# Patient Record
Sex: FEMALE | Race: BLACK OR AFRICAN AMERICAN | NOT HISPANIC OR LATINO | Employment: FULL TIME | URBAN - METROPOLITAN AREA
[De-identification: names, ages, dates, MRNs, and addresses within clinical notes are randomized per-mention and may not be internally consistent; named-entity substitution may affect disease eponyms.]

---

## 2018-06-29 ENCOUNTER — OFFICE VISIT (OUTPATIENT)
Dept: URGENT CARE | Facility: CLINIC | Age: 68
End: 2018-06-29
Payer: COMMERCIAL

## 2018-06-29 VITALS
WEIGHT: 180 LBS | HEIGHT: 65 IN | SYSTOLIC BLOOD PRESSURE: 160 MMHG | HEART RATE: 68 BPM | BODY MASS INDEX: 29.99 KG/M2 | DIASTOLIC BLOOD PRESSURE: 67 MMHG | OXYGEN SATURATION: 98 % | TEMPERATURE: 98 F | RESPIRATION RATE: 17 BRPM

## 2018-06-29 DIAGNOSIS — L23.9 ALLERGIC DERMATITIS: Primary | ICD-10-CM

## 2018-06-29 PROCEDURE — 99203 OFFICE O/P NEW LOW 30 MIN: CPT | Mod: S$GLB,,, | Performed by: NURSE PRACTITIONER

## 2018-06-29 RX ORDER — LEVOTHYROXINE SODIUM 50 UG/1
50 TABLET ORAL DAILY
COMMUNITY

## 2018-06-29 RX ORDER — MELOXICAM 15 MG/1
15 TABLET ORAL DAILY
COMMUNITY

## 2018-06-29 RX ORDER — SIMVASTATIN 20 MG/1
20 TABLET, FILM COATED ORAL NIGHTLY
COMMUNITY

## 2018-06-29 RX ORDER — TRIAMCINOLONE ACETONIDE 1 MG/G
OINTMENT TOPICAL 2 TIMES DAILY
Qty: 1 TUBE | Refills: 0 | Status: SHIPPED | OUTPATIENT
Start: 2018-06-29 | End: 2022-01-01

## 2018-06-29 RX ORDER — LISINOPRIL AND HYDROCHLOROTHIAZIDE 12.5; 2 MG/1; MG/1
1 TABLET ORAL DAILY
COMMUNITY
End: 2022-01-01

## 2018-06-29 NOTE — PROGRESS NOTES
"Subjective:       Patient ID: Dominique Mcgee is a 68 y.o. female.    Vitals:  height is 5' 5" (1.651 m) and weight is 81.6 kg (180 lb). Her oral temperature is 98.1 °F (36.7 °C). Her blood pressure is 160/67 (abnormal) and her pulse is 68. Her respiration is 17 and oxygen saturation is 98%.     Chief Complaint: Rash    Generalized itchy rash on arms for 2 weeks. No known contact with chemical, soap change detergent change, yard work or insect bite.  Diffuse reddened papular lesions over arms. Patient is wearing sleeveless shirt today and states this is what she usually wears daily.  No lesions elsewhere on body. No fever throughout.      Rash   This is a new problem. The current episode started 1 to 4 weeks ago. The problem has been gradually worsening since onset. The rash is diffuse. The rash is characterized by swelling, itchiness, pain and burning. It is unknown if there was an exposure to a precipitant. Pertinent negatives include no congestion, cough, fever, joint pain, shortness of breath or sore throat. Past treatments include anti-itch cream. The treatment provided no relief.     Review of Systems   Constitution: Negative for chills, fever and malaise/fatigue.   HENT: Negative for congestion, ear pain, hoarse voice and sore throat.    Eyes: Negative for discharge and redness.   Cardiovascular: Negative for chest pain, dyspnea on exertion and leg swelling.   Respiratory: Negative for cough, shortness of breath, sputum production and wheezing.    Skin: Positive for itching and rash.   Musculoskeletal: Negative for joint pain and myalgias.   Gastrointestinal: Negative for abdominal pain and nausea.   Neurological: Negative for headaches.       Objective:      Physical Exam   Constitutional: She is oriented to person, place, and time. She appears well-developed and well-nourished.   HENT:   Head: Normocephalic and atraumatic. Head is without abrasion, without contusion and without laceration.   Right Ear: " External ear normal.   Left Ear: External ear normal.   Nose: Nose normal.   Mouth/Throat: Oropharynx is clear and moist.   Eyes: Conjunctivae, EOM and lids are normal. Pupils are equal, round, and reactive to light.   Neck: Trachea normal, full passive range of motion without pain and phonation normal. Neck supple.   Cardiovascular: Normal rate, regular rhythm and normal heart sounds.    Pulmonary/Chest: Effort normal and breath sounds normal. No stridor. No respiratory distress. She has no decreased breath sounds. She has no wheezes.   Musculoskeletal: Normal range of motion.   Neurological: She is alert and oriented to person, place, and time.   Skin: Skin is warm, dry and intact. Capillary refill takes less than 2 seconds. Rash noted. No abrasion, no bruising, no burn, no ecchymosis, no laceration and no lesion noted. Rash is maculopapular. No erythema.        Erythematous, maculopapular rash in diffuse patches to bilateral arms   Psychiatric: She has a normal mood and affect. Her speech is normal and behavior is normal. Judgment and thought content normal. Cognition and memory are normal.   Nursing note and vitals reviewed.      Assessment:       1. Allergic dermatitis        Plan:         Allergic dermatitis  -     triamcinolone acetonide 0.1% (KENALOG) 0.1 % ointment; Apply topically 2 (two) times daily. for 14 days  Dispense: 1 Tube; Refill: 0      Patient Instructions     You may continue taking an antihistamine daily.    Please drink plenty of fluids.  Please get plenty of rest.  Please return here or go to the Emergency Department for any concerns or worsening of condition.  If you have a localized reaction it is ok to apply topical Benadryl and/or Cortaid as directed to the affected area.  Please take over the counter Benadryl as directed for the next 24-72 hours as needed for itching unless it makes you sleepy, then you can take over the counter Allegra or Claritin or Zyrtec as directed during the  daytime hours as these generally do not cause drowsiness.  Please follow up with your primary care doctor or specialist as needed.    If you  smoke, please stop smoking.    General Allergic Reactions  An allergic reaction is a set of symptoms caused by an allergen. An allergen is something that causes a persons immune system to react. When a person comes in contact with an allergen, it causes the body to release chemicals. These include the chemical histamine. Histamine causes swelling and itching. It may affect the entire body. This is called a general allergic reaction. Often symptoms affect only 1 part of the body. This is called a local allergic reaction.  You are having an allergic reaction. Almost anything can cause one. Different people are allergic to different things. It is usually something that you ate or swallowed, came into contact with by getting or putting it on your skin or clothes, or something you breathed in the air. This can be very annoying and sometimes scary.  Most of us think of allergic reactions when we have a rash or itchy skin. Symptoms can include:  · Itching of the eyes, nose, and roof of the mouth  · Runny or stuffy nose  · Watery eyes   · Sneezing or coughing   · A blocked feeling in the ear  · Red, itchy rash called hives  · Red and purple spots  · Rash, redness, welts, blisters  · Itching, burning, stinging, pain  · Dry, flaky, cracking, scaly skin  Severe symptoms include:  · Swelling of the face, lips, or other parts of the body  · Hoarse voice  · Trouble swallowing, feeling like your throat is closing  · Trouble breathing, wheezing  · Nausea, vomiting, diarrhea, stomach cramps  · Feeling faint or lightheaded, rapid heart rate  Sometimes the cause may be obvious. But there are so many things that can cause a reaction that you may not be able to figure out. The most important things to help find your allergen are:  · Remembering when it started  · What you were doing at the time  or just before that  · Any activities you were involved in  · Any new products or contacts  Below are some common causes. But remember that almost anything can cause a reaction. You may not even be aware that you came into contact with one of these things:  · Dust, mold, pollen  · Plants (common ones are poison ivy and poison oak, but there are many others)   · Animals  · Foods such as shrimp, shellfish, peanuts, milk products, gluten, and eggs. Also food colorings, flavorings, and additives.  · Insect bites or stings such as bees, mosquitos, fleas, ticks  · Medicines such as penicillin, sulfa medicines, amoxicillin, aspirin, and ibuprofen. But any medicine can cause a reaction.  · Jewelry such as nickel or gold. This can be new, or something youve worn for a while, including zippers and buttons.  · Latex such as in gloves, clothes, toys, balloons, or some tapes. Some people allergic to latex may also have problems with foods like bananas, avocados, kiwi, papaya, or chestnuts.  · Lotions, perfumes, cosmetics, soaps, shampoos, skincare products, nail products  · Chemicals or dyes in clothing, linen, , hair dyes, soaps, iodine  Many viruses and common colds can cause a rash that is not an allergic reaction. Sometimes it is hard to tell the difference between allergies, sensitivity, or an intolerance to something. This is especially true with food. Many things can cause diarrhea, vomiting, stomach cramps, and skin irritation.  Home care    The goal of treatment is to help relieve the symptoms and get you feeling better. The rash will usually fade over several days. But it can sometimes last a couple of weeks. Over the next couple of days, there may be times when it is gets a little worse, and then better again. Here are some things to do:  · If you know what you are allergic to, stay away from it. Future reactions could be worse than this one.  · Avoid tight clothing and anything that heats up your skin (hot  showers or baths, direct sunlight). Heat will make itching worse.  · An ice pack will relieve local areas of intense itching and redness. To make an ice pack, put ice cubes in a plastic bag that seals at the top. Wrap it in a thin, clean towel. Dont put the ice directly on the skin because it can damage the skin.  · Oral diphenhydramine is an over-the-counter antihistamine sold at pharmacy and grocery stores. Unless a prescription antihistamine was given, diphenhydramine may be used to reduce itching if large areas of the skin are involved. It may make you sleepy. So be careful using it in the daytime or when going to school, working, or driving. Note: Dont use diphenhydramine if you have glaucoma or if you are a man with trouble urinating due to an enlarged prostate. There are other antihistamines that wont make you so sleepy. These are good choices for daytime use. Ask your pharmacist for suggestions.  · Dont use diphenhydramine cream on your skin. It can cause a further reaction in some people.  · To help prevent an infection, don't scratch the affected area. Scratching may worsen the reaction and damage your skin. It can also lead to an infection. Always check the affected for signs of an infection.  · Call your healthcare provider and ask what you can use to help decrease the itching.  · To decrease allergic reactions, try the following:    · Use heat-steam to clean your home  · Use high-efficiency particulate (HEPA) vacuums and filters  · Stay away from food and pet triggers  · Kill any cockroaches  · Clean your house often  Follow-up care  Follow up with your healthcare provider, or as advised. If you had a severe reaction today, or if you have had several mild to medium allergic reactions in the past, ask your provider about allergy testing. This can help you find out what you are allergic to. If your reaction included dizziness, fainting, or trouble breathing or swallowing, ask your provider about  carrying auto-injectable epinephrine.  Call 911  Call 911 if any of these occur:  · Trouble breathing or swallowing, wheezing  · Cool, moist, pale skin  · Shortness of breath  · Hoarse voice or trouble speaking  · Confused   · Very drowsy or trouble awakening  · Fainting or loss of consciousness  · Rapid heart rate  · Feeling of dizziness or weakness or a sudden drop in blood pressure  · Feeling of doom  · Feeling lightheaded  · Severe nausea or vomiting, or diarrhea  · Seizure  · Swelling in the face, eyelids, lips, mouth, throat or tongue  · Drooling  When to seek medical advice  Call your healthcare provider right away if any of these occur:  · Spreading areas of itching, redness or swelling  · Nausea or stomach cramps or abdominal pain  · Continuing or recurring symptoms  · Spreading areas of redness, swelling, or itching  · Signs of infection at the affected site:  ¨ Spreading redness  ¨ Increased pain or swelling  ¨ Fluid or colored drainage from the site  ¨ Fever of 100.4°F (38°C) or above lasting for 24 to 48 hours, or as directed by your provider  Date Last Reviewed: 3/1/2017  © 6044-0841 [employer]. [address] [place] [address]. All rights reserved. This information is not intended as a substitute for professional medical care. Always follow your healthcare professional's instructions.

## 2018-06-29 NOTE — PATIENT INSTRUCTIONS
You may continue taking an antihistamine daily.    Please drink plenty of fluids.  Please get plenty of rest.  Please return here or go to the Emergency Department for any concerns or worsening of condition.  If you have a localized reaction it is ok to apply topical Benadryl and/or Cortaid as directed to the affected area.  Please take over the counter Benadryl as directed for the next 24-72 hours as needed for itching unless it makes you sleepy, then you can take over the counter Allegra or Claritin or Zyrtec as directed during the daytime hours as these generally do not cause drowsiness.  Please follow up with your primary care doctor or specialist as needed.    If you  smoke, please stop smoking.    General Allergic Reactions  An allergic reaction is a set of symptoms caused by an allergen. An allergen is something that causes a persons immune system to react. When a person comes in contact with an allergen, it causes the body to release chemicals. These include the chemical histamine. Histamine causes swelling and itching. It may affect the entire body. This is called a general allergic reaction. Often symptoms affect only 1 part of the body. This is called a local allergic reaction.  You are having an allergic reaction. Almost anything can cause one. Different people are allergic to different things. It is usually something that you ate or swallowed, came into contact with by getting or putting it on your skin or clothes, or something you breathed in the air. This can be very annoying and sometimes scary.  Most of us think of allergic reactions when we have a rash or itchy skin. Symptoms can include:  · Itching of the eyes, nose, and roof of the mouth  · Runny or stuffy nose  · Watery eyes   · Sneezing or coughing   · A blocked feeling in the ear  · Red, itchy rash called hives  · Red and purple spots  · Rash, redness, welts, blisters  · Itching, burning, stinging, pain  · Dry, flaky, cracking, scaly  skin  Severe symptoms include:  · Swelling of the face, lips, or other parts of the body  · Hoarse voice  · Trouble swallowing, feeling like your throat is closing  · Trouble breathing, wheezing  · Nausea, vomiting, diarrhea, stomach cramps  · Feeling faint or lightheaded, rapid heart rate  Sometimes the cause may be obvious. But there are so many things that can cause a reaction that you may not be able to figure out. The most important things to help find your allergen are:  · Remembering when it started  · What you were doing at the time or just before that  · Any activities you were involved in  · Any new products or contacts  Below are some common causes. But remember that almost anything can cause a reaction. You may not even be aware that you came into contact with one of these things:  · Dust, mold, pollen  · Plants (common ones are poison ivy and poison oak, but there are many others)   · Animals  · Foods such as shrimp, shellfish, peanuts, milk products, gluten, and eggs. Also food colorings, flavorings, and additives.  · Insect bites or stings such as bees, mosquitos, fleas, ticks  · Medicines such as penicillin, sulfa medicines, amoxicillin, aspirin, and ibuprofen. But any medicine can cause a reaction.  · Jewelry such as nickel or gold. This can be new, or something youve worn for a while, including zippers and buttons.  · Latex such as in gloves, clothes, toys, balloons, or some tapes. Some people allergic to latex may also have problems with foods like bananas, avocados, kiwi, papaya, or chestnuts.  · Lotions, perfumes, cosmetics, soaps, shampoos, skincare products, nail products  · Chemicals or dyes in clothing, linen, , hair dyes, soaps, iodine  Many viruses and common colds can cause a rash that is not an allergic reaction. Sometimes it is hard to tell the difference between allergies, sensitivity, or an intolerance to something. This is especially true with food. Many things can cause  diarrhea, vomiting, stomach cramps, and skin irritation.  Home care    The goal of treatment is to help relieve the symptoms and get you feeling better. The rash will usually fade over several days. But it can sometimes last a couple of weeks. Over the next couple of days, there may be times when it is gets a little worse, and then better again. Here are some things to do:  · If you know what you are allergic to, stay away from it. Future reactions could be worse than this one.  · Avoid tight clothing and anything that heats up your skin (hot showers or baths, direct sunlight). Heat will make itching worse.  · An ice pack will relieve local areas of intense itching and redness. To make an ice pack, put ice cubes in a plastic bag that seals at the top. Wrap it in a thin, clean towel. Dont put the ice directly on the skin because it can damage the skin.  · Oral diphenhydramine is an over-the-counter antihistamine sold at pharmacy and grocery stores. Unless a prescription antihistamine was given, diphenhydramine may be used to reduce itching if large areas of the skin are involved. It may make you sleepy. So be careful using it in the daytime or when going to school, working, or driving. Note: Dont use diphenhydramine if you have glaucoma or if you are a man with trouble urinating due to an enlarged prostate. There are other antihistamines that wont make you so sleepy. These are good choices for daytime use. Ask your pharmacist for suggestions.  · Dont use diphenhydramine cream on your skin. It can cause a further reaction in some people.  · To help prevent an infection, don't scratch the affected area. Scratching may worsen the reaction and damage your skin. It can also lead to an infection. Always check the affected for signs of an infection.  · Call your healthcare provider and ask what you can use to help decrease the itching.  · To decrease allergic reactions, try the following:    · Use heat-steam to clean  your home  · Use high-efficiency particulate (HEPA) vacuums and filters  · Stay away from food and pet triggers  · Kill any cockroaches  · Clean your house often  Follow-up care  Follow up with your healthcare provider, or as advised. If you had a severe reaction today, or if you have had several mild to medium allergic reactions in the past, ask your provider about allergy testing. This can help you find out what you are allergic to. If your reaction included dizziness, fainting, or trouble breathing or swallowing, ask your provider about carrying auto-injectable epinephrine.  Call 911  Call 911 if any of these occur:  · Trouble breathing or swallowing, wheezing  · Cool, moist, pale skin  · Shortness of breath  · Hoarse voice or trouble speaking  · Confused   · Very drowsy or trouble awakening  · Fainting or loss of consciousness  · Rapid heart rate  · Feeling of dizziness or weakness or a sudden drop in blood pressure  · Feeling of doom  · Feeling lightheaded  · Severe nausea or vomiting, or diarrhea  · Seizure  · Swelling in the face, eyelids, lips, mouth, throat or tongue  · Drooling  When to seek medical advice  Call your healthcare provider right away if any of these occur:  · Spreading areas of itching, redness or swelling  · Nausea or stomach cramps or abdominal pain  · Continuing or recurring symptoms  · Spreading areas of redness, swelling, or itching  · Signs of infection at the affected site:  ¨ Spreading redness  ¨ Increased pain or swelling  ¨ Fluid or colored drainage from the site  ¨ Fever of 100.4°F (38°C) or above lasting for 24 to 48 hours, or as directed by your provider  Date Last Reviewed: 3/1/2017  © 1065-0682 [employer]. [address] [place] [address], [place] [address]. All rights reserved. This information is not intended as a substitute for professional medical care. Always follow your healthcare professional's instructions.

## 2021-06-14 ENCOUNTER — HOSPITAL ENCOUNTER (OUTPATIENT)
Dept: RADIOLOGY | Facility: OTHER | Age: 71
Discharge: HOME | End: 2021-06-14
Attending: INTERNAL MEDICINE
Payer: MEDICARE

## 2021-06-14 DIAGNOSIS — Z12.31 VISIT FOR SCREENING MAMMOGRAM: ICD-10-CM

## 2021-06-14 PROCEDURE — 77067 MAMMO DIGITAL SCREENING BILAT WITH TOMO: ICD-10-PCS | Mod: 26,,, | Performed by: RADIOLOGY

## 2021-06-14 PROCEDURE — 77063 BREAST TOMOSYNTHESIS BI: CPT | Mod: 26,,, | Performed by: RADIOLOGY

## 2021-06-14 PROCEDURE — 77067 SCR MAMMO BI INCL CAD: CPT | Mod: 26,,, | Performed by: RADIOLOGY

## 2021-06-14 PROCEDURE — 77067 SCR MAMMO BI INCL CAD: CPT | Mod: TC

## 2021-06-14 PROCEDURE — 77063 MAMMO DIGITAL SCREENING BILAT WITH TOMO: ICD-10-PCS | Mod: 26,,, | Performed by: RADIOLOGY

## 2022-01-01 ENCOUNTER — HOSPITAL ENCOUNTER (EMERGENCY)
Facility: OTHER | Age: 72
Discharge: HOME | End: 2022-11-30
Attending: EMERGENCY MEDICINE
Payer: MEDICARE

## 2022-01-01 ENCOUNTER — HOSPITAL ENCOUNTER (INPATIENT)
Facility: HOSPITAL | Age: 72
LOS: 26 days | DRG: 643 | End: 2023-01-18
Attending: EMERGENCY MEDICINE | Admitting: HOSPITALIST
Payer: MEDICARE

## 2022-01-01 ENCOUNTER — HOSPITAL ENCOUNTER (OUTPATIENT)
Dept: RADIOLOGY | Facility: OTHER | Age: 72
Discharge: HOME | End: 2022-12-10
Attending: INTERNAL MEDICINE
Payer: MEDICARE

## 2022-01-01 ENCOUNTER — HOSPITAL ENCOUNTER (EMERGENCY)
Facility: OTHER | Age: 72
Discharge: HOME | End: 2022-12-12
Attending: EMERGENCY MEDICINE
Payer: MEDICARE

## 2022-01-01 VITALS
DIASTOLIC BLOOD PRESSURE: 84 MMHG | SYSTOLIC BLOOD PRESSURE: 196 MMHG | OXYGEN SATURATION: 100 % | TEMPERATURE: 98 F | BODY MASS INDEX: 25.34 KG/M2 | WEIGHT: 143 LBS | RESPIRATION RATE: 22 BRPM | HEIGHT: 63 IN | HEART RATE: 67 BPM

## 2022-01-01 VITALS
RESPIRATION RATE: 18 BRPM | DIASTOLIC BLOOD PRESSURE: 86 MMHG | HEIGHT: 63 IN | SYSTOLIC BLOOD PRESSURE: 179 MMHG | HEART RATE: 99 BPM | WEIGHT: 132 LBS | BODY MASS INDEX: 23.39 KG/M2 | TEMPERATURE: 99 F | OXYGEN SATURATION: 98 %

## 2022-01-01 DIAGNOSIS — R65.21 SEPTIC SHOCK: ICD-10-CM

## 2022-01-01 DIAGNOSIS — R03.0 ELEVATED BLOOD PRESSURE READING: ICD-10-CM

## 2022-01-01 DIAGNOSIS — R53.1 WEAKNESS: ICD-10-CM

## 2022-01-01 DIAGNOSIS — G95.9 CERVICAL MYELOPATHY: ICD-10-CM

## 2022-01-01 DIAGNOSIS — R06.02 SHORTNESS OF BREATH: ICD-10-CM

## 2022-01-01 DIAGNOSIS — I16.0 HYPERTENSIVE URGENCY: ICD-10-CM

## 2022-01-01 DIAGNOSIS — R07.9 CHEST PAIN: ICD-10-CM

## 2022-01-01 DIAGNOSIS — E87.6 HYPOKALEMIA: ICD-10-CM

## 2022-01-01 DIAGNOSIS — E04.9 ENLARGED THYROID: ICD-10-CM

## 2022-01-01 DIAGNOSIS — R06.02 SOB (SHORTNESS OF BREATH): ICD-10-CM

## 2022-01-01 DIAGNOSIS — E87.1 HYPONATREMIA: Primary | ICD-10-CM

## 2022-01-01 DIAGNOSIS — J96.01 ACUTE HYPOXEMIC RESPIRATORY FAILURE: ICD-10-CM

## 2022-01-01 DIAGNOSIS — R13.10 DYSPHAGIA, UNSPECIFIED TYPE: ICD-10-CM

## 2022-01-01 DIAGNOSIS — A41.9 SEPTIC SHOCK: ICD-10-CM

## 2022-01-01 DIAGNOSIS — R05.9 COUGH, UNSPECIFIED TYPE: Primary | ICD-10-CM

## 2022-01-01 DIAGNOSIS — Z91.89 AT RISK FOR LONG QT SYNDROME: ICD-10-CM

## 2022-01-01 DIAGNOSIS — E87.1 HYPONATREMIA: ICD-10-CM

## 2022-01-01 DIAGNOSIS — R06.09 DYSPNEA ON EXERTION: ICD-10-CM

## 2022-01-01 DIAGNOSIS — J06.9 VIRAL URI WITH COUGH: Primary | ICD-10-CM

## 2022-01-01 DIAGNOSIS — R00.0 TACHYCARDIA: ICD-10-CM

## 2022-01-01 DIAGNOSIS — R06.03 RESPIRATORY DISTRESS: ICD-10-CM

## 2022-01-01 DIAGNOSIS — R60.0 FLUID COLLECTION (EDEMA) IN THE ARMS, LEGS, HANDS AND FEET: ICD-10-CM

## 2022-01-01 DIAGNOSIS — M54.9 BACK PAIN, UNSPECIFIED BACK LOCATION, UNSPECIFIED BACK PAIN LATERALITY, UNSPECIFIED CHRONICITY: ICD-10-CM

## 2022-01-01 DIAGNOSIS — I10 HYPERTENSION, UNSPECIFIED TYPE: ICD-10-CM

## 2022-01-01 DIAGNOSIS — E87.8 HYPOCHLOREMIA: ICD-10-CM

## 2022-01-01 DIAGNOSIS — I10 HYPERTENSION: ICD-10-CM

## 2022-01-01 LAB
ABDOMINAL AORTA MID PSV: 49 CM/S
ALBUMIN SERPL BCP-MCNC: 3.1 G/DL (ref 3.5–5.2)
ALBUMIN SERPL BCP-MCNC: 3.2 G/DL (ref 3.5–5.2)
ALBUMIN SERPL BCP-MCNC: 3.4 G/DL (ref 3.5–5.2)
ALBUMIN SERPL BCP-MCNC: 3.4 G/DL (ref 3.5–5.2)
ALBUMIN SERPL BCP-MCNC: 4.1 G/DL (ref 3.5–5.2)
ALBUMIN SERPL BCP-MCNC: 4.2 G/DL (ref 3.5–5.2)
ALBUMIN SERPL BCP-MCNC: 4.2 G/DL (ref 3.5–5.2)
ALDOST SERPL-MCNC: 10.8 NG/DL
ALDOST SERPL-MCNC: 7 NG/DL
ALDOST SERPL-MCNC: 7.4 NG/DL
ALDOST/RENIN PLAS-RTO: 2.2 RATIO
ALDOST/RENIN PLAS-RTO: 73.7 RATIO
ALLENS TEST: ABNORMAL
ALP SERPL-CCNC: 60 U/L (ref 55–135)
ALP SERPL-CCNC: 64 U/L (ref 55–135)
ALP SERPL-CCNC: 64 U/L (ref 55–135)
ALP SERPL-CCNC: 66 U/L (ref 55–135)
ALP SERPL-CCNC: 68 U/L (ref 55–135)
ALP SERPL-CCNC: 70 U/L (ref 55–135)
ALP SERPL-CCNC: 81 U/L (ref 55–135)
ALT SERPL W/O P-5'-P-CCNC: 10 U/L (ref 10–44)
ALT SERPL W/O P-5'-P-CCNC: 11 U/L (ref 10–44)
ALT SERPL W/O P-5'-P-CCNC: 12 U/L (ref 10–44)
ALT SERPL W/O P-5'-P-CCNC: 14 U/L (ref 10–44)
ALT SERPL W/O P-5'-P-CCNC: 14 U/L (ref 10–44)
AMORPH CRY UR QL COMP ASSIST: ABNORMAL
ANION GAP SERPL CALC-SCNC: 10 MMOL/L (ref 8–16)
ANION GAP SERPL CALC-SCNC: 11 MMOL/L (ref 8–16)
ANION GAP SERPL CALC-SCNC: 11 MMOL/L (ref 8–16)
ANION GAP SERPL CALC-SCNC: 12 MMOL/L (ref 8–16)
ANION GAP SERPL CALC-SCNC: 12 MMOL/L (ref 8–16)
ANION GAP SERPL CALC-SCNC: 5 MMOL/L (ref 8–16)
ANION GAP SERPL CALC-SCNC: 6 MMOL/L (ref 8–16)
ANION GAP SERPL CALC-SCNC: 7 MMOL/L (ref 8–16)
ANION GAP SERPL CALC-SCNC: 7 MMOL/L (ref 8–16)
ANION GAP SERPL CALC-SCNC: 8 MMOL/L (ref 8–16)
ANION GAP SERPL CALC-SCNC: 9 MMOL/L (ref 8–16)
APPEARANCE FLD: CLEAR
ASCENDING AORTA: 2.24 CM
AST SERPL-CCNC: 18 U/L (ref 10–40)
AST SERPL-CCNC: 18 U/L (ref 10–40)
AST SERPL-CCNC: 20 U/L (ref 10–40)
AST SERPL-CCNC: 22 U/L (ref 10–40)
AST SERPL-CCNC: 23 U/L (ref 10–40)
AST SERPL-CCNC: 23 U/L (ref 10–40)
AST SERPL-CCNC: 24 U/L (ref 10–40)
AV INDEX (PROSTH): 0.73
AV MEAN GRADIENT: 3 MMHG
AV PEAK GRADIENT: 5 MMHG
AV VALVE AREA: 2.08 CM2
AV VELOCITY RATIO: 0.75
BACTERIA #/AREA URNS AUTO: ABNORMAL /HPF
BACTERIA #/AREA URNS AUTO: ABNORMAL /HPF
BASOPHILS # BLD AUTO: 0.01 K/UL (ref 0–0.2)
BASOPHILS # BLD AUTO: 0.02 K/UL (ref 0–0.2)
BASOPHILS # BLD AUTO: 0.04 K/UL (ref 0–0.2)
BASOPHILS # BLD AUTO: 0.08 K/UL (ref 0–0.2)
BASOPHILS # BLD AUTO: 0.09 K/UL (ref 0–0.2)
BASOPHILS # BLD AUTO: 0.09 K/UL (ref 0–0.2)
BASOPHILS # BLD AUTO: 0.1 K/UL (ref 0–0.2)
BASOPHILS # BLD AUTO: 0.1 K/UL (ref 0–0.2)
BASOPHILS # BLD AUTO: 0.13 K/UL (ref 0–0.2)
BASOPHILS NFR BLD: 0.1 % (ref 0–1.9)
BASOPHILS NFR BLD: 0.2 % (ref 0–1.9)
BASOPHILS NFR BLD: 0.4 % (ref 0–1.9)
BASOPHILS NFR BLD: 0.8 % (ref 0–1.9)
BASOPHILS NFR BLD: 0.9 % (ref 0–1.9)
BASOPHILS NFR BLD: 1.1 % (ref 0–1.9)
BASOPHILS NFR BLD: 1.3 % (ref 0–1.9)
BASOPHILS NFR BLD: 1.4 % (ref 0–1.9)
BASOPHILS NFR BLD: 1.4 % (ref 0–1.9)
BILIRUB SERPL-MCNC: 0.6 MG/DL (ref 0.1–1)
BILIRUB SERPL-MCNC: 0.6 MG/DL (ref 0.1–1)
BILIRUB SERPL-MCNC: 0.7 MG/DL (ref 0.1–1)
BILIRUB SERPL-MCNC: 0.8 MG/DL (ref 0.1–1)
BILIRUB SERPL-MCNC: 0.9 MG/DL (ref 0.1–1)
BILIRUB SERPL-MCNC: 1 MG/DL (ref 0.1–1)
BILIRUB SERPL-MCNC: 1.1 MG/DL (ref 0.1–1)
BILIRUB UR QL STRIP: NEGATIVE
BILIRUB UR QL STRIP: NEGATIVE
BNP SERPL-MCNC: 16 PG/ML (ref 0–99)
BNP SERPL-MCNC: 31 PG/ML (ref 0–99)
BNP SERPL-MCNC: <10 PG/ML (ref 0–99)
BODY FLD TYPE: NORMAL
BSA FOR ECHO PROCEDURE: 1.64 M2
BUN SERPL-MCNC: 10 MG/DL (ref 8–23)
BUN SERPL-MCNC: 11 MG/DL (ref 8–23)
BUN SERPL-MCNC: 12 MG/DL (ref 8–23)
BUN SERPL-MCNC: 13 MG/DL (ref 8–23)
BUN SERPL-MCNC: 14 MG/DL (ref 8–23)
BUN SERPL-MCNC: 15 MG/DL (ref 8–23)
BUN SERPL-MCNC: 16 MG/DL (ref 8–23)
BUN SERPL-MCNC: 16 MG/DL (ref 8–23)
BUN SERPL-MCNC: 17 MG/DL (ref 8–23)
BUN SERPL-MCNC: 19 MG/DL (ref 8–23)
BUN SERPL-MCNC: 22 MG/DL (ref 8–23)
BUN SERPL-MCNC: 22 MG/DL (ref 8–23)
BUN SERPL-MCNC: 23 MG/DL (ref 8–23)
BUN SERPL-MCNC: 7 MG/DL (ref 8–23)
BUN SERPL-MCNC: 8 MG/DL (ref 8–23)
BUN SERPL-MCNC: 8 MG/DL (ref 8–23)
BUN SERPL-MCNC: 9 MG/DL (ref 8–23)
BUN SERPL-MCNC: 9 MG/DL (ref 8–23)
CALCIUM SERPL-MCNC: 7.6 MG/DL (ref 8.7–10.5)
CALCIUM SERPL-MCNC: 7.6 MG/DL (ref 8.7–10.5)
CALCIUM SERPL-MCNC: 8 MG/DL (ref 8.7–10.5)
CALCIUM SERPL-MCNC: 8.1 MG/DL (ref 8.7–10.5)
CALCIUM SERPL-MCNC: 8.2 MG/DL (ref 8.7–10.5)
CALCIUM SERPL-MCNC: 8.2 MG/DL (ref 8.7–10.5)
CALCIUM SERPL-MCNC: 8.3 MG/DL (ref 8.7–10.5)
CALCIUM SERPL-MCNC: 8.4 MG/DL (ref 8.7–10.5)
CALCIUM SERPL-MCNC: 8.5 MG/DL (ref 8.7–10.5)
CALCIUM SERPL-MCNC: 8.6 MG/DL (ref 8.7–10.5)
CALCIUM SERPL-MCNC: 8.7 MG/DL (ref 8.7–10.5)
CALCIUM SERPL-MCNC: 8.8 MG/DL (ref 8.7–10.5)
CALCIUM SERPL-MCNC: 8.9 MG/DL (ref 8.7–10.5)
CALCIUM SERPL-MCNC: 8.9 MG/DL (ref 8.7–10.5)
CALCIUM SERPL-MCNC: 9 MG/DL (ref 8.7–10.5)
CALCIUM SERPL-MCNC: 9.1 MG/DL (ref 8.7–10.5)
CALCIUM SERPL-MCNC: 9.3 MG/DL (ref 8.7–10.5)
CALCIUM SERPL-MCNC: 9.7 MG/DL (ref 8.7–10.5)
CALCIUM SERPL-MCNC: 9.8 MG/DL (ref 8.7–10.5)
CALCIUM SERPL-MCNC: 9.9 MG/DL (ref 8.7–10.5)
CAOX CRY UR QL COMP ASSIST: ABNORMAL
CHLORIDE SERPL-SCNC: 101 MMOL/L (ref 95–110)
CHLORIDE SERPL-SCNC: 75 MMOL/L (ref 95–110)
CHLORIDE SERPL-SCNC: 76 MMOL/L (ref 95–110)
CHLORIDE SERPL-SCNC: 77 MMOL/L (ref 95–110)
CHLORIDE SERPL-SCNC: 78 MMOL/L (ref 95–110)
CHLORIDE SERPL-SCNC: 79 MMOL/L (ref 95–110)
CHLORIDE SERPL-SCNC: 80 MMOL/L (ref 95–110)
CHLORIDE SERPL-SCNC: 83 MMOL/L (ref 95–110)
CHLORIDE SERPL-SCNC: 83 MMOL/L (ref 95–110)
CHLORIDE SERPL-SCNC: 84 MMOL/L (ref 95–110)
CHLORIDE SERPL-SCNC: 84 MMOL/L (ref 95–110)
CHLORIDE SERPL-SCNC: 87 MMOL/L (ref 95–110)
CHLORIDE SERPL-SCNC: 87 MMOL/L (ref 95–110)
CHLORIDE SERPL-SCNC: 89 MMOL/L (ref 95–110)
CHLORIDE SERPL-SCNC: 89 MMOL/L (ref 95–110)
CHLORIDE SERPL-SCNC: 90 MMOL/L (ref 95–110)
CHLORIDE SERPL-SCNC: 91 MMOL/L (ref 95–110)
CHLORIDE SERPL-SCNC: 93 MMOL/L (ref 95–110)
CHLORIDE SERPL-SCNC: 94 MMOL/L (ref 95–110)
CHLORIDE SERPL-SCNC: 95 MMOL/L (ref 95–110)
CHLORIDE SERPL-SCNC: 97 MMOL/L (ref 95–110)
CHLORIDE SERPL-SCNC: 99 MMOL/L (ref 95–110)
CHOLEST SERPL-MCNC: 151 MG/DL (ref 120–199)
CHOLEST/HDLC SERPL: 3.7 {RATIO} (ref 2–5)
CLARITY UR REFRACT.AUTO: ABNORMAL
CLARITY UR REFRACT.AUTO: CLEAR
CO2 SERPL-SCNC: 23 MMOL/L (ref 23–29)
CO2 SERPL-SCNC: 23 MMOL/L (ref 23–29)
CO2 SERPL-SCNC: 25 MMOL/L (ref 23–29)
CO2 SERPL-SCNC: 26 MMOL/L (ref 23–29)
CO2 SERPL-SCNC: 28 MMOL/L (ref 23–29)
CO2 SERPL-SCNC: 29 MMOL/L (ref 23–29)
CO2 SERPL-SCNC: 30 MMOL/L (ref 23–29)
CO2 SERPL-SCNC: 31 MMOL/L (ref 23–29)
CO2 SERPL-SCNC: 32 MMOL/L (ref 23–29)
CO2 SERPL-SCNC: 35 MMOL/L (ref 23–29)
CO2 SERPL-SCNC: 37 MMOL/L (ref 23–29)
COLOR FLD: COLORLESS
COLOR UR AUTO: COLORLESS
COLOR UR AUTO: YELLOW
CORTIS SERPL-MCNC: 37.8 UG/DL (ref 4.3–22.4)
CREAT SERPL-MCNC: 0.4 MG/DL (ref 0.5–1.4)
CREAT SERPL-MCNC: 0.4 MG/DL (ref 0.5–1.4)
CREAT SERPL-MCNC: 0.5 MG/DL (ref 0.5–1.4)
CREAT SERPL-MCNC: 0.6 MG/DL (ref 0.5–1.4)
CREAT SERPL-MCNC: 0.7 MG/DL (ref 0.5–1.4)
CREAT SERPL-MCNC: 0.8 MG/DL (ref 0.5–1.4)
CREAT UR-MCNC: 110 MG/DL (ref 15–325)
CTP QC/QA: YES
CV ECHO LV RWT: 0.4 CM
D DIMER PPP IA.FEU-MCNC: 0.27 MG/L FEU
D DIMER PPP IA.FEU-MCNC: 0.32 MG/L FEU
D DIMER PPP IA.FEU-MCNC: 0.48 MG/L FEU
DELSYS: ABNORMAL
DIFFERENTIAL METHOD: ABNORMAL
DOP CALC AO PEAK VEL: 1.17 M/S
DOP CALC AO VTI: 25.24 CM
DOP CALC LVOT AREA: 2.8 CM2
DOP CALC LVOT DIAMETER: 1.9 CM
DOP CALC LVOT PEAK VEL: 0.88 M/S
DOP CALC LVOT STROKE VOLUME: 52.45 CM3
DOP CALC MV VTI: 8.26 CM
DOP CALCLVOT PEAK VEL VTI: 18.51 CM
E WAVE DECELERATION TIME: 154.9 MSEC
E/A RATIO: 0.51
E/E' RATIO: 7.57 M/S
ECHO LV POSTERIOR WALL: 0.7 CM (ref 0.6–1.1)
EJECTION FRACTION: 65 %
EOSINOPHIL # BLD AUTO: 0 K/UL (ref 0–0.5)
EOSINOPHIL # BLD AUTO: 0.1 K/UL (ref 0–0.5)
EOSINOPHIL # BLD AUTO: 0.1 K/UL (ref 0–0.5)
EOSINOPHIL # BLD AUTO: 0.2 K/UL (ref 0–0.5)
EOSINOPHIL # BLD AUTO: 0.2 K/UL (ref 0–0.5)
EOSINOPHIL NFR BLD: 0 % (ref 0–8)
EOSINOPHIL NFR BLD: 0 % (ref 0–8)
EOSINOPHIL NFR BLD: 0.1 % (ref 0–8)
EOSINOPHIL NFR BLD: 0.2 % (ref 0–8)
EOSINOPHIL NFR BLD: 0.2 % (ref 0–8)
EOSINOPHIL NFR BLD: 0.9 % (ref 0–8)
EOSINOPHIL NFR BLD: 1.4 % (ref 0–8)
EOSINOPHIL NFR BLD: 1.9 % (ref 0–8)
EOSINOPHIL NFR BLD: 2.5 % (ref 0–8)
EP: 5
ERYTHROCYTE [DISTWIDTH] IN BLOOD BY AUTOMATED COUNT: 12.3 % (ref 11.5–14.5)
ERYTHROCYTE [DISTWIDTH] IN BLOOD BY AUTOMATED COUNT: 12.4 % (ref 11.5–14.5)
ERYTHROCYTE [DISTWIDTH] IN BLOOD BY AUTOMATED COUNT: 12.5 % (ref 11.5–14.5)
ERYTHROCYTE [DISTWIDTH] IN BLOOD BY AUTOMATED COUNT: 12.6 % (ref 11.5–14.5)
ERYTHROCYTE [DISTWIDTH] IN BLOOD BY AUTOMATED COUNT: 12.7 % (ref 11.5–14.5)
ERYTHROCYTE [DISTWIDTH] IN BLOOD BY AUTOMATED COUNT: 12.8 % (ref 11.5–14.5)
ERYTHROCYTE [DISTWIDTH] IN BLOOD BY AUTOMATED COUNT: 12.9 % (ref 11.5–14.5)
ERYTHROCYTE [DISTWIDTH] IN BLOOD BY AUTOMATED COUNT: 13 % (ref 11.5–14.5)
ERYTHROCYTE [DISTWIDTH] IN BLOOD BY AUTOMATED COUNT: 13.1 % (ref 11.5–14.5)
ERYTHROCYTE [DISTWIDTH] IN BLOOD BY AUTOMATED COUNT: 13.3 % (ref 11.5–14.5)
ERYTHROCYTE [DISTWIDTH] IN BLOOD BY AUTOMATED COUNT: 13.3 % (ref 11.5–14.5)
ERYTHROCYTE [SEDIMENTATION RATE] IN BLOOD BY WESTERGREN METHOD: 12 MM/H
ERYTHROCYTE [SEDIMENTATION RATE] IN BLOOD BY WESTERGREN METHOD: 18 MM/H
ERYTHROCYTE [SEDIMENTATION RATE] IN BLOOD BY WESTERGREN METHOD: 8 MM/H
EST. GFR  (NO RACE VARIABLE): >60 ML/MIN/1.73 M^2
ESTIMATED AVG GLUCOSE: 108 MG/DL (ref 68–131)
FIO2: 30
FIO2: 30
FIO2: 40
FRACTIONAL SHORTENING: 32 % (ref 28–44)
GLUCOSE SERPL-MCNC: 100 MG/DL (ref 70–110)
GLUCOSE SERPL-MCNC: 109 MG/DL (ref 70–110)
GLUCOSE SERPL-MCNC: 109 MG/DL (ref 70–110)
GLUCOSE SERPL-MCNC: 113 MG/DL (ref 70–110)
GLUCOSE SERPL-MCNC: 114 MG/DL (ref 70–110)
GLUCOSE SERPL-MCNC: 118 MG/DL (ref 70–110)
GLUCOSE SERPL-MCNC: 118 MG/DL (ref 70–110)
GLUCOSE SERPL-MCNC: 119 MG/DL (ref 70–110)
GLUCOSE SERPL-MCNC: 122 MG/DL (ref 70–110)
GLUCOSE SERPL-MCNC: 123 MG/DL (ref 70–110)
GLUCOSE SERPL-MCNC: 127 MG/DL (ref 70–110)
GLUCOSE SERPL-MCNC: 129 MG/DL (ref 70–110)
GLUCOSE SERPL-MCNC: 129 MG/DL (ref 70–110)
GLUCOSE SERPL-MCNC: 130 MG/DL (ref 70–110)
GLUCOSE SERPL-MCNC: 132 MG/DL (ref 70–110)
GLUCOSE SERPL-MCNC: 133 MG/DL (ref 70–110)
GLUCOSE SERPL-MCNC: 136 MG/DL (ref 70–110)
GLUCOSE SERPL-MCNC: 139 MG/DL (ref 70–110)
GLUCOSE SERPL-MCNC: 150 MG/DL (ref 70–110)
GLUCOSE SERPL-MCNC: 154 MG/DL (ref 70–110)
GLUCOSE SERPL-MCNC: 155 MG/DL (ref 70–110)
GLUCOSE SERPL-MCNC: 171 MG/DL (ref 70–110)
GLUCOSE SERPL-MCNC: 171 MG/DL (ref 70–110)
GLUCOSE SERPL-MCNC: 176 MG/DL (ref 70–110)
GLUCOSE SERPL-MCNC: 256 MG/DL (ref 70–110)
GLUCOSE SERPL-MCNC: 84 MG/DL (ref 70–110)
GLUCOSE SERPL-MCNC: 90 MG/DL (ref 70–110)
GLUCOSE SERPL-MCNC: 97 MG/DL (ref 70–110)
GLUCOSE SERPL-MCNC: 98 MG/DL (ref 70–110)
GLUCOSE SERPL-MCNC: 99 MG/DL (ref 70–110)
GLUCOSE UR QL STRIP: ABNORMAL
GLUCOSE UR QL STRIP: NEGATIVE
GRAM STN SPEC: NORMAL
HBA1C MFR BLD: 5.4 % (ref 4–5.6)
HCO3 UR-SCNC: 28.1 MMOL/L (ref 24–28)
HCO3 UR-SCNC: 28.7 MMOL/L (ref 24–28)
HCO3 UR-SCNC: 30.3 MMOL/L (ref 24–28)
HCO3 UR-SCNC: 32.8 MMOL/L (ref 24–28)
HCT VFR BLD AUTO: 26.3 % (ref 37–48.5)
HCT VFR BLD AUTO: 28.9 % (ref 37–48.5)
HCT VFR BLD AUTO: 30.3 % (ref 37–48.5)
HCT VFR BLD AUTO: 32.7 % (ref 37–48.5)
HCT VFR BLD AUTO: 33.1 % (ref 37–48.5)
HCT VFR BLD AUTO: 35.3 % (ref 37–48.5)
HCT VFR BLD AUTO: 35.7 % (ref 37–48.5)
HCT VFR BLD AUTO: 37.9 % (ref 37–48.5)
HCT VFR BLD AUTO: 39.9 % (ref 37–48.5)
HCT VFR BLD AUTO: 40 % (ref 37–48.5)
HCT VFR BLD AUTO: 40.5 % (ref 37–48.5)
HCV AB SERPL QL IA: NEGATIVE
HDLC SERPL-MCNC: 41 MG/DL (ref 40–75)
HDLC SERPL: 27.2 % (ref 20–50)
HGB BLD-MCNC: 10.1 G/DL (ref 12–16)
HGB BLD-MCNC: 10.8 G/DL (ref 12–16)
HGB BLD-MCNC: 11 G/DL (ref 12–16)
HGB BLD-MCNC: 11.6 G/DL (ref 12–16)
HGB BLD-MCNC: 12.4 G/DL (ref 12–16)
HGB BLD-MCNC: 12.9 G/DL (ref 12–16)
HGB BLD-MCNC: 13 G/DL (ref 12–16)
HGB BLD-MCNC: 13.5 G/DL (ref 12–16)
HGB BLD-MCNC: 13.7 G/DL (ref 12–16)
HGB BLD-MCNC: 8.9 G/DL (ref 12–16)
HGB BLD-MCNC: 9.7 G/DL (ref 12–16)
HGB UR QL STRIP: ABNORMAL
HGB UR QL STRIP: NEGATIVE
HIV 1+2 AB+HIV1 P24 AG SERPL QL IA: NEGATIVE
HYALINE CASTS UR QL AUTO: 0 /LPF
IMM GRANULOCYTES # BLD AUTO: 0.01 K/UL (ref 0–0.04)
IMM GRANULOCYTES # BLD AUTO: 0.02 K/UL (ref 0–0.04)
IMM GRANULOCYTES # BLD AUTO: 0.03 K/UL (ref 0–0.04)
IMM GRANULOCYTES # BLD AUTO: 0.03 K/UL (ref 0–0.04)
IMM GRANULOCYTES # BLD AUTO: 0.04 K/UL (ref 0–0.04)
IMM GRANULOCYTES # BLD AUTO: 0.04 K/UL (ref 0–0.04)
IMM GRANULOCYTES # BLD AUTO: 0.05 K/UL (ref 0–0.04)
IMM GRANULOCYTES # BLD AUTO: 0.05 K/UL (ref 0–0.04)
IMM GRANULOCYTES # BLD AUTO: 0.06 K/UL (ref 0–0.04)
IMM GRANULOCYTES # BLD AUTO: 0.11 K/UL (ref 0–0.04)
IMM GRANULOCYTES # BLD AUTO: 0.11 K/UL (ref 0–0.04)
IMM GRANULOCYTES NFR BLD AUTO: 0.1 % (ref 0–0.5)
IMM GRANULOCYTES NFR BLD AUTO: 0.3 % (ref 0–0.5)
IMM GRANULOCYTES NFR BLD AUTO: 0.4 % (ref 0–0.5)
IMM GRANULOCYTES NFR BLD AUTO: 0.5 % (ref 0–0.5)
IMM GRANULOCYTES NFR BLD AUTO: 0.5 % (ref 0–0.5)
IMM GRANULOCYTES NFR BLD AUTO: 0.7 % (ref 0–0.5)
IMM GRANULOCYTES NFR BLD AUTO: 0.8 % (ref 0–0.5)
INFLUENZA A, MOLECULAR: NOT DETECTED
INFLUENZA A, MOLECULAR: NOT DETECTED
INFLUENZA B, MOLECULAR: NOT DETECTED
INFLUENZA B, MOLECULAR: NOT DETECTED
INR PPP: 1 (ref 0.8–1.2)
INTERVENTRICULAR SEPTUM: 0.85 CM (ref 0.6–1.1)
IP: 14
IP: 15
IT: 0.9
IVRT: 88.49 MSEC
KAPPA LC SER QL IA: 1.81 MG/DL (ref 0.33–1.94)
KAPPA LC/LAMBDA SER IA: 2.7 (ref 0.26–1.65)
KETONES UR QL STRIP: ABNORMAL
KETONES UR QL STRIP: NEGATIVE
LA MAJOR: 4.43 CM
LA MINOR: 4.37 CM
LA WIDTH: 3.38 CM
LACTATE SERPL-SCNC: 0.6 MMOL/L (ref 0.5–2.2)
LACTATE SERPL-SCNC: 1 MMOL/L (ref 0.5–2.2)
LAMBDA LC SER QL IA: 0.67 MG/DL (ref 0.57–2.63)
LDLC SERPL CALC-MCNC: 99 MG/DL (ref 63–159)
LEFT ATRIUM SIZE: 2.88 CM
LEFT ATRIUM VOLUME INDEX MOD: 16.7 ML/M2
LEFT ATRIUM VOLUME INDEX: 22.3 ML/M2
LEFT ATRIUM VOLUME MOD: 27.21 CM3
LEFT ATRIUM VOLUME: 36.4 CM3
LEFT INTERNAL DIMENSION IN SYSTOLE: 2.35 CM (ref 2.1–4)
LEFT RENAL DIST DIAS: 8 CM/S
LEFT RENAL DIST SYS: 38 CM/S
LEFT RENAL ORIGIN DIAS: 31 CM/S
LEFT RENAL ORIGIN SYS: 189 CM/S
LEFT RENAL ULTRASOUND ACCELERATION TIME MEASUREMENT 1: 46 MS
LEFT RENAL ULTRASOUND ACCELERATION TIME MEASUREMENT AVERAGE: 46 MS
LEFT RENAL ULTRASOUND KIDNEY SIZE MEASUREMENT 1: 10 CM
LEFT RENAL ULTRASOUND KIDNEY SIZE MEASUREMENT AVERAGE: 10 CM
LEFT RENAL ULTRASOUND RESISTIVE INDEX MEASUREMENT 1: 0.79
LEFT RENAL ULTRASOUND RESISTIVE INDEX MEASUREMENT AVERAGE: 0.79
LEFT VENTRICLE DIASTOLIC VOLUME INDEX: 30.76 ML/M2
LEFT VENTRICLE DIASTOLIC VOLUME: 50.14 ML
LEFT VENTRICLE MASS INDEX: 44 G/M2
LEFT VENTRICLE SYSTOLIC VOLUME INDEX: 11.8 ML/M2
LEFT VENTRICLE SYSTOLIC VOLUME: 19.16 ML
LEFT VENTRICULAR INTERNAL DIMENSION IN DIASTOLE: 3.48 CM (ref 3.5–6)
LEFT VENTRICULAR MASS: 71.42 G
LEUKOCYTE ESTERASE UR QL STRIP: NEGATIVE
LEUKOCYTE ESTERASE UR QL STRIP: NEGATIVE
LIPASE SERPL-CCNC: 20 U/L (ref 4–60)
LV LATERAL E/E' RATIO: 7.57 M/S
LV SEPTAL E/E' RATIO: 7.57 M/S
LYMPHOCYTES # BLD AUTO: 0.2 K/UL (ref 1–4.8)
LYMPHOCYTES # BLD AUTO: 0.6 K/UL (ref 1–4.8)
LYMPHOCYTES # BLD AUTO: 0.7 K/UL (ref 1–4.8)
LYMPHOCYTES # BLD AUTO: 0.8 K/UL (ref 1–4.8)
LYMPHOCYTES # BLD AUTO: 1 K/UL (ref 1–4.8)
LYMPHOCYTES # BLD AUTO: 1.2 K/UL (ref 1–4.8)
LYMPHOCYTES # BLD AUTO: 1.5 K/UL (ref 1–4.8)
LYMPHOCYTES # BLD AUTO: 1.6 K/UL (ref 1–4.8)
LYMPHOCYTES # BLD AUTO: 1.7 K/UL (ref 1–4.8)
LYMPHOCYTES # BLD AUTO: 1.7 K/UL (ref 1–4.8)
LYMPHOCYTES # BLD AUTO: 2 K/UL (ref 1–4.8)
LYMPHOCYTES NFR BLD: 1.5 % (ref 18–48)
LYMPHOCYTES NFR BLD: 11.3 % (ref 18–48)
LYMPHOCYTES NFR BLD: 15.1 % (ref 18–48)
LYMPHOCYTES NFR BLD: 18.5 % (ref 18–48)
LYMPHOCYTES NFR BLD: 18.8 % (ref 18–48)
LYMPHOCYTES NFR BLD: 22.8 % (ref 18–48)
LYMPHOCYTES NFR BLD: 23.2 % (ref 18–48)
LYMPHOCYTES NFR BLD: 3.6 % (ref 18–48)
LYMPHOCYTES NFR BLD: 5.9 % (ref 18–48)
LYMPHOCYTES NFR BLD: 7.3 % (ref 18–48)
LYMPHOCYTES NFR BLD: 7.8 % (ref 18–48)
MAGNESIUM SERPL-MCNC: 1.7 MG/DL (ref 1.6–2.6)
MAGNESIUM SERPL-MCNC: 1.8 MG/DL (ref 1.6–2.6)
MAGNESIUM SERPL-MCNC: 1.9 MG/DL (ref 1.6–2.6)
MAGNESIUM SERPL-MCNC: 2 MG/DL (ref 1.6–2.6)
MAGNESIUM SERPL-MCNC: 2.2 MG/DL (ref 1.6–2.6)
MCH RBC QN AUTO: 25.8 PG (ref 27–31)
MCH RBC QN AUTO: 25.8 PG (ref 27–31)
MCH RBC QN AUTO: 25.9 PG (ref 27–31)
MCH RBC QN AUTO: 26.2 PG (ref 27–31)
MCH RBC QN AUTO: 26.2 PG (ref 27–31)
MCH RBC QN AUTO: 26.3 PG (ref 27–31)
MCH RBC QN AUTO: 26.6 PG (ref 27–31)
MCH RBC QN AUTO: 26.7 PG (ref 27–31)
MCH RBC QN AUTO: 26.7 PG (ref 27–31)
MCH RBC QN AUTO: 26.8 PG (ref 27–31)
MCH RBC QN AUTO: 27.1 PG (ref 27–31)
MCHC RBC AUTO-ENTMCNC: 32.1 G/DL (ref 32–36)
MCHC RBC AUTO-ENTMCNC: 32.5 G/DL (ref 32–36)
MCHC RBC AUTO-ENTMCNC: 32.6 G/DL (ref 32–36)
MCHC RBC AUTO-ENTMCNC: 33.3 G/DL (ref 32–36)
MCHC RBC AUTO-ENTMCNC: 33.6 G/DL (ref 32–36)
MCHC RBC AUTO-ENTMCNC: 33.6 G/DL (ref 32–36)
MCHC RBC AUTO-ENTMCNC: 33.8 G/DL (ref 32–36)
MCHC RBC AUTO-ENTMCNC: 33.8 G/DL (ref 32–36)
MCHC RBC AUTO-ENTMCNC: 34 G/DL (ref 32–36)
MCHC RBC AUTO-ENTMCNC: 34.3 G/DL (ref 32–36)
MCHC RBC AUTO-ENTMCNC: 35.1 G/DL (ref 32–36)
MCV RBC AUTO: 76 FL (ref 82–98)
MCV RBC AUTO: 77 FL (ref 82–98)
MCV RBC AUTO: 78 FL (ref 82–98)
MCV RBC AUTO: 79 FL (ref 82–98)
MCV RBC AUTO: 79 FL (ref 82–98)
MCV RBC AUTO: 80 FL (ref 82–98)
MCV RBC AUTO: 80 FL (ref 82–98)
MCV RBC AUTO: 81 FL (ref 82–98)
MCV RBC AUTO: 82 FL (ref 82–98)
MICROSCOPIC COMMENT: ABNORMAL
MICROSCOPIC COMMENT: ABNORMAL
MIN VOL: 9.26
MIN VOL: 9.7
MODE: ABNORMAL
MONOCYTES # BLD AUTO: 0.5 K/UL (ref 0.3–1)
MONOCYTES # BLD AUTO: 0.5 K/UL (ref 0.3–1)
MONOCYTES # BLD AUTO: 0.6 K/UL (ref 0.3–1)
MONOCYTES # BLD AUTO: 0.6 K/UL (ref 0.3–1)
MONOCYTES # BLD AUTO: 0.7 K/UL (ref 0.3–1)
MONOCYTES # BLD AUTO: 0.8 K/UL (ref 0.3–1)
MONOCYTES # BLD AUTO: 1 K/UL (ref 0.3–1)
MONOCYTES # BLD AUTO: 1.1 K/UL (ref 0.3–1)
MONOCYTES # BLD AUTO: 1.2 K/UL (ref 0.3–1)
MONOCYTES # BLD AUTO: 1.4 K/UL (ref 0.3–1)
MONOCYTES # BLD AUTO: 1.5 K/UL (ref 0.3–1)
MONOCYTES NFR BLD: 10.3 % (ref 4–15)
MONOCYTES NFR BLD: 11 % (ref 4–15)
MONOCYTES NFR BLD: 11.1 % (ref 4–15)
MONOCYTES NFR BLD: 4.8 % (ref 4–15)
MONOCYTES NFR BLD: 5.6 % (ref 4–15)
MONOCYTES NFR BLD: 5.9 % (ref 4–15)
MONOCYTES NFR BLD: 7.3 % (ref 4–15)
MONOCYTES NFR BLD: 7.4 % (ref 4–15)
MONOCYTES NFR BLD: 7.8 % (ref 4–15)
MONOCYTES NFR BLD: 8.4 % (ref 4–15)
MONOCYTES NFR BLD: 9.9 % (ref 4–15)
MONOS+MACROS NFR FLD MANUAL: 100 %
MV A" WAVE DURATION": 6.28 MSEC
MV MEAN GRADIENT: 1 MMHG
MV PEAK A VEL: 1.04 M/S
MV PEAK E VEL: 0.53 M/S
MV PEAK GRADIENT: 2 MMHG
MV STENOSIS PRESSURE HALF TIME: 35.27 MS
MV VALVE AREA BY CONTINUITY EQUATION: 6.35 CM2
MV VALVE AREA P 1/2 METHOD: 6.24 CM2
NEUTROPHILS # BLD AUTO: 10.2 K/UL (ref 1.8–7.7)
NEUTROPHILS # BLD AUTO: 12.8 K/UL (ref 1.8–7.7)
NEUTROPHILS # BLD AUTO: 13.1 K/UL (ref 1.8–7.7)
NEUTROPHILS # BLD AUTO: 13.9 K/UL (ref 1.8–7.7)
NEUTROPHILS # BLD AUTO: 4.3 K/UL (ref 1.8–7.7)
NEUTROPHILS # BLD AUTO: 4.7 K/UL (ref 1.8–7.7)
NEUTROPHILS # BLD AUTO: 5.7 K/UL (ref 1.8–7.7)
NEUTROPHILS # BLD AUTO: 6.1 K/UL (ref 1.8–7.7)
NEUTROPHILS # BLD AUTO: 7.1 K/UL (ref 1.8–7.7)
NEUTROPHILS # BLD AUTO: 9.7 K/UL (ref 1.8–7.7)
NEUTROPHILS # BLD AUTO: 9.7 K/UL (ref 1.8–7.7)
NEUTROPHILS NFR BLD: 63.3 % (ref 38–73)
NEUTROPHILS NFR BLD: 64.3 % (ref 38–73)
NEUTROPHILS NFR BLD: 67.5 % (ref 38–73)
NEUTROPHILS NFR BLD: 70.7 % (ref 38–73)
NEUTROPHILS NFR BLD: 73.2 % (ref 38–73)
NEUTROPHILS NFR BLD: 81.4 % (ref 38–73)
NEUTROPHILS NFR BLD: 81.8 % (ref 38–73)
NEUTROPHILS NFR BLD: 86.9 % (ref 38–73)
NEUTROPHILS NFR BLD: 87.8 % (ref 38–73)
NEUTROPHILS NFR BLD: 87.9 % (ref 38–73)
NEUTROPHILS NFR BLD: 90.2 % (ref 38–73)
NITRITE UR QL STRIP: NEGATIVE
NITRITE UR QL STRIP: NEGATIVE
NONHDLC SERPL-MCNC: 110 MG/DL
NRBC BLD-RTO: 0 /100 WBC
OHS CV LEFT RENAL RAR: 3.86
OHS CV RIGHT RENAL RAR: 4.49
OHS CV US LEFT RENAL HIGHEST EDV: 31
OHS CV US LEFT RENAL HIGHEST PSV: 189
OHS CV US RIGHT RENAL HIGHEST EDV: 21
OHS CV US RIGHT RENAL HIGHEST PSV: 220
OSMOLALITY SERPL: 255 MOSM/KG (ref 275–295)
OSMOLALITY UR: 270 MOSM/KG (ref 50–1200)
OSMOLALITY UR: 364 MOSM/KG (ref 50–1200)
OSMOLALITY UR: 572 MOSM/KG (ref 50–1200)
OSMOLALITY UR: 802 MOSM/KG (ref 50–1200)
PCO2 BLDA: 35.5 MMHG (ref 35–45)
PCO2 BLDA: 37 MMHG (ref 35–45)
PCO2 BLDA: 41.2 MMHG (ref 35–45)
PCO2 BLDA: 43.1 MMHG (ref 35–45)
PEEP: 5
PEEP: 6
PEEP: 6
PH SMN: 7.47 [PH] (ref 7.35–7.45)
PH SMN: 7.49 [PH] (ref 7.35–7.45)
PH SMN: 7.5 [PH] (ref 7.35–7.45)
PH SMN: 7.51 [PH] (ref 7.35–7.45)
PH UR STRIP: 6 [PH] (ref 5–8)
PH UR STRIP: 7 [PH] (ref 5–8)
PHOSPHATE SERPL-MCNC: 1.5 MG/DL (ref 2.7–4.5)
PHOSPHATE SERPL-MCNC: 2.5 MG/DL (ref 2.7–4.5)
PHOSPHATE SERPL-MCNC: 2.6 MG/DL (ref 2.7–4.5)
PHOSPHATE SERPL-MCNC: 2.8 MG/DL (ref 2.7–4.5)
PHOSPHATE SERPL-MCNC: 2.9 MG/DL (ref 2.7–4.5)
PHOSPHATE SERPL-MCNC: 3.6 MG/DL (ref 2.7–4.5)
PHOSPHATE SERPL-MCNC: 4.2 MG/DL (ref 2.7–4.5)
PHOSPHATE SERPL-MCNC: 4.7 MG/DL (ref 2.7–4.5)
PHOSPHATE SERPL-MCNC: <1 MG/DL (ref 2.7–4.5)
PHOSPHATE UR-MCNC: 134 MG/DL
PIP: 22
PISA TR MAX VEL: 2.95 M/S
PLATELET # BLD AUTO: 304 K/UL (ref 150–450)
PLATELET # BLD AUTO: 316 K/UL (ref 150–450)
PLATELET # BLD AUTO: 353 K/UL (ref 150–450)
PLATELET # BLD AUTO: 360 K/UL (ref 150–450)
PLATELET # BLD AUTO: 384 K/UL (ref 150–450)
PLATELET # BLD AUTO: 414 K/UL (ref 150–450)
PLATELET # BLD AUTO: 428 K/UL (ref 150–450)
PLATELET # BLD AUTO: 443 K/UL (ref 150–450)
PLATELET # BLD AUTO: 455 K/UL (ref 150–450)
PLATELET # BLD AUTO: 483 K/UL (ref 150–450)
PLATELET # BLD AUTO: 494 K/UL (ref 150–450)
PLATELET BLD QL SMEAR: ABNORMAL
PMV BLD AUTO: 10.4 FL (ref 9.2–12.9)
PMV BLD AUTO: 10.4 FL (ref 9.2–12.9)
PMV BLD AUTO: 11 FL (ref 9.2–12.9)
PMV BLD AUTO: 11.1 FL (ref 9.2–12.9)
PMV BLD AUTO: 11.2 FL (ref 9.2–12.9)
PMV BLD AUTO: 9.4 FL (ref 9.2–12.9)
PMV BLD AUTO: 9.6 FL (ref 9.2–12.9)
PMV BLD AUTO: 9.7 FL (ref 9.2–12.9)
PMV BLD AUTO: 9.8 FL (ref 9.2–12.9)
PMV BLD AUTO: 9.9 FL (ref 9.2–12.9)
PMV BLD AUTO: 9.9 FL (ref 9.2–12.9)
PO2 BLDA: 63 MMHG (ref 80–100)
PO2 BLDA: 66 MMHG (ref 80–100)
PO2 BLDA: 66 MMHG (ref 80–100)
PO2 BLDA: 99 MMHG (ref 80–100)
POC BE: 5 MMOL/L
POC BE: 5 MMOL/L
POC BE: 7 MMOL/L
POC BE: 9 MMOL/L
POC MOLECULAR INFLUENZA A AGN: NEGATIVE
POC MOLECULAR INFLUENZA A AGN: NEGATIVE
POC MOLECULAR INFLUENZA B AGN: NEGATIVE
POC MOLECULAR INFLUENZA B AGN: NEGATIVE
POC SATURATED O2: 94 % (ref 95–100)
POC SATURATED O2: 94 % (ref 95–100)
POC SATURATED O2: 95 % (ref 95–100)
POC SATURATED O2: 98 % (ref 95–100)
POC TCO2: 29 MMOL/L (ref 23–27)
POC TCO2: 30 MMOL/L (ref 23–27)
POC TCO2: 32 MMOL/L (ref 23–27)
POC TCO2: 34 MMOL/L (ref 23–27)
POCT GLUCOSE: 105 MG/DL (ref 70–110)
POCT GLUCOSE: 109 MG/DL (ref 70–110)
POCT GLUCOSE: 113 MG/DL (ref 70–110)
POCT GLUCOSE: 121 MG/DL (ref 70–110)
POCT GLUCOSE: 135 MG/DL (ref 70–110)
POCT GLUCOSE: 142 MG/DL (ref 70–110)
POCT GLUCOSE: 145 MG/DL (ref 70–110)
POCT GLUCOSE: 99 MG/DL (ref 70–110)
POTASSIUM SERPL-SCNC: 2.9 MMOL/L (ref 3.5–5.1)
POTASSIUM SERPL-SCNC: 3 MMOL/L (ref 3.5–5.1)
POTASSIUM SERPL-SCNC: 3.2 MMOL/L (ref 3.5–5.1)
POTASSIUM SERPL-SCNC: 3.4 MMOL/L (ref 3.5–5.1)
POTASSIUM SERPL-SCNC: 3.5 MMOL/L (ref 3.5–5.1)
POTASSIUM SERPL-SCNC: 3.6 MMOL/L (ref 3.5–5.1)
POTASSIUM SERPL-SCNC: 3.6 MMOL/L (ref 3.5–5.1)
POTASSIUM SERPL-SCNC: 3.7 MMOL/L (ref 3.5–5.1)
POTASSIUM SERPL-SCNC: 3.8 MMOL/L (ref 3.5–5.1)
POTASSIUM SERPL-SCNC: 3.9 MMOL/L (ref 3.5–5.1)
POTASSIUM SERPL-SCNC: 4 MMOL/L (ref 3.5–5.1)
POTASSIUM SERPL-SCNC: 4.1 MMOL/L (ref 3.5–5.1)
POTASSIUM SERPL-SCNC: 4.2 MMOL/L (ref 3.5–5.1)
POTASSIUM SERPL-SCNC: 4.2 MMOL/L (ref 3.5–5.1)
POTASSIUM SERPL-SCNC: 4.3 MMOL/L (ref 3.5–5.1)
POTASSIUM SERPL-SCNC: 5 MMOL/L (ref 3.5–5.1)
PROT SERPL-MCNC: 5.7 G/DL (ref 6–8.4)
PROT SERPL-MCNC: 6.1 G/DL (ref 6–8.4)
PROT SERPL-MCNC: 6.4 G/DL (ref 6–8.4)
PROT SERPL-MCNC: 6.6 G/DL (ref 6–8.4)
PROT SERPL-MCNC: 7.7 G/DL (ref 6–8.4)
PROT SERPL-MCNC: 7.9 G/DL (ref 6–8.4)
PROT SERPL-MCNC: 7.9 G/DL (ref 6–8.4)
PROT UR QL STRIP: ABNORMAL
PROT UR QL STRIP: NEGATIVE
PROTHROMBIN TIME: 10.9 SEC (ref 9–12.5)
PS: 14
PULM VEIN S/D RATIO: 2.47
PV PEAK D VEL: 0.3 M/S
PV PEAK S VEL: 0.74 M/S
RA MAJOR: 3.87 CM
RA PRESSURE: 3 MMHG
RA WIDTH: 3.2 CM
RBC # BLD AUTO: 3.28 M/UL (ref 4–5.4)
RBC # BLD AUTO: 3.62 M/UL (ref 4–5.4)
RBC # BLD AUTO: 3.91 M/UL (ref 4–5.4)
RBC # BLD AUTO: 4.19 M/UL (ref 4–5.4)
RBC # BLD AUTO: 4.24 M/UL (ref 4–5.4)
RBC # BLD AUTO: 4.43 M/UL (ref 4–5.4)
RBC # BLD AUTO: 4.67 M/UL (ref 4–5.4)
RBC # BLD AUTO: 4.83 M/UL (ref 4–5.4)
RBC # BLD AUTO: 4.96 M/UL (ref 4–5.4)
RBC # BLD AUTO: 5.14 M/UL (ref 4–5.4)
RBC # BLD AUTO: 5.14 M/UL (ref 4–5.4)
RBC #/AREA URNS AUTO: 4 /HPF (ref 0–4)
RBC #/AREA URNS AUTO: 70 /HPF (ref 0–4)
RENIN PLAS-CCNC: 0.1 NG/ML/HR
RENIN PLAS-CCNC: 1.2 NG/ML/H
RENIN PLAS-CCNC: 4.9 NG/ML/HR
RIGHT RENAL DIST DIAS: 18 CM/S
RIGHT RENAL DIST SYS: 114 CM/S
RIGHT RENAL MID DIAS: 21 CM/S
RIGHT RENAL MID RAR: 2.45
RIGHT RENAL MID SYS: 120 CM/S
RIGHT RENAL ORIGIN DIAS: 20 CM/S
RIGHT RENAL ORIGIN SYS: 220 CM/S
RIGHT RENAL ULTRASOUND ACCELERATION TIME MEASUREMENT 1: 29 MS
RIGHT RENAL ULTRASOUND ACCELERATION TIME MEASUREMENT 2: 34 MS
RIGHT RENAL ULTRASOUND ACCELERATION TIME MEASUREMENT 3: 40 MS
RIGHT RENAL ULTRASOUND ACCELERATION TIME MEASUREMENT AVERAGE: 40 MS
RIGHT RENAL ULTRASOUND KIDNEY SIZE MEASUREMENT 1: 9.6 CM
RIGHT RENAL ULTRASOUND KIDNEY SIZE MEASUREMENT AVERAGE: 9.6 CM
RIGHT RENAL ULTRASOUND RESISTIVE INDEX MEASUREMENT 1: 0.69
RIGHT RENAL ULTRASOUND RESISTIVE INDEX MEASUREMENT 2: 0.79
RIGHT RENAL ULTRASOUND RESISTIVE INDEX MEASUREMENT 3: 0.77
RIGHT RENAL ULTRASOUND RESISTIVE INDEX MEASUREMENT AVERAGE: 0.79
RIGHT VENTRICULAR END-DIASTOLIC DIMENSION: 2.74 CM
RSV AG BY MOLECULAR METHOD: NOT DETECTED
RSV AG BY MOLECULAR METHOD: NOT DETECTED
RV TISSUE DOPPLER FREE WALL SYSTOLIC VELOCITY 1 (APICAL 4 CHAMBER VIEW): 12.17 CM/S
SAMPLE: ABNORMAL
SARS-COV-2 RDRP RESP QL NAA+PROBE: NEGATIVE
SARS-COV-2 RDRP RESP QL NAA+PROBE: NEGATIVE
SARS-COV-2 RNA RESP QL NAA+PROBE: NOT DETECTED
SARS-COV-2 RNA RESP QL NAA+PROBE: NOT DETECTED
SINUS: 2.71 CM
SITE: ABNORMAL
SODIUM SERPL-SCNC: 115 MMOL/L (ref 136–145)
SODIUM SERPL-SCNC: 115 MMOL/L (ref 136–145)
SODIUM SERPL-SCNC: 116 MMOL/L (ref 136–145)
SODIUM SERPL-SCNC: 116 MMOL/L (ref 136–145)
SODIUM SERPL-SCNC: 117 MMOL/L (ref 136–145)
SODIUM SERPL-SCNC: 118 MMOL/L (ref 136–145)
SODIUM SERPL-SCNC: 118 MMOL/L (ref 136–145)
SODIUM SERPL-SCNC: 119 MMOL/L (ref 136–145)
SODIUM SERPL-SCNC: 120 MMOL/L (ref 136–145)
SODIUM SERPL-SCNC: 121 MMOL/L (ref 136–145)
SODIUM SERPL-SCNC: 121 MMOL/L (ref 136–145)
SODIUM SERPL-SCNC: 123 MMOL/L (ref 136–145)
SODIUM SERPL-SCNC: 124 MMOL/L (ref 136–145)
SODIUM SERPL-SCNC: 124 MMOL/L (ref 136–145)
SODIUM SERPL-SCNC: 126 MMOL/L (ref 136–145)
SODIUM SERPL-SCNC: 126 MMOL/L (ref 136–145)
SODIUM SERPL-SCNC: 127 MMOL/L (ref 136–145)
SODIUM SERPL-SCNC: 127 MMOL/L (ref 136–145)
SODIUM SERPL-SCNC: 128 MMOL/L (ref 136–145)
SODIUM SERPL-SCNC: 129 MMOL/L (ref 136–145)
SODIUM SERPL-SCNC: 133 MMOL/L (ref 136–145)
SODIUM SERPL-SCNC: 135 MMOL/L (ref 136–145)
SODIUM SERPL-SCNC: 135 MMOL/L (ref 136–145)
SODIUM SERPL-SCNC: 136 MMOL/L (ref 136–145)
SODIUM UR-SCNC: 109 MMOL/L (ref 20–250)
SODIUM UR-SCNC: 18 MMOL/L (ref 20–250)
SODIUM UR-SCNC: 47 MMOL/L (ref 20–250)
SODIUM UR-SCNC: 91 MMOL/L (ref 20–250)
SODIUM UR-SCNC: <10 MMOL/L (ref 20–250)
SP GR UR STRIP: 1 (ref 1–1.03)
SP GR UR STRIP: 1.01 (ref 1–1.03)
SP02: 94
SPONT RATE: 19
SPONT RATE: 30
SQUAMOUS #/AREA URNS AUTO: 1 /HPF
SQUAMOUS #/AREA URNS AUTO: 8 /HPF
STJ: 2.2 CM
T4 FREE SERPL-MCNC: 1.19 NG/DL (ref 0.71–1.51)
TDI LATERAL: 0.07 M/S
TDI SEPTAL: 0.07 M/S
TDI: 0.07 M/S
TR MAX PG: 35 MMHG
TRICUSPID ANNULAR PLANE SYSTOLIC EXCURSION: 1.49 CM
TRIGL SERPL-MCNC: 55 MG/DL (ref 30–150)
TRIGL SERPL-MCNC: 56 MG/DL (ref 30–150)
TROPONIN I SERPL DL<=0.01 NG/ML-MCNC: 0.01 NG/ML (ref 0–0.03)
TROPONIN I SERPL DL<=0.01 NG/ML-MCNC: 0.01 NG/ML (ref 0–0.03)
TROPONIN I SERPL DL<=0.01 NG/ML-MCNC: 0.02 NG/ML (ref 0–0.03)
TROPONIN I SERPL DL<=0.01 NG/ML-MCNC: 0.02 NG/ML (ref 0–0.03)
TSH SERPL DL<=0.005 MIU/L-ACNC: 0.49 UIU/ML (ref 0.4–4)
TSH SERPL DL<=0.005 MIU/L-ACNC: 2.33 UIU/ML (ref 0.4–4)
TV REST PULMONARY ARTERY PRESSURE: 38 MMHG
URN SPEC COLLECT METH UR: ABNORMAL
URN SPEC COLLECT METH UR: ABNORMAL
VT: 350
VT: 356
WBC # BLD AUTO: 11.14 K/UL (ref 3.9–12.7)
WBC # BLD AUTO: 11.58 K/UL (ref 3.9–12.7)
WBC # BLD AUTO: 13.25 K/UL (ref 3.9–12.7)
WBC # BLD AUTO: 14.55 K/UL (ref 3.9–12.7)
WBC # BLD AUTO: 15.61 K/UL (ref 3.9–12.7)
WBC # BLD AUTO: 15.79 K/UL (ref 3.9–12.7)
WBC # BLD AUTO: 6.68 K/UL (ref 3.9–12.7)
WBC # BLD AUTO: 7.37 K/UL (ref 3.9–12.7)
WBC # BLD AUTO: 8.05 K/UL (ref 3.9–12.7)
WBC # BLD AUTO: 8.74 K/UL (ref 3.9–12.7)
WBC # BLD AUTO: 9.06 K/UL (ref 3.9–12.7)
WBC # FLD: 0 /CU MM
WBC #/AREA URNS AUTO: 8 /HPF (ref 0–5)
YEAST UR QL AUTO: ABNORMAL
YEAST UR QL AUTO: ABNORMAL

## 2022-01-01 PROCEDURE — 0241U SARS-COV2 (COVID) WITH FLU/RSV BY PCR: CPT | Performed by: PHYSICIAN ASSISTANT

## 2022-01-01 PROCEDURE — 99291 CRITICAL CARE FIRST HOUR: CPT | Mod: ,,, | Performed by: INTERNAL MEDICINE

## 2022-01-01 PROCEDURE — 76536 US EXAM OF HEAD AND NECK: CPT | Mod: 26,,, | Performed by: RADIOLOGY

## 2022-01-01 PROCEDURE — 25000003 PHARM REV CODE 250: Performed by: STUDENT IN AN ORGANIZED HEALTH CARE EDUCATION/TRAINING PROGRAM

## 2022-01-01 PROCEDURE — 97530 THERAPEUTIC ACTIVITIES: CPT

## 2022-01-01 PROCEDURE — 63600175 PHARM REV CODE 636 W HCPCS

## 2022-01-01 PROCEDURE — 99233 SBSQ HOSP IP/OBS HIGH 50: CPT | Mod: ,,, | Performed by: INTERNAL MEDICINE

## 2022-01-01 PROCEDURE — 25000003 PHARM REV CODE 250: Performed by: HOSPITALIST

## 2022-01-01 PROCEDURE — 93005 ELECTROCARDIOGRAM TRACING: CPT

## 2022-01-01 PROCEDURE — 27000221 HC OXYGEN, UP TO 24 HOURS

## 2022-01-01 PROCEDURE — 85025 COMPLETE CBC W/AUTO DIFF WBC: CPT | Performed by: STUDENT IN AN ORGANIZED HEALTH CARE EDUCATION/TRAINING PROGRAM

## 2022-01-01 PROCEDURE — 36556 INSERT NON-TUNNEL CV CATH: CPT | Mod: 59,GC,, | Performed by: INTERNAL MEDICINE

## 2022-01-01 PROCEDURE — 84165 PATHOLOGIST INTERPRETATION SPE: ICD-10-PCS | Mod: 26,,, | Performed by: PATHOLOGY

## 2022-01-01 PROCEDURE — 99900035 HC TECH TIME PER 15 MIN (STAT)

## 2022-01-01 PROCEDURE — 86334 IMMUNOFIX E-PHORESIS SERUM: CPT | Mod: 26,,, | Performed by: PATHOLOGY

## 2022-01-01 PROCEDURE — 25000003 PHARM REV CODE 250

## 2022-01-01 PROCEDURE — 84439 ASSAY OF FREE THYROXINE: CPT | Performed by: HOSPITALIST

## 2022-01-01 PROCEDURE — 25000242 PHARM REV CODE 250 ALT 637 W/ HCPCS

## 2022-01-01 PROCEDURE — 80053 COMPREHEN METABOLIC PANEL: CPT | Performed by: STUDENT IN AN ORGANIZED HEALTH CARE EDUCATION/TRAINING PROGRAM

## 2022-01-01 PROCEDURE — 37799 UNLISTED PX VASCULAR SURGERY: CPT

## 2022-01-01 PROCEDURE — 87040 BLOOD CULTURE FOR BACTERIA: CPT | Mod: 59 | Performed by: STUDENT IN AN ORGANIZED HEALTH CARE EDUCATION/TRAINING PROGRAM

## 2022-01-01 PROCEDURE — 36415 COLL VENOUS BLD VENIPUNCTURE: CPT | Performed by: HOSPITALIST

## 2022-01-01 PROCEDURE — 94640 AIRWAY INHALATION TREATMENT: CPT

## 2022-01-01 PROCEDURE — 93010 EKG 12-LEAD: ICD-10-PCS | Mod: 76,,, | Performed by: INTERNAL MEDICINE

## 2022-01-01 PROCEDURE — 20000000 HC ICU ROOM

## 2022-01-01 PROCEDURE — 99285 EMERGENCY DEPT VISIT HI MDM: CPT | Mod: 25

## 2022-01-01 PROCEDURE — 97110 THERAPEUTIC EXERCISES: CPT | Mod: CQ

## 2022-01-01 PROCEDURE — 99223 PR INITIAL HOSPITAL CARE,LEVL III: ICD-10-PCS | Mod: ,,, | Performed by: NEUROLOGICAL SURGERY

## 2022-01-01 PROCEDURE — 36415 COLL VENOUS BLD VENIPUNCTURE: CPT

## 2022-01-01 PROCEDURE — 99233 SBSQ HOSP IP/OBS HIGH 50: CPT | Mod: GC,,, | Performed by: HOSPITALIST

## 2022-01-01 PROCEDURE — 84244 ASSAY OF RENIN: CPT | Performed by: NURSE PRACTITIONER

## 2022-01-01 PROCEDURE — 99291 PR CRITICAL CARE, E/M 30-74 MINUTES: ICD-10-PCS | Mod: ,,, | Performed by: INTERNAL MEDICINE

## 2022-01-01 PROCEDURE — 83935 ASSAY OF URINE OSMOLALITY: CPT | Performed by: NURSE PRACTITIONER

## 2022-01-01 PROCEDURE — 84300 ASSAY OF URINE SODIUM: CPT | Performed by: NURSE PRACTITIONER

## 2022-01-01 PROCEDURE — 85379 FIBRIN DEGRADATION QUANT: CPT | Performed by: PHYSICIAN ASSISTANT

## 2022-01-01 PROCEDURE — 85025 COMPLETE CBC W/AUTO DIFF WBC: CPT

## 2022-01-01 PROCEDURE — 63600175 PHARM REV CODE 636 W HCPCS: Performed by: HOSPITALIST

## 2022-01-01 PROCEDURE — 83521 IG LIGHT CHAINS FREE EACH: CPT | Mod: 59 | Performed by: STUDENT IN AN ORGANIZED HEALTH CARE EDUCATION/TRAINING PROGRAM

## 2022-01-01 PROCEDURE — 94799 UNLISTED PULMONARY SVC/PX: CPT

## 2022-01-01 PROCEDURE — 86334 IMMUNOFIX E-PHORESIS SERUM: CPT | Performed by: NURSE PRACTITIONER

## 2022-01-01 PROCEDURE — 63600175 PHARM REV CODE 636 W HCPCS: Performed by: INTERNAL MEDICINE

## 2022-01-01 PROCEDURE — 84300 ASSAY OF URINE SODIUM: CPT | Performed by: STUDENT IN AN ORGANIZED HEALTH CARE EDUCATION/TRAINING PROGRAM

## 2022-01-01 PROCEDURE — 36415 COLL VENOUS BLD VENIPUNCTURE: CPT | Performed by: STUDENT IN AN ORGANIZED HEALTH CARE EDUCATION/TRAINING PROGRAM

## 2022-01-01 PROCEDURE — 80048 BASIC METABOLIC PNL TOTAL CA: CPT | Mod: 91

## 2022-01-01 PROCEDURE — 63600175 PHARM REV CODE 636 W HCPCS: Performed by: STUDENT IN AN ORGANIZED HEALTH CARE EDUCATION/TRAINING PROGRAM

## 2022-01-01 PROCEDURE — 83935 ASSAY OF URINE OSMOLALITY: CPT | Performed by: STUDENT IN AN ORGANIZED HEALTH CARE EDUCATION/TRAINING PROGRAM

## 2022-01-01 PROCEDURE — 80053 COMPREHEN METABOLIC PANEL: CPT | Performed by: EMERGENCY MEDICINE

## 2022-01-01 PROCEDURE — 25000003 PHARM REV CODE 250: Performed by: INTERNAL MEDICINE

## 2022-01-01 PROCEDURE — 99223 1ST HOSP IP/OBS HIGH 75: CPT | Mod: ,,, | Performed by: NURSE PRACTITIONER

## 2022-01-01 PROCEDURE — C9113 INJ PANTOPRAZOLE SODIUM, VIA: HCPCS | Performed by: STUDENT IN AN ORGANIZED HEALTH CARE EDUCATION/TRAINING PROGRAM

## 2022-01-01 PROCEDURE — 99233 PR SUBSEQUENT HOSPITAL CARE,LEVL III: ICD-10-PCS | Mod: GC,,, | Performed by: HOSPITALIST

## 2022-01-01 PROCEDURE — 80048 BASIC METABOLIC PNL TOTAL CA: CPT

## 2022-01-01 PROCEDURE — 36000 PLACE NEEDLE IN VEIN: CPT

## 2022-01-01 PROCEDURE — 36415 COLL VENOUS BLD VENIPUNCTURE: CPT | Performed by: NURSE PRACTITIONER

## 2022-01-01 PROCEDURE — 97165 OT EVAL LOW COMPLEX 30 MIN: CPT

## 2022-01-01 PROCEDURE — 93010 ELECTROCARDIOGRAM REPORT: CPT | Mod: ,,, | Performed by: INTERNAL MEDICINE

## 2022-01-01 PROCEDURE — 89051 BODY FLUID CELL COUNT: CPT

## 2022-01-01 PROCEDURE — 36556 PR INSERT NON-TUNNEL CV CATH 5+ YRS OLD: ICD-10-PCS | Mod: 59,GC,, | Performed by: INTERNAL MEDICINE

## 2022-01-01 PROCEDURE — 99233 PR SUBSEQUENT HOSPITAL CARE,LEVL III: ICD-10-PCS | Mod: ,,, | Performed by: NURSE PRACTITIONER

## 2022-01-01 PROCEDURE — 36620 ARTERIAL LINE: ICD-10-PCS | Mod: 59,GC,, | Performed by: INTERNAL MEDICINE

## 2022-01-01 PROCEDURE — 85025 COMPLETE CBC W/AUTO DIFF WBC: CPT | Performed by: PHYSICIAN ASSISTANT

## 2022-01-01 PROCEDURE — 99223 PR INITIAL HOSPITAL CARE,LEVL III: ICD-10-PCS | Mod: ,,, | Performed by: PHYSICIAN ASSISTANT

## 2022-01-01 PROCEDURE — 97116 GAIT TRAINING THERAPY: CPT | Mod: CQ

## 2022-01-01 PROCEDURE — 87077 CULTURE AEROBIC IDENTIFY: CPT

## 2022-01-01 PROCEDURE — 83880 ASSAY OF NATRIURETIC PEPTIDE: CPT | Performed by: PHYSICIAN ASSISTANT

## 2022-01-01 PROCEDURE — 25000003 PHARM REV CODE 250: Performed by: NURSE PRACTITIONER

## 2022-01-01 PROCEDURE — 80053 COMPREHEN METABOLIC PANEL: CPT | Performed by: PHYSICIAN ASSISTANT

## 2022-01-01 PROCEDURE — 76536 US THYROID: ICD-10-PCS | Mod: 26,,, | Performed by: RADIOLOGY

## 2022-01-01 PROCEDURE — 94761 N-INVAS EAR/PLS OXIMETRY MLT: CPT

## 2022-01-01 PROCEDURE — 36620 INSERTION CATHETER ARTERY: CPT | Mod: 59,GC,, | Performed by: INTERNAL MEDICINE

## 2022-01-01 PROCEDURE — 84100 ASSAY OF PHOSPHORUS: CPT | Mod: 91 | Performed by: STUDENT IN AN ORGANIZED HEALTH CARE EDUCATION/TRAINING PROGRAM

## 2022-01-01 PROCEDURE — 25000003 PHARM REV CODE 250: Performed by: PHYSICIAN ASSISTANT

## 2022-01-01 PROCEDURE — 97161 PT EVAL LOW COMPLEX 20 MIN: CPT

## 2022-01-01 PROCEDURE — 20600001 HC STEP DOWN PRIVATE ROOM

## 2022-01-01 PROCEDURE — 80048 BASIC METABOLIC PNL TOTAL CA: CPT | Mod: 91 | Performed by: STUDENT IN AN ORGANIZED HEALTH CARE EDUCATION/TRAINING PROGRAM

## 2022-01-01 PROCEDURE — 84100 ASSAY OF PHOSPHORUS: CPT | Mod: 91

## 2022-01-01 PROCEDURE — 27200966 HC CLOSED SUCTION SYSTEM

## 2022-01-01 PROCEDURE — 84100 ASSAY OF PHOSPHORUS: CPT | Performed by: STUDENT IN AN ORGANIZED HEALTH CARE EDUCATION/TRAINING PROGRAM

## 2022-01-01 PROCEDURE — 84478 ASSAY OF TRIGLYCERIDES: CPT | Performed by: STUDENT IN AN ORGANIZED HEALTH CARE EDUCATION/TRAINING PROGRAM

## 2022-01-01 PROCEDURE — 76536 US EXAM OF HEAD AND NECK: CPT | Mod: TC

## 2022-01-01 PROCEDURE — 0241U SARS-COV2 (COVID) WITH FLU/RSV BY PCR: CPT | Performed by: INTERNAL MEDICINE

## 2022-01-01 PROCEDURE — 84100 ASSAY OF PHOSPHORUS: CPT

## 2022-01-01 PROCEDURE — 83605 ASSAY OF LACTIC ACID: CPT | Performed by: STUDENT IN AN ORGANIZED HEALTH CARE EDUCATION/TRAINING PROGRAM

## 2022-01-01 PROCEDURE — 84484 ASSAY OF TROPONIN QUANT: CPT

## 2022-01-01 PROCEDURE — 83735 ASSAY OF MAGNESIUM: CPT | Performed by: STUDENT IN AN ORGANIZED HEALTH CARE EDUCATION/TRAINING PROGRAM

## 2022-01-01 PROCEDURE — 87205 SMEAR GRAM STAIN: CPT

## 2022-01-01 PROCEDURE — 99900026 HC AIRWAY MAINTENANCE (STAT)

## 2022-01-01 PROCEDURE — 84105 ASSAY OF URINE PHOSPHORUS: CPT | Performed by: NURSE PRACTITIONER

## 2022-01-01 PROCEDURE — 99233 PR SUBSEQUENT HOSPITAL CARE,LEVL III: ICD-10-PCS | Mod: ,,, | Performed by: INTERNAL MEDICINE

## 2022-01-01 PROCEDURE — 84132 ASSAY OF SERUM POTASSIUM: CPT | Performed by: INTERNAL MEDICINE

## 2022-01-01 PROCEDURE — 85379 FIBRIN DEGRADATION QUANT: CPT

## 2022-01-01 PROCEDURE — 99223 1ST HOSP IP/OBS HIGH 75: CPT | Mod: ,,, | Performed by: NEUROLOGICAL SURGERY

## 2022-01-01 PROCEDURE — 84300 ASSAY OF URINE SODIUM: CPT

## 2022-01-01 PROCEDURE — 86803 HEPATITIS C AB TEST: CPT | Performed by: EMERGENCY MEDICINE

## 2022-01-01 PROCEDURE — 99233 SBSQ HOSP IP/OBS HIGH 50: CPT | Mod: ,,, | Performed by: NURSE PRACTITIONER

## 2022-01-01 PROCEDURE — 87040 BLOOD CULTURE FOR BACTERIA: CPT | Performed by: STUDENT IN AN ORGANIZED HEALTH CARE EDUCATION/TRAINING PROGRAM

## 2022-01-01 PROCEDURE — 87449 NOS EACH ORGANISM AG IA: CPT | Performed by: STUDENT IN AN ORGANIZED HEALTH CARE EDUCATION/TRAINING PROGRAM

## 2022-01-01 PROCEDURE — 27000190 HC CPAP FULL FACE MASK W/VALVE

## 2022-01-01 PROCEDURE — 80048 BASIC METABOLIC PNL TOTAL CA: CPT | Mod: XB | Performed by: HOSPITALIST

## 2022-01-01 PROCEDURE — 81001 URINALYSIS AUTO W/SCOPE: CPT | Performed by: PHYSICIAN ASSISTANT

## 2022-01-01 PROCEDURE — 83690 ASSAY OF LIPASE: CPT | Performed by: PHYSICIAN ASSISTANT

## 2022-01-01 PROCEDURE — 83880 ASSAY OF NATRIURETIC PEPTIDE: CPT | Performed by: EMERGENCY MEDICINE

## 2022-01-01 PROCEDURE — 84443 ASSAY THYROID STIM HORMONE: CPT | Performed by: HOSPITALIST

## 2022-01-01 PROCEDURE — 87389 HIV-1 AG W/HIV-1&-2 AB AG IA: CPT | Performed by: EMERGENCY MEDICINE

## 2022-01-01 PROCEDURE — 87635 SARS-COV-2 COVID-19 AMP PRB: CPT | Performed by: EMERGENCY MEDICINE

## 2022-01-01 PROCEDURE — 84484 ASSAY OF TROPONIN QUANT: CPT | Performed by: PHYSICIAN ASSISTANT

## 2022-01-01 PROCEDURE — 84443 ASSAY THYROID STIM HORMONE: CPT | Performed by: PHYSICIAN ASSISTANT

## 2022-01-01 PROCEDURE — 99285 PR EMERGENCY DEPT VISIT,LEVEL V: ICD-10-PCS | Mod: CS,,, | Performed by: PHYSICIAN ASSISTANT

## 2022-01-01 PROCEDURE — 82570 ASSAY OF URINE CREATININE: CPT

## 2022-01-01 PROCEDURE — 93010 EKG 12-LEAD: ICD-10-PCS | Mod: ,,, | Performed by: INTERNAL MEDICINE

## 2022-01-01 PROCEDURE — 83835 ASSAY OF METANEPHRINES: CPT

## 2022-01-01 PROCEDURE — 99223 PR INITIAL HOSPITAL CARE,LEVL III: ICD-10-PCS | Mod: ,,, | Performed by: NURSE PRACTITIONER

## 2022-01-01 PROCEDURE — 94002 VENT MGMT INPAT INIT DAY: CPT

## 2022-01-01 PROCEDURE — 31500 INSERT EMERGENCY AIRWAY: CPT | Mod: ,,, | Performed by: INTERNAL MEDICINE

## 2022-01-01 PROCEDURE — 83735 ASSAY OF MAGNESIUM: CPT | Performed by: PHYSICIAN ASSISTANT

## 2022-01-01 PROCEDURE — 84165 PROTEIN E-PHORESIS SERUM: CPT | Mod: 26,,, | Performed by: PATHOLOGY

## 2022-01-01 PROCEDURE — 85025 COMPLETE CBC W/AUTO DIFF WBC: CPT | Performed by: EMERGENCY MEDICINE

## 2022-01-01 PROCEDURE — 84300 ASSAY OF URINE SODIUM: CPT | Mod: 91 | Performed by: STUDENT IN AN ORGANIZED HEALTH CARE EDUCATION/TRAINING PROGRAM

## 2022-01-01 PROCEDURE — 99232 SBSQ HOSP IP/OBS MODERATE 35: CPT | Mod: GC,,, | Performed by: HOSPITALIST

## 2022-01-01 PROCEDURE — 83735 ASSAY OF MAGNESIUM: CPT

## 2022-01-01 PROCEDURE — 84244 ASSAY OF RENIN: CPT | Performed by: STUDENT IN AN ORGANIZED HEALTH CARE EDUCATION/TRAINING PROGRAM

## 2022-01-01 PROCEDURE — 93010 ELECTROCARDIOGRAM REPORT: CPT | Mod: 76,,, | Performed by: INTERNAL MEDICINE

## 2022-01-01 PROCEDURE — 84244 ASSAY OF RENIN: CPT | Mod: 91 | Performed by: STUDENT IN AN ORGANIZED HEALTH CARE EDUCATION/TRAINING PROGRAM

## 2022-01-01 PROCEDURE — 87070 CULTURE OTHR SPECIMN AEROBIC: CPT

## 2022-01-01 PROCEDURE — 87186 SC STD MICRODIL/AGAR DIL: CPT

## 2022-01-01 PROCEDURE — 80048 BASIC METABOLIC PNL TOTAL CA: CPT | Mod: 91 | Performed by: HOSPITALIST

## 2022-01-01 PROCEDURE — 94660 CPAP INITIATION&MGMT: CPT

## 2022-01-01 PROCEDURE — 63600175 PHARM REV CODE 636 W HCPCS: Performed by: NURSE PRACTITIONER

## 2022-01-01 PROCEDURE — 83930 ASSAY OF BLOOD OSMOLALITY: CPT | Performed by: STUDENT IN AN ORGANIZED HEALTH CARE EDUCATION/TRAINING PROGRAM

## 2022-01-01 PROCEDURE — 80048 BASIC METABOLIC PNL TOTAL CA: CPT | Mod: XB | Performed by: STUDENT IN AN ORGANIZED HEALTH CARE EDUCATION/TRAINING PROGRAM

## 2022-01-01 PROCEDURE — 80061 LIPID PANEL: CPT | Performed by: STUDENT IN AN ORGANIZED HEALTH CARE EDUCATION/TRAINING PROGRAM

## 2022-01-01 PROCEDURE — 99232 PR SUBSEQUENT HOSPITAL CARE,LEVL II: ICD-10-PCS | Mod: GC,,, | Performed by: HOSPITALIST

## 2022-01-01 PROCEDURE — 99223 PR INITIAL HOSPITAL CARE,LEVL III: ICD-10-PCS | Mod: AI,GC,, | Performed by: HOSPITALIST

## 2022-01-01 PROCEDURE — 99285 EMERGENCY DEPT VISIT HI MDM: CPT | Mod: CS,,, | Performed by: PHYSICIAN ASSISTANT

## 2022-01-01 PROCEDURE — 81001 URINALYSIS AUTO W/SCOPE: CPT | Performed by: STUDENT IN AN ORGANIZED HEALTH CARE EDUCATION/TRAINING PROGRAM

## 2022-01-01 PROCEDURE — 99223 1ST HOSP IP/OBS HIGH 75: CPT | Mod: ,,, | Performed by: PHYSICIAN ASSISTANT

## 2022-01-01 PROCEDURE — 87635 SARS-COV-2 COVID-19 AMP PRB: CPT | Performed by: PHYSICIAN ASSISTANT

## 2022-01-01 PROCEDURE — 97116 GAIT TRAINING THERAPY: CPT

## 2022-01-01 PROCEDURE — 99223 1ST HOSP IP/OBS HIGH 75: CPT | Mod: AI,GC,, | Performed by: HOSPITALIST

## 2022-01-01 PROCEDURE — 80048 BASIC METABOLIC PNL TOTAL CA: CPT | Mod: 91,XB

## 2022-01-01 PROCEDURE — 83036 HEMOGLOBIN GLYCOSYLATED A1C: CPT | Performed by: STUDENT IN AN ORGANIZED HEALTH CARE EDUCATION/TRAINING PROGRAM

## 2022-01-01 PROCEDURE — 86334 PATHOLOGIST INTERPRETATION IFE: ICD-10-PCS | Mod: 26,,, | Performed by: PATHOLOGY

## 2022-01-01 PROCEDURE — 85610 PROTHROMBIN TIME: CPT | Performed by: INTERNAL MEDICINE

## 2022-01-01 PROCEDURE — 82803 BLOOD GASES ANY COMBINATION: CPT

## 2022-01-01 PROCEDURE — 82533 TOTAL CORTISOL: CPT | Performed by: HOSPITALIST

## 2022-01-01 PROCEDURE — 31500 INTUBATION: ICD-10-PCS | Mod: ,,, | Performed by: INTERNAL MEDICINE

## 2022-01-01 PROCEDURE — 84165 PROTEIN E-PHORESIS SERUM: CPT | Performed by: NURSE PRACTITIONER

## 2022-01-01 PROCEDURE — 84484 ASSAY OF TROPONIN QUANT: CPT | Performed by: EMERGENCY MEDICINE

## 2022-01-01 PROCEDURE — 82088 ASSAY OF ALDOSTERONE: CPT | Performed by: STUDENT IN AN ORGANIZED HEALTH CARE EDUCATION/TRAINING PROGRAM

## 2022-01-01 RX ORDER — LOSARTAN POTASSIUM 50 MG/1
50 TABLET ORAL DAILY
Status: DISCONTINUED | OUTPATIENT
Start: 2022-01-01 | End: 2022-01-01

## 2022-01-01 RX ORDER — CARVEDILOL 25 MG/1
25 TABLET ORAL 2 TIMES DAILY
Status: DISCONTINUED | OUTPATIENT
Start: 2022-01-01 | End: 2022-01-01

## 2022-01-01 RX ORDER — LOSARTAN POTASSIUM 50 MG/1
50 TABLET ORAL ONCE
Status: COMPLETED | OUTPATIENT
Start: 2022-01-01 | End: 2022-01-01

## 2022-01-01 RX ORDER — POTASSIUM CHLORIDE 20 MEQ/1
40 TABLET, EXTENDED RELEASE ORAL ONCE
Status: COMPLETED | OUTPATIENT
Start: 2022-01-01 | End: 2022-01-01

## 2022-01-01 RX ORDER — IPRATROPIUM BROMIDE AND ALBUTEROL SULFATE 2.5; .5 MG/3ML; MG/3ML
3 SOLUTION RESPIRATORY (INHALATION)
Status: DISCONTINUED | OUTPATIENT
Start: 2022-01-01 | End: 2023-01-01

## 2022-01-01 RX ORDER — POTASSIUM CHLORIDE 29.8 MG/ML
40 INJECTION INTRAVENOUS ONCE
Status: COMPLETED | OUTPATIENT
Start: 2022-01-01 | End: 2022-01-01

## 2022-01-01 RX ORDER — GLYCERIN 1 G/1
1 SUPPOSITORY RECTAL ONCE AS NEEDED
Status: DISCONTINUED | OUTPATIENT
Start: 2022-01-01 | End: 2023-01-01 | Stop reason: HOSPADM

## 2022-01-01 RX ORDER — ETOMIDATE 2 MG/ML
INJECTION INTRAVENOUS
Status: DISPENSED
Start: 2022-01-01 | End: 2022-01-01

## 2022-01-01 RX ORDER — ACETAMINOPHEN 325 MG/1
650 TABLET ORAL EVERY 6 HOURS PRN
Status: DISCONTINUED | OUTPATIENT
Start: 2022-01-01 | End: 2023-01-01

## 2022-01-01 RX ORDER — NOREPINEPHRINE BITARTRATE/D5W 4MG/250ML
0-.2 PLASTIC BAG, INJECTION (ML) INTRAVENOUS CONTINUOUS
Status: DISCONTINUED | OUTPATIENT
Start: 2022-01-01 | End: 2022-01-01

## 2022-01-01 RX ORDER — IBUPROFEN 200 MG
16 TABLET ORAL
Status: DISCONTINUED | OUTPATIENT
Start: 2022-01-01 | End: 2023-01-01

## 2022-01-01 RX ORDER — METOPROLOL SUCCINATE 50 MG/1
50 TABLET, EXTENDED RELEASE ORAL DAILY
COMMUNITY

## 2022-01-01 RX ORDER — VASOPRESSIN 20 [USP'U]/ML
INJECTION, SOLUTION INTRAMUSCULAR; SUBCUTANEOUS
Status: DISPENSED
Start: 2022-01-01 | End: 2022-01-01

## 2022-01-01 RX ORDER — SODIUM CHLORIDE 9 MG/ML
INJECTION, SOLUTION INTRAVENOUS CONTINUOUS
Status: DISCONTINUED | OUTPATIENT
Start: 2022-01-01 | End: 2022-01-01

## 2022-01-01 RX ORDER — DEXMEDETOMIDINE HYDROCHLORIDE 4 UG/ML
0-1.4 INJECTION, SOLUTION INTRAVENOUS CONTINUOUS
Status: DISCONTINUED | OUTPATIENT
Start: 2022-01-01 | End: 2022-01-01

## 2022-01-01 RX ORDER — MAGNESIUM SULFATE HEPTAHYDRATE 40 MG/ML
2 INJECTION, SOLUTION INTRAVENOUS ONCE
Status: COMPLETED | OUTPATIENT
Start: 2022-01-01 | End: 2022-01-01

## 2022-01-01 RX ORDER — INSULIN ASPART 100 [IU]/ML
0-5 INJECTION, SOLUTION INTRAVENOUS; SUBCUTANEOUS EVERY 6 HOURS PRN
Status: DISCONTINUED | OUTPATIENT
Start: 2022-01-01 | End: 2023-01-01

## 2022-01-01 RX ORDER — AMOXICILLIN 250 MG
1 CAPSULE ORAL DAILY
Status: DISCONTINUED | OUTPATIENT
Start: 2022-01-01 | End: 2023-01-01

## 2022-01-01 RX ORDER — POLYETHYLENE GLYCOL 3350 17 G/17G
17 POWDER, FOR SOLUTION ORAL 2 TIMES DAILY
Status: DISCONTINUED | OUTPATIENT
Start: 2022-01-01 | End: 2023-01-01

## 2022-01-01 RX ORDER — DESMOPRESSIN ACETATE 4 UG/ML
1 INJECTION, SOLUTION INTRAVENOUS; SUBCUTANEOUS ONCE
Status: DISCONTINUED | OUTPATIENT
Start: 2022-01-01 | End: 2022-01-01

## 2022-01-01 RX ORDER — NALOXONE HCL 0.4 MG/ML
0.02 VIAL (ML) INJECTION
Status: DISCONTINUED | OUTPATIENT
Start: 2022-01-01 | End: 2023-01-01

## 2022-01-01 RX ORDER — HYDROCHLOROTHIAZIDE 25 MG/1
25 TABLET ORAL DAILY
COMMUNITY

## 2022-01-01 RX ORDER — NOREPINEPHRINE BITARTRATE/D5W 4MG/250ML
0-3 PLASTIC BAG, INJECTION (ML) INTRAVENOUS CONTINUOUS
Status: DISCONTINUED | OUTPATIENT
Start: 2022-01-01 | End: 2022-01-01

## 2022-01-01 RX ORDER — METOPROLOL SUCCINATE 50 MG/1
50 TABLET, EXTENDED RELEASE ORAL DAILY
Status: DISCONTINUED | OUTPATIENT
Start: 2022-01-01 | End: 2022-01-01

## 2022-01-01 RX ORDER — FUROSEMIDE 10 MG/ML
40 INJECTION INTRAMUSCULAR; INTRAVENOUS ONCE
Status: COMPLETED | OUTPATIENT
Start: 2022-01-01 | End: 2022-01-01

## 2022-01-01 RX ORDER — POTASSIUM CHLORIDE 20 MEQ/1
40 TABLET, EXTENDED RELEASE ORAL ONCE
Status: DISCONTINUED | OUTPATIENT
Start: 2022-01-01 | End: 2022-01-01

## 2022-01-01 RX ORDER — HYDRALAZINE HYDROCHLORIDE 20 MG/ML
5 INJECTION INTRAMUSCULAR; INTRAVENOUS EVERY 6 HOURS PRN
Status: DISCONTINUED | OUTPATIENT
Start: 2022-01-01 | End: 2022-01-01

## 2022-01-01 RX ORDER — LIDOCAINE HYDROCHLORIDE 10 MG/ML
INJECTION INFILTRATION; PERINEURAL
Status: DISPENSED
Start: 2022-01-01 | End: 2022-01-01

## 2022-01-01 RX ORDER — ROCURONIUM BROMIDE 10 MG/ML
INJECTION, SOLUTION INTRAVENOUS
Status: DISPENSED
Start: 2022-01-01 | End: 2022-01-01

## 2022-01-01 RX ORDER — DEXMEDETOMIDINE HYDROCHLORIDE 4 UG/ML
0-1.4 INJECTION, SOLUTION INTRAVENOUS CONTINUOUS
Status: DISCONTINUED | OUTPATIENT
Start: 2022-01-01 | End: 2023-01-01

## 2022-01-01 RX ORDER — ATORVASTATIN CALCIUM 10 MG/1
10 TABLET, FILM COATED ORAL DAILY
Status: DISCONTINUED | OUTPATIENT
Start: 2022-01-01 | End: 2023-01-01

## 2022-01-01 RX ORDER — CARVEDILOL 12.5 MG/1
12.5 TABLET ORAL 2 TIMES DAILY
Status: DISCONTINUED | OUTPATIENT
Start: 2022-01-01 | End: 2022-01-01

## 2022-01-01 RX ORDER — ETOMIDATE 2 MG/ML
INJECTION INTRAVENOUS CODE/TRAUMA/SEDATION MEDICATION
Status: COMPLETED | OUTPATIENT
Start: 2022-01-01 | End: 2022-01-01

## 2022-01-01 RX ORDER — GLYCERIN 1 G/1
1 SUPPOSITORY RECTAL ONCE
Status: COMPLETED | OUTPATIENT
Start: 2022-01-01 | End: 2022-01-01

## 2022-01-01 RX ORDER — LISINOPRIL 20 MG/1
20 TABLET ORAL DAILY
Status: DISCONTINUED | OUTPATIENT
Start: 2022-01-01 | End: 2022-01-01

## 2022-01-01 RX ORDER — CAPSAICIN 0.03 G/100G
CREAM TOPICAL 2 TIMES DAILY
Status: DISCONTINUED | OUTPATIENT
Start: 2022-01-01 | End: 2023-01-01 | Stop reason: HOSPADM

## 2022-01-01 RX ORDER — SODIUM CHLORIDE 9 MG/ML
INJECTION, SOLUTION INTRAVENOUS CONTINUOUS
Status: ACTIVE | OUTPATIENT
Start: 2022-01-01 | End: 2022-01-01

## 2022-01-01 RX ORDER — LABETALOL HCL 20 MG/4 ML
10 SYRINGE (ML) INTRAVENOUS EVERY 6 HOURS PRN
Status: DISCONTINUED | OUTPATIENT
Start: 2022-01-01 | End: 2022-01-01

## 2022-01-01 RX ORDER — ENOXAPARIN SODIUM 100 MG/ML
40 INJECTION SUBCUTANEOUS EVERY 24 HOURS
Status: DISCONTINUED | OUTPATIENT
Start: 2022-01-01 | End: 2023-01-01

## 2022-01-01 RX ORDER — AMLODIPINE BESYLATE 5 MG/1
5 TABLET ORAL DAILY
Status: DISCONTINUED | OUTPATIENT
Start: 2022-01-01 | End: 2022-01-01

## 2022-01-01 RX ORDER — HYDRALAZINE HYDROCHLORIDE 20 MG/ML
10 INJECTION INTRAMUSCULAR; INTRAVENOUS EVERY 6 HOURS PRN
Status: DISCONTINUED | OUTPATIENT
Start: 2022-01-01 | End: 2022-01-01

## 2022-01-01 RX ORDER — GLUCAGON 1 MG
1 KIT INJECTION
Status: DISCONTINUED | OUTPATIENT
Start: 2022-01-01 | End: 2022-01-01

## 2022-01-01 RX ORDER — AMLODIPINE BESYLATE 10 MG/1
10 TABLET ORAL DAILY
Status: DISCONTINUED | OUTPATIENT
Start: 2022-01-01 | End: 2022-01-01

## 2022-01-01 RX ORDER — LEVOTHYROXINE SODIUM 50 UG/1
50 TABLET ORAL
Status: DISCONTINUED | OUTPATIENT
Start: 2022-01-01 | End: 2023-01-01

## 2022-01-01 RX ORDER — SODIUM CHLORIDE 0.9 % (FLUSH) 0.9 %
10 SYRINGE (ML) INJECTION EVERY 12 HOURS PRN
Status: DISCONTINUED | OUTPATIENT
Start: 2022-01-01 | End: 2023-01-01 | Stop reason: HOSPADM

## 2022-01-01 RX ORDER — GLUCAGON 1 MG
1 KIT INJECTION
Status: DISCONTINUED | OUTPATIENT
Start: 2022-01-01 | End: 2023-01-01

## 2022-01-01 RX ORDER — MUPIROCIN 20 MG/G
OINTMENT TOPICAL 2 TIMES DAILY
Status: COMPLETED | OUTPATIENT
Start: 2022-01-01 | End: 2023-01-01

## 2022-01-01 RX ORDER — PANTOPRAZOLE SODIUM 40 MG/10ML
40 INJECTION, POWDER, LYOPHILIZED, FOR SOLUTION INTRAVENOUS DAILY
Status: DISCONTINUED | OUTPATIENT
Start: 2022-01-01 | End: 2023-01-01

## 2022-01-01 RX ORDER — SODIUM CHLORIDE 9 MG/ML
1000 INJECTION, SOLUTION INTRAVENOUS
Status: COMPLETED | OUTPATIENT
Start: 2022-01-01 | End: 2022-01-01

## 2022-01-01 RX ORDER — NICARDIPINE HYDROCHLORIDE 0.2 MG/ML
0-15 INJECTION INTRAVENOUS CONTINUOUS
Status: DISCONTINUED | OUTPATIENT
Start: 2022-01-01 | End: 2023-01-01

## 2022-01-01 RX ORDER — 3% SODIUM CHLORIDE 3 G/100ML
40 INJECTION, SOLUTION INTRAVENOUS CONTINUOUS
Status: DISCONTINUED | OUTPATIENT
Start: 2022-01-01 | End: 2022-01-01

## 2022-01-01 RX ORDER — CARVEDILOL 6.25 MG/1
6.25 TABLET ORAL 2 TIMES DAILY
Status: DISCONTINUED | OUTPATIENT
Start: 2022-01-01 | End: 2022-01-01

## 2022-01-01 RX ORDER — METHOCARBAMOL 500 MG/1
500 TABLET, FILM COATED ORAL 3 TIMES DAILY PRN
Status: DISCONTINUED | OUTPATIENT
Start: 2022-01-01 | End: 2023-01-01

## 2022-01-01 RX ORDER — LOSARTAN POTASSIUM 50 MG/1
100 TABLET ORAL DAILY
Status: DISCONTINUED | OUTPATIENT
Start: 2022-01-01 | End: 2022-01-01

## 2022-01-01 RX ORDER — FLUDROCORTISONE ACETATE 0.1 MG/1
100 TABLET ORAL DAILY
Status: DISCONTINUED | OUTPATIENT
Start: 2022-01-01 | End: 2022-01-01

## 2022-01-01 RX ORDER — SODIUM,POTASSIUM PHOSPHATES 280-250MG
2 POWDER IN PACKET (EA) ORAL ONCE
Status: COMPLETED | OUTPATIENT
Start: 2022-01-01 | End: 2022-01-01

## 2022-01-01 RX ORDER — IBUPROFEN 200 MG
24 TABLET ORAL
Status: DISCONTINUED | OUTPATIENT
Start: 2022-01-01 | End: 2023-01-01

## 2022-01-01 RX ORDER — HYDRALAZINE HYDROCHLORIDE 25 MG/1
25 TABLET, FILM COATED ORAL EVERY 8 HOURS PRN
Status: DISCONTINUED | OUTPATIENT
Start: 2022-01-01 | End: 2022-01-01

## 2022-01-01 RX ORDER — LOSARTAN POTASSIUM 50 MG/1
50 TABLET ORAL DAILY
COMMUNITY

## 2022-01-01 RX ORDER — PROPOFOL 10 MG/ML
VIAL (ML) INTRAVENOUS
Status: DISCONTINUED
Start: 2022-01-01 | End: 2022-01-01 | Stop reason: WASHOUT

## 2022-01-01 RX ORDER — ROCURONIUM BROMIDE 10 MG/ML
INJECTION, SOLUTION INTRAVENOUS CODE/TRAUMA/SEDATION MEDICATION
Status: COMPLETED | OUTPATIENT
Start: 2022-01-01 | End: 2022-01-01

## 2022-01-01 RX ORDER — SUCCINYLCHOLINE CHLORIDE 20 MG/ML
INJECTION INTRAMUSCULAR; INTRAVENOUS
Status: DISCONTINUED
Start: 2022-01-01 | End: 2022-01-01 | Stop reason: WASHOUT

## 2022-01-01 RX ADMIN — POTASSIUM CHLORIDE 40 MEQ: 29.8 INJECTION, SOLUTION INTRAVENOUS at 05:12

## 2022-01-01 RX ADMIN — CAPSAICIN: 0.25 CREAM TOPICAL at 08:12

## 2022-01-01 RX ADMIN — THIAMINE HYDROCHLORIDE 500 MG: 100 INJECTION, SOLUTION INTRAMUSCULAR; INTRAVENOUS at 09:12

## 2022-01-01 RX ADMIN — NOREPINEPHRINE BITARTRATE 0.03 MCG/KG/MIN: 4 INJECTION, SOLUTION INTRAVENOUS at 04:12

## 2022-01-01 RX ADMIN — LEVOTHYROXINE SODIUM 50 MCG: 50 TABLET ORAL at 05:12

## 2022-01-01 RX ADMIN — POLYETHYLENE GLYCOL 3350 17 G: 17 POWDER, FOR SOLUTION ORAL at 03:12

## 2022-01-01 RX ADMIN — IPRATROPIUM BROMIDE AND ALBUTEROL SULFATE 3 ML: 2.5; .5 SOLUTION RESPIRATORY (INHALATION) at 07:12

## 2022-01-01 RX ADMIN — LOSARTAN POTASSIUM 50 MG: 50 TABLET, FILM COATED ORAL at 09:12

## 2022-01-01 RX ADMIN — ATORVASTATIN CALCIUM 10 MG: 10 TABLET, FILM COATED ORAL at 07:12

## 2022-01-01 RX ADMIN — NICARDIPINE HYDROCHLORIDE 3 MG/HR: 0.2 INJECTION, SOLUTION INTRAVENOUS at 01:12

## 2022-01-01 RX ADMIN — SODIUM CHLORIDE 1000 ML: 9 INJECTION, SOLUTION INTRAVENOUS at 04:12

## 2022-01-01 RX ADMIN — SENNOSIDES AND DOCUSATE SODIUM 1 TABLET: 50; 8.6 TABLET ORAL at 03:12

## 2022-01-01 RX ADMIN — THIAMINE HYDROCHLORIDE 500 MG: 100 INJECTION, SOLUTION INTRAMUSCULAR; INTRAVENOUS at 08:12

## 2022-01-01 RX ADMIN — LOSARTAN POTASSIUM 50 MG: 50 TABLET, FILM COATED ORAL at 08:12

## 2022-01-01 RX ADMIN — POLYETHYLENE GLYCOL 3350 17 G: 17 POWDER, FOR SOLUTION ORAL at 09:12

## 2022-01-01 RX ADMIN — AZITHROMYCIN MONOHYDRATE 500 MG: 500 INJECTION, POWDER, LYOPHILIZED, FOR SOLUTION INTRAVENOUS at 10:12

## 2022-01-01 RX ADMIN — CARVEDILOL 25 MG: 25 TABLET, FILM COATED ORAL at 08:12

## 2022-01-01 RX ADMIN — IPRATROPIUM BROMIDE AND ALBUTEROL SULFATE 3 ML: 2.5; .5 SOLUTION RESPIRATORY (INHALATION) at 12:12

## 2022-01-01 RX ADMIN — PIPERACILLIN SODIUM AND TAZOBACTAM SODIUM 4.5 G: 4; .5 INJECTION, POWDER, LYOPHILIZED, FOR SOLUTION INTRAVENOUS at 11:12

## 2022-01-01 RX ADMIN — IPRATROPIUM BROMIDE AND ALBUTEROL SULFATE 3 ML: 2.5; .5 SOLUTION RESPIRATORY (INHALATION) at 08:12

## 2022-01-01 RX ADMIN — LOSARTAN POTASSIUM 50 MG: 50 TABLET, FILM COATED ORAL at 03:12

## 2022-01-01 RX ADMIN — POTASSIUM BICARBONATE 20 MEQ: 391 TABLET, EFFERVESCENT ORAL at 09:12

## 2022-01-01 RX ADMIN — PIPERACILLIN SODIUM AND TAZOBACTAM SODIUM 4.5 G: 4; .5 INJECTION, POWDER, LYOPHILIZED, FOR SOLUTION INTRAVENOUS at 04:12

## 2022-01-01 RX ADMIN — HYDROCORTISONE SODIUM SUCCINATE 100 MG: 100 INJECTION, POWDER, FOR SOLUTION INTRAMUSCULAR; INTRAVENOUS at 09:12

## 2022-01-01 RX ADMIN — CAPSAICIN: 0.25 CREAM TOPICAL at 09:12

## 2022-01-01 RX ADMIN — POTASSIUM CHLORIDE 40 MEQ: 400 INJECTION, SOLUTION INTRAVENOUS at 05:12

## 2022-01-01 RX ADMIN — DEXMEDETOMIDINE HYDROCHLORIDE 0.2 MCG/KG/HR: 4 INJECTION, SOLUTION INTRAVENOUS at 04:12

## 2022-01-01 RX ADMIN — AZITHROMYCIN MONOHYDRATE 500 MG: 500 INJECTION, POWDER, LYOPHILIZED, FOR SOLUTION INTRAVENOUS at 09:12

## 2022-01-01 RX ADMIN — CARVEDILOL 12.5 MG: 12.5 TABLET, FILM COATED ORAL at 08:12

## 2022-01-01 RX ADMIN — HYDROCORTISONE SODIUM SUCCINATE 100 MG: 100 INJECTION, POWDER, FOR SOLUTION INTRAMUSCULAR; INTRAVENOUS at 12:12

## 2022-01-01 RX ADMIN — POTASSIUM CHLORIDE 40 MEQ: 1500 TABLET, EXTENDED RELEASE ORAL at 01:12

## 2022-01-01 RX ADMIN — ATORVASTATIN CALCIUM 10 MG: 10 TABLET, FILM COATED ORAL at 08:12

## 2022-01-01 RX ADMIN — SODIUM CHLORIDE: 9 INJECTION, SOLUTION INTRAVENOUS at 02:12

## 2022-01-01 RX ADMIN — AMLODIPINE BESYLATE 5 MG: 5 TABLET ORAL at 08:12

## 2022-01-01 RX ADMIN — MUPIROCIN: 20 OINTMENT TOPICAL at 09:12

## 2022-01-01 RX ADMIN — NOREPINEPHRINE BITARTRATE 0.03 MCG/KG/MIN: 4 INJECTION, SOLUTION INTRAVENOUS at 08:12

## 2022-01-01 RX ADMIN — PANTOPRAZOLE SODIUM 40 MG: 40 INJECTION, POWDER, FOR SOLUTION INTRAVENOUS at 11:12

## 2022-01-01 RX ADMIN — CAPSAICIN: 0.25 CREAM TOPICAL at 10:12

## 2022-01-01 RX ADMIN — IPRATROPIUM BROMIDE AND ALBUTEROL SULFATE 3 ML: 2.5; .5 SOLUTION RESPIRATORY (INHALATION) at 03:12

## 2022-01-01 RX ADMIN — MUPIROCIN: 20 OINTMENT TOPICAL at 12:12

## 2022-01-01 RX ADMIN — HYDRALAZINE HYDROCHLORIDE 5 MG: 20 INJECTION, SOLUTION INTRAMUSCULAR; INTRAVENOUS at 08:12

## 2022-01-01 RX ADMIN — PIPERACILLIN SODIUM AND TAZOBACTAM SODIUM 4.5 G: 4; .5 INJECTION, POWDER, LYOPHILIZED, FOR SOLUTION INTRAVENOUS at 08:12

## 2022-01-01 RX ADMIN — SODIUM CHLORIDE: 0.9 INJECTION, SOLUTION INTRAVENOUS at 10:12

## 2022-01-01 RX ADMIN — ENOXAPARIN SODIUM 40 MG: 40 INJECTION SUBCUTANEOUS at 05:12

## 2022-01-01 RX ADMIN — CARVEDILOL 6.25 MG: 6.25 TABLET, FILM COATED ORAL at 08:12

## 2022-01-01 RX ADMIN — DEXMEDETOMIDINE HYDROCHLORIDE 0.3 MCG/KG/HR: 4 INJECTION, SOLUTION INTRAVENOUS at 05:12

## 2022-01-01 RX ADMIN — LEVOTHYROXINE SODIUM 50 MCG: 50 TABLET ORAL at 06:12

## 2022-01-01 RX ADMIN — POTASSIUM BICARBONATE 50 MEQ: 978 TABLET, EFFERVESCENT ORAL at 05:12

## 2022-01-01 RX ADMIN — IPRATROPIUM BROMIDE AND ALBUTEROL SULFATE 3 ML: 2.5; .5 SOLUTION RESPIRATORY (INHALATION) at 01:12

## 2022-01-01 RX ADMIN — SODIUM PHOSPHATE, MONOBASIC, MONOHYDRATE AND SODIUM PHOSPHATE, DIBASIC, ANHYDROUS 15 MMOL: 276; 142 INJECTION, SOLUTION INTRAVENOUS at 08:12

## 2022-01-01 RX ADMIN — PIPERACILLIN SODIUM AND TAZOBACTAM SODIUM 4.5 G: 4; .5 INJECTION, POWDER, LYOPHILIZED, FOR SOLUTION INTRAVENOUS at 01:12

## 2022-01-01 RX ADMIN — THERA TABS 1 TABLET: TAB at 09:12

## 2022-01-01 RX ADMIN — AMLODIPINE BESYLATE 5 MG: 5 TABLET ORAL at 09:12

## 2022-01-01 RX ADMIN — MUPIROCIN: 20 OINTMENT TOPICAL at 08:12

## 2022-01-01 RX ADMIN — THIAMINE HYDROCHLORIDE 500 MG: 100 INJECTION, SOLUTION INTRAMUSCULAR; INTRAVENOUS at 02:12

## 2022-01-01 RX ADMIN — THERA TABS 1 TABLET: TAB at 08:12

## 2022-01-01 RX ADMIN — HYDROCORTISONE SODIUM SUCCINATE 100 MG: 100 INJECTION, POWDER, FOR SOLUTION INTRAMUSCULAR; INTRAVENOUS at 06:12

## 2022-01-01 RX ADMIN — DEXMEDETOMIDINE HYDROCHLORIDE 0.2 MCG/KG/HR: 4 INJECTION, SOLUTION INTRAVENOUS at 02:12

## 2022-01-01 RX ADMIN — DEXTROSE 500 ML: 5 SOLUTION INTRAVENOUS at 12:12

## 2022-01-01 RX ADMIN — PIPERACILLIN SODIUM AND TAZOBACTAM SODIUM 4.5 G: 4; .5 INJECTION, POWDER, LYOPHILIZED, FOR SOLUTION INTRAVENOUS at 07:12

## 2022-01-01 RX ADMIN — THIAMINE HYDROCHLORIDE 500 MG: 100 INJECTION, SOLUTION INTRAMUSCULAR; INTRAVENOUS at 03:12

## 2022-01-01 RX ADMIN — AMLODIPINE BESYLATE 5 MG: 5 TABLET ORAL at 07:12

## 2022-01-01 RX ADMIN — ENOXAPARIN SODIUM 40 MG: 40 INJECTION SUBCUTANEOUS at 04:12

## 2022-01-01 RX ADMIN — MAGNESIUM SULFATE 2 G: 2 INJECTION INTRAVENOUS at 06:12

## 2022-01-01 RX ADMIN — VANCOMYCIN HYDROCHLORIDE 1250 MG: 1.25 INJECTION, POWDER, LYOPHILIZED, FOR SOLUTION INTRAVENOUS at 12:12

## 2022-01-01 RX ADMIN — PANTOPRAZOLE SODIUM 40 MG: 40 INJECTION, POWDER, FOR SOLUTION INTRAVENOUS at 08:12

## 2022-01-01 RX ADMIN — CAPSAICIN: 0.25 CREAM TOPICAL at 12:12

## 2022-01-01 RX ADMIN — ENOXAPARIN SODIUM 40 MG: 40 INJECTION SUBCUTANEOUS at 06:12

## 2022-01-01 RX ADMIN — ACETAMINOPHEN 650 MG: 325 TABLET ORAL at 08:12

## 2022-01-01 RX ADMIN — HYDROCORTISONE SODIUM SUCCINATE 100 MG: 100 INJECTION, POWDER, FOR SOLUTION INTRAMUSCULAR; INTRAVENOUS at 01:12

## 2022-01-01 RX ADMIN — POTASSIUM CHLORIDE 40 MEQ: 1500 TABLET, EXTENDED RELEASE ORAL at 05:12

## 2022-01-01 RX ADMIN — MAGNESIUM SULFATE 2 G: 2 INJECTION INTRAVENOUS at 09:12

## 2022-01-01 RX ADMIN — PIPERACILLIN SODIUM AND TAZOBACTAM SODIUM 4.5 G: 4; .5 INJECTION, POWDER, LYOPHILIZED, FOR SOLUTION INTRAVENOUS at 03:12

## 2022-01-01 RX ADMIN — GLYCERIN 1 SUPPOSITORY: 2 SUPPOSITORY RECTAL at 02:12

## 2022-01-01 RX ADMIN — CARVEDILOL 6.25 MG: 6.25 TABLET, FILM COATED ORAL at 09:12

## 2022-01-01 RX ADMIN — SENNOSIDES AND DOCUSATE SODIUM 1 TABLET: 50; 8.6 TABLET ORAL at 08:12

## 2022-01-01 RX ADMIN — DEXMEDETOMIDINE HYDROCHLORIDE 0.2 MCG/KG/HR: 4 INJECTION, SOLUTION INTRAVENOUS at 01:12

## 2022-01-01 RX ADMIN — MUPIROCIN: 20 OINTMENT TOPICAL at 10:12

## 2022-01-01 RX ADMIN — LABETALOL HYDROCHLORIDE 10 MG: 5 INJECTION, SOLUTION INTRAVENOUS at 07:12

## 2022-01-01 RX ADMIN — POLYETHYLENE GLYCOL 3350 17 G: 17 POWDER, FOR SOLUTION ORAL at 08:12

## 2022-01-01 RX ADMIN — ETOMIDATE 8 MG: 2 INJECTION INTRAVENOUS at 01:12

## 2022-01-01 RX ADMIN — VANCOMYCIN HYDROCHLORIDE 1250 MG: 1.25 INJECTION, POWDER, LYOPHILIZED, FOR SOLUTION INTRAVENOUS at 01:12

## 2022-01-01 RX ADMIN — LABETALOL HYDROCHLORIDE 10 MG: 5 INJECTION, SOLUTION INTRAVENOUS at 06:12

## 2022-01-01 RX ADMIN — VASOPRESSIN 0.04 UNITS/MIN: 20 INJECTION INTRAVENOUS at 12:12

## 2022-01-01 RX ADMIN — FUROSEMIDE 40 MG: 10 INJECTION, SOLUTION INTRAMUSCULAR; INTRAVENOUS at 11:12

## 2022-01-01 RX ADMIN — CARVEDILOL 12.5 MG: 12.5 TABLET, FILM COATED ORAL at 07:12

## 2022-01-01 RX ADMIN — LOSARTAN POTASSIUM 50 MG: 50 TABLET, FILM COATED ORAL at 11:12

## 2022-01-01 RX ADMIN — DEXTROSE 500 ML: 5 SOLUTION INTRAVENOUS at 05:12

## 2022-01-01 RX ADMIN — METHOCARBAMOL 500 MG: 500 TABLET ORAL at 12:12

## 2022-01-01 RX ADMIN — SODIUM CHLORIDE 1700 ML: 9 INJECTION, SOLUTION INTRAVENOUS at 12:12

## 2022-01-01 RX ADMIN — SODIUM CHLORIDE 500 ML: 0.9 INJECTION, SOLUTION INTRAVENOUS at 03:12

## 2022-01-01 RX ADMIN — ATORVASTATIN CALCIUM 10 MG: 10 TABLET, FILM COATED ORAL at 09:12

## 2022-01-01 RX ADMIN — SODIUM PHOSPHATE, MONOBASIC, MONOHYDRATE 39.99 MMOL: 276; 142 INJECTION, SOLUTION INTRAVENOUS at 05:12

## 2022-01-01 RX ADMIN — ROCURONIUM BROMIDE 60 MG: 10 INJECTION, SOLUTION INTRAVENOUS at 01:12

## 2022-01-01 RX ADMIN — IPRATROPIUM BROMIDE AND ALBUTEROL SULFATE 3 ML: 2.5; .5 SOLUTION RESPIRATORY (INHALATION) at 02:12

## 2022-01-01 RX ADMIN — HYDRALAZINE HYDROCHLORIDE 25 MG: 25 TABLET, FILM COATED ORAL at 08:12

## 2022-01-01 RX ADMIN — SODIUM CHLORIDE 40 ML/HR: 3 INJECTION, SOLUTION INTRAVENOUS at 03:12

## 2022-01-01 RX ADMIN — POTASSIUM & SODIUM PHOSPHATES POWDER PACK 280-160-250 MG 2 PACKET: 280-160-250 PACK at 08:12

## 2022-01-01 RX ADMIN — AMLODIPINE BESYLATE 5 MG: 5 TABLET ORAL at 06:12

## 2022-01-01 RX ADMIN — HYDRALAZINE HYDROCHLORIDE 5 MG: 20 INJECTION, SOLUTION INTRAMUSCULAR; INTRAVENOUS at 01:12

## 2022-01-01 RX ADMIN — LABETALOL HYDROCHLORIDE 10 MG: 5 INJECTION, SOLUTION INTRAVENOUS at 01:12

## 2022-01-01 RX ADMIN — MAGNESIUM SULFATE 2 G: 2 INJECTION INTRAVENOUS at 04:12

## 2022-01-01 RX ADMIN — Medication 1 ENEMA: at 06:12

## 2022-01-01 RX ADMIN — LABETALOL HYDROCHLORIDE 10 MG: 5 INJECTION, SOLUTION INTRAVENOUS at 12:12

## 2022-01-01 RX ADMIN — HYDROCORTISONE SODIUM SUCCINATE 100 MG: 100 INJECTION, POWDER, FOR SOLUTION INTRAMUSCULAR; INTRAVENOUS at 05:12

## 2022-01-01 RX ADMIN — NICARDIPINE HYDROCHLORIDE 2 MG/HR: 0.2 INJECTION, SOLUTION INTRAVENOUS at 04:12

## 2022-11-30 NOTE — ED TRIAGE NOTES
Pt with SOB for 3 days, cough. SOB worsened when ambulating. Pt is alert and oriented, ambulatory, respirations are even unlabored.

## 2022-11-30 NOTE — FIRST PROVIDER EVALUATION
Emergency Department TeleTriage Encounter Note      CHIEF COMPLAINT    Chief Complaint   Patient presents with    Cough     Patient to ED with c/o productive cough / congestion x 3 days with dyspnea on exertion . Oxygen saturation 98% on room air. BP and hr elevated . Patient states she took BP meds at 0900        VITAL SIGNS   Initial Vitals [11/30/22 1138]   BP Pulse Resp Temp SpO2   (!) 217/89 (!) 115 18 98.1 °F (36.7 °C) 98 %      MAP       --            ALLERGIES    Review of patient's allergies indicates:   Allergen Reactions    Shellfish containing products Swelling       PROVIDER TRIAGE NOTE  Patient presents with complaint of shortness of breath for 3 days.  She reports associated cough and congestion.  She denies chest pain.  Reports compliance with her blood pressure medications.      Phy:   Constitutional: well nourished, well developed, appearing stated age, NAD   HEENT: NCAT, symmetrical lids, No obvious facial deformity.  Normal phonation. Normal Conjunctiva   Neck: NAROM   Respiratory: Normal effort.  No obvious use of accessory muscles   Musculoskeletal: Moved upper extremities well   Neuro: Alert, answers questions appropriately    Psych: appropriate mood and affect      Initial orders will be placed and care will be transferred to an alternate provider when patient is roomed for a full evaluation. Any additional orders and the final disposition will be determined by that provider.        ORDERS  Labs Reviewed   CBC W/ AUTO DIFFERENTIAL   COMPREHENSIVE METABOLIC PANEL   SARS-COV-2 RDRP GENE       ED Orders (720h ago, onward)      Start Ordered     Status Ordering Provider    11/30/22 1210 11/30/22 1209  CBC Auto Differential  STAT         Ordered ELVER RAI    11/30/22 1210 11/30/22 1209  Comprehensive Metabolic Panel  STAT         Ordered ELVER RAI    11/30/22 1210 11/30/22 1209  Pulse Oximetry Continuous  Continuous         Ordered ELVER RAI     11/30/22 1210 11/30/22 1209  Cardiac Monitoring - Adult  Continuous         Ordered ELVER RAI    11/30/22 1210 11/30/22 1209  EKG 12-lead  Once         Ordered ELVER RAI    11/30/22 1210 11/30/22 1209  POCT COVID-19 Rapid Screening  Once         Ordered EVELYN RAIINE    11/30/22 1210 11/30/22 1209  X-Ray Chest PA And Lateral  1 time imaging         Ordered ELVER RAI              Virtual Visit Note: The provider triage portion of this emergency department evaluation and documentation was performed via [email or web address], a HIPAA-compliant telemedicine application, in concert with a tele-presenter in the room. A face to face patient evaluation with one of my colleagues will occur once the patient is placed in an emergency department room.      DISCLAIMER: This note was prepared with [employer] voice recognition transcription software. Garbled syntax, mangled pronouns, and other bizarre constructions may be attributed to that software system.

## 2022-11-30 NOTE — ED PROVIDER NOTES
Source of History:  Patient    Chief complaint:  Cough (Patient to ED with c/o productive cough / congestion x 3 days with dyspnea on exertion . Oxygen saturation 98% on room air. BP and hr elevated . Patient states she took BP meds at 0900 )      HPI:  Dominique Mcgee is a 72 y.o. female with hypertension, hypothyroidism and hyperlipidemia who is presenting to the emergency department with shortness of breath.  Patient states that she is felt short of breath at rest and this worsens with exertion for the past 3 days.  Denies orthopnea.  She also reports a mostly dry cough and feels like mucus gets stuck in her throat.  She denies fever or chills.  She is been taking over-the-counter Coricidin HBP.  She denies chest pain.  She also reports gas pain in her stomach.  She denies leg pain or leg swelling.  Patient states that she was possibly diagnosed with a blood clot in her left leg approximately 6 months ago but was not placed on anticoagulation.  This is the extent to the patients complaints today here in the emergency department.    ROS: As per HPI and below:  General: No fever.  No chills.  No fatigue.  Eyes: No visual changes.  ENT: No sore throat. No congestion.  Head: No headache.    Respiratory: + shortness of breath and cough  Cardiovascular: No chest pain.  Abdomen: + increased gas.  No nausea or vomiting.  Genito-Urinary: No abnormal urination.  Neurologic: No focal weakness.  No numbness.  MSK: no back pain.  Integument: No rashes or lesions.  Allergy/immunology:  Not immunocompromised.     Review of patient's allergies indicates:   Allergen Reactions    Shellfish containing products Swelling       PMH:  As per HPI and below:  Past Medical History:   Diagnosis Date    Hyperlipidemia     Hypertension     Hypothyroidism      Past Surgical History:   Procedure Laterality Date    HYSTERECTOMY         Social History     Tobacco Use    Smoking status: Never       Physical Exam:    BP (!) 194/87 (BP  "Location: Left arm)   Pulse 102   Temp 98.7 °F (37.1 °C) (Oral)   Resp 18   Ht 5' 3" (1.6 m)   Wt 59.9 kg (132 lb)   SpO2 99%   BMI 23.38 kg/m²   Nursing note and vital signs reviewed.  Appearance: No acute distress.  Nontoxic appearing.  Eyes: No conjunctival injection.  ENT: Oropharynx clear.    Chest/ Respiratory: Clear to auscultation bilaterally.  Good air movement.  No wheezes.  No rhonchi. No rales. No accessory muscle use.  Cardiovascular: Regular rate and rhythm.  No murmurs. No gallops. No rubs.  Abdomen: Soft.  Not distended.  Mild epigastric tenderness.  No guarding.  No rebound. Non-peritoneal.  Musculoskeletal: Good range of motion all joints.  No deformities.  Neck supple.  No meningismus.  No lower extremity edema.  Skin: No rashes seen.  Good turgor.  No abrasions.  No ecchymoses.  Neurologic: Motor intact.  Sensation intact.    Mental Status:  Alert and oriented x 3.  Appropriate, conversant.   Labs that have been ordered have been independently reviewed and interpreted by myself.    EKG obtained from 12:56 a.m. sinus tachycardia rate of 107 beats per minute.  No STEMI.  No ischemic changes.  No previous EKG to compare to.    I decided to obtain the patient's medical records.    MDM/ Differential Dx:    72 y.o. female who is presenting to the emergency department with shortness of breath, dyspnea on exertion and cough x3 days.  Patient is afebrile, nontoxic hemodynamically stable.  Initial vital signs, patient was hypertensive and tachycardic.  Repeat vital signs upon arrival to back ED, tachycardia improved but pulse 102 and blood pressure improving.  Basic lab work obtained from tele triage which was unremarkable.  COVID and flu test were negative and chest x-ray revealed no acute cardiopulmonary process.  Given patient's age and risk factors, concern for ACS versus PE.  Will obtain additional blood work, BNP, troponin and D-dimer.        Troponin, D-dimer, lipase and BNP all within normal " limits.  Patient's tachycardia resolved.  Patient advised on supportive care for mucolytic-OTC meds.  Given strict precautions to the ED.     Diagnostic Impression:    1. Cough, unspecified type    2. Shortness of breath                   Ho Dorsey PA-C  11/30/22 7858

## 2022-12-12 NOTE — ED TRIAGE NOTES
Patient reports SOB, chest tightness, productive cough w/ clear white sputum w/ intermittent dizziness x1week. Patient denies fever.

## 2022-12-12 NOTE — ED PROVIDER NOTES
Encounter Date: 12/12/2022    SCRIBE #1 NOTE: I, Terry Morris, am scribing for, and in the presence of,  Yvonne Cary MD.     History     Chief Complaint   Patient presents with    Cough    Shortness of Breath     Patient presented to ER with complaint of cough, SOB, and chest tightness x 1 week.      Dominique Mcgee is a 72 y.o. female with PMHx of hypertension, hypothyroidism, and hyperlipidemia who presents to the ED with cough, SOB, and chest tightness for the past week. Associated symptoms include fever. Pt also reports coughing up white phlegm.   She denies any weakness, nausea vomiting or diarrhea.      The history is provided by the patient.   Review of patient's allergies indicates:   Allergen Reactions    Shellfish containing products Swelling     Past Medical History:   Diagnosis Date    Hyperlipidemia     Hypertension     Hypothyroidism      Past Surgical History:   Procedure Laterality Date    HYSTERECTOMY       Family History   Problem Relation Age of Onset    Breast cancer Sister 55    Breast cancer Sister 65     Social History     Tobacco Use    Smoking status: Never   Substance Use Topics    Drug use: Never     Review of Systems   Constitutional:  Positive for fever.   HENT:  Negative for sore throat.    Respiratory:  Positive for cough, chest tightness and shortness of breath.    Cardiovascular:  Negative for chest pain.   Gastrointestinal:  Negative for nausea.   Genitourinary:  Negative for dysuria.   Musculoskeletal:  Negative for back pain.   Skin:  Negative for rash.   Neurological:  Negative for weakness.   Hematological:  Does not bruise/bleed easily.     Physical Exam     Initial Vitals [12/12/22 1447]   BP Pulse Resp Temp SpO2   (!) 205/85 76 18 98.3 °F (36.8 °C) 99 %      MAP       --         Physical Exam    Nursing note and vitals reviewed.  Constitutional: She appears well-developed and well-nourished.   HENT:   Head: Normocephalic and atraumatic.   Mouth/Throat: Oropharynx is  clear and moist.   Eyes: Conjunctivae and EOM are normal. Pupils are equal, round, and reactive to light.   Neck: Neck supple.   Normal range of motion.  Cardiovascular:  Normal rate, regular rhythm, normal heart sounds and intact distal pulses.     Exam reveals no gallop and no friction rub.       No murmur heard.  Pulmonary/Chest: Breath sounds normal.   Abdominal: Abdomen is soft. Bowel sounds are normal. She exhibits no distension. There is no abdominal tenderness. There is no rebound and no guarding.   Musculoskeletal:         General: No tenderness or edema. Normal range of motion.      Cervical back: Normal range of motion and neck supple.     Lymphadenopathy:     She has no cervical adenopathy.   Neurological: She is alert and oriented to person, place, and time. She has normal strength and normal reflexes.   Skin: Skin is warm and dry.   Psychiatric: She has a normal mood and affect. Her behavior is normal. Judgment and thought content normal.       ED Course   Procedures  Labs Reviewed   CBC W/ AUTO DIFFERENTIAL - Abnormal; Notable for the following components:       Result Value    MCV 79 (*)     MCH 26.7 (*)     Platelets 483 (*)     All other components within normal limits   COMPREHENSIVE METABOLIC PANEL - Abnormal; Notable for the following components:    Sodium 127 (*)     Chloride 87 (*)     CO2 30 (*)     All other components within normal limits   HIV 1 / 2 ANTIBODY    Narrative:     Release to patient->Immediate   HEPATITIS C ANTIBODY    Narrative:     Release to patient->Immediate   TROPONIN I   B-TYPE NATRIURETIC PEPTIDE   POCT INFLUENZA A/B MOLECULAR   SARS-COV-2 RDRP GENE    Narrative:     This test utilizes isothermal nucleic acid amplification technology to detect the SARS-CoV-2 RdRp nucleic acid segment. The analytical sensitivity (limit of detection) is 500 copies/swab.     A POSITIVE result is indicative of the presence of SARS-CoV-2 RNA; clinical correlation with patient history and  other diagnostic information is necessary to determine patient infection status.    A NEGATIVE result means that SARS-CoV-2 nucleic acids are not present above the limit of detection. A NEGATIVE result should be treated as presumptive. It does not rule out the possibility of COVID-19 and should not be the sole basis for treatment decisions. If COVID-19 is strongly suspected based on clinical and exposure history, re-testing using an alternate molecular assay should be considered.     This test is only for use under the Food and Drug Administration s Emergency Use Authorization (EUA).     Commercial kits are provided by [employer]. Performance characteristics of the EUA have been independently verified by [facility] Department of Pathology and Laboratory Medicine.   _________________________________________________________________   The authorized Fact Sheet for Healthcare Providers and the authorized Fact Sheet for Patients of the ID NOW COVID-19 are available on the FDA website:    https://www.fda.gov/media/036383/download      https://www.fda.gov/media/494248/download         EKG Readings: (Independently Interpreted)   Initial: 1706. Rhythm: Normal Sinus Rhythm. Ectopy: No Ectopy. Conduction: Normal. ST Segments: Normal ST Segments. T Waves: Normal. Clinical Impression: Normal Sinus Rhythm     Imaging Results              X-Ray Chest AP Portable (Final result)  Result time 12/12/22 16:58:25      Final result by Khushboo Quispe MD (12/12/22 16:58:25)                   Impression:      No acute cardiopulmonary process seen.      Electronically signed by: Khushboo Quispe  Date:    12/12/2022  Time:    16:58               Narrative:    EXAMINATION:  XR CHEST AP PORTABLE    CLINICAL HISTORY:  CHF;    TECHNIQUE:  Single frontal view of the chest was performed.    COMPARISON:  11/30/2022    FINDINGS:  Lungs are well expanded.  No acute consolidation, pleural effusion, or pneumothorax.    Cardiac  silhouette is normal in size.                                       Medications - No data to display  Medical Decision Making:   History:   Old Medical Records: I decided to obtain old medical records.  Independently Interpreted Test(s):   I have ordered and independently interpreted X-rays - see prior notes.  I have ordered and independently interpreted EKG Reading(s) - see prior notes  Clinical Tests:   Lab Tests: Ordered and Reviewed  Radiological Study: Ordered and Reviewed  Medical Tests: Ordered and Reviewed  ED Management:    The patient is a 72-year-old female who came to the emergency department with viral symptoms and a white productive cough.  She states she has been sick for the past week.  She has a past history of hypertension, high cholesterol and hypothyroidism.  Her cardiac workup is within normal limits.  BNP  was 16.  Her sodium was 127 and chloride was 86.  She has had normal electrolytes in the past.  The patient does not have any symptoms of weakness muscle spasms and she has had no nausea and vomiting.  I recommend that she follows up with her primary care doctor this week to have repeat labs drawn.NP was        Scribe Attestation:   Scribe #1: I performed the above scribed service and the documentation accurately describes the services I performed. I attest to the accuracy of the note.            I, Yvonne Cary, personally performed the services described in this documentation. All medical record entries made by the scribe were at my direction and in my presence.  I have reviewed the chart and agree that the record reflects my personal performance and is accurate and complete. Yvonne Cary M.D. 7:56 PM12/12/2022        Clinical Impression:   Final diagnoses:  [R07.9] Chest pain  [R06.02] Shortness of breath  [J06.9] Viral URI with cough (Primary)        ED Disposition Condition    Discharge Stable          ED Prescriptions    None       Follow-up Information       Follow up With Specialties  Details Why Contact Info    Briana Leblanc MD Internal Medicine  As needed [address] [facility] [address]  [phone]      [facility] - Emergency Dept Emergency Medicine  As needed, If symptoms worsen [address] [facility] [id number] [phone]             Yvonne Cary MD  12/12/22 2002

## 2022-12-23 PROBLEM — E87.6 HYPOKALEMIA: Status: ACTIVE | Noted: 2022-01-01

## 2022-12-23 PROBLEM — I16.0 HYPERTENSIVE URGENCY: Status: ACTIVE | Noted: 2022-01-01

## 2022-12-23 PROBLEM — I10 ESSENTIAL HYPERTENSION: Status: ACTIVE | Noted: 2022-01-01

## 2022-12-23 PROBLEM — R06.00 DYSPNEA: Status: ACTIVE | Noted: 2022-01-01

## 2022-12-23 PROBLEM — E87.1 HYPONATREMIA: Status: ACTIVE | Noted: 2022-01-01

## 2022-12-23 PROBLEM — M54.9 BACK PAIN: Status: ACTIVE | Noted: 2022-01-01

## 2022-12-23 PROBLEM — R06.09 DYSPNEA ON EXERTION: Status: ACTIVE | Noted: 2022-01-01

## 2022-12-23 PROBLEM — E87.3 METABOLIC ALKALOSIS: Status: ACTIVE | Noted: 2022-01-01

## 2022-12-23 PROBLEM — E78.5 HYPERLIPIDEMIA: Status: ACTIVE | Noted: 2022-01-01

## 2022-12-23 PROBLEM — R53.1 GENERALIZED WEAKNESS: Status: ACTIVE | Noted: 2022-01-01

## 2022-12-23 NOTE — Clinical Note
Diagnosis: Hyponatremia [198519]   Admitting Provider:: JORJE FLOYD [[id number]]   Future Attending Provider: JORJE FLOYD [[id number]]   Reason for IP Medical Treatment  (Clinical interventions that can only be accomplished in the IP setting? ) :: hyponatremia, elevated bp   Estimated Length of Stay:: 2 midnights   I certify that Inpatient services for greater than or equal to 2 midnights are medically necessary:: Yes   Plans for Post-Acute care--if anticipated (pick the single best option):: A. No post acute care anticipated at this time   Special Needs:: No Special Needs [1]

## 2022-12-23 NOTE — ED PROVIDER NOTES
Encounter Date: 12/23/2022       History     Chief Complaint   Patient presents with    Shortness of Breath     X 2 weeks. Reports cough. Denies cardiac hx.     1:29 PM  Patient is a 72-year-old female with a history of HTN, HLD, hypothyroidism who presents to [facility] ED with SOB and LIRA.    Patient has had shortness of breath for 1-2 weeks.  On chart review she has presented to the ED twice since November.  She states that she feels like she is breathing fast.  Can barely talk sometimes.  Her shortness of breath is worse with exertion.  States that she has to sit or lay down to relieve her symptoms.  She denies any chest pain.  Was coughing last week that resolved.  She has not had fever, chills, leg pain or swelling.  She has had some nausea without vomiting.  Does feel weak and fatigued.    On 12/16, her lisinopril-hctz was switched to losartan-hctc. She is still on metoprolol. She had both this morning.     Review of patient's allergies indicates:   Allergen Reactions    Shellfish containing products Swelling     Past Medical History:   Diagnosis Date    Hyperlipidemia     Hypertension     Hypothyroidism      Past Surgical History:   Procedure Laterality Date    HYSTERECTOMY       Family History   Problem Relation Age of Onset    Breast cancer Sister 55    Breast cancer Sister 65     Social History     Tobacco Use    Smoking status: Never   Substance Use Topics    Drug use: Never     Review of Systems   Constitutional:  Negative for chills, diaphoresis and fever.   HENT:  Negative for sore throat.    Respiratory:  Positive for shortness of breath. Negative for cough (resolved).    Cardiovascular:  Negative for chest pain and leg swelling.   Gastrointestinal:  Negative for abdominal pain, diarrhea, nausea and vomiting.   Genitourinary:  Negative for dysuria, frequency, hematuria and urgency.   Musculoskeletal:  Negative for back pain.   Skin:  Negative for rash.   Neurological:  Negative for weakness, numbness and  headaches.   Hematological:  Does not bruise/bleed easily.     Physical Exam     Initial Vitals   BP Pulse Resp Temp SpO2   12/23/22 1248 12/23/22 1246 12/23/22 1246 12/23/22 1248 12/23/22 1246   (!) 242/107 85 16 97.9 °F (36.6 °C) 98 %      MAP       --                Physical Exam    Vitals reviewed.  Constitutional: She appears well-developed and well-nourished. She is not diaphoretic. She is cooperative.  Non-toxic appearance. She does not have a sickly appearance. She does not appear ill. No distress. Face mask in place.   HENT:   Head: Normocephalic and atraumatic.   Nose: Nose normal.   Mouth/Throat: No trismus in the jaw.   Eyes: Conjunctivae and EOM are normal.   Neck:   Normal range of motion.  Cardiovascular:  Normal rate and regular rhythm.           Pulmonary/Chest: Breath sounds normal. No accessory muscle usage. No tachypnea. No respiratory distress. She has no wheezes. She has no rhonchi. She has no rales.   Abdominal: She exhibits no distension.   Musculoskeletal:         General: Normal range of motion.      Cervical back: Normal range of motion.     Neurological: She is alert. She has normal strength.   Skin: Skin is warm and dry. No erythema. No pallor.       ED Course   Procedures  Labs Reviewed   CBC W/ AUTO DIFFERENTIAL - Abnormal; Notable for the following components:       Result Value    MCV 78 (*)     MCH 26.7 (*)     Platelets 494 (*)     Gran % 81.4 (*)     Lymph % 11.3 (*)     All other components within normal limits   COMPREHENSIVE METABOLIC PANEL - Abnormal; Notable for the following components:    Sodium 120 (*)     Potassium 2.9 (*)     Chloride 76 (*)     CO2 37 (*)     Glucose 118 (*)     Total Bilirubin 1.1 (*)     Anion Gap 7 (*)     All other components within normal limits   TROPONIN I   B-TYPE NATRIURETIC PEPTIDE   D DIMER, QUANTITATIVE   SARS-COV2 (COVID) WITH FLU/RSV BY PCR   MAGNESIUM   MAGNESIUM    Narrative:     add on mg per dr.catherine ramos /order#[id number] @  12/23/2022  15:37   ADD ON TSH PER JU MITCHELL PA-C ORDER# [id number] @  12/23/2022    15:53    TSH   TSH    Narrative:     add on mg per dr.catherine mitchell /order#[id number] @ 12/23/2022  15:37   ADD ON TSH PER JU MITCHELL PA-C ORDER# [id number] @  12/23/2022    15:53    URINALYSIS, REFLEX TO URINE CULTURE   BASIC METABOLIC PANEL   ALDOSTERONE   RENIN   ALDOSTERONE/RENIN ACTIVITY RATIO   HEMOGLOBIN A1C   LIPID PANEL        ECG Results              EKG 12-lead (Final result)  Result time 12/23/22 14:41:44      Final result by Interface, Lab In [facility] (12/23/22 14:41:44)                   Narrative:    Test Reason : R06.02,    Vent. Rate : 082 BPM     Atrial Rate : 082 BPM     P-R Int : 174 ms          QRS Dur : 084 ms      QT Int : 448 ms       P-R-T Axes : 083 074 098 degrees     QTc Int : 523 ms    Normal sinus rhythm  Nonspecific ST and T wave abnormality  Prolonged QT  Abnormal ECG  When compared with ECG of 12-DEC-2022 17:06,  Nonspecific T wave abnormality now evident in Inferior leads  QT has lengthened  Confirmed by LISSETH RUEDA MD ([id number]) on 12/23/2022 2:41:35 PM    Referred By: ED   SELF           Confirmed By:LISSETH RUEDA MD                                  Imaging Results              X-Ray Chest AP Portable (Final result)  Result time 12/23/22 14:47:01      Final result by Theodore Estrada III, MD (12/23/22 14:47:01)                   Impression:      No acute process seen.      Electronically signed by: Theodore Estrada MD  Date:    12/23/2022  Time:    14:47               Narrative:    EXAMINATION:  XR CHEST AP PORTABLE    CLINICAL HISTORY:  CHF;    FINDINGS:  Chest one view: Heart size is normal.  There is aortic plaque.  Lungs are clear.                                       Medications   sodium chloride 0.9% flush 10 mL (has no administration in time range)   naloxone 0.4 mg/mL injection 0.02 mg (has no administration in time range)   glucose chewable tablet 16 g (has no administration  in time range)   glucose chewable tablet 24 g (has no administration in time range)   glucagon (human recombinant) injection 1 mg (has no administration in time range)   enoxaparin injection 40 mg (40 mg Subcutaneous Given 12/23/22 1731)   atorvastatin tablet 10 mg (has no administration in time range)   levothyroxine tablet 50 mcg (has no administration in time range)   magnesium sulfate 2g in water 50mL IVPB (premix) (2 g Intravenous New Bag 12/23/22 1819)   dextrose 10% bolus 125 mL 125 mL (has no administration in time range)   dextrose 10% bolus 250 mL 250 mL (has no administration in time range)   labetalol 20 mg/4 mL (5 mg/mL) IV syring (10 mg Intravenous Given 12/23/22 1834)   hydrALAZINE injection 5 mg (has no administration in time range)   metoprolol succinate (TOPROL-XL) 24 hr tablet 50 mg (has no administration in time range)   amLODIPine tablet 5 mg (5 mg Oral Given 12/23/22 1835)   0.9%  NaCl infusion (1,000 mLs Intravenous New Bag 12/23/22 1633)   potassium bicarbonate disintegrating tablet 50 mEq (50 mEq Oral Given 12/23/22 1731)   potassium chloride SA CR tablet 40 mEq (40 mEq Oral Given 12/23/22 1731)                Attending Attestation:     Physician Attestation Statement for NP/PA:   I discussed this assessment and plan of this patient with the NP/PA, but I did not personally examine the patient. The face to face encounter was performed by the NP/PA.            ED Course as of 12/23/22 1922   Fri Dec 23, 2022   1353 BP(!): 156/101  Lowered without intervention.  [CL]   1353 Temp: 97.9 °F (36.6 °C) [CL]   1353 Pulse: 78 [CL]   1353 Resp: 20 [CL]   1353 SpO2: 98 % [CL]   1519 WBC: 8.74 [CL]   1519 HEMOGLOBIN: 13.7 [CL]   1519 Platelets(!): 494 [CL]   1520 X-Ray Chest AP Portable  No acute process seen. [CL]   1522 D-Dimer: 0.27  Doubt VTE. [CL]   1535 Troponin I: 0.024 [CL]   1535 BNP: 31 [CL]   1549 Sodium(!): 120  Will add on urine studies. Patient on HCTZ. It has been trending down for the  past few weeks.  [CL]   1549 Potassium(!): 2.9  Will add on Mag. [CL]   1549 Chloride(!): 76  Patient getting NaCl infusion.  [CL]   1550 Creatinine: 0.6  No GERMANIA.  [CL]   1552 BP(!): 193/89 [CL]   1634 BP(!): 185/86 [CL]   1640 RSV Ag by Molecular Method: Not Detected [CL]   1640 Influenza B, Molecular: Not Detected [CL]   1640 Influenza A, Molecular: Not Detected [CL]   1640 SARS-CoV2 (COVID-19) Qualitative PCR: Not Detected [CL]   1640 Case was discussed with Hospital Medicine who will admit to their service for further evaluation and management of hyponatremia, hypokalemia, and hypochloridemia.  She is on hydrochlorothiazide.  I have added on urine studies for further investigation. [CL]   1642 Troponin I: 0.024 [CL]   1642 TSH: 2.327  Likely not the cause of hyponatremia.  [CL]      ED Course User Index  [CL] Aubree Navarro PA-C               I have reviewed patient's chart and discussed this case with my supervising MD.     Aubree Navarro PA-C  Emergent Department  [facility]  Spectralink #[id number] or #[id number]    Clinical Impression:   Final diagnoses:  [R06.02] SOB (shortness of breath)  [E87.1] Hyponatremia (Primary)  [R03.0] Elevated blood pressure reading  [E87.6] Hypokalemia  [E87.8] Hypochloremia        ED Disposition Condition    Admit Stable                  Aubree Navarro PA-C  12/23/22 1642       Andreas Albright MD  12/23/22 1922

## 2022-12-23 NOTE — ED NOTES
Patient identifiers for Dominique Mcgee 72 y.o. female checked and correct.  Chief Complaint   Patient presents with    Shortness of Breath     X 2 weeks. Reports cough. Denies cardiac hx.     Past Medical History:   Diagnosis Date    Hyperlipidemia     Hypertension     Hypothyroidism      Allergies reported:   Review of patient's allergies indicates:   Allergen Reactions    Shellfish containing products Swelling         LOC: Patient is awake, alert, and aware of environment with an appropriate affect. Patient is oriented x 4 and speaking appropriately.  APPEARANCE: Patient resting comfortably and in no acute distress. Patient is clean and well groomed, patient's clothing is properly fastened.  HEENT: Pt presents with surgical mask on.   SKIN: The skin is warm and dry. Patient has sluggish skin turgor and dry mucus membranes.   MUSKULOSKELETAL: Patient is moving all extremities well, no obvious deformities noted. Pulses intact. Pt reports the use of a cane to ambulate, reports generalized weakness.   RESPIRATORY: Airway is open and patent. Respirations are spontaneous and non-labored with normal effort and rate. Pt reports dyspnea on exertion.   CARDIAC: Patient has a normal rate and rhythm. 78 on cardiac monitor. No peripheral edema noted. Pt is hypertensive.  ABDOMEN: No distention noted. Soft and non-tender upon palpation.  NEUROLOGICAL: Facial expression is symmetrical. Hand grasps are equal bilaterally. Normal sensation in all extremities when touched with finger.

## 2022-12-23 NOTE — HPI
Ms. Mcgee is a 71 y/o F patient with a PMHx of essential hypertension, hyperlipidemia, hypothyroidism, balance difficulites who presents to the ED for 3 weeks of weakness, dyspnea on exertion, decreased appetite. Prior to 3 weeks ago patient was completely functional with no deficits, completely independent with ADLs/iADLs. She became weaker over the past three weeks and more short of breath to the point where she gets winded while walking to the bathroom and back to her bed. Her  also states that she has been eating less and less, however has been drinking plenty of water. Patient denies any chest pain, N/V/D, dysuria, edema, abdominal distention, but does also endorse occasionally lower quadrant abdominal pain and constipation. She was started on Losartan by her PCP 4 days ago due to concerns that lisinopril was causing her to have dry mouth and cough. Currently taking a statin, losartan,  metoprolol, hydrochlorothiazide, and levothyroxine.     History obtained from patient and her .     ED Course:  BP on arrival up to 244/109, HR 84, 97% on room air. She was given 1L NS by ED provider. Labs significant for Na 120, K 2.9, Cl 76, CO2 37, T.Bili 1.1. Admitted to hospital medicine for further work-up.

## 2022-12-23 NOTE — ED TRIAGE NOTES
The pt is a 72-year-old female who presents to the ED from home via personal transportation. The pt reports shortness of breath that has been present for the last few weeks that is becoming progressively worse. Reports unable to tolerate any physical activity.

## 2022-12-23 NOTE — SUBJECTIVE & OBJECTIVE
Past Medical History:   Diagnosis Date    Hyperlipidemia     Hypertension     Hypothyroidism        Past Surgical History:   Procedure Laterality Date    HYSTERECTOMY         Review of patient's allergies indicates:   Allergen Reactions    Shellfish containing products Swelling       No current facility-administered medications on file prior to encounter.     Current Outpatient Medications on File Prior to Encounter   Medication Sig    alendronate-vitamin D3 (FOSAMAX PLUS D) 70 mg- 2,800 unit per tablet Take 1 tablet by mouth every 7 days.    levothyroxine (SYNTHROID) 50 MCG tablet Take 50 mcg by mouth once daily.    lisinopril-hydrochlorothiazide (PRINZIDE,ZESTORETIC) 20-12.5 mg per tablet Take 1 tablet by mouth once daily.    meloxicam (MOBIC) 15 MG tablet Take 15 mg by mouth once daily.    metoprolol succinate (TOPROL-XL) 50 MG 24 hr tablet Take 50 mg by mouth once daily.    simvastatin (ZOCOR) 20 MG tablet Take 20 mg by mouth every evening.    triamcinolone acetonide 0.1% (KENALOG) 0.1 % ointment Apply topically 2 (two) times daily. for 14 days     Family History       Problem Relation (Age of Onset)    Breast cancer Sister (55), Sister (65)          Tobacco Use    Smoking status: Never    Smokeless tobacco: Not on file   Substance and Sexual Activity    Alcohol use: Not on file    Drug use: Never    Sexual activity: Not on file     Review of Systems   Constitutional:  Positive for fatigue. Negative for chills and fever.   HENT:  Negative for congestion and sore throat.    Respiratory:  Positive for shortness of breath. Negative for cough.    Cardiovascular:  Negative for chest pain and leg swelling.   Gastrointestinal:  Positive for abdominal pain and constipation. Negative for diarrhea, nausea and vomiting.   Endocrine: Positive for polydipsia. Negative for polyuria.   Genitourinary:  Negative for difficulty urinating and dysuria.   Musculoskeletal:  Negative for arthralgias and back pain.   Skin:  Negative  for color change.   Neurological:  Positive for weakness.        Chronic balance problems   Psychiatric/Behavioral:  Negative for agitation, behavioral problems and confusion.    Objective:     Vital Signs (Most Recent):  Temp: 97.9 °F (36.6 °C) (12/23/22 1631)  Pulse: 84 (12/23/22 1718)  Resp: 20 (12/23/22 1631)  BP: (!) 179/90 (12/23/22 1732)  SpO2: 97 % (12/23/22 1718)   Vital Signs (24h Range):  Temp:  [97.9 °F (36.6 °C)] 97.9 °F (36.6 °C)  Pulse:  [74-86] 84  Resp:  [16-20] 20  SpO2:  [95 %-99 %] 97 %  BP: (148-244)/() 179/90     Weight: 58.5 kg (129 lb)  Body mass index is 22.85 kg/m².    Physical Exam  Constitutional:       Appearance: Normal appearance.   HENT:      Head: Normocephalic and atraumatic.      Nose: Nose normal.      Mouth/Throat:      Mouth: Mucous membranes are moist.   Eyes:      Extraocular Movements: Extraocular movements intact.      Pupils: Pupils are equal, round, and reactive to light.   Cardiovascular:      Rate and Rhythm: Normal rate and regular rhythm.      Pulses: Normal pulses.      Heart sounds: Normal heart sounds.   Pulmonary:      Effort: Pulmonary effort is normal. No respiratory distress.      Breath sounds: Normal breath sounds.   Abdominal:      General: Abdomen is flat. There is no distension.      Tenderness: There is no abdominal tenderness.   Musculoskeletal:         General: Normal range of motion.      Cervical back: Normal range of motion.      Comments: Flexion UE 5/5 strength, extension UE 4/5 strength, 4/5 strenght LE bilaterally   Skin:     General: Skin is warm and dry.   Neurological:      General: No focal deficit present.      Mental Status: She is alert and oriented to person, place, and time.   Psychiatric:         Mood and Affect: Mood normal.         Behavior: Behavior normal.         CRANIAL NERVES     CN III, IV, VI   Pupils are equal, round, and reactive to light.     Significant Labs: All pertinent labs within the past 24 hours have been  reviewed.    Significant Imaging: I have reviewed all pertinent imaging results/findings within the past 24 hours.

## 2022-12-24 PROBLEM — D72.829 LEUKOCYTOSIS: Status: ACTIVE | Noted: 2022-01-01

## 2022-12-24 NOTE — ASSESSMENT & PLAN NOTE
Likely due to profound electrolyte abnormalities, will replete. Work-up as above.   - PT/OT ordered

## 2022-12-24 NOTE — ASSESSMENT & PLAN NOTE
Hypochloremic metabolic alkalosis. Likely due to volume contraction in addition to hypokalemia. Repeat BMP with improvement after 1L NS in the ED as well as potassium repletion. Initial CO2 of 37.  - Asymptomatic with normal respiratory drive and saturation  - Improving after IV NS. Now normalized.

## 2022-12-24 NOTE — HOSPITAL COURSE
Ms. Mcgee is a 72 year old female with a history of HTN, HLD, Hypothyroidism, balance disorders who presented to the ED for 3 weeks of leg weakness, dyspnea on exertion, decreased appetite. Recently switched from Lisinopril to Losartan and started on HCTZ. Has history of very high blood pressures with systolic's in the 180s+. On arrival, hypertensive with Na 120, K2.9, CO2 37. Received IV fluids and admitted to hospital medicine for further work-up. Initial concern for hyperaldo state given metabolic alkalosis, hypokalemia, uncontrolled hypertension. Also concern for thiazide induced electrolyte abnormlaities. Her thiazide and losartan were held and labs were collected for hyperaldo state. BP came down from 180s to 120s with 5mg hydralazine IV and 10mg labetalol IV. She was started on amlodipine 5mg and her home metoprolol was switched to Coreg 6.25 for better blood pressure control. Losartan was restarted. Her sodium continues to remain in the low 120s, urine/osm studies pending. Potassium repleted orally. Urine studies indicate possible SIADH, will attempt fluid restriction and assess Na response. Coreg increased to 12.5mg BID for tighter BP control. BP highly sensitive to IV medications with large fluctuations from HTN to hypotension; however, patient remains largely asymptomatic until morning of 12/28 when she appeared more somnolent and confused. Nephrology and ICU consulted and pt to be admitted to ICU for hypertonic saline.

## 2022-12-24 NOTE — PT/OT/SLP EVAL
Occupational Therapy   Evaluation    Name: Dominique Mcgee  MRN: [id number]  Admitting Diagnosis: Hypertensive urgency  Recent Surgery: * No surgery found *      Recommendations:     Discharge Recommendations: home health OT  Discharge Equipment Recommendations:  bedside commode  Barriers to discharge:  Decreased caregiver support    Assessment:     Dominique Mcgee is a 72 y.o. female with a medical diagnosis of Hypertensive urgency.  She presents with decreased independence with ADL's. Performance deficits affecting function: weakness, impaired endurance, impaired self care skills, impaired balance, impaired functional mobility, impaired cardiopulmonary response to activity, decreased upper extremity function, decreased lower extremity function.      Rehab Prognosis: Fair; patient would benefit from acute skilled OT services to address these deficits and reach maximum level of function.       Plan:     Patient to be seen 3 x/week to address the above listed problems via self-care/home management, neuromuscular re-education, therapeutic activities, therapeutic exercises  Plan of Care Expires: 01/23/23  Plan of Care Reviewed with: patient    Subjective     Chief Complaint: neck pain  Patient/Family Comments/goals: to feel better    Occupational Profile:  Living Environment: Pt resides alone in 1st floor apartment with 4 steps no rails to enter. Pt was independent with ADL's & IADL's and used SPC at times for ambulation.  Pt does not drive & is a retired . Pt enjoys going to the [employer].  Pt has a bathtub for bathing.  Equipment Used at Home: rollator, shower chair, cane, straight (raised toilet seat)  Assistance upon Discharge: unknown    Pain/Comfort:  Pain Rating 1:  (did not rate)  Location 1: neck  Pain Addressed 1: Reposition, Distraction, Nurse notified, Cessation of Activity  Pain Rating Post-Intervention 1:  (did not rate)    Patients cultural, spiritual, Adventist conflicts given the current  situation: no    Objective:     Communicated with: RN prior to session.  Patient found supine with telemetry, oxygen, bed alarm (no family present) upon OT entry to room.    General Precautions: Standard, fall  Orthopedic Precautions: N/A  Braces: N/A    Occupational Performance:    Bed Mobility:    Patient completed Supine to Sit with contact guard assistance  Patient completed Sit to Supine with stand by assistance  Min (A) with scooting up HOB while supine    Functional Mobility/Transfers:  Patient completed Sit <> Stand Transfer with stand by assistance  with  no assistive device   Functional Mobility: CGA with functional mobility in room  with (A) due to decreased balance & dizziness    Activities of Daily Living:  Upper Body Dressing: stand by assistance donning gown around back while seated EOB  Lower Body Dressing: stand by assistance donning socks seated EOB    Cognitive/Visual Perceptual:  Cognitive/Psychosocial Skills:     -       Oriented to: Person, Place, Time, and Situation   -       Follows Commands/attention:Follows multistep  commands  -       Safety awareness/insight to disability: intact     Physical Exam:  Sensation:    -       Intact  Dominant hand:    -       right  Upper Extremity Range of Motion:  WFL except 20-30* (B) shoulder flexion  Upper Extremity Strength: 3+/5 grossly except 3-/5 (B) shoulder flexion   Strength: fair BUE    AMPAC 6 Click ADL:  AMPAC Total Score: 18    Treatment & Education:  SBA with postural control while seated EOB.  Pt with noted dizziness with standing activities.  Provided education on safety & need for (A) with OOB activities.  Provided education regarding role of OT, POC, & discharge recommendations with pt verbalizing understanding.  Pt had no further questions & when asked whether there were any concerns pt reported none.      Patient left supine with all lines intact, call button in reach, bed alarm on, and RN notified    GOALS:   Multidisciplinary  Problems       Occupational Therapy Goals          Problem: Occupational Therapy    Goal Priority Disciplines Outcome Interventions   Occupational Therapy Goal     OT, PT/OT Ongoing, Progressing    Description: Goals to be met by: 1/23/23     Patient will increase functional independence with ADLs by performing:    UE Dressing with Modified [place].  LE Dressing with Modified [place].  Grooming while standing with Modified independent.  Toileting from toilet with Modified [place] for hygiene and clothing management.   Supine to sit with Modified [place].  Toilet transfer to toilet with Modified [place].                         History:     Past Medical History:   Diagnosis Date    Hyperlipidemia     Hypertension     Hypothyroidism          Past Surgical History:   Procedure Laterality Date    HYSTERECTOMY         Time Tracking:     OT Date of Treatment: 12/24/22  OT Start Time: 1055  OT Stop Time: 1117  OT Total Time (min): 22 min    Billable Minutes:Evaluation 12  Therapeutic Activity 10    12/24/2022

## 2022-12-24 NOTE — ASSESSMENT & PLAN NOTE
On home losartan, metoprolol, HCTZ. Seems to be uncontrolled as of 6 months ago. Recently lisinopril switched to losartan given cough, resolved.   - Treatment as above

## 2022-12-24 NOTE — ASSESSMENT & PLAN NOTE
Sodium of 120 on admission, received 1L NS. Possible in the setting of thiazide use, hyperaldo state, decreased intake  - Repeat BMP after fluids  - Hold off on urine studies as she just received fluids  - Hyperaldo work-up as above  - Slowly increase <10 per day as unknown chronicity

## 2022-12-24 NOTE — ASSESSMENT & PLAN NOTE
Ms Mcgee is a 73 y/o female with previous history of essential hypertension, hypothyroidism, HLD who presents to the ED for 3 weeks of generalized weakness and progressing dyspnea on exertion. Found to have BP as high as 244 systolic in the ED with hyponatremia and hypokalemia. Differentials given her presentation include primary/secondary hyperaldosteronism, renal artery stenosis, decreased with uncontrolled essential hypertension. On chart review it seems her blood pressure has consistently been above 190 for the past 6 months with no known work-up in the [facility] system. She does seem to follow with [facility] and outside PCP who started her on Losartan-Hydrochlorothiazide, Metoprolol, Levothyroxin, and a statin. She was in relatively good health prior to her deterioation three weeks ago. She did follow with [facility] outpatient PT/OT for balance issues and shoulder pain in the past couple of months, last fall was in April this year, denies any syncope or other symptoms (mechanical falls).     - Holding thiazide and acei/arb to evaluate accurate serum susanne/serum renin. Will continue with abdominal imaging if concerns for adrenal hyperplasia, or renal artery stenosis given initial laboratory findings  - replete electrolytes as below  - Metoprolol 50 xl mg bid, per home dose  - starting amlodipine 5mg   - IV Labetalol 10mg q6h prn,  - IV hydralazine 5mg q6h prn,

## 2022-12-24 NOTE — H&P
[facility] Dept  Hospital Medicine  History & Physical    Patient Name: Dominique Mcgee  MRN: [id number]  Patient Class: IP- Inpatient  Admission Date: 12/23/2022  Attending Physician: Lexis Purdy*   Primary Care Provider: Briana Leblanc MD         Patient information was obtained from patient, spouse/SO and ER records.     Subjective:     Principal Problem:Hypertensive urgency    Chief Complaint:   Chief Complaint   Patient presents with    Shortness of Breath     X 2 weeks. Reports cough. Denies cardiac hx.        HPI: Ms. Mcgee is a 73 y/o F patient with a PMHx of essential hypertension, hyperlipidemia, hypothyroidism, balance difficulites who presents to the ED for 3 weeks of weakness, dyspnea on exertion, decreased appetite. Prior to 3 weeks ago patient was completely functional with no deficits, completely independent with ADLs/iADLs. She became weaker over the past three weeks and more short of breath to the point where she gets winded while walking to the bathroom and back to her bed. Her  also states that she has been eating less and less, however has been drinking plenty of water. Patient denies any chest pain, N/V/D, dysuria, edema, abdominal distention, but does also endorse occasionally lower quadrant abdominal pain and constipation. She was started on Losartan by her PCP 4 days ago due to concerns that lisinopril was causing her to have dry mouth and cough. Currently taking a statin, losartan,  metoprolol, hydrochlorothiazide, and levothyroxine.     History obtained from patient and her .     ED Course:  BP on arrival up to 244/109, HR 84, 97% on room air. She was given 1L NS by ED provider. Labs significant for Na 120, K 2.9, Cl 76, CO2 37, T.Bili 1.1. Admitted to hospital medicine for further work-up.      Past Medical History:   Diagnosis Date    Hyperlipidemia     Hypertension     Hypothyroidism        Past Surgical History:   Procedure Laterality Date     HYSTERECTOMY         Review of patient's allergies indicates:   Allergen Reactions    Shellfish containing products Swelling       No current facility-administered medications on file prior to encounter.     Current Outpatient Medications on File Prior to Encounter   Medication Sig    alendronate-vitamin D3 (FOSAMAX PLUS D) 70 mg- 2,800 unit per tablet Take 1 tablet by mouth every 7 days.    levothyroxine (SYNTHROID) 50 MCG tablet Take 50 mcg by mouth once daily.    lisinopril-hydrochlorothiazide (PRINZIDE,ZESTORETIC) 20-12.5 mg per tablet Take 1 tablet by mouth once daily.    meloxicam (MOBIC) 15 MG tablet Take 15 mg by mouth once daily.    metoprolol succinate (TOPROL-XL) 50 MG 24 hr tablet Take 50 mg by mouth once daily.    simvastatin (ZOCOR) 20 MG tablet Take 20 mg by mouth every evening.    triamcinolone acetonide 0.1% (KENALOG) 0.1 % ointment Apply topically 2 (two) times daily. for 14 days     Family History       Problem Relation (Age of Onset)    Breast cancer Sister (55), Sister (65)          Tobacco Use    Smoking status: Never    Smokeless tobacco: Not on file   Substance and Sexual Activity    Alcohol use: Not on file    Drug use: Never    Sexual activity: Not on file     Review of Systems   Constitutional:  Positive for fatigue. Negative for chills and fever.   HENT:  Negative for congestion and sore throat.    Respiratory:  Positive for shortness of breath. Negative for cough.    Cardiovascular:  Negative for chest pain and leg swelling.   Gastrointestinal:  Positive for abdominal pain and constipation. Negative for diarrhea, nausea and vomiting.   Endocrine: Positive for polydipsia. Negative for polyuria.   Genitourinary:  Negative for difficulty urinating and dysuria.   Musculoskeletal:  Negative for arthralgias and back pain.   Skin:  Negative for color change.   Neurological:  Positive for weakness.        Chronic balance problems   Psychiatric/Behavioral:  Negative for agitation, behavioral  problems and confusion.    Objective:     Vital Signs (Most Recent):  Temp: 97.9 °F (36.6 °C) (12/23/22 1631)  Pulse: 84 (12/23/22 1718)  Resp: 20 (12/23/22 1631)  BP: (!) 179/90 (12/23/22 1732)  SpO2: 97 % (12/23/22 1718)   Vital Signs (24h Range):  Temp:  [97.9 °F (36.6 °C)] 97.9 °F (36.6 °C)  Pulse:  [74-86] 84  Resp:  [16-20] 20  SpO2:  [95 %-99 %] 97 %  BP: (148-244)/() 179/90     Weight: 58.5 kg (129 lb)  Body mass index is 22.85 kg/m².    Physical Exam  Constitutional:       Appearance: Normal appearance.   HENT:      Head: Normocephalic and atraumatic.      Nose: Nose normal.      Mouth/Throat:      Mouth: Mucous membranes are moist.   Eyes:      Extraocular Movements: Extraocular movements intact.      Pupils: Pupils are equal, round, and reactive to light.   Cardiovascular:      Rate and Rhythm: Normal rate and regular rhythm.      Pulses: Normal pulses.      Heart sounds: Normal heart sounds.   Pulmonary:      Effort: Pulmonary effort is normal. No respiratory distress.      Breath sounds: Normal breath sounds.   Abdominal:      General: Abdomen is flat. There is no distension.      Tenderness: There is no abdominal tenderness.   Musculoskeletal:         General: Normal range of motion.      Cervical back: Normal range of motion.      Comments: Flexion UE 5/5 strength, extension UE 4/5 strength, 4/5 strenght LE bilaterally   Skin:     General: Skin is warm and dry.   Neurological:      General: No focal deficit present.      Mental Status: She is alert and oriented to person, place, and time.   Psychiatric:         Mood and Affect: Mood normal.         Behavior: Behavior normal.         CRANIAL NERVES     CN III, IV, VI   Pupils are equal, round, and reactive to light.     Significant Labs: All pertinent labs within the past 24 hours have been reviewed.    Significant Imaging: I have reviewed all pertinent imaging results/findings within the past 24 hours.    Assessment/Plan:     * Hypertensive  urgency  Ms Mcgee is a 71 y/o female with previous history of essential hypertension, hypothyroidism, HLD who presents to the ED for 3 weeks of generalized weakness and progressing dyspnea on exertion. Found to have BP as high as 244 systolic in the ED with hyponatremia, metabolic alkalosis, and hypokalemia. Differentials given her presentation include primary/secondary hyperaldosteronism, renal artery stenosis, decreased intake/diuretic induced electrolyte abnormalities with uncontrolled essential hypertension. On chart review it seems her blood pressure has consistently been above 190 for the past 6 months with no known work-up in the [facility] system. She does seem to follow with [facility] and outside PCP who started her on Losartan-Hydrochlorothiazide, Metoprolol, Levothyroxin, and a statin. She was in relatively good health prior to her deterioation three weeks ago. She did follow with [facility] outpatient PT/OT for balance issues and shoulder pain in the past couple of months, last fall was in April this year, denies any syncope or other symptoms (mechanical falls).     - Holding thiazide (sodium loss) and acei/arb to evaluate accurate serum susanne/serum renin. Will continue with abdominal imaging if concerns for adrenal hyperplasia, or renal artery stenosis given initial laboratory findings  - replete electrolytes as below  - Metoprolol 50 xl mg bid, per home dose  - starting amlodipine 5mg   - IV Labetalol 10mg q6h prn,  - IV hydralazine 5mg q6h prn,     Hypokalemia  Hypokalemic to 2.8, may explain her generalized weakness and fatigue along with her sodium. Possibly due to decreased intake, thiazide use, hyperaldo state. Will work-up as above  - Repleted with 90mEq, BMP with improvement   - Replete as necessary       Hyponatremia  Sodium of 120 on admission, received 1L NS. Possible in the setting of thiazide use, decreased intake  - Repeat BMP after fluids  - Hold off on urine studies as she is receiving fluids  -  Slowly increase <10 per day as unknown chronicity  - 100ml/hr for 5 hours, repeat BMP in the morning    Metabolic alkalosis  Hypochloremic metabolic alkalosis. Likely due to volume contraction in addition to hypokalemia. Repeat BMP with improvement after 1L NS in the ED as well as potassium repletion. Initial CO2 of 37.  - Asymptomatic with normal respiratory drive and saturation    Back pain  Progressing back pain over the past couple of months, now with LE weakness. No incontinence or saddle paresthesia.   - XRAY lumbar spine      Generalized weakness  Likely due to profound electrolyte abnormalities, will replete. Work-up as above.   - PT/OT ordered    Hyperlipidemia  Lipid panel pending  - Continue home simvastatin equivalent (atorvastatin 10)    Essential hypertension  On home losartan, metoprolol, HCTZ. Seems to be uncontrolled as of 6 months ago. Recently lisinopril switched to losartan given cough, resolved.   - Treatment as above    Dyspnea on exertion  Unclear etiology, no PFTs done in the past. May be explained due to profound weakness and primary susanne, workup pending. CXR without acute changes. Currently satting 99% on room air. EKG with peaked T waves in V3. Elevated QTC. Will replete electrolytes and reassess.         VTE Risk Mitigation (From admission, onward)           Ordered     enoxaparin injection 40 mg  Daily         12/23/22 1649     IP VTE HIGH RISK PATIENT  Once         12/23/22 1649     Place sequential compression device  Until discontinued         12/23/22 1649                       Hope Jean DO  Department of Hospital Medicine   [facility]

## 2022-12-24 NOTE — PLAN OF CARE
Problem: Occupational Therapy  Goal: Occupational Therapy Goal  Description: Goals to be met by: 1/23/23     Patient will increase functional independence with ADLs by performing:    UE Dressing with Modified [place].  LE Dressing with Modified [place].  Grooming while standing with Modified independent.  Toileting from toilet with Modified [place] for hygiene and clothing management.   Supine to sit with Modified [place].  Toilet transfer to toilet with Modified [place].    Outcome: Ongoing, Progressing     OT eval completed.

## 2022-12-24 NOTE — SUBJECTIVE & OBJECTIVE
Interval History: HDS, AF overnight. Patient states her neck is hurting more today, and still short of breath when getting up. Otherwise feeling better. Potassium repleted, still hyponatremic. Metabolic alkalosis improving. Will obtain urine studies, continue to work up hyponatremia. Hold thiazide.       Objective:     Vital Signs (Most Recent):  Temp: 98.3 °F (36.8 °C) (12/24/22 1126)  Pulse: 83 (12/24/22 1126)  Resp: 18 (12/24/22 1126)  BP: 130/63 (12/24/22 1126)  SpO2: 100 % (12/24/22 1126) Vital Signs (24h Range):  Temp:  [97.9 °F (36.6 °C)-98.5 °F (36.9 °C)] 98.3 °F (36.8 °C)  Pulse:  [74-98] 83  Resp:  [16-20] 18  SpO2:  [95 %-100 %] 100 %  BP: (118-244)/() 130/63     Weight: 59.5 kg (131 lb 3.2 oz)  Body mass index is 22.52 kg/m².    Intake/Output Summary (Last 24 hours) at 12/24/2022 1245  Last data filed at 12/23/2022 2034  Gross per 24 hour   Intake 402.08 ml   Output --   Net 402.08 ml      Physical Exam  Constitutional:       Appearance: Normal appearance.   HENT:      Head: Normocephalic and atraumatic.      Nose: Nose normal.      Mouth/Throat:      Mouth: Mucous membranes are moist.   Eyes:      Extraocular Movements: Extraocular movements intact.      Pupils: Pupils are equal, round, and reactive to light.   Cardiovascular:      Rate and Rhythm: Normal rate and regular rhythm.      Pulses: Normal pulses.      Heart sounds: Normal heart sounds.   Pulmonary:      Effort: Pulmonary effort is normal. No respiratory distress.      Breath sounds: Normal breath sounds.   Abdominal:      General: Abdomen is flat. There is no distension.      Tenderness: There is no abdominal tenderness.   Musculoskeletal:         General: Normal range of motion.      Cervical back: Normal range of motion.   Skin:     General: Skin is warm and dry.   Neurological:      General: No focal deficit present.      Mental Status: She is alert and oriented to person, place, and time.   Psychiatric:         Mood and Affect: Mood  normal.         Behavior: Behavior normal.       Significant Labs: All pertinent labs within the past 24 hours have been reviewed.    Significant Imaging: I have reviewed all pertinent imaging results/findings within the past 24 hours.

## 2022-12-24 NOTE — ASSESSMENT & PLAN NOTE
Unclear etiology, no PFTs done in the past. May be explained due to profound weakness and primary susanne, workup pending. CXR without acute changes. Currently satting 99% on room air. EKG with peaked T waves in V3. Elevated QTC. Will replete electrolytes and reassess.

## 2022-12-24 NOTE — ASSESSMENT & PLAN NOTE
WBC of 13 on day two of admission. Unclear source of this leukocytosis as she is not on any steroids, no clear source of infection, UA negative w/ no reflux, CXR clear. Patient HDS, AF.   - Repeat CBC in the morning

## 2022-12-24 NOTE — ASSESSMENT & PLAN NOTE
Hypokalemic to 2.8, may explain her generalized weakness and fatigue along with her sodium. Possibly due to decreased intake, thiazide use, hyperaldo state. Will work-up as above  - Repleted with 90mEq, BMP pending  - Replete as necessary

## 2022-12-24 NOTE — CARE UPDATE
"RAPID RESPONSE NURSE CHART REVIEW        Chart Reviewed: 12/24/2022, 10:48 AM    MRN: [id number]  Bed: 8073/8073 A    Dx: Hypertensive urgency    Dominique Mcgee has a past medical history of Hyperlipidemia, Hypertension, and Hypothyroidism.    Last VS: /71   Pulse 98   Temp 98.2 °F (36.8 °C)   Resp 18   Ht 5' 4" (1.626 m)   Wt 59.5 kg (131 lb 3.2 oz)   SpO2 98%   Breastfeeding No   BMI 22.52 kg/m²     24H Vital Sign Range:  Temp:  [97.9 °F (36.6 °C)-98.5 °F (36.9 °C)]   Pulse:  [74-98]   Resp:  [16-20]   BP: (118-244)/()   SpO2:  [95 %-100 %]     Level of Consciousness (AVPU): alert    Recent Labs     12/23/22  1449 12/24/22  0440   WBC 8.74 13.25*   HGB 13.7 13.5   HCT 39.9 40.0   * 353       Recent Labs     12/23/22  1449 12/23/22  1920 12/24/22  0440   * 123* 121*   K 2.9* 3.5 4.3   CL 76* 83* 87*   CO2 37* 31* 25   CREATININE 0.6 0.5 0.4*   * 118* 84   PHOS  --   --  2.5*   MG 1.7  --  1.8        No results for input(s): PH, PCO2, PO2, HCO3, POCSATURATED, BE in the last 72 hours.     OXYGEN:  Flow (L/min): 2          MEWS score: 1    bedside Dipak MARIE  contacted for abnormal labs. No additional concerns verbalized at this time. Instructed to call [phone] for further concerns or assistance.    Beto García RN        "

## 2022-12-24 NOTE — ASSESSMENT & PLAN NOTE
Progressing back pain over the past couple of months, now with LE weakness. No incontinence or saddle paresthesia.   - XRAY lumbar spine  - Capsaicin as needed  - Robaxin prn

## 2022-12-24 NOTE — ASSESSMENT & PLAN NOTE
Ms Mcgee is a 71 y/o female with previous history of essential hypertension, hypothyroidism, HLD who presents to the ED for 3 weeks of generalized weakness and progressing dyspnea on exertion. Found to have BP as high as 244 systolic in the ED with hyponatremia, metabolic alkalosis, and hypokalemia. Differentials given her presentation include primary/secondary hyperaldosteronism, renal artery stenosis, decreased intake/diuretic induced electrolyte abnormalities with uncontrolled essential hypertension. On chart review it seems her blood pressure has consistently been above 190 for the past 6 months with no known work-up in the [facility]. She does seem to follow with [facility] and outside PCP who started her on Losartan-Hydrochlorothiazide, Metoprolol, Levothyroxin, and a statin. She was in relatively good health prior to her deterioation three weeks ago. She did follow with [facility] outpatient PT/OT for balance issues and shoulder pain in the past couple of months, last fall was in April this year, denies any syncope or other symptoms (mechanical falls).     - Holding thiazide (sodium loss). Will continue with abdominal imaging if concerns for adrenal hyperplasia, or renal artery stenosis given initial laboratory findings.  - Renin/susanne pending  - replete electrolytes  - Metoprolol 50 xl mg bid switched to Coreg 6.25 for better BP control  - starting amlodipine 5mg   - continue home losartan 50mg  - IV Labetalol 10mg q6h prn,  - IV hydralazine 5mg q6h prn,

## 2022-12-24 NOTE — ASSESSMENT & PLAN NOTE
Hypokalemic to 2.8, may explain her generalized weakness and fatigue along with her sodium. Possibly due to decreased intake, thiazide use, hyperaldo state. Will work-up as above  - Repleted with 90mEq, BMP with improvement   - Replete as necessary

## 2022-12-24 NOTE — ASSESSMENT & PLAN NOTE
Sodium of 120 on admission, received 1L NS. Possible in the setting of thiazide use, decreased intake  - Repeat BMP after fluids with similar Na  - Urine studies pending  - Slowly increase <10 per day as unknown chronicity  - Daily CMP

## 2022-12-24 NOTE — PT/OT/SLP EVAL
"Physical Therapy Evaluation    Patient Name:  Dominique Mcgee   MRN:  [id number]    Recommendations:     Discharge Recommendations: home health PT   Discharge Equipment Recommendations: bedside commode (may require RW, will continue to assess as pt progresses)   Barriers to discharge: Inaccessible home and Decreased caregiver support lives alone with 3 CHINO    Assessment:     Dominique Mcgee is a 72 y.o. female admitted with a medical diagnosis of Hypertensive urgency.  She presents with the following impairments/functional limitations: weakness, impaired balance, impaired cardiopulmonary response to activity, gait instability, impaired functional mobility . Pt is unsafe with functional mobility at this time due to pt requires CGA for bed mobility,CGA for transfers, and minimal assist for gait with no AD due to instability, weakness, and SOB. Pt is motivated to progress with functional mobility.     Rehab Prognosis: Good; patient would benefit from acute skilled PT services to address these deficits and reach maximum level of function.    Recent Surgery: * No surgery found *      Plan:     During this hospitalization, patient to be seen 4 x/week to address the identified rehab impairments via gait training, therapeutic activities, therapeutic exercises, neuromuscular re-education and progress toward the following goals:    Plan of Care Expires:  01/23/23    Subjective   "I just get so short winded when I walk"  Pain/Comfort:  Pain Rating 1: 8/10 (pt c/o neck pain with movement)  Location - Orientation 1: generalized  Location 1: neck  Pain Addressed 1: Reposition, Cessation of Activity  Pain Rating Post-Intervention 1: 0/10 (at rest)    Patients cultural, spiritual, Worship conflicts given the current situation: no    Living Environment:  Pt lives alone in a 1st floor apt with 3 CHINO with no rail  Prior to admission, patients level of function was independent.  Equipment used at home: cane, straight.  Upon " discharge, patient will have assistance from unknown, pt states she has people that help her at times but did not specify.    Objective:     Communicated with nurse prior to session.  Patient found HOB elevated with bed alarm, oxygen  upon PT entry to room.    General Precautions: Standard, fall, respiratory  Orthopedic Precautions:N/A   Braces: N/A  Respiratory Status: Nasal cannula, flow 2 L/min    Exams:  Cognitive Exam:  Patient is oriented to Person, Place, and Time  Sensation:    -       Intact  light/touch B LE  RLE ROM: WFL  RLE Strength: WFL except hip flex 4-/5  LLE ROM: WFL  LLE Strength: WFL except hip flex 4-/5    Functional Mobility:  Bed Mobility:     Supine to Sit: contact guard assistance  Sit to Supine: supervision  Transfers:     Sit to Stand:  contact guard assistance with no AD  Gait: 15ft x 2 trials with HHA with minimal assist due to instability at times with pt reaching for the wall for support. Pt performed gait with flexed trunk and at slow pace. Pt limited with gait distance due to SOB.    AM-PAC 6 CLICK MOBILITY  Total Score:19     Treatment & Education:  Pt ambulated to the bathroom and urinated on the commode, nurse notified. Pt able to clean herself; required minimal assist to don and doff her underwear    Patient left HOB elevated with all lines intact, call button in reach, bed alarm on, and nurse notified.    GOALS:   Multidisciplinary Problems       Physical Therapy Goals          Problem: Physical Therapy    Goal Priority Disciplines Outcome Goal Variances Interventions   Physical Therapy Goal     PT, PT/OT Ongoing, Progressing     Description: PT goals until 12/30/22    1. Pt supine to sit with mod independent-not met  2. Pt sit to stand with or without RW as needed for safety with mod independent-not met  3. Pt to perform gait 150ft with or without RW as needed for safety with mod independent.-not met  4. Pt to go up/down 3 steps with or without RW as needed for safety with mod  independent.-not met  5. Pt to perform B LE exs in sitting or supine x 10 reps to strengthen B LE to improve functional mobility.-not met                        History:     Past Medical History:   Diagnosis Date    Hyperlipidemia     Hypertension     Hypothyroidism        Past Surgical History:   Procedure Laterality Date    HYSTERECTOMY         Time Tracking:     PT Received On: 12/24/22  PT Start Time: 0816     PT Stop Time: 0832  PT Total Time (min): 16 min     Billable Minutes: Evaluation 8 and Gait Training 8      12/24/2022

## 2022-12-24 NOTE — PLAN OF CARE
Problem: Physical Therapy  Goal: Physical Therapy Goal  Description: PT goals until 12/30/22    1. Pt supine to sit with mod independent-not met  2. Pt sit to stand with or without RW as needed for safety with mod independent-not met  3. Pt to perform gait 150ft with or without RW as needed for safety with mod independent.-not met  4. Pt to go up/down 3 steps with or without RW as needed for safety with mod independent.-not met  5. Pt to perform B LE exs in sitting or supine x 10 reps to strengthen B LE to improve functional mobility.-not met   Outcome: Ongoing, Progressing   Pt's goals set and pt will benefit from skilled PT services to work towards improved functional mobility including: bed mobility, transfers, up/down step, and gait.   12/24/2022

## 2022-12-24 NOTE — PROGRESS NOTES
[facility] - Telemetry [facility] Medicine  Progress Note    Patient Name: Dominique Mcgee  MRN: [id number]  Patient Class: IP- Inpatient   Admission Date: 12/23/2022  Length of Stay: 1 days  Attending Physician: Lexis Purdy*  Primary Care Provider: Briana Leblanc MD        Subjective:     Principal Problem:Hypertensive urgency        HPI:  Ms. Mcgee is a 71 y/o F patient with a PMHx of essential hypertension, hyperlipidemia, hypothyroidism, balance difficulites who presents to the ED for 3 weeks of weakness, dyspnea on exertion, decreased appetite. Prior to 3 weeks ago patient was completely functional with no deficits, completely independent with ADLs/iADLs. She became weaker over the past three weeks and more short of breath to the point where she gets winded while walking to the bathroom and back to her bed. Her  also states that she has been eating less and less, however has been drinking plenty of water. Patient denies any chest pain, N/V/D, dysuria, edema, abdominal distention, but does also endorse occasionally lower quadrant abdominal pain and constipation. She was started on Losartan by her PCP 4 days ago due to concerns that lisinopril was causing her to have dry mouth and cough. Currently taking a statin, losartan,  metoprolol, hydrochlorothiazide, and levothyroxine.     History obtained from patient and her .     ED Course:  BP on arrival up to 244/109, HR 84, 97% on room air. She was given 1L NS by ED provider. Labs significant for Na 120, K 2.9, Cl 76, CO2 37, T.Bili 1.1. Admitted to hospital medicine for further work-up.      Overview/Hospital Course:  Ms. Mcgee is a 72 year old female with a history of HTN, HLD, Hypothyroidism, balance disorders who presented to the ED for 3 weeks of leg weakness, dyspnea on exertion, decreased appetite. Recently switched from Lisinopril to Losartan and started on HCTZ. Has history of very high blood pressures with systolic's in  the 180s+. On arrival, hypertensive with Na 120, K2.9, CO2 37. Received IV fluids and admitted to hospital medicine for further work-up. Initial concern for hyperaldo state given metabolic alkalosis, hypokalemia, uncontrolled hypertension. Also concern for thiazide induced electrolyte abnormlaities. Her thiazide and losartan were held and labs were collected for hyperaldo state. BP came down from 180s to 120s with 5mg hydralazine IV and 10mg labetalol IV. She was started on amlodipine 5mg and her home metoprolol was switched to Coreg 6.25 for better blood pressure control. Losartan was restarted. Her sodium continues to remain in the low 120s, urine studies pending. Potassium repleted orally.       Interval History: HDS, AF overnight. Patient states her neck is hurting more today, and still short of breath when getting up. Otherwise feeling better. Potassium repleted, still hyponatremic. Metabolic alkalosis improving. Will obtain urine studies, continue to work up hyponatremia. Hold thiazide.       Objective:     Vital Signs (Most Recent):  Temp: 98.3 °F (36.8 °C) (12/24/22 1126)  Pulse: 83 (12/24/22 1126)  Resp: 18 (12/24/22 1126)  BP: 130/63 (12/24/22 1126)  SpO2: 100 % (12/24/22 1126) Vital Signs (24h Range):  Temp:  [97.9 °F (36.6 °C)-98.5 °F (36.9 °C)] 98.3 °F (36.8 °C)  Pulse:  [74-98] 83  Resp:  [16-20] 18  SpO2:  [95 %-100 %] 100 %  BP: (118-244)/() 130/63     Weight: 59.5 kg (131 lb 3.2 oz)  Body mass index is 22.52 kg/m².    Intake/Output Summary (Last 24 hours) at 12/24/2022 1245  Last data filed at 12/23/2022 2034  Gross per 24 hour   Intake 402.08 ml   Output --   Net 402.08 ml      Physical Exam  Constitutional:       Appearance: Normal appearance.   HENT:      Head: Normocephalic and atraumatic.      Nose: Nose normal.      Mouth/Throat:      Mouth: Mucous membranes are moist.   Eyes:      Extraocular Movements: Extraocular movements intact.      Pupils: Pupils are equal, round, and reactive to  light.   Cardiovascular:      Rate and Rhythm: Normal rate and regular rhythm.      Pulses: Normal pulses.      Heart sounds: Normal heart sounds.   Pulmonary:      Effort: Pulmonary effort is normal. No respiratory distress.      Breath sounds: Normal breath sounds.   Abdominal:      General: Abdomen is flat. There is no distension.      Tenderness: There is no abdominal tenderness.   Musculoskeletal:         General: Normal range of motion.      Cervical back: Normal range of motion.   Skin:     General: Skin is warm and dry.   Neurological:      General: No focal deficit present.      Mental Status: She is alert and oriented to person, place, and time.   Psychiatric:         Mood and Affect: Mood normal.         Behavior: Behavior normal.       Significant Labs: All pertinent labs within the past 24 hours have been reviewed.    Significant Imaging: I have reviewed all pertinent imaging results/findings within the past 24 hours.      Assessment/Plan:      * Hypertensive urgency  Ms Mcgee is a 71 y/o female with previous history of essential hypertension, hypothyroidism, HLD who presents to the ED for 3 weeks of generalized weakness and progressing dyspnea on exertion. Found to have BP as high as 244 systolic in the ED with hyponatremia, metabolic alkalosis, and hypokalemia. Differentials given her presentation include primary/secondary hyperaldosteronism, renal artery stenosis, decreased intake/diuretic induced electrolyte abnormalities with uncontrolled essential hypertension. On chart review it seems her blood pressure has consistently been above 190 for the past 6 months with no known work-up in the [facility] system. She does seem to follow with [facility] and outside PCP who started her on Losartan-Hydrochlorothiazide, Metoprolol, Levothyroxin, and a statin. She was in relatively good health prior to her deterioation three weeks ago. She did follow with [facility] outpatient PT/OT for balance issues and shoulder pain in  the past couple of months, last fall was in April this year, denies any syncope or other symptoms (mechanical falls).     - Holding thiazide (sodium loss). Will continue with abdominal imaging if concerns for adrenal hyperplasia, or renal artery stenosis given initial laboratory findings.  - Renin/susanne pending  - replete electrolytes  - Metoprolol 50 xl mg bid switched to Coreg 6.25 for better BP control  - starting amlodipine 5mg   - continue home losartan 50mg  - IV Labetalol 10mg q6h prn,  - IV hydralazine 5mg q6h prn,     Hypokalemia  Hypokalemic to 2.8, may explain her generalized weakness and fatigue along with her sodium. Possibly due to decreased intake, thiazide use, hyperaldo state. Will work-up as above  - Repleted with 90mEq, BMP with improvement   - Replete as necessary       Hyponatremia  Sodium of 120 on admission, received 1L NS. Possible in the setting of thiazide use, decreased intake  - Repeat BMP after fluids with similar Na  - Urine studies pending  - Slowly increase <10 per day as unknown chronicity  - Daily CMP    Leukocytosis  WBC of 13 on day two of admission. Unclear source of this leukocytosis as she is not on any steroids, no clear source of infection, UA negative w/ no reflux, CXR clear. Patient HDS, AF.   - Repeat CBC in the morning    Metabolic alkalosis  Hypochloremic metabolic alkalosis. Likely due to volume contraction in addition to hypokalemia. Repeat BMP with improvement after 1L NS in the ED as well as potassium repletion. Initial CO2 of 37.  - Asymptomatic with normal respiratory drive and saturation  - Improving after IV NS. Now normalized.    Back pain  Progressing back pain over the past couple of months, now with LE weakness. No incontinence or saddle paresthesia.   - XRAY lumbar spine  - Capsaicin as needed  - Robaxin prn     Generalized weakness  Likely due to profound electrolyte abnormalities, will replete. Work-up as above.   - PT/OT ordered    Hyperlipidemia  Lipid  panel pending  - Continue home simvastatin equivalent (atorvastatin 10)\  - Repeat lipid panel WNL (chol 151, trig 55, hdl 41)    Essential hypertension  On home losartan, metoprolol, HCTZ. Seems to be uncontrolled as of 6 months ago. Recently lisinopril switched to losartan given cough, resolved.   - Treatment as above    Dyspnea on exertion  Unclear etiology, no PFTs done in the past. May be explained due to profound weakness and primary susanne, workup pending. CXR without acute changes. Currently satting 99% on room air. EKG with peaked T waves in V3. Elevated QTC. Will replete electrolytes and reassess.         VTE Risk Mitigation (From admission, onward)         Ordered     enoxaparin injection 40 mg  Daily         12/23/22 1649     IP VTE HIGH RISK PATIENT  Once         12/23/22 1649     Place sequential compression device  Until discontinued         12/23/22 1649                Discharge Planning   TRICIA: 12/25/2022     Code Status: Full Code   Is the patient medically ready for discharge?: No    Reason for patient still in hospital (select all that apply): Laboratory test and Treatment                     Hope Jean DO  Department of Hospital Medicine   [facility] - Telemetry Stepdown

## 2022-12-24 NOTE — PHARMACY MED REC
"            Admission Medication History     The home medication history was taken by Gay Lo.    You may go to "Admission" then "Reconcile Home Medications" tabs to review and/or act upon these items.     The home medication list has been updated by the Pharmacy department.   Please read ALL comments highlighted in yellow.   Please address this information as you see fit.    Feel free to contact us if you have any questions or require assistance.      The medications listed below were removed from the home medication list. Please reorder if appropriate:  Patient reports no longer taking the following medication(s):  LISINOPRIL-HYDRO CHLORTHIAZIDE 20-12.5 MG TABLET  TRIAMCINOLONE 0.1 % OINTMENT       Current Outpatient Medications on File Prior to Encounter   Medication Sig    hydroCHLOROthiazide (HYDRODIURIL) 25 MG tablet   Take 25 mg by mouth once daily.    levothyroxine (SYNTHROID) 50 MCG tablet   Take 50 mcg by mouth once daily.    losartan (COZAAR) 50 MG tablet   Take 50 mg by mouth once daily.    meloxicam (MOBIC) 15 MG tablet   Take 15 mg by mouth once daily.    metoprolol succinate (TOPROL-XL) 50 MG 24 hr tablet   Take 50 mg by mouth once daily.    simvastatin (ZOCOR) 20 MG tablet   Take 20 mg by mouth every evening.    alendronate-vitamin D3 (FOSAMAX PLUS D) 70 mg- 2,800 unit per tablet Take 1 tablet by mouth every 7 days.         Gay Lo  EXT [id number]      .        "

## 2022-12-24 NOTE — ASSESSMENT & PLAN NOTE
Lipid panel pending  - Continue home simvastatin equivalent (atorvastatin 10)\  - Repeat lipid panel WNL (chol 151, trig 55, hdl 41)

## 2022-12-25 PROBLEM — D72.829 LEUKOCYTOSIS: Status: RESOLVED | Noted: 2022-01-01 | Resolved: 2022-01-01

## 2022-12-25 NOTE — ASSESSMENT & PLAN NOTE
Ms Mcgee is a 71 y/o female with previous history of essential hypertension, hypothyroidism, HLD who presents to the ED for 3 weeks of generalized weakness and progressing dyspnea on exertion. Found to have BP as high as 244 systolic in the ED with hyponatremia, metabolic alkalosis, and hypokalemia. Differentials given her presentation include primary/secondary hyperaldosteronism, renal artery stenosis, decreased intake/diuretic induced electrolyte abnormalities with uncontrolled essential hypertension. On chart review it seems her blood pressure has consistently been above 190 for the past 6 months with no known work-up in the [facility] system. She does seem to follow with [facility] and outside PCP who started her on Losartan-Hydrochlorothiazide, Metoprolol, Levothyroxin, and a statin. She was in relatively good health prior to her deterioation three weeks ago. She did follow with [facility] outpatient PT/OT for balance issues and shoulder pain in the past couple of months, last fall was in April this year, denies any syncope or other symptoms (mechanical falls).     - Holding thiazide (sodium loss). Will continue with abdominal imaging if concerns for adrenal hyperplasia, or renal artery stenosis given initial laboratory findings.  - Renin/susanne pending  - replete electrolytes  - Metoprolol 50 xl mg bid switched to Coreg 6.25 for better BP control  - starting amlodipine 5mg   - continue home losartan 50mg  - IV Labetalol 10mg q6h prn  - IV hydralazine 5mg q6h prn

## 2022-12-25 NOTE — ASSESSMENT & PLAN NOTE
Unclear etiology, no PFTs done in the past. May be explained due to profound weakness and primary susanne, workup pending. CXR without acute changes. Currently satting 99% on room air. EKG with peaked T waves in V3. Elevated QTC. Repeat EKG without acute changes after electrolyte repletion.

## 2022-12-25 NOTE — ASSESSMENT & PLAN NOTE
WBC of 13 on day two of admission. Unclear source of this leukocytosis as she is not on any steroids, no clear source of infection, UA negative w/ no reflux, CXR clear. Patient HDS, AF.   - Repeat CBC in the morning.

## 2022-12-25 NOTE — ASSESSMENT & PLAN NOTE
Sodium of 120 on admission, received 1L NS. Possible in the setting of thiazide use, decreased intake  - Repeat BMP after fluids with similar Na  - Urine studies pending/Serum osm pending  - Slowly improving while holding thiazide  - Daily CMP

## 2022-12-25 NOTE — ASSESSMENT & PLAN NOTE
Hypochloremic metabolic alkalosis. Likely due to volume contraction in addition to hypokalemia. Repeat BMP with improvement after 1L NS in the ED as well as potassium repletion. Initial CO2 of 37.  - Asymptomatic with normal respiratory drive and saturation  - Improving after IV NS

## 2022-12-25 NOTE — PROGRESS NOTES
[facility] - Telemetry [facility] Medicine  Progress Note    Patient Name: Dominique Mcgee  MRN: [id number]  Patient Class: IP- Inpatient   Admission Date: 12/23/2022  Length of Stay: 2 days  Attending Physician: Lexis Purdy*  Primary Care Provider: Briana Leblanc MD        Subjective:     Principal Problem:Hypertensive urgency        HPI:  Ms. Mcgee is a 71 y/o F patient with a PMHx of essential hypertension, hyperlipidemia, hypothyroidism, balance difficulites who presents to the ED for 3 weeks of weakness, dyspnea on exertion, decreased appetite. Prior to 3 weeks ago patient was completely functional with no deficits, completely independent with ADLs/iADLs. She became weaker over the past three weeks and more short of breath to the point where she gets winded while walking to the bathroom and back to her bed. Her  also states that she has been eating less and less, however has been drinking plenty of water. Patient denies any chest pain, N/V/D, dysuria, edema, abdominal distention, but does also endorse occasionally lower quadrant abdominal pain and constipation. She was started on Losartan by her PCP 4 days ago due to concerns that lisinopril was causing her to have dry mouth and cough. Currently taking a statin, losartan,  metoprolol, hydrochlorothiazide, and levothyroxine.     History obtained from patient and her .     ED Course:  BP on arrival up to 244/109, HR 84, 97% on room air. She was given 1L NS by ED provider. Labs significant for Na 120, K 2.9, Cl 76, CO2 37, T.Bili 1.1. Admitted to hospital medicine for further work-up.      Overview/Hospital Course:  Ms. Mcgee is a 72 year old female with a history of HTN, HLD, Hypothyroidism, balance disorders who presented to the ED for 3 weeks of leg weakness, dyspnea on exertion, decreased appetite. Recently switched from Lisinopril to Losartan and started on HCTZ. Has history of very high blood pressures with systolic's in  the 180s+. On arrival, hypertensive with Na 120, K2.9, CO2 37. Received IV fluids and admitted to hospital medicine for further work-up. Initial concern for hyperaldo state given metabolic alkalosis, hypokalemia, uncontrolled hypertension. Also concern for thiazide induced electrolyte abnormlaities. Her thiazide and losartan were held and labs were collected for hyperaldo state. BP came down from 180s to 120s with 5mg hydralazine IV and 10mg labetalol IV. She was started on amlodipine 5mg and her home metoprolol was switched to Coreg 6.25 for better blood pressure control. Losartan was restarted. Her sodium continues to remain in the low 120s, urine/osm studies pending. Potassium repleted orally.       Interval History: No acute changes overnight. Patient worked well with PT/OT yesterday however states she became more short of breath on exertion again. Sodium 123 today, slightly improving. Otherwise without complaints. Urine/serum osm and lytes pending. Will order echo to evaluate function (no prior echo done).       Objective:     Vital Signs (Most Recent):  Temp: 98.5 °F (36.9 °C) (12/25/22 1204)  Pulse: 75 (12/25/22 1204)  Resp: 16 (12/25/22 1204)  BP: (!) 140/65 (12/25/22 1204)  SpO2: 100 % (12/25/22 1204)   Vital Signs (24h Range):  Temp:  [97.4 °F (36.3 °C)-98.5 °F (36.9 °C)] 98.5 °F (36.9 °C)  Pulse:  [72-96] 75  Resp:  [16-18] 16  SpO2:  [99 %-100 %] 100 %  BP: (132-188)/(64-82) 140/65     Weight: 59.5 kg (131 lb 3.2 oz)  Body mass index is 22.52 kg/m².  No intake or output data in the 24 hours ending 12/25/22 1352   Physical Exam  Constitutional:       Appearance: Normal appearance.   HENT:      Head: Normocephalic and atraumatic.      Nose: Nose normal.      Mouth/Throat:      Mouth: Mucous membranes are moist.   Eyes:      Extraocular Movements: Extraocular movements intact.      Pupils: Pupils are equal, round, and reactive to light.   Cardiovascular:      Rate and Rhythm: Normal rate and regular rhythm.       Pulses: Normal pulses.      Heart sounds: Normal heart sounds.   Pulmonary:      Effort: Pulmonary effort is normal. No respiratory distress.      Breath sounds: Normal breath sounds.   Abdominal:      General: Abdomen is flat. There is no distension.      Tenderness: There is no abdominal tenderness.   Musculoskeletal:         General: Normal range of motion.      Cervical back: Normal range of motion.   Skin:     General: Skin is warm and dry.   Neurological:      General: No focal deficit present.      Mental Status: She is alert and oriented to person, place, and time.   Psychiatric:         Mood and Affect: Mood normal.         Behavior: Behavior normal.       Significant Labs: All pertinent labs within the past 24 hours have been reviewed.    Significant Imaging: I have reviewed all pertinent imaging results/findings within the past 24 hours.      Assessment/Plan:      * Hypertensive urgency  Ms Mcgee is a 73 y/o female with previous history of essential hypertension, hypothyroidism, HLD who presents to the ED for 3 weeks of generalized weakness and progressing dyspnea on exertion. Found to have BP as high as 244 systolic in the ED with hyponatremia, metabolic alkalosis, and hypokalemia. Differentials given her presentation include primary/secondary hyperaldosteronism, renal artery stenosis, decreased intake/diuretic induced electrolyte abnormalities with uncontrolled essential hypertension. On chart review it seems her blood pressure has consistently been above 190 for the past 6 months with no known work-up in the [facility] system. She does seem to follow with [facility] [place] [facility] and outside PCP who started her on Losartan-Hydrochlorothiazide, Metoprolol, Levothyroxin, and a statin. She was in relatively good health prior to her deterioation three weeks ago. She did follow with [facility] outpatient PT/OT for balance issues and shoulder pain in the past couple of months, last fall was in April this year, denies any syncope  or other symptoms (mechanical falls).     - Holding thiazide (sodium loss). Will continue with abdominal imaging if concerns for adrenal hyperplasia, or renal artery stenosis given initial laboratory findings.  - Renin/susanne pending  - replete electrolytes  - Metoprolol 50 xl mg bid switched to Coreg 6.25 for better BP control  - starting amlodipine 5mg   - continue home losartan 50mg  - IV Labetalol 10mg q6h prn  - IV hydralazine 5mg q6h prn    Hypokalemia  Hypokalemic to 2.8, may explain her generalized weakness and fatigue along with her sodium. Possibly due to decreased intake, thiazide use, hyperaldo state. Will work-up as above  - Repleted with 90mEq, BMP with improvement   - Replete as necessary       Hyponatremia  Sodium of 120 on admission, received 1L NS. Possible in the setting of thiazide use, decreased intake  - Repeat BMP after fluids with similar Na  - Urine studies pending/Serum osm pending  - Slowly improving while holding thiazide  - Daily CMP    Metabolic alkalosis  Hypochloremic metabolic alkalosis. Likely due to volume contraction in addition to hypokalemia. Repeat BMP with improvement after 1L NS in the ED as well as potassium repletion. Initial CO2 of 37.  - Asymptomatic with normal respiratory drive and saturation  - Improving after IV NS    Back pain  Progressing back pain over the past couple of months, now with LE weakness. No incontinence or saddle paresthesia.   - XRAY lumbar spine  - Capsaicin as needed  - Robaxin prn   - F/u out patient with spine clinic    Generalized weakness  Likely due to profound electrolyte abnormalities, will replete. Work-up as above.   - PT/OT ordered    Hyperlipidemia  Lipid panel pending  - Continue home simvastatin equivalent (atorvastatin 10)\  - Repeat lipid panel WNL (chol 151, trig 55, hdl 41)    Essential hypertension  On home losartan, metoprolol, HCTZ. Seems to be uncontrolled as of 6 months ago. Recently lisinopril switched to losartan given cough,  resolved.   - Treatment as above    Dyspnea on exertion  Unclear etiology, no PFTs done in the past. May be explained due to profound weakness and primary susanne, workup pending. CXR without acute changes. Currently satting 99% on room air. EKG with peaked T waves in V3. Elevated QTC. Repeat EKG without acute changes after electrolyte repletion.       VTE Risk Mitigation (From admission, onward)           Ordered     enoxaparin injection 40 mg  Daily         12/23/22 1649     IP VTE HIGH RISK PATIENT  Once         12/23/22 1649     Place sequential compression device  Until discontinued         12/23/22 1649                    Discharge Planning   TRICIA: 12/26/2022     Code Status: Full Code   Is the patient medically ready for discharge?: No    Reason for patient still in hospital (select all that apply): Treatment                     Hope Jean DO  Department of Hospital Medicine   [facility] - Telemetry Stepdown

## 2022-12-25 NOTE — SUBJECTIVE & OBJECTIVE
Interval History: No acute changes overnight. Patient worked well with PT/OT yesterday however states she became more short of breath on exertion again. Sodium 123 today, slightly improving. Otherwise without complaints. Urine/serum osm and lytes pending. Will order echo to evaluate function (no prior echo done).       Objective:     Vital Signs (Most Recent):  Temp: 98.5 °F (36.9 °C) (12/25/22 1204)  Pulse: 75 (12/25/22 1204)  Resp: 16 (12/25/22 1204)  BP: (!) 140/65 (12/25/22 1204)  SpO2: 100 % (12/25/22 1204)   Vital Signs (24h Range):  Temp:  [97.4 °F (36.3 °C)-98.5 °F (36.9 °C)] 98.5 °F (36.9 °C)  Pulse:  [72-96] 75  Resp:  [16-18] 16  SpO2:  [99 %-100 %] 100 %  BP: (132-188)/(64-82) 140/65     Weight: 59.5 kg (131 lb 3.2 oz)  Body mass index is 22.52 kg/m².  No intake or output data in the 24 hours ending 12/25/22 1352   Physical Exam  Constitutional:       Appearance: Normal appearance.   HENT:      Head: Normocephalic and atraumatic.      Nose: Nose normal.      Mouth/Throat:      Mouth: Mucous membranes are moist.   Eyes:      Extraocular Movements: Extraocular movements intact.      Pupils: Pupils are equal, round, and reactive to light.   Cardiovascular:      Rate and Rhythm: Normal rate and regular rhythm.      Pulses: Normal pulses.      Heart sounds: Normal heart sounds.   Pulmonary:      Effort: Pulmonary effort is normal. No respiratory distress.      Breath sounds: Normal breath sounds.   Abdominal:      General: Abdomen is flat. There is no distension.      Tenderness: There is no abdominal tenderness.   Musculoskeletal:         General: Normal range of motion.      Cervical back: Normal range of motion.   Skin:     General: Skin is warm and dry.   Neurological:      General: No focal deficit present.      Mental Status: She is alert and oriented to person, place, and time.   Psychiatric:         Mood and Affect: Mood normal.         Behavior: Behavior normal.       Significant Labs: All pertinent  labs within the past 24 hours have been reviewed.    Significant Imaging: I have reviewed all pertinent imaging results/findings within the past 24 hours.

## 2022-12-25 NOTE — ASSESSMENT & PLAN NOTE
Progressing back pain over the past couple of months, now with LE weakness. No incontinence or saddle paresthesia.   - XRAY lumbar spine with no acute fracture or displacement  - Capsaicin as needed  - Robaxin prn   - F/u out patient with spine clinic

## 2022-12-26 NOTE — ASSESSMENT & PLAN NOTE
Hypokalemic to 2.8, may explain her generalized weakness and fatigue along with her sodium. Possibly due to decreased intake, thiazide use, hyperaldo state. Will work-up as above    - Replete as necessary, f/u BMP and daily CMP

## 2022-12-26 NOTE — SUBJECTIVE & OBJECTIVE
Interval History: NAEON. Pt reports mild confusion and LIRA, but she appears comfortable at rest. States that she has been trying to stay hydrated, but pt was advised to restrict fluids considering concern for SIADH. Pt on 2L via NC in AM, but she had sats 98% and above on RA, so O2 discontinued. Ordered duonebs for subjective SOB/LIRA.    Review of Systems   Constitutional:  Positive for fatigue. Negative for chills and fever.   HENT:  Negative for congestion and sore throat.    Respiratory:  Positive for shortness of breath. Negative for cough and chest tightness.    Cardiovascular:  Negative for chest pain and leg swelling.   Gastrointestinal:  Positive for abdominal pain. Negative for constipation, diarrhea, nausea and vomiting.   Endocrine: Negative for polydipsia and polyuria.   Genitourinary:  Negative for difficulty urinating and dysuria.   Musculoskeletal:  Negative for arthralgias and back pain.   Skin:  Negative for color change.   Neurological:  Positive for weakness.        Chronic balance problems   Psychiatric/Behavioral:  Negative for agitation, behavioral problems and confusion.    Objective:     Vital Signs (Most Recent):  Temp: 97.7 °F (36.5 °C) (12/26/22 1155)  Pulse: 71 (12/26/22 1438)  Resp: 18 (12/26/22 1155)  BP: 133/60 (12/26/22 1458)  SpO2: 100 % (12/26/22 1155) Vital Signs (24h Range):  Temp:  [97.1 °F (36.2 °C)-97.7 °F (36.5 °C)] 97.7 °F (36.5 °C)  Pulse:  [71-88] 71  Resp:  [16-18] 18  SpO2:  [97 %-100 %] 100 %  BP: ()/(50-90) 133/60     Weight: 59.5 kg (131 lb 3.2 oz)  Body mass index is 22.52 kg/m².    Intake/Output Summary (Last 24 hours) at 12/26/2022 1522  Last data filed at 12/26/2022 0520  Gross per 24 hour   Intake 360 ml   Output 300 ml   Net 60 ml      Physical Exam  Vitals and nursing note reviewed.   Constitutional:       Appearance: Normal appearance.   HENT:      Head: Normocephalic and atraumatic.      Nose: Nose normal.      Mouth/Throat:      Mouth: Mucous membranes  are moist.   Eyes:      Extraocular Movements: Extraocular movements intact.      Pupils: Pupils are equal, round, and reactive to light.   Cardiovascular:      Rate and Rhythm: Normal rate and regular rhythm.      Pulses: Normal pulses.      Heart sounds: Normal heart sounds.   Pulmonary:      Effort: Pulmonary effort is normal. No respiratory distress.      Breath sounds: Normal breath sounds.   Abdominal:      General: Abdomen is flat. There is no distension.      Tenderness: There is no abdominal tenderness.   Musculoskeletal:         General: Normal range of motion.      Cervical back: Normal range of motion.   Skin:     General: Skin is warm and dry.   Neurological:      General: No focal deficit present.      Mental Status: She is alert and oriented to person, place, and time.   Psychiatric:         Mood and Affect: Mood normal.         Behavior: Behavior normal.       Significant Labs: All pertinent labs within the past 24 hours have been reviewed.  CBC:   Recent Labs   Lab 12/25/22 0403 12/26/22  0523   WBC 9.06 7.37   HGB 10.8* 11.6*   HCT 33.1* 35.7*   * 414     CMP:   Recent Labs   Lab 12/25/22 0403 12/26/22  0523   * 119*   K 3.8 4.1   CL 84* 80*   CO2 30* 30*    90   BUN 11 10   CREATININE 0.5 0.5   CALCIUM 8.6* 8.9   PROT 5.7* 6.1   ALBUMIN 3.1* 3.2*   BILITOT 0.7 1.0   ALKPHOS 60 64   AST 18 18   ALT 11 12   ANIONGAP 9 9       Significant Imaging: I have reviewed all pertinent imaging results/findings within the past 24 hours.

## 2022-12-26 NOTE — ASSESSMENT & PLAN NOTE
Ms Mcgee is a 71 y/o female with previous history of essential hypertension, hypothyroidism, HLD who presents to the ED for 3 weeks of generalized weakness and progressing dyspnea on exertion. Found to have BP as high as 244 systolic in the ED with hyponatremia, metabolic alkalosis, and hypokalemia. Differentials given her presentation include primary/secondary hyperaldosteronism, renal artery stenosis, decreased intake/diuretic induced electrolyte abnormalities with uncontrolled essential hypertension. On chart review it seems her blood pressure has consistently been above 190 for the past 6 months with no known work-up in the [facility] system. She does seem to follow with [facility] and outside PCP who started her on Losartan-Hydrochlorothiazide, Metoprolol, Levothyroxin, and a statin. She was in relatively good health prior to her deterioation three weeks ago. She did follow with [facility] outpatient PT/OT for balance issues and shoulder pain in the past couple of months, last fall was in April this year, denies any syncope or other symptoms (mechanical falls).     - Holding thiazide (sodium loss). Will continue with abdominal imaging if concerns for adrenal hyperplasia, or renal artery stenosis given initial laboratory findings.  - Renin/susanne pending  - replete electrolytes  - Metoprolol 50 xl mg bid switched to Coreg 6.25 for better BP control; Coreg increased to 12.5mg BID  - starting amlodipine 5mg   - continue home losartan 50mg  - IV Labetalol 10mg q6h prn  - IV hydralazine 5mg q6h prn

## 2022-12-26 NOTE — ASSESSMENT & PLAN NOTE
On home losartan, metoprolol, HCTZ. Seems to be uncontrolled as of 6 months ago. Recently lisinopril switched to losartan given cough, resolved.     - Metoprolol transitioned to Coreg. Coreg increased to 12.5 mg BID  - Treatment as above

## 2022-12-26 NOTE — ASSESSMENT & PLAN NOTE
Hypochloremic metabolic alkalosis. Likely due to volume contraction in addition to hypokalemia. Repeat BMP with improvement after 1L NS in the ED as well as potassium repletion. Initial CO2 of 37.  - Asymptomatic with normal respiratory drive and saturation  - stable, will continue to reassess following fluid restriction

## 2022-12-26 NOTE — PLAN OF CARE
Problem: Adult Inpatient Plan of Care  Goal: Plan of Care Review  12/26/2022 1731 by Mayra Javier RN  Outcome: Ongoing, Progressing  12/26/2022 1730 by Mayra Javier RN  Outcome: Ongoing, Progressing  Goal: Patient-Specific Goal (Individualized)  12/26/2022 1731 by Mayra Javier RN  Outcome: Ongoing, Progressing  12/26/2022 1730 by Mayra Javier RN  Outcome: Ongoing, Progressing  Goal: Absence of Hospital-Acquired Illness or Injury  12/26/2022 1731 by Mayra Javier RN  Outcome: Ongoing, Progressing  12/26/2022 1730 by Mayra Javier RN  Outcome: Ongoing, Progressing  Goal: Optimal Comfort and Wellbeing  12/26/2022 1731 by Mayra Javier RN  Outcome: Ongoing, Progressing  12/26/2022 1730 by Mayra Javier RN  Outcome: Ongoing, Progressing  Goal: Readiness for Transition of Care  12/26/2022 1731 by Mayra Javier RN  Outcome: Ongoing, Progressing  12/26/2022 1730 by Mayra Javier RN  Outcome: Ongoing, Progressing     Problem: Fall Injury Risk  Goal: Absence of Fall and Fall-Related Injury  12/26/2022 1731 by Mayra Javier RN  Outcome: Ongoing, Progressing  12/26/2022 1730 by Mayra Javier RN  Outcome: Ongoing, Progressing

## 2022-12-26 NOTE — PROGRESS NOTES
[facility]  Progress Note    Patient Name: Dominique Mcgee  MRN: [id number]  Patient Class: IP- Inpatient   Admission Date: 12/23/2022  Length of Stay: 3 days  Attending Physician: Lexis Purdy*  Primary Care Provider: Briana Leblanc MD        Subjective:     Principal Problem:Hypertensive urgency        HPI:  Ms. Mcgee is a 73 y/o F patient with a PMHx of essential hypertension, hyperlipidemia, hypothyroidism, balance difficulites who presents to the ED for 3 weeks of weakness, dyspnea on exertion, decreased appetite. Prior to 3 weeks ago patient was completely functional with no deficits, completely independent with ADLs/iADLs. She became weaker over the past three weeks and more short of breath to the point where she gets winded while walking to the bathroom and back to her bed. Her  also states that she has been eating less and less, however has been drinking plenty of water. Patient denies any chest pain, N/V/D, dysuria, edema, abdominal distention, but does also endorse occasionally lower quadrant abdominal pain and constipation. She was started on Losartan by her PCP 4 days ago due to concerns that lisinopril was causing her to have dry mouth and cough. Currently taking a statin, losartan,  metoprolol, hydrochlorothiazide, and levothyroxine.     History obtained from patient and her .     ED Course:  BP on arrival up to 244/109, HR 84, 97% on room air. She was given 1L NS by ED provider. Labs significant for Na 120, K 2.9, Cl 76, CO2 37, T.Bili 1.1. Admitted to hospital medicine for further work-up.      Overview/Hospital Course:  Ms. Mcgee is a 72 year old female with a history of HTN, HLD, Hypothyroidism, balance disorders who presented to the ED for 3 weeks of leg weakness, dyspnea on exertion, decreased appetite. Recently switched from Lisinopril to Losartan and started on HCTZ. Has history of very high blood pressures with systolic's in  the 180s+. On arrival, hypertensive with Na 120, K2.9, CO2 37. Received IV fluids and admitted to hospital medicine for further work-up. Initial concern for hyperaldo state given metabolic alkalosis, hypokalemia, uncontrolled hypertension. Also concern for thiazide induced electrolyte abnormlaities. Her thiazide and losartan were held and labs were collected for hyperaldo state. BP came down from 180s to 120s with 5mg hydralazine IV and 10mg labetalol IV. She was started on amlodipine 5mg and her home metoprolol was switched to Coreg 6.25 for better blood pressure control. Losartan was restarted. Her sodium continues to remain in the low 120s, urine/osm studies pending. Potassium repleted orally. Urine studies indicate possible SIADH, will attempt fluid restriction and assess Na response. Coreg increased to 12.5mg BID for tighter BP control.      Interval History: NAEON. Pt reports mild confusion and LIRA, but she appears comfortable at rest. States that she has been trying to stay hydrated, but pt was advised to restrict fluids considering concern for SIADH. Pt on 2L via NC in AM, but she had sats 98% and above on RA, so O2 discontinued. Ordered duonebs for subjective SOB/LIRA.    Review of Systems   Constitutional:  Positive for fatigue. Negative for chills and fever.   HENT:  Negative for congestion and sore throat.    Respiratory:  Positive for shortness of breath. Negative for cough and chest tightness.    Cardiovascular:  Negative for chest pain and leg swelling.   Gastrointestinal:  Positive for abdominal pain. Negative for constipation, diarrhea, nausea and vomiting.   Endocrine: Negative for polydipsia and polyuria.   Genitourinary:  Negative for difficulty urinating and dysuria.   Musculoskeletal:  Negative for arthralgias and back pain.   Skin:  Negative for color change.   Neurological:  Positive for weakness.        Chronic balance problems   Psychiatric/Behavioral:  Negative for agitation, behavioral  problems and confusion.    Objective:     Vital Signs (Most Recent):  Temp: 97.7 °F (36.5 °C) (12/26/22 1155)  Pulse: 71 (12/26/22 1438)  Resp: 18 (12/26/22 1155)  BP: 133/60 (12/26/22 1458)  SpO2: 100 % (12/26/22 1155) Vital Signs (24h Range):  Temp:  [97.1 °F (36.2 °C)-97.7 °F (36.5 °C)] 97.7 °F (36.5 °C)  Pulse:  [71-88] 71  Resp:  [16-18] 18  SpO2:  [97 %-100 %] 100 %  BP: ()/(50-90) 133/60     Weight: 59.5 kg (131 lb 3.2 oz)  Body mass index is 22.52 kg/m².    Intake/Output Summary (Last 24 hours) at 12/26/2022 1522  Last data filed at 12/26/2022 0520  Gross per 24 hour   Intake 360 ml   Output 300 ml   Net 60 ml      Physical Exam  Vitals and nursing note reviewed.   Constitutional:       Appearance: Normal appearance.   HENT:      Head: Normocephalic and atraumatic.      Nose: Nose normal.      Mouth/Throat:      Mouth: Mucous membranes are moist.   Eyes:      Extraocular Movements: Extraocular movements intact.      Pupils: Pupils are equal, round, and reactive to light.   Cardiovascular:      Rate and Rhythm: Normal rate and regular rhythm.      Pulses: Normal pulses.      Heart sounds: Normal heart sounds.   Pulmonary:      Effort: Pulmonary effort is normal. No respiratory distress.      Breath sounds: Normal breath sounds.   Abdominal:      General: Abdomen is flat. There is no distension.      Tenderness: There is no abdominal tenderness.   Musculoskeletal:         General: Normal range of motion.      Cervical back: Normal range of motion.   Skin:     General: Skin is warm and dry.   Neurological:      General: No focal deficit present.      Mental Status: She is alert and oriented to person, place, and time.   Psychiatric:         Mood and Affect: Mood normal.         Behavior: Behavior normal.       Significant Labs: All pertinent labs within the past 24 hours have been reviewed.  CBC:   Recent Labs   Lab 12/25/22  0403 12/26/22  0523   WBC 9.06 7.37   HGB 10.8* 11.6*   HCT 33.1* 35.7*   PLT  455* 414     CMP:   Recent Labs   Lab 12/25/22  0403 12/26/22  0523   * 119*   K 3.8 4.1   CL 84* 80*   CO2 30* 30*    90   BUN 11 10   CREATININE 0.5 0.5   CALCIUM 8.6* 8.9   PROT 5.7* 6.1   ALBUMIN 3.1* 3.2*   BILITOT 0.7 1.0   ALKPHOS 60 64   AST 18 18   ALT 11 12   ANIONGAP 9 9       Significant Imaging: I have reviewed all pertinent imaging results/findings within the past 24 hours.      Assessment/Plan:      * Hypertensive urgency  Ms Mcgee is a 73 y/o female with previous history of essential hypertension, hypothyroidism, HLD who presents to the ED for 3 weeks of generalized weakness and progressing dyspnea on exertion. Found to have BP as high as 244 systolic in the ED with hyponatremia, metabolic alkalosis, and hypokalemia. Differentials given her presentation include primary/secondary hyperaldosteronism, renal artery stenosis, decreased intake/diuretic induced electrolyte abnormalities with uncontrolled essential hypertension. On chart review it seems her blood pressure has consistently been above 190 for the past 6 months with no known work-up in the [facility] system. She does seem to follow with [facility] and outside PCP who started her on Losartan-Hydrochlorothiazide, Metoprolol, Levothyroxin, and a statin. She was in relatively good health prior to her deterioation three weeks ago. She did follow with [facility] outpatient PT/OT for balance issues and shoulder pain in the past couple of months, last fall was in April this year, denies any syncope or other symptoms (mechanical falls).     - Holding thiazide (sodium loss). Will continue with abdominal imaging if concerns for adrenal hyperplasia, or renal artery stenosis given initial laboratory findings.  - Renin/susanne pending  - replete electrolytes  - Metoprolol 50 xl mg bid switched to Coreg 6.25 for better BP control; Coreg increased to 12.5mg BID  - starting amlodipine 5mg   - continue home losartan 50mg  - IV Labetalol 10mg q6h prn  - IV  hydralazine 5mg q6h prn    Metabolic alkalosis  Hypochloremic metabolic alkalosis. Likely due to volume contraction in addition to hypokalemia. Repeat BMP with improvement after 1L NS in the ED as well as potassium repletion. Initial CO2 of 37.  - Asymptomatic with normal respiratory drive and saturation  - stable, will continue to reassess following fluid restriction    Back pain  Progressing back pain over the past couple of months, now with LE weakness. No incontinence or saddle paresthesia.   - XRAY lumbar spine with no acute fracture or displacement  - Capsaicin as needed  - Robaxin prn   - F/u out patient with spine clinic    Hypokalemia  Hypokalemic to 2.8, may explain her generalized weakness and fatigue along with her sodium. Possibly due to decreased intake, thiazide use, hyperaldo state. Will work-up as above    - Replete as necessary, f/u BMP and daily CMP      Generalized weakness  Likely due to profound electrolyte abnormalities, will replete. Work-up as above.   - PT/OT ordered    Hyperlipidemia  Lipid panel pending  - Continue home simvastatin equivalent (atorvastatin 10)\  - Repeat lipid panel WNL (chol 151, trig 55, hdl 41)    Essential hypertension  On home losartan, metoprolol, HCTZ. Seems to be uncontrolled as of 6 months ago. Recently lisinopril switched to losartan given cough, resolved.     - Metoprolol transitioned to Coreg. Coreg increased to 12.5 mg BID  - Treatment as above    Dyspnea on exertion  Unclear etiology, no PFTs done in the past. May be explained due to profound weakness and primary susanne, workup pending. CXR without acute changes. Currently satting 99% on room air. EKG with peaked T waves in V3. Elevated QTC. Repeat EKG without acute changes after electrolyte repletion.     - D dimer and BL LE US to rule out DVT/PE negative.  - duonebs ordered  - fluid restriction    Hyponatremia  Sodium of 120 on admission, received 1L NS. Possible in the setting of thiazide use, decreased  intake  - Repeat BMP after fluids with similar Na  - Urine studies pending/Serum osm pending  - Slowly improving while holding thiazide  - Daily CMP  - Na of 119 on 12/26 with increased U osm and Urine Na, concerning for SIADH. Fluid restriction ordered. follow up BMP      VTE Risk Mitigation (From admission, onward)         Ordered     enoxaparin injection 40 mg  Daily         12/23/22 1649     IP VTE HIGH RISK PATIENT  Once         12/23/22 1649     Place sequential compression device  Until discontinued         12/23/22 1649                Discharge Planning   TRICIA: 12/26/2022     Code Status: Full Code   Is the patient medically ready for discharge?: No    Reason for patient still in hospital (select all that apply): Treatment                     Samuel Meier MD  Department of Hospital Medicine   [facility] - Tele[facility]

## 2022-12-26 NOTE — ASSESSMENT & PLAN NOTE
Unclear etiology, no PFTs done in the past. May be explained due to profound weakness and primary susanne, workup pending. CXR without acute changes. Currently satting 99% on room air. EKG with peaked T waves in V3. Elevated QTC. Repeat EKG without acute changes after electrolyte repletion.     - D dimer and BL LE US to rule out DVT/PE negative.  - duonebs ordered  - fluid restriction

## 2022-12-27 PROBLEM — K59.00 CONSTIPATION: Status: ACTIVE | Noted: 2022-01-01

## 2022-12-27 NOTE — CODE/ RAPID DOCUMENTATION
RAPID RESPONSE NURSE ROUND       Rounding completed with charge RN, Alban for Hypertension reports NAD. No additional concerns verbalized at this time. Instructed to call [phone] for further concerns or assistance.

## 2022-12-27 NOTE — SUBJECTIVE & OBJECTIVE
Interval History: Pt with HTN to 220s systolic in AM. Given BP meds with resolution of HTN. Headache resolved with resolution of HTN as well. Pt had received IV labetalol and pressures dropped significantly, so IV BP meds discontinued. Slight increase in Na with fluid restriction. Will follow up CT head and continue fluid restriction.    Review of Systems   Constitutional:  Positive for fatigue. Negative for chills and fever.   HENT:  Negative for congestion and sore throat.    Respiratory:  Positive for shortness of breath. Negative for cough and chest tightness.    Cardiovascular:  Negative for chest pain and leg swelling.   Gastrointestinal:  Negative for abdominal pain, constipation, diarrhea, nausea and vomiting.   Endocrine: Negative for polydipsia and polyuria.   Genitourinary:  Negative for difficulty urinating and dysuria.   Musculoskeletal:  Negative for arthralgias and back pain.   Skin:  Negative for color change.   Neurological:  Positive for weakness and headaches.        Chronic balance problems   Psychiatric/Behavioral:  Negative for agitation, behavioral problems and confusion.    Objective:     Vital Signs (Most Recent):  Temp: 97.7 °F (36.5 °C) (12/27/22 1150)  Pulse: 85 (12/27/22 1259)  Resp: 17 (12/27/22 1259)  BP: (!) 146/66 (12/27/22 1305)  SpO2: 97 % (12/27/22 1259)   Vital Signs (24h Range):  Temp:  [97.3 °F (36.3 °C)-98.3 °F (36.8 °C)] 97.7 °F (36.5 °C)  Pulse:  [] 85  Resp:  [16-20] 17  SpO2:  [97 %-100 %] 97 %  BP: ()/() 146/66     Weight: 59.4 kg (131 lb)  Body mass index is 22.49 kg/m².    Intake/Output Summary (Last 24 hours) at 12/27/2022 1402  Last data filed at 12/27/2022 0630  Gross per 24 hour   Intake --   Output 300 ml   Net -300 ml      Physical Exam  Vitals and nursing note reviewed.   Constitutional:       Appearance: Normal appearance.   HENT:      Head: Normocephalic and atraumatic.      Nose: Nose normal.      Mouth/Throat:      Mouth: Mucous membranes  are moist.   Eyes:      Extraocular Movements: Extraocular movements intact.      Pupils: Pupils are equal, round, and reactive to light.   Cardiovascular:      Rate and Rhythm: Normal rate and regular rhythm.      Pulses: Normal pulses.      Heart sounds: Normal heart sounds.   Pulmonary:      Effort: Pulmonary effort is normal. No respiratory distress.      Breath sounds: Normal breath sounds.   Abdominal:      General: Abdomen is flat. There is no distension.      Tenderness: There is no abdominal tenderness.   Musculoskeletal:         General: Normal range of motion.      Cervical back: Normal range of motion.   Skin:     General: Skin is warm and dry.   Neurological:      General: No focal deficit present.      Mental Status: She is alert and oriented to person, place, and time.   Psychiatric:         Mood and Affect: Mood normal.         Behavior: Behavior normal.       Significant Labs: All pertinent labs within the past 24 hours have been reviewed.  CBC:   Recent Labs   Lab 12/26/22  0523 12/27/22  0542   WBC 7.37 11.14   HGB 11.6* 12.4   HCT 35.7* 35.3*    428     CMP:   Recent Labs   Lab 12/26/22  0523 12/26/22  1653 12/26/22  2052 12/27/22  0542   * 115* 116* 117*   K 4.1 3.8 3.7 3.8   CL 80* 78* 75* 78*   CO2 30* 30* 31* 29   GLU 90 139* 132* 127*   BUN 10 12 11 10   CREATININE 0.5 0.5 0.5 0.4*   CALCIUM 8.9 9.0 9.0 9.0   PROT 6.1  --   --  6.6   ALBUMIN 3.2*  --   --  3.4*   BILITOT 1.0  --   --  0.9   ALKPHOS 64  --   --  70   AST 18  --   --  22   ALT 12  --   --  14   ANIONGAP 9 7* 10 10       Significant Imaging: I have reviewed all pertinent imaging results/findings within the past 24 hours.

## 2022-12-27 NOTE — ASSESSMENT & PLAN NOTE
On home losartan, metoprolol, HCTZ. Seems to be uncontrolled as of 6 months ago. Recently lisinopril switched to losartan given cough, resolved.     - Metoprolol transitioned to Coreg. Coreg increased to 12.5 mg BID  - Losartan increased to 100mg QD  - Pt very sensitive to IV BP meds, so IV hydralazine and labetalol discontinued  - Treatment as above

## 2022-12-27 NOTE — PT/OT/SLP PROGRESS
"Physical Therapy Treatment    Patient Name:  Dominique Mcgee   MRN:  [id number]    Recommendations:     Discharge Recommendations: home health PT  Discharge Equipment Recommendations: bedside commode (may require RW, will continue to assess as pt progresses)  Barriers to discharge: Inaccessible home and Decreased caregiver support    Assessment:     Dominique Mcgee is a 72 y.o. female admitted with a medical diagnosis of Hypertensive urgency.  She presents with the following impairments/functional limitations: weakness, impaired endurance, impaired functional mobility, gait instability, decreased coordination, pain, decreased safety awareness, decreased ROM.  Pt was agreeable to therapy, but required slightly more assistance today. Pt completed bed mobility and transfer to bedside chair with CGA-Enrique. Pt demonstrated slightly better stability with RW, but Enrique to maneuver RW. Pt limited by increase fatigued, but participated well. RN notified of dizziness with positional changes. Pt continues to benefit from therapy to improve functional endurance, strength, and mobility.     Rehab Prognosis: Good; patient would benefit from acute skilled PT services to address these deficits and reach maximum level of function.    Recent Surgery: * No surgery found *      Plan:     During this hospitalization, patient to be seen 4 x/week to address the identified rehab impairments via gait training, therapeutic activities, therapeutic exercises, neuromuscular re-education and progress toward the following goals:    Plan of Care Expires:  01/23/23    Subjective     Chief Complaint: feeling weak  Patient/Family Comments/goals: "I haven't tried the walker"  Pain/Comfort:  Pain Rating 1: 0/10  Pain Rating Post-Intervention 1: 0/10      Objective:     Communicated with RN prior to session.  Patient found HOB elevated with telemetry, pulse ox (continuous), oxygen, PureWick upon PT entry to room.     General Precautions: Standard, " fall  Orthopedic Precautions: N/A  Braces: N/A  Respiratory Status: Nasal cannula     Functional Mobility:  Bed Mobility:   Scooting to EOB: contact guard assistance  Supine to Sit: minimum assistance; HOB elevated  Reported dizziness upon initially sitting up  Transfers:   Sit <> Stand Transfer: contact guard assistance with rolling walker   2x trials from EOB  Reports dizziness with positional change,   returned to sitting after 1st trial, reports better with 2nd trial  Bed <> Chair Transfer: minimum assistance with rolling walker using Step Transfer technique   Gait:  Pt ambulated ~5ft with minimum assistance and rolling walker. Distance limited by fatigued.  Gait Deviation(s): occasional unsteady gait with decrease victor manuel, flexed posture, and decrease step length   Verbal/tactile cues for Rw management and upright posture.  Balance:   Static/Dynamic sitting EOB balance: CGA-SBA    AM-PAC 6 CLICK MOBILITY  Turning over in bed (including adjusting bedclothes, sheets and blankets)?: 3  Sitting down on and standing up from a chair with arms (e.g., wheelchair, bedside commode, etc.): 3  Moving from lying on back to sitting on the side of the bed?: 3  Moving to and from a bed to a chair (including a wheelchair)?: 3  Need to walk in hospital room?: 3  Climbing 3-5 steps with a railing?: 3  Basic Mobility Total Score: 18     Treatment & Education:  Seated BLE therex x10 reps: ankle pumps and LAQ  Patient educated on role of therapy, goals of session, and benefits of out of bed mobility.   Instructed on use of call button and importance of calling nursing staff for assistance with mobility   Questions/concerns addressed within PTA scope of practice  Pt verbalized understanding.  Whiteboard Updated    Patient left up in chair with all lines intact, call button in reach, RN notified, and family present..    GOALS:   Multidisciplinary Problems       Physical Therapy Goals          Problem: Physical Therapy    Goal Priority  Disciplines Outcome Goal Variances Interventions   Physical Therapy Goal     PT, PT/OT Ongoing, Progressing     Description: PT goals until 12/30/22    1. Pt supine to sit with mod independent-not met  2. Pt sit to stand with or without RW as needed for safety with mod independent-not met  3. Pt to perform gait 150ft with or without RW as needed for safety with mod independent.-not met  4. Pt to go up/down 3 steps with or without RW as needed for safety with mod independent.-not met  5. Pt to perform B LE exs in sitting or supine x 10 reps to strengthen B LE to improve functional mobility.-not met                        Time Tracking:     PT Received On: 12/27/22  PT Start Time: 1212     PT Stop Time: 1238  PT Total Time (min): 26 min     Billable Minutes: Gait Training 10 and Therapeutic Exercise 16    Treatment Type: Treatment  PT/PTA: PTA     PTA Visit Number: 1     12/27/2022

## 2022-12-27 NOTE — ASSESSMENT & PLAN NOTE
Ms Mcgee is a 71 y/o female with previous history of essential hypertension, hypothyroidism, HLD who presents to the ED for 3 weeks of generalized weakness and progressing dyspnea on exertion. Found to have BP as high as 244 systolic in the ED with hyponatremia, metabolic alkalosis, and hypokalemia. Differentials given her presentation include primary/secondary hyperaldosteronism, renal artery stenosis, decreased intake/diuretic induced electrolyte abnormalities with uncontrolled essential hypertension. On chart review it seems her blood pressure has consistently been above 190 for the past 6 months with no known work-up in the [facility] system. She does seem to follow with [facility] and outside PCP who started her on Losartan-Hydrochlorothiazide, Metoprolol, Levothyroxin, and a statin. She was in relatively good health prior to her deterioation three weeks ago. She did follow with [facility] outpatient PT/OT for balance issues and shoulder pain in the past couple of months, last fall was in April this year, denies any syncope or other symptoms (mechanical falls).     - Holding thiazide (sodium loss). Will continue with abdominal imaging if concerns for adrenal hyperplasia, or renal artery stenosis given initial laboratory findings.  - Renin/susanne pending  - replete electrolytes  - Metoprolol 50 xl mg bid switched to Coreg 6.25 for better BP control; Coreg increased to 12.5mg BID  - starting amlodipine 5mg   - continue home losartan 50mg; increased to 100mg QD  - DC IV Labetalol 10mg q6h prn IV hydralazine 5mg q6h prn due to hypotension with IV BP meds

## 2022-12-27 NOTE — AI DETERIORATION ALERT
"RAPID RESPONSE NURSE AI ALERT       AI alert received.    Chart Reviewed: 12/27/2022, 8:18 AM    MRN: [id number]  Bed: 8073/8073 A    Dx: Hypertensive urgency    Dominique Mcgee has a past medical history of Hyperlipidemia, Hypertension, and Hypothyroidism.    Last VS: BP (!) 234/100   Pulse 84   Temp 97.6 °F (36.4 °C) (Oral)   Resp 18   Ht 5' 4" (1.626 m)   Wt 59.5 kg (131 lb 3.2 oz)   SpO2 98%   Breastfeeding No   BMI 22.52 kg/m²     24H Vital Sign Range:  Temp:  [97.3 °F (36.3 °C)-98.3 °F (36.8 °C)]   Pulse:  []   Resp:  [18-20]   BP: ()/()   SpO2:  [97 %-100 %]     Level of Consciousness (AVPU): alert    Recent Labs     12/25/22  0403 12/26/22  0523 12/27/22  0542   WBC 9.06 7.37 11.14   HGB 10.8* 11.6* 12.4   HCT 33.1* 35.7* 35.3*   * 414 428       Recent Labs     12/25/22  0403 12/26/22  0523 12/26/22  1653 12/26/22 2052 12/27/22  0542   * 119* 115* 116* 117*   K 3.8 4.1 3.8 3.7 3.8   CL 84* 80* 78* 75* 78*   CO2 30* 30* 30* 31* 29   CREATININE 0.5 0.5 0.5 0.5 0.4*    90 139* 132* 127*   PHOS 2.9 2.9  --   --  2.8   MG 1.7 1.7  --   --  1.8        No results for input(s): PH, PCO2, PO2, HCO3, POCSATURATED, BE in the last 72 hours.     OXYGEN:  Flow (L/min): 2          MEWS score: 3    bedside RNMayra  contacted. No concerns verbalized at this time. Instructed to call [phone] for further concerns or assistance.    Mary Rebolledo RN        "

## 2022-12-27 NOTE — PROGRESS NOTES
[facility] - Telemetry [facility] Medicine  Progress Note    Patient Name: Dominique Mcgee  MRN: [id number]  Patient Class: IP- Inpatient   Admission Date: 12/23/2022  Length of Stay: 4 days  Attending Physician: Lexis Purdy*  Primary Care Provider: Briana Leblanc MD        Subjective:     Principal Problem:Hypertensive urgency        HPI:  Ms. Mcgee is a 73 y/o F patient with a PMHx of essential hypertension, hyperlipidemia, hypothyroidism, balance difficulites who presents to the ED for 3 weeks of weakness, dyspnea on exertion, decreased appetite. Prior to 3 weeks ago patient was completely functional with no deficits, completely independent with ADLs/iADLs. She became weaker over the past three weeks and more short of breath to the point where she gets winded while walking to the bathroom and back to her bed. Her  also states that she has been eating less and less, however has been drinking plenty of water. Patient denies any chest pain, N/V/D, dysuria, edema, abdominal distention, but does also endorse occasionally lower quadrant abdominal pain and constipation. She was started on Losartan by her PCP 4 days ago due to concerns that lisinopril was causing her to have dry mouth and cough. Currently taking a statin, losartan,  metoprolol, hydrochlorothiazide, and levothyroxine.     History obtained from patient and her .     ED Course:  BP on arrival up to 244/109, HR 84, 97% on room air. She was given 1L NS by ED provider. Labs significant for Na 120, K 2.9, Cl 76, CO2 37, T.Bili 1.1. Admitted to hospital medicine for further work-up.      Overview/Hospital Course:  Ms. Mcgee is a 72 year old female with a history of HTN, HLD, Hypothyroidism, balance disorders who presented to the ED for 3 weeks of leg weakness, dyspnea on exertion, decreased appetite. Recently switched from Lisinopril to Losartan and started on HCTZ. Has history of very high blood pressures with systolic's in  the 180s+. On arrival, hypertensive with Na 120, K2.9, CO2 37. Received IV fluids and admitted to hospital medicine for further work-up. Initial concern for hyperaldo state given metabolic alkalosis, hypokalemia, uncontrolled hypertension. Also concern for thiazide induced electrolyte abnormlaities. Her thiazide and losartan were held and labs were collected for hyperaldo state. BP came down from 180s to 120s with 5mg hydralazine IV and 10mg labetalol IV. She was started on amlodipine 5mg and her home metoprolol was switched to Coreg 6.25 for better blood pressure control. Losartan was restarted. Her sodium continues to remain in the low 120s, urine/osm studies pending. Potassium repleted orally. Urine studies indicate possible SIADH, will attempt fluid restriction and assess Na response. Coreg increased to 12.5mg BID for tighter BP control. BP highly sensitive to IV medications with large fluctuations from HTN to hypotension; however, patient remains largely asymptomatic.       Interval History: Pt with HTN to 220s systolic in AM. Given BP meds with resolution of HTN. Headache resolved with resolution of HTN as well. Pt had received IV labetalol and pressures dropped significantly, so IV BP meds discontinued. Slight increase in Na with fluid restriction. Will follow up CT head and continue fluid restriction.    Review of Systems   Constitutional:  Positive for fatigue. Negative for chills and fever.   HENT:  Negative for congestion and sore throat.    Respiratory:  Positive for shortness of breath. Negative for cough and chest tightness.    Cardiovascular:  Negative for chest pain and leg swelling.   Gastrointestinal:  Negative for abdominal pain, constipation, diarrhea, nausea and vomiting.   Endocrine: Negative for polydipsia and polyuria.   Genitourinary:  Negative for difficulty urinating and dysuria.   Musculoskeletal:  Negative for arthralgias and back pain.   Skin:  Negative for color change.   Neurological:   Positive for weakness and headaches.        Chronic balance problems   Psychiatric/Behavioral:  Negative for agitation, behavioral problems and confusion.    Objective:     Vital Signs (Most Recent):  Temp: 97.7 °F (36.5 °C) (12/27/22 1150)  Pulse: 85 (12/27/22 1259)  Resp: 17 (12/27/22 1259)  BP: (!) 146/66 (12/27/22 1305)  SpO2: 97 % (12/27/22 1259)   Vital Signs (24h Range):  Temp:  [97.3 °F (36.3 °C)-98.3 °F (36.8 °C)] 97.7 °F (36.5 °C)  Pulse:  [] 85  Resp:  [16-20] 17  SpO2:  [97 %-100 %] 97 %  BP: ()/() 146/66     Weight: 59.4 kg (131 lb)  Body mass index is 22.49 kg/m².    Intake/Output Summary (Last 24 hours) at 12/27/2022 1402  Last data filed at 12/27/2022 0630  Gross per 24 hour   Intake --   Output 300 ml   Net -300 ml      Physical Exam  Vitals and nursing note reviewed.   Constitutional:       Appearance: Normal appearance.   HENT:      Head: Normocephalic and atraumatic.      Nose: Nose normal.      Mouth/Throat:      Mouth: Mucous membranes are moist.   Eyes:      Extraocular Movements: Extraocular movements intact.      Pupils: Pupils are equal, round, and reactive to light.   Cardiovascular:      Rate and Rhythm: Normal rate and regular rhythm.      Pulses: Normal pulses.      Heart sounds: Normal heart sounds.   Pulmonary:      Effort: Pulmonary effort is normal. No respiratory distress.      Breath sounds: Normal breath sounds.   Abdominal:      General: Abdomen is flat. There is no distension.      Tenderness: There is no abdominal tenderness.   Musculoskeletal:         General: Normal range of motion.      Cervical back: Normal range of motion.   Skin:     General: Skin is warm and dry.   Neurological:      General: No focal deficit present.      Mental Status: She is alert and oriented to person, place, and time.   Psychiatric:         Mood and Affect: Mood normal.         Behavior: Behavior normal.       Significant Labs: All pertinent labs within the past 24 hours have  been reviewed.  CBC:   Recent Labs   Lab 12/26/22  0523 12/27/22  0542   WBC 7.37 11.14   HGB 11.6* 12.4   HCT 35.7* 35.3*    428     CMP:   Recent Labs   Lab 12/26/22  0523 12/26/22  1653 12/26/22 2052 12/27/22  0542   * 115* 116* 117*   K 4.1 3.8 3.7 3.8   CL 80* 78* 75* 78*   CO2 30* 30* 31* 29   GLU 90 139* 132* 127*   BUN 10 12 11 10   CREATININE 0.5 0.5 0.5 0.4*   CALCIUM 8.9 9.0 9.0 9.0   PROT 6.1  --   --  6.6   ALBUMIN 3.2*  --   --  3.4*   BILITOT 1.0  --   --  0.9   ALKPHOS 64  --   --  70   AST 18  --   --  22   ALT 12  --   --  14   ANIONGAP 9 7* 10 10       Significant Imaging: I have reviewed all pertinent imaging results/findings within the past 24 hours.      Assessment/Plan:      * Hypertensive urgency  Ms Mcgee is a 71 y/o female with previous history of essential hypertension, hypothyroidism, HLD who presents to the ED for 3 weeks of generalized weakness and progressing dyspnea on exertion. Found to have BP as high as 244 systolic in the ED with hyponatremia, metabolic alkalosis, and hypokalemia. Differentials given her presentation include primary/secondary hyperaldosteronism, renal artery stenosis, decreased intake/diuretic induced electrolyte abnormalities with uncontrolled essential hypertension. On chart review it seems her blood pressure has consistently been above 190 for the past 6 months with no known work-up in the [facility] system. She does seem to follow with [facility] and outside PCP who started her on Losartan-Hydrochlorothiazide, Metoprolol, Levothyroxin, and a statin. She was in relatively good health prior to her deterioation three weeks ago. She did follow with [facility] outpatient PT/OT for balance issues and shoulder pain in the past couple of months, last fall was in April this year, denies any syncope or other symptoms (mechanical falls).     - Holding thiazide (sodium loss). Will continue with abdominal imaging if concerns for adrenal hyperplasia, or renal artery  stenosis given initial laboratory findings.  - Renin/susanne pending  - replete electrolytes  - Metoprolol 50 xl mg bid switched to Coreg 6.25 for better BP control; Coreg increased to 12.5mg BID  - starting amlodipine 5mg   - continue home losartan 50mg; increased to 100mg QD  - DC IV Labetalol 10mg q6h prn IV hydralazine 5mg q6h prn due to hypotension with IV BP meds      Metabolic alkalosis  Hypochloremic metabolic alkalosis. Likely due to volume contraction in addition to hypokalemia. Repeat BMP with improvement after 1L NS in the ED as well as potassium repletion. Initial CO2 of 37.  - Asymptomatic with normal respiratory drive and saturation  - stable, will continue to reassess following fluid restriction    Back pain  Progressing back pain over the past couple of months, now with LE weakness. No incontinence or saddle paresthesia.   - XRAY lumbar spine with no acute fracture or displacement  - Capsaicin as needed  - Robaxin prn   - F/u out patient with spine clinic    Hypokalemia  Hypokalemic to 2.8, may explain her generalized weakness and fatigue along with her sodium. Possibly due to decreased intake, thiazide use, hyperaldo state. Will work-up as above    - Replete as necessary, f/u BMP Q12      Generalized weakness  Likely due to profound electrolyte abnormalities, will replete. Work-up as above.   - PT/OT ordered    Hyperlipidemia  Lipid panel pending  - Continue home simvastatin equivalent (atorvastatin 10)\  - Repeat lipid panel WNL (chol 151, trig 55, hdl 41)    Essential hypertension  On home losartan, metoprolol, HCTZ. Seems to be uncontrolled as of 6 months ago. Recently lisinopril switched to losartan given cough, resolved.     - Metoprolol transitioned to Coreg. Coreg increased to 12.5 mg BID  - Losartan increased to 100mg QD  - Pt very sensitive to IV BP meds, so IV hydralazine and labetalol discontinued  - Treatment as above    Dyspnea on exertion  Unclear etiology, no PFTs done in the past. May  be explained due to profound weakness and primary susanne, workup pending. CXR without acute changes. Currently satting 99% on room air. EKG with peaked T waves in V3. Elevated QTC. Repeat EKG without acute changes after electrolyte repletion.     - D dimer and BL LE US to rule out DVT/PE, negative.  - duonebs ordered  - fluid restriction    Hyponatremia  Sodium of 120 on admission, received 1L NS. Possible in the setting of thiazide use, decreased intake  - Repeat BMP after fluids with similar Na  - Urine studies pending/Serum osm pending  - Slowly improving while holding thiazide  - Daily CMP  - Na of 119 on 12/26 with increased U osm and Urine Na, concerning for SIADH. Fluid restriction ordered.  - Slight increase in Na with fluid restriction. Will continue fluid restriction and obtain CT head  - F/u BMP q12      VTE Risk Mitigation (From admission, onward)         Ordered     enoxaparin injection 40 mg  Daily         12/23/22 1649     IP VTE HIGH RISK PATIENT  Once         12/23/22 1649     Place sequential compression device  Until discontinued         12/23/22 1649                Discharge Planning   TRICIA: 12/30/2022     Code Status: Full Code   Is the patient medically ready for discharge?: No    Reason for patient still in hospital (select all that apply): Treatment                     Samuel Meier MD  Department of Hospital Medicine   [facility] - Telemetry Stepdown

## 2022-12-27 NOTE — ASSESSMENT & PLAN NOTE
Sodium of 120 on admission, received 1L NS. Possible in the setting of thiazide use, decreased intake  - Repeat BMP after fluids with similar Na  - Urine studies pending/Serum osm pending  - Slowly improving while holding thiazide  - Daily CMP  - Na of 119 on 12/26 with increased U osm and Urine Na, concerning for SIADH. Fluid restriction ordered.  - Slight increase in Na with fluid restriction. Will continue fluid restriction and obtain CT head  - F/u BMP q12

## 2022-12-27 NOTE — PLAN OF CARE
[facility] - Telemetry Stepdown  Initial Discharge Assessment       Primary Care Provider: Briana Leblanc MD    Admission Diagnosis: Hypokalemia [E87.6]  Hypochloremia [E87.8]  Hyponatremia [E87.1]  Elevated blood pressure reading [R03.0]  SOB (shortness of breath) [R06.02]  Chest pain [R07.9]    Admission Date: 12/23/2022  Expected Discharge Date: 12/30/2022    Discharge Barriers Identified: None    Payor: [employer] MEDICARE / Plan: [employer] 65 / Product Type: Medicare Advantage /     Extended Emergency Contact Information  Primary Emergency Contact: Jennifer Mcgee   [facility]  Home Phone: [phone]  Relation: Daughter    Discharge Plan A: Home  Discharge Plan B: Home with family, Home Health      [id number] - [place] - [address]  [address]  [place] [address]  Phone: [phone] Fax: [phone]      Initial Assessment (most recent)       Adult Discharge Assessment - 12/27/22 1518          Discharge Assessment    Assessment Type Discharge Planning Assessment     Confirmed/corrected address, phone number and insurance Yes     Confirmed Demographics Correct on Facesheet     Source of Information patient;family     Communicated TRICIA with patient/caregiver Yes     Reason For Admission Hypertensive urgency     People in Home alone     Do you expect to return to your current living situation? Yes     Do you have help at home or someone to help you manage your care at home? No     Current cognitive status: Alert/Oriented     Walking or Climbing Stairs ambulation difficulty, requires equipment     Mobility Management Ambulates with a standard cane     Home Accessibility stairs to enter home     Number of Stairs, Main Entrance four     Stair Railings, Main Entrance railings safe and in good condition     Home Layout Able to live on 1st floor     Equipment Currently Used at Home cane, straight     Readmission within 30 days? No     Patient  currently being followed by outpatient case management? No     Do you currently have service(s) that help you manage your care at home? No     Do you take prescription medications? Yes     Do you have prescription coverage? Yes     Do you have any problems affording any of your prescribed medications? No     Is the patient taking medications as prescribed? yes     Who is going to help you get home at discharge? Family member     How do you get to doctors appointments? family or friend will provide;other (see comments)   Patient reports she receives transportation from [facility]    Are you on dialysis? No     Do you take coumadin? No     Discharge Plan A Home     Discharge Plan B Home with family;Home Health     DME Needed Upon Discharge  none     Discharge Plan discussed with: Patient;Sibling     Name(s) and Number(s) Sister present at bedside, patient provided consent for sister to listen/participate in discussion     Discharge Barriers Identified None                 Heather Floyd LCSW  [facility]  Ext. [phone]

## 2022-12-27 NOTE — ASSESSMENT & PLAN NOTE
Unclear etiology, no PFTs done in the past. May be explained due to profound weakness and primary susanne, workup pending. CXR without acute changes. Currently satting 99% on room air. EKG with peaked T waves in V3. Elevated QTC. Repeat EKG without acute changes after electrolyte repletion.     - D dimer and BL LE US to rule out DVT/PE, negative.  - duonebs ordered  - fluid restriction

## 2022-12-27 NOTE — PT/OT/SLP PROGRESS
Occupational Therapy      Patient Name:  Dominique Mcgee   MRN:  [id number]    Patient not seen today secondary to nursing reported BP issues on this date and to HOLD on therapy today.     12/27/2022

## 2022-12-27 NOTE — ASSESSMENT & PLAN NOTE
Hypokalemic to 2.8, may explain her generalized weakness and fatigue along with her sodium. Possibly due to decreased intake, thiazide use, hyperaldo state. Will work-up as above    - Replete as necessary, f/u BMP Q12

## 2022-12-28 PROBLEM — R06.09 DYSPNEA ON EXERTION: Status: RESOLVED | Noted: 2022-01-01 | Resolved: 2022-01-01

## 2022-12-28 PROBLEM — R10.84 GENERALIZED ABDOMINAL PAIN: Status: ACTIVE | Noted: 2022-01-01

## 2022-12-28 PROBLEM — T68.XXXA HYPOTHERMIA: Status: ACTIVE | Noted: 2022-01-01

## 2022-12-28 NOTE — NURSING
Patient hypertensive during med pass 188/70 manually. Patient given PO hydralazine 25 mg. BP rechecked, 140/66. Patient became hypotensive during midnight vitals. MD notified. No new orders given. Will continue to monitor patient. Patient BP checked manually and found to be 74/40. Patient also very difficult to arouse. Patient placed in trendelenburg. Patient now alert and oriented. /63.

## 2022-12-28 NOTE — PROGRESS NOTES
[facility] - Telemetry Stepdown  Critical Care Medicine  Progress Note    Patient Name: Dominique Mcgee  MRN: [id number]  Admission Date: 12/23/2022  Hospital Length of Stay: 4 days  Code Status: Full Code  Attending Provider: Lexis Purdy*  Primary Care Provider: Briana Leblanc MD   Principal Problem: Hypertensive urgency    Subjective:     HPI:  Ms. Mcgee is a 71 y/o F patient with a PMHx of essential hypertension, hyperlipidemia, hypothyroidism, balance difficulites who presents to the ED for 3 weeks of weakness, dyspnea on exertion, decreased appetite, po intake and constipation with intermittent lower abdominal cramping. Patient denies any chest pain, N/V/D, dysuria, edema, abdominal diste On admission, hypertensive with Na 120, Received IV fluids and admitted to hospital medicine for further work-up. Initial concern for hyperaldo state given metabolic alkalosis, hypokalemia, uncontrolled hypertension. Also concern for thiazide induced electrolyte abnormlaities. Her thiazide and losartan were held and labs were collected for hyperaldo state. BP came down from 180s to 120s with 5mg hydralazine IV and 10mg labetalol IV. Urine studies indicate possible SIADH.      Today Pt with HTN to 220s systolic in AM. Given BP meds with resolution of HTN. Headache resolved with resolution of HTN as well. Pt had received IV labetalol and pressures dropped significantly, so IV BP meds discontinued. Slight increase in Na with fluid restriction.  MICU consulted for hyponatremia, hypertensive emergency and chest pain. EKG with non specific lateral precordial changes, pending troponin. At bedside patient reported abdominal discomfort, slight SOB and constipation.      Hospital/ICU Course:  No notes on file    Past Medical History:   Diagnosis Date    Hyperlipidemia     Hypertension     Hypothyroidism        Past Surgical History:   Procedure Laterality Date    HYSTERECTOMY         Review of patient's allergies  indicates:   Allergen Reactions    Hydralazine analogues      Precipitous drop in BP with IV formulation. Avoid if possible    Shellfish containing products Swelling       Family History       Problem Relation (Age of Onset)    Breast cancer Sister (55), Sister (65)          Tobacco Use    Smoking status: Never    Smokeless tobacco: Not on file   Substance and Sexual Activity    Alcohol use: Not on file    Drug use: Never    Sexual activity: Not on file     Review of Systems   Constitutional:  Negative for appetite change and fever.   HENT:  Negative for congestion and sinus pain.    Eyes:  Negative for discharge and redness.   Respiratory:  Positive for shortness of breath. Negative for cough.    Cardiovascular:  Negative for chest pain and leg swelling.   Gastrointestinal:  Positive for abdominal pain and constipation. Negative for diarrhea and nausea.   Endocrine: Negative for polydipsia and polyuria.   Genitourinary:  Negative for dysuria and hematuria.   Musculoskeletal:  Negative for arthralgias and myalgias.   Skin:  Negative for color change and rash.   Neurological:  Negative for weakness and numbness.   Psychiatric/Behavioral:  Negative for agitation and confusion.    Objective:     Vital Signs (Most Recent):  Temp: 97.9 °F (36.6 °C) (12/27/22 1454)  Pulse: 100 (12/27/22 1503)  Resp: 18 (12/27/22 1454)  BP: (!) 205/91 (12/27/22 1557)  SpO2: 100 % (12/27/22 1454)   Vital Signs (24h Range):  Temp:  [97.4 °F (36.3 °C)-98.3 °F (36.8 °C)] 97.9 °F (36.6 °C)  Pulse:  [] 100  Resp:  [16-20] 18  SpO2:  [97 %-100 %] 100 %  BP: (101-234)/() 205/91   Weight: 59.4 kg (131 lb)  Body mass index is 22.49 kg/m².      Intake/Output Summary (Last 24 hours) at 12/27/2022 1805  Last data filed at 12/27/2022 0630  Gross per 24 hour   Intake --   Output 300 ml   Net -300 ml       Physical Exam  Constitutional:       General: She is not in acute distress.     Appearance: Normal appearance.      Comments:  Grunting in abdominal pain   HENT:      Head: Normocephalic and atraumatic.      Mouth/Throat:      Mouth: Mucous membranes are moist.      Pharynx: Oropharynx is clear.   Eyes:      Conjunctiva/sclera: Conjunctivae normal.      Pupils: Pupils are equal, round, and reactive to light.   Cardiovascular:      Rate and Rhythm: Normal rate and regular rhythm.      Pulses: Normal pulses.      Heart sounds: Normal heart sounds.   Pulmonary:      Effort: Pulmonary effort is normal.      Breath sounds: Normal breath sounds.   Abdominal:      General: Abdomen is flat. There is no distension.      Palpations: Abdomen is soft. There is no mass.      Tenderness: There is abdominal tenderness (mild generalized). There is no guarding or rebound.      Comments: Hyperactive BS   Musculoskeletal:         General: No swelling or deformity.      Cervical back: Normal range of motion and neck supple. No tenderness.   Skin:     General: Skin is warm and dry.   Neurological:      General: No focal deficit present.      Mental Status: She is alert and oriented to person, place, and time.   Psychiatric:         Mood and Affect: Mood normal.         Behavior: Behavior normal.       Vents:     Lines/Drains/Airways       Drain  Duration             Female External Urinary Catheter 12/23/22 1438 4 days              Peripheral Intravenous Line  Duration                  Peripheral IV - Single Lumen 12/23/22 1837 20 G Right Antecubital 3 days                  Significant Labs:    CBC/Anemia Profile:  Recent Labs   Lab 12/26/22  0523 12/27/22  0542   WBC 7.37 11.14   HGB 11.6* 12.4   HCT 35.7* 35.3*    428   MCV 81* 76*   RDW 12.5 12.4        Chemistries:  Recent Labs   Lab 12/26/22  0523 12/26/22  1653 12/26/22  2052 12/27/22  0542   * 115* 116* 117*   K 4.1 3.8 3.7 3.8   CL 80* 78* 75* 78*   CO2 30* 30* 31* 29   BUN 10 12 11 10   CREATININE 0.5 0.5 0.5 0.4*   CALCIUM 8.9 9.0 9.0 9.0   ALBUMIN 3.2*  --   --  3.4*   PROT 6.1  --   --   6.6   BILITOT 1.0  --   --  0.9   ALKPHOS 64  --   --  70   ALT 12  --   --  14   AST 18  --   --  22   MG 1.7  --   --  1.8   PHOS 2.9  --   --  2.8       All pertinent labs within the past 24 hours have been reviewed.    Significant Imaging: I have reviewed all pertinent imaging results/findings within the past 24 hours.      ABG  No results for input(s): PH, PO2, PCO2, HCO3, BE in the last 168 hours.  Assessment/Plan:     Cardiac/Vascular  * Hypertensive urgency  71 y/o F hx of HTN, HLD who presented to the ED with complaint of weakness and fatigue. On admission, pt hypertensive with systolics up to 200s. Home regimen: HCTZ 25mg qd, losartan 50mg qd, metoprolol succinate 50mg qd. On admit Toprol transitioned to Coreg 6.25mg BID for better control and uptitrated to 12.5mg BID. HCTZ discontinued s/o hyponatremia and hypokalemia. On 12/26 patient received dose of IV labetalol 20mg followed by IV hydralazine 5mg with resulting drop in BP to 90s/50s. On 12/27 patient's BP continuing to uptrend again. MICU consulted for concern for hypertensive emergency.    On evaluation, patient awake and alert and in mild distress. Pt reporting diffuse crampy abdominal pain. She denies any current chest pain or discomfort, headaches, or vision changes. /83. EKG sinus rhythm with nonspecific T-wave changes in lateral leads.     Plan:  -- would recommend aggressive treatment of patient's constipation.  -- f/u KUB and can utilize enema for constipation  -- if patient persistently hypertensive despite improvement in abdominal pain and constipation, would recommend uptitrating coreg to 25mg BID, followed by amlodipine 10mg qd  -- please avoid use of IV hydralazine.   -- f/u troponin, if elevated trend to plateau    Patient stable for continued management on stepdown unit at this time.      Renal/  Hyponatremia  Sodium labs consistent with SIADH- high urine Osm and Sodium  Agree with fluid restriction, can likely restrict a little  more aggressively, with 800-1000cc/day    GI  Constipation  - f/u KUB  - if high stool burden, would give an enema (glycerine or brown bomb)  - Can continue aggressive bowel regimen. Can uptitrate senna to BID, miralax to 34g BID as needed until BM (if enema isn't working overnight)  - Important to treat given pain likely driving some HTN         Critical care was time spent personally by me on the following activities: development of treatment plan with patient or surrogate and bedside caregivers, discussions with consultants, evaluation of patient's response to treatment, examination of patient, ordering and performing treatments and interventions, ordering and review of laboratory studies, ordering and review of radiographic studies, pulse oximetry, re-evaluation of patient's condition. This critical care time did not overlap with that of any other provider or involve time for any procedures.     Darian Hawthorne MD  Critical Care Medicine  [facility] - Tele[facility] Stepdown

## 2022-12-28 NOTE — ASSESSMENT & PLAN NOTE
Patient started having weakness around 3 weeks ago after possible viral illness, also has had decreased appetite and more recently experiencing shortness of breath.  Sodium of 120 on admit, has been fluctuating since, now around 116-117. UOSM 802, HANH 109  Has become increasingly lethargic since admission, concern due to hyponatremia  Possibility of SIADH based on urine studies  -transfer to ICU  -nephrology consulted  -hypertonic saline at 40cc/hr w/ q2 sodium checks and adjustment as necessary  -goal of 123 by 12/29 AM

## 2022-12-28 NOTE — SUBJECTIVE & OBJECTIVE
Past Medical History:   Diagnosis Date    Hyperlipidemia     Hypertension     Hypothyroidism        Past Surgical History:   Procedure Laterality Date    HYSTERECTOMY         Review of patient's allergies indicates:   Allergen Reactions    Hydralazine analogues      Precipitous drop in BP with IV formulation. Avoid if possible    Shellfish containing products Swelling       Family History       Problem Relation (Age of Onset)    Breast cancer Sister (55), Sister (65)          Tobacco Use    Smoking status: Never    Smokeless tobacco: Not on file   Substance and Sexual Activity    Alcohol use: Not on file    Drug use: Never    Sexual activity: Not on file     Review of Systems   Constitutional:  Negative for appetite change and fever.   HENT:  Negative for congestion and sinus pain.    Eyes:  Negative for discharge and redness.   Respiratory:  Positive for shortness of breath. Negative for cough.    Cardiovascular:  Negative for chest pain and leg swelling.   Gastrointestinal:  Positive for abdominal pain and constipation. Negative for diarrhea and nausea.   Endocrine: Negative for polydipsia and polyuria.   Genitourinary:  Negative for dysuria and hematuria.   Musculoskeletal:  Negative for arthralgias and myalgias.   Skin:  Negative for color change and rash.   Neurological:  Negative for weakness and numbness.   Psychiatric/Behavioral:  Negative for agitation and confusion.    Objective:     Vital Signs (Most Recent):  Temp: 97.9 °F (36.6 °C) (12/27/22 1454)  Pulse: 100 (12/27/22 1503)  Resp: 18 (12/27/22 1454)  BP: (!) 205/91 (12/27/22 1557)  SpO2: 100 % (12/27/22 1454)   Vital Signs (24h Range):  Temp:  [97.4 °F (36.3 °C)-98.3 °F (36.8 °C)] 97.9 °F (36.6 °C)  Pulse:  [] 100  Resp:  [16-20] 18  SpO2:  [97 %-100 %] 100 %  BP: (101-234)/() 205/91   Weight: 59.4 kg (131 lb)  Body mass index is 22.49 kg/m².      Intake/Output Summary (Last 24 hours) at 12/27/2022 6267  Last data filed at 12/27/2022  0630  Gross per 24 hour   Intake --   Output 300 ml   Net -300 ml       Physical Exam  Constitutional:       General: She is not in acute distress.     Appearance: Normal appearance.      Comments: Grunting in abdominal pain   HENT:      Head: Normocephalic and atraumatic.      Mouth/Throat:      Mouth: Mucous membranes are moist.      Pharynx: Oropharynx is clear.   Eyes:      Conjunctiva/sclera: Conjunctivae normal.      Pupils: Pupils are equal, round, and reactive to light.   Cardiovascular:      Rate and Rhythm: Normal rate and regular rhythm.      Pulses: Normal pulses.      Heart sounds: Normal heart sounds.   Pulmonary:      Effort: Pulmonary effort is normal.      Breath sounds: Normal breath sounds.   Abdominal:      General: Abdomen is flat. There is no distension.      Palpations: Abdomen is soft. There is no mass.      Tenderness: There is abdominal tenderness (mild generalized). There is no guarding or rebound.      Comments: Hyperactive BS   Musculoskeletal:         General: No swelling or deformity.      Cervical back: Normal range of motion and neck supple. No tenderness.   Skin:     General: Skin is warm and dry.   Neurological:      General: No focal deficit present.      Mental Status: She is alert and oriented to person, place, and time.   Psychiatric:         Mood and Affect: Mood normal.         Behavior: Behavior normal.       Vents:     Lines/Drains/Airways       Drain  Duration             Female External Urinary Catheter 12/23/22 1438 4 days              Peripheral Intravenous Line  Duration                  Peripheral IV - Single Lumen 12/23/22 1837 20 G Right Antecubital 3 days                  Significant Labs:    CBC/Anemia Profile:  Recent Labs   Lab 12/26/22  0523 12/27/22  0542   WBC 7.37 11.14   HGB 11.6* 12.4   HCT 35.7* 35.3*    428   MCV 81* 76*   RDW 12.5 12.4        Chemistries:  Recent Labs   Lab 12/26/22  0523 12/26/22  1653 12/26/22  2052 12/27/22  0542   *  115* 116* 117*   K 4.1 3.8 3.7 3.8   CL 80* 78* 75* 78*   CO2 30* 30* 31* 29   BUN 10 12 11 10   CREATININE 0.5 0.5 0.5 0.4*   CALCIUM 8.9 9.0 9.0 9.0   ALBUMIN 3.2*  --   --  3.4*   PROT 6.1  --   --  6.6   BILITOT 1.0  --   --  0.9   ALKPHOS 64  --   --  70   ALT 12  --   --  14   AST 18  --   --  22   MG 1.7  --   --  1.8   PHOS 2.9  --   --  2.8       All pertinent labs within the past 24 hours have been reviewed.    Significant Imaging: I have reviewed all pertinent imaging results/findings within the past 24 hours.

## 2022-12-28 NOTE — ASSESSMENT & PLAN NOTE
Ms Mcgee is a 73 y/o female with previous history of essential hypertension, hypothyroidism, HLD who presents to the ED for 3 weeks of generalized weakness and progressing dyspnea on exertion. Found to have BP as high as 244 systolic in the ED with hyponatremia, metabolic alkalosis, and hypokalemia. Differentials given her presentation include primary/secondary hyperaldosteronism, renal artery stenosis, decreased intake/diuretic induced electrolyte abnormalities with uncontrolled essential hypertension. On chart review it seems her blood pressure has consistently been above 190 for the past 6 months with no known work-up in the [facility] system. She [place] [facility] [place]CP who started her on Losartan-Hydrochlorothiazide, Metoprolol, Levothyroxin, and a statin. She was in relatively good health prior to her deterioation three weeks ago. She did [place] [facility] [place] for balance issues and shoulder pain in the past couple of months, last fall was in April this year, denies any syncope or other symptoms (mechanical falls).     - Holding thiazide (sodium loss). Will continue with abdominal imaging if concerns for adrenal hyperplasia, or renal artery stenosis given initial laboratory findings.  - Renin/susanne pending  - replete electrolytes  - Metoprolol 50 xl mg bid switched to Coreg 6.25 for better BP control; Coreg increased to 25 mg BID  - starting amlodipine 5mg   - continue home losartan 50mg; increased to 100mg QD  - DC IV Labetalol 10mg q6h prn IV hydralazine 5mg q6h prn due to hypotension with IV BP meds and PO hydralazine

## 2022-12-28 NOTE — ASSESSMENT & PLAN NOTE
Suspect this is Euvolemic hyponatremia but could have multiple components including medication induced (HCTZ as OP), low solute intake or underlying endocrinopathy.    Has had poor PO intake since admit.    UOSM on 12/25:  802  HANH on 12/25:  109    12/25 urine studies lean more toward an SIADH picture, but she has also had a hypochloremic metabolic alkalosis, uncontrolled hypertension although potassium levels have been WNL.    Urine metanephrines in process    Plan/Recommendations:  -recommend transfer to ICU for hypertonic saline (reported with increased lethargy, weakness)  -recommend starting at 30 cc/hr with sodium checks q 4 hours    -goal rise in Na 6-8 mmol /24h  -recheck urine Na and Urine OSM  -recommend endocrine consult for possible endocrinopathy leading to hyponatremia  -recommend MRI head to assess pituitary  -recommend abdominal CT to assess adrenal glands  -will get renal vascular US to r/o PAVEL  -strict I/O's

## 2022-12-28 NOTE — CONSULTS
[facility] - Telemetry Stepdown  Critical Care Medicine  Consult Note    Patient Name: Dominique Mcgee  MRN: [id number]  Admission Date: 12/23/2022  Hospital Length of Stay: 4 days  Code Status: Full Code  Attending Physician: Lexis Purdy*   Primary Care Provider: Briana Leblanc MD   Principal Problem: Hypertensive urgency    Consults  Subjective:     HPI:  Ms. Mcgee is a 71 y/o F patient with a PMHx of essential hypertension, hyperlipidemia, hypothyroidism, balance difficulites who presents to the ED for 3 weeks of weakness, dyspnea on exertion, decreased appetite, po intake and constipation with intermittent lower abdominal cramping. Patient denies any chest pain, N/V/D, dysuria, edema, abdominal diste On admission, hypertensive with Na 120, Received IV fluids and admitted to hospital medicine for further work-up. Initial concern for hyperaldo state given metabolic alkalosis, hypokalemia, uncontrolled hypertension. Also concern for thiazide induced electrolyte abnormlaities. Her thiazide and losartan were held and labs were collected for hyperaldo state. BP came down from 180s to 120s with 5mg hydralazine IV and 10mg labetalol IV. Urine studies indicate possible SIADH.      Today Pt with HTN to 220s systolic in AM. Given BP meds with resolution of HTN. Headache resolved with resolution of HTN as well. Pt had received IV labetalol and pressures dropped significantly, so IV BP meds discontinued. Slight increase in Na with fluid restriction.  MICU consulted for hyponatremia, hypertensive emergency and chest pain. EKG with non specific lateral precordial changes, pending troponin. At bedside patient reported abdominal discomfort, slight SOB and constipation.      Hospital/ICU Course:  No notes on file    Past Medical History:   Diagnosis Date    Hyperlipidemia     Hypertension     Hypothyroidism        Past Surgical History:   Procedure Laterality Date    HYSTERECTOMY         Review of patient's  allergies indicates:   Allergen Reactions    Hydralazine analogues      Precipitous drop in BP with IV formulation. Avoid if possible    Shellfish containing products Swelling       Family History       Problem Relation (Age of Onset)    Breast cancer Sister (55), Sister (65)          Tobacco Use    Smoking status: Never    Smokeless tobacco: Not on file   Substance and Sexual Activity    Alcohol use: Not on file    Drug use: Never    Sexual activity: Not on file     Review of Systems   Constitutional:  Negative for appetite change and fever.   HENT:  Negative for congestion and sinus pain.    Eyes:  Negative for discharge and redness.   Respiratory:  Positive for shortness of breath. Negative for cough.    Cardiovascular:  Negative for chest pain and leg swelling.   Gastrointestinal:  Positive for abdominal pain and constipation. Negative for diarrhea and nausea.   Endocrine: Negative for polydipsia and polyuria.   Genitourinary:  Negative for dysuria and hematuria.   Musculoskeletal:  Negative for arthralgias and myalgias.   Skin:  Negative for color change and rash.   Neurological:  Negative for weakness and numbness.   Psychiatric/Behavioral:  Negative for agitation and confusion.    Objective:     Vital Signs (Most Recent):  Temp: 97.9 °F (36.6 °C) (12/27/22 1454)  Pulse: 100 (12/27/22 1503)  Resp: 18 (12/27/22 1454)  BP: (!) 205/91 (12/27/22 1557)  SpO2: 100 % (12/27/22 1454)   Vital Signs (24h Range):  Temp:  [97.4 °F (36.3 °C)-98.3 °F (36.8 °C)] 97.9 °F (36.6 °C)  Pulse:  [] 100  Resp:  [16-20] 18  SpO2:  [97 %-100 %] 100 %  BP: (101-234)/() 205/91   Weight: 59.4 kg (131 lb)  Body mass index is 22.49 kg/m².      Intake/Output Summary (Last 24 hours) at 12/27/2022 1805  Last data filed at 12/27/2022 0630  Gross per 24 hour   Intake --   Output 300 ml   Net -300 ml       Physical Exam  Constitutional:       General: She is not in acute distress.     Appearance: Normal appearance.       Comments: Grunting in abdominal pain   HENT:      Head: Normocephalic and atraumatic.      Mouth/Throat:      Mouth: Mucous membranes are moist.      Pharynx: Oropharynx is clear.   Eyes:      Conjunctiva/sclera: Conjunctivae normal.      Pupils: Pupils are equal, round, and reactive to light.   Cardiovascular:      Rate and Rhythm: Normal rate and regular rhythm.      Pulses: Normal pulses.      Heart sounds: Normal heart sounds.   Pulmonary:      Effort: Pulmonary effort is normal.      Breath sounds: Normal breath sounds.   Abdominal:      General: Abdomen is flat. There is no distension.      Palpations: Abdomen is soft. There is no mass.      Tenderness: There is abdominal tenderness (mild generalized). There is no guarding or rebound.      Comments: Hyperactive BS   Musculoskeletal:         General: No swelling or deformity.      Cervical back: Normal range of motion and neck supple. No tenderness.   Skin:     General: Skin is warm and dry.   Neurological:      General: No focal deficit present.      Mental Status: She is alert and oriented to person, place, and time.   Psychiatric:         Mood and Affect: Mood normal.         Behavior: Behavior normal.       Vents:     Lines/Drains/Airways       Drain  Duration             Female External Urinary Catheter 12/23/22 1438 4 days              Peripheral Intravenous Line  Duration                  Peripheral IV - Single Lumen 12/23/22 1837 20 G Right Antecubital 3 days                  Significant Labs:    CBC/Anemia Profile:  Recent Labs   Lab 12/26/22  0523 12/27/22  0542   WBC 7.37 11.14   HGB 11.6* 12.4   HCT 35.7* 35.3*    428   MCV 81* 76*   RDW 12.5 12.4        Chemistries:  Recent Labs   Lab 12/26/22  0523 12/26/22  1653 12/26/22  2052 12/27/22  0542   * 115* 116* 117*   K 4.1 3.8 3.7 3.8   CL 80* 78* 75* 78*   CO2 30* 30* 31* 29   BUN 10 12 11 10   CREATININE 0.5 0.5 0.5 0.4*   CALCIUM 8.9 9.0 9.0 9.0   ALBUMIN 3.2*  --   --  3.4*   PROT 6.1   --   --  6.6   BILITOT 1.0  --   --  0.9   ALKPHOS 64  --   --  70   ALT 12  --   --  14   AST 18  --   --  22   MG 1.7  --   --  1.8   PHOS 2.9  --   --  2.8       All pertinent labs within the past 24 hours have been reviewed.    Significant Imaging: I have reviewed all pertinent imaging results/findings within the past 24 hours.      ABG  No results for input(s): PH, PO2, PCO2, HCO3, BE in the last 168 hours.  Assessment/Plan:     Cardiac/Vascular  * Hypertensive urgency  73 y/o F hx of HTN, HLD who presented to the ED with complaint of weakness and fatigue. On admission, pt hypertensive with systolics up to 200s. Home regimen: HCTZ 25mg qd, losartan 50mg qd, metoprolol succinate 50mg qd. On admit Toprol transitioned to Coreg 6.25mg BID for better control and uptitrated to 12.5mg BID. HCTZ discontinued s/o hyponatremia and hypokalemia. On 12/26 patient received dose of IV labetalol 20mg followed by IV hydralazine 5mg with resulting drop in BP to 90s/50s. On 12/27 patient's BP continuing to uptrend again. MICU consulted for concern for hypertensive emergency.    On evaluation, patient awake and alert and in mild distress. Pt reporting diffuse crampy abdominal pain. She denies any current chest pain or discomfort, headaches, or vision changes. /83. EKG sinus rhythm with nonspecific T-wave changes in lateral leads.     Plan:  -- would recommend aggressive treatment of patient's constipation.  -- f/u KUB and can utilize enema for constipation  -- if patient persistently hypertensive despite improvement in abdominal pain and constipation, would recommend uptitrating coreg to 25mg BID, followed by amlodipine 10mg qd  -- please avoid use of IV hydralazine.   -- f/u troponin, if elevated trend to plateau    Patient stable for continued management on stepdown unit at this time.      Renal/  Hyponatremia  Sodium labs consistent with SIADH- high urine Osm and Sodium  Agree with fluid restriction, can likely  restrict a little more aggressively, with 800-1000cc/day    GI  Constipation  - f/u KUB  - if high stool burden, would give an enema (glycerine or brown bomb)  - Can continue aggressive bowel regimen. Can uptitrate senna to BID, miralax to 34g BID as needed until BM (if enema isn't working overnight)  - Important to treat given pain likely driving some HTN         Critical care was time spent personally by me on the following activities: development of treatment plan with patient or surrogate and bedside caregivers, discussions with consultants, evaluation of patient's response to treatment, examination of patient, ordering and performing treatments and interventions, ordering and review of laboratory studies, ordering and review of radiographic studies, pulse oximetry, re-evaluation of patient's condition. This critical care time did not overlap with that of any other provider or involve time for any procedures.    Thank you for your consult.     Darian Hawthorne MD  Critical Care Medicine  [facility] - Telemetry Stepdown

## 2022-12-28 NOTE — PROGRESS NOTES
[facility] - Cardiac Medical ICU  [facility] Medicine  Progress Note    Patient Name: Dominique Mcgee  MRN: [id number]  Patient Class: IP- Inpatient   Admission Date: 12/23/2022  Length of Stay: 5 days  Attending Physician: Nathan Loaiza MD  Primary Care Provider: Briana Leblanc MD        Subjective:     Principal Problem:Hyponatremia        HPI:  Ms. Mcgee is a 71 y/o F patient with a PMHx of essential hypertension, hyperlipidemia, hypothyroidism, balance difficulites who presents to the ED for 3 weeks of weakness, dyspnea on exertion, decreased appetite. Prior to 3 weeks ago patient was completely functional with no deficits, completely independent with ADLs/iADLs. She became weaker over the past three weeks and more short of breath to the point where she gets winded while walking to the bathroom and back to her bed. Her  also states that she has been eating less and less, however has been drinking plenty of water. Patient denies any chest pain, N/V/D, dysuria, edema, abdominal distention, but does also endorse occasionally lower quadrant abdominal pain and constipation. She was started on Losartan by her PCP 4 days ago due to concerns that lisinopril was causing her to have dry mouth and cough. Currently taking a statin, losartan,  metoprolol, hydrochlorothiazide, and levothyroxine.     History obtained from patient and her .     ED Course:  BP on arrival up to 244/109, HR 84, 97% on room air. She was given 1L NS by ED provider. Labs significant for Na 120, K 2.9, Cl 76, CO2 37, T.Bili 1.1. Admitted to hospital medicine for further work-up.      Overview/Hospital Course:  Ms. Mcgee is a 72 year old female with a history of HTN, HLD, Hypothyroidism, balance disorders who presented to the ED for 3 weeks of leg weakness, dyspnea on exertion, decreased appetite. Recently switched from Lisinopril to Losartan and started on HCTZ. Has history of very high blood pressures with systolic's in the 180s+. On  arrival, hypertensive with Na 120, K2.9, CO2 37. Received IV fluids and admitted to hospital medicine for further work-up. Initial concern for hyperaldo state given metabolic alkalosis, hypokalemia, uncontrolled hypertension. Also concern for thiazide induced electrolyte abnormlaities. Her thiazide and losartan were held and labs were collected for hyperaldo state. BP came down from 180s to 120s with 5mg hydralazine IV and 10mg labetalol IV. She was started on amlodipine 5mg and her home metoprolol was switched to Coreg 6.25 for better blood pressure control. Losartan was restarted. Her sodium continues to remain in the low 120s, urine/osm studies pending. Potassium repleted orally. Urine studies indicate possible SIADH, will attempt fluid restriction and assess Na response. Coreg increased to 12.5mg BID for tighter BP control. BP highly sensitive to IV medications with large fluctuations from HTN to hypotension; however, patient remains largely asymptomatic until morning of 12/28 when she appeared more somnolent and confused. Nephrology and ICU consulted and pt to be admitted to ICU for hypertonic saline.      Interval History: Pt received PO hydralazine overnight and dropped from 180s systolic to 80s systolic. In AM, pt appeared to be more somnolent and confused on exam. Nephrology and ICU consulted for symptomatic hyponatremia, and pt will be admitted to MICU for hypertonic saline.    Review of Systems   Constitutional:  Positive for fatigue. Negative for chills and fever.   HENT:  Negative for congestion and sore throat.    Respiratory:  Positive for shortness of breath. Negative for cough and chest tightness.    Cardiovascular:  Negative for chest pain and leg swelling.   Gastrointestinal:  Negative for abdominal pain, constipation, diarrhea, nausea and vomiting.   Endocrine: Negative for polydipsia and polyuria.   Genitourinary:  Negative for difficulty urinating and dysuria.   Musculoskeletal:  Negative for  arthralgias and back pain.   Skin:  Negative for color change.   Neurological:  Positive for dizziness, weakness, light-headedness and headaches.        Chronic balance problems   Psychiatric/Behavioral:  Negative for agitation, behavioral problems and confusion.    Objective:     Vital Signs (Most Recent):  Temp: 97.5 °F (36.4 °C) (12/28/22 1125)  Pulse: 82 (12/28/22 1125)  Resp: 18 (12/28/22 1125)  BP: 119/60 (12/28/22 1125)  SpO2: (!) 94 % (12/28/22 1316)   Vital Signs (24h Range):  Temp:  [96.3 °F (35.7 °C)-98 °F (36.7 °C)] 97.5 °F (36.4 °C)  Pulse:  [] 82  Resp:  [18-32] 18  SpO2:  [94 %-100 %] 94 %  BP: ()/(51-98) 119/60     Weight: 59.4 kg (131 lb)  Body mass index is 22.49 kg/m².    Intake/Output Summary (Last 24 hours) at 12/28/2022 1407  Last data filed at 12/28/2022 0645  Gross per 24 hour   Intake --   Output 500 ml   Net -500 ml      Physical Exam  Vitals and nursing note reviewed.   Constitutional:       Appearance: Normal appearance.   HENT:      Head: Normocephalic and atraumatic.      Nose: Nose normal.      Mouth/Throat:      Mouth: Mucous membranes are dry.      Pharynx: Oropharynx is clear.   Eyes:      Extraocular Movements: Extraocular movements intact.      Pupils: Pupils are equal, round, and reactive to light.   Cardiovascular:      Rate and Rhythm: Normal rate and regular rhythm.      Pulses: Normal pulses.      Heart sounds: Normal heart sounds.   Pulmonary:      Effort: Pulmonary effort is normal. No respiratory distress.      Breath sounds: Normal breath sounds.   Abdominal:      General: Abdomen is flat. There is no distension.      Tenderness: There is no abdominal tenderness.   Musculoskeletal:         General: Normal range of motion.      Cervical back: Normal range of motion.   Skin:     General: Skin is warm and dry.   Neurological:      General: No focal deficit present.      Mental Status: She is lethargic and confused.      Motor: Weakness present.   Psychiatric:          Mood and Affect: Mood normal.         Behavior: Behavior normal.       Significant Labs: All pertinent labs within the past 24 hours have been reviewed.  CBC:   Recent Labs   Lab 12/27/22  0542 12/28/22  1042   WBC 11.14 11.58   HGB 12.4 11.0*   HCT 35.3* 32.7*    384     CMP:   Recent Labs   Lab 12/27/22  0542 12/27/22  1722 12/27/22  1951 12/28/22  0837   * 115* 116* 117*   K 3.8 3.8 3.8 3.8   CL 78* 77* 76* 76*   CO2 29 29 29 31*   * 133* 150* 119*   BUN 10 12 12 14   CREATININE 0.4* 0.5 0.5 0.5   CALCIUM 9.0 9.1 9.3 9.0   PROT 6.6  --   --   --    ALBUMIN 3.4*  --   --   --    BILITOT 0.9  --   --   --    ALKPHOS 70  --   --   --    AST 22  --   --   --    ALT 14  --   --   --    ANIONGAP 10 9 11 10       Significant Imaging: I have reviewed all pertinent imaging results/findings within the past 24 hours.      Assessment/Plan:      * Hyponatremia  Sodium of 120 on admission, received 1L NS. Possible in the setting of thiazide use, decreased intake  - Repeat BMP after fluids with similar Na  - Urine studies pending/Serum osm pending  - Slowly improving while holding thiazide  - Daily CMP  - Na of 119 on 12/26 with increased U osm and Urine Na, concerning for SIADH. Fluid restriction ordered.  - Slight increase in Na with fluid restriction. Will continue fluid restriction and obtain CT head  - F/u BMP q12  - Pt with increased somnolence and fatigue on 12/28, to be admitted to ICU for hypertonic saline    Constipation  -Bowel regimen and enema ordered      Metabolic alkalosis  Hypochloremic metabolic alkalosis. Likely due to volume contraction in addition to hypokalemia. Repeat BMP with improvement after 1L NS in the ED as well as potassium repletion. Initial CO2 of 37.  - Asymptomatic with normal respiratory drive and saturation  - stable, will continue to reassess following fluid restriction    Back pain  Progressing back pain over the past couple of months, now with LE weakness. No  incontinence or saddle paresthesia.   - XRAY lumbar spine with no acute fracture or displacement  - Capsaicin as needed  - Robaxin prn   - F/u out patient with spine clinic    Hypokalemia  Hypokalemic to 2.8, may explain her generalized weakness and fatigue along with her sodium. Possibly due to decreased intake, thiazide use, hyperaldo state. Will work-up as above    - Replete as necessary, f/u BMP Q12      Generalized weakness  Likely due to profound electrolyte abnormalities, will replete. Work-up as above.   - PT/OT ordered    Hypertensive urgency  Ms Mcgee is a 71 y/o female with previous history of essential hypertension, hypothyroidism, HLD who presents to the ED for 3 weeks of generalized weakness and progressing dyspnea on exertion. Found to have BP as high as 244 systolic in the ED with hyponatremia, metabolic alkalosis, and hypokalemia. Differentials given her presentation include primary/secondary hyperaldosteronism, renal artery stenosis, decreased intake/diuretic induced electrolyte abnormalities with uncontrolled essential hypertension. On chart review it seems her blood pressure has consistently been above 190 for the past 6 months with [facility] She does seem to follow [place] [facility] [place] outside PCP who started her on Losartan-Hydrochlorothiazide, Metoprolol, Levothyroxin, and a statin. She was in relatively good health prior to her deterioation three weeks ago. She did follow with [facility] [place]/OT for balance issues and shoulder pain in the past couple of months, last fall was in April this year, denies any syncope or other symptoms (mechanical falls).     - Holding thiazide (sodium loss). Will continue with abdominal imaging if concerns for adrenal hyperplasia, or renal artery stenosis given initial laboratory findings.  - Renin/susanne pending  - replete electrolytes  - Metoprolol 50 xl mg bid switched to Coreg 6.25 for better BP control; Coreg increased to 25 mg  BID  - starting amlodipine 5mg   - continue home losartan 50mg; increased to 100mg QD  - DC IV Labetalol 10mg q6h prn IV hydralazine 5mg q6h prn due to hypotension with IV BP meds and PO hydralazine      Hyperlipidemia  Lipid panel pending  - Continue home simvastatin equivalent (atorvastatin 10)\  - Repeat lipid panel WNL (chol 151, trig 55, hdl 41)    Essential hypertension  On home losartan, metoprolol, HCTZ. Seems to be uncontrolled as of 6 months ago. Recently lisinopril switched to losartan given cough, resolved.     - Metoprolol transitioned to Coreg. Coreg increased to25mg BID  - Losartan increased to 100mg QD  - Pt very sensitive to BP meds, so PRN IV/PO hydralazine and IV labetalol discontinued  - Treatment as above    Dyspnea on exertion  Unclear etiology, no PFTs done in the past. May be explained due to profound weakness and primary susanne, workup pending. CXR without acute changes. Currently satting 99% on room air. EKG with peaked T waves in V3. Elevated QTC. Repeat EKG without acute changes after electrolyte repletion.     - D dimer and BL LE US to rule out DVT/PE, negative.  - duonebs ordered  - fluid restriction      VTE Risk Mitigation (From admission, onward)         Ordered     enoxaparin injection 40 mg  Daily         12/23/22 1649     IP VTE HIGH RISK PATIENT  Once         12/23/22 1649     Place sequential compression device  Until discontinued         12/23/22 1649                Discharge Planning   TRICIA: 12/30/2022     Code Status: Full Code   Is the patient medically ready for discharge?: No    Reason for patient still in hospital (select all that apply): Patient unstable  Discharge Plan A: Home                  Samuel Meier MD  Department of Hospital Medicine   [facility] - Cardiac Medical ICU

## 2022-12-28 NOTE — NURSING
Bedside handoff report complete. Pt is awake and alert. BP was elevated 193/91. MD notified. WCTM.

## 2022-12-28 NOTE — SUBJECTIVE & OBJECTIVE
Past Medical History:   Diagnosis Date    Hyperlipidemia     Hypertension     Hypothyroidism        Past Surgical History:   Procedure Laterality Date    HYSTERECTOMY         Review of patient's allergies indicates:   Allergen Reactions    Hydralazine analogues      Precipitous drop in BP with IV formulation. Avoid if possible    Shellfish containing products Swelling     Current Facility-Administered Medications   Medication Frequency    acetaminophen tablet 650 mg Q6H PRN    albuterol-ipratropium 2.5 mg-0.5 mg/3 mL nebulizer solution 3 mL Q6H WAKE    [START ON 12/29/2022] amLODIPine tablet 10 mg Daily    atorvastatin tablet 10 mg Daily    capsaicin 0.025 % cream BID    carvediloL tablet 25 mg BID    dextrose 10% bolus 125 mL 125 mL PRN    dextrose 10% bolus 250 mL 250 mL PRN    enoxaparin injection 40 mg Daily    glucagon (human recombinant) injection 1 mg PRN    glucose chewable tablet 16 g PRN    glucose chewable tablet 24 g PRN    levothyroxine tablet 50 mcg Before breakfast    losartan tablet 100 mg Daily    methocarbamoL tablet 500 mg TID PRN    naloxone 0.4 mg/mL injection 0.02 mg PRN    polyethylene glycol packet 17 g BID    senna-docusate 8.6-50 mg per tablet 1 tablet Daily    sodium chloride 0.9% flush 10 mL Q12H PRN     Family History       Problem Relation (Age of Onset)    Breast cancer Sister (55), Sister (65)          Tobacco Use    Smoking status: Never    Smokeless tobacco: Not on file   Substance and Sexual Activity    Alcohol use: Not on file    Drug use: Never    Sexual activity: Not on file     Review of Systems   Constitutional:  Positive for activity change, appetite change and fatigue. Negative for fever.   HENT:  Negative for congestion, facial swelling, postnasal drip, rhinorrhea, sinus pressure and sinus pain.    Respiratory:  Positive for shortness of breath. Negative for cough and chest tightness.    Cardiovascular:  Negative for chest pain, palpitations and leg swelling.    Gastrointestinal:  Positive for abdominal distention, abdominal pain and constipation.   Genitourinary:  Negative for difficulty urinating.   Musculoskeletal:  Positive for arthralgias and back pain. Negative for myalgias.   Skin:  Negative for color change, rash and wound.   Neurological:  Negative for dizziness, light-headedness and headaches.   Psychiatric/Behavioral:  Negative for agitation, behavioral problems and confusion.    Objective:     Vital Signs (Most Recent):  Temp: 97.8 °F (36.6 °C) (12/28/22 0729)  Pulse: 93 (12/28/22 0912)  Resp: 18 (12/28/22 0912)  BP: (!) 179/79 (12/28/22 0912)  SpO2: 99 % (12/28/22 0912)   Vital Signs (24h Range):  Temp:  [96.3 °F (35.7 °C)-98 °F (36.7 °C)] 97.8 °F (36.6 °C)  Pulse:  [] 93  Resp:  [17-32] 18  SpO2:  [96 %-100 %] 99 %  BP: ()/(51-98) 179/79     Weight: 59.4 kg (131 lb) (12/27/22 0939)  Body mass index is 22.49 kg/m².  Body surface area is 1.64 meters squared.    I/O last 3 completed shifts:  In: -   Out: 800 [Urine:800]    Physical Exam  Vitals and nursing note reviewed.   Constitutional:       General: She is not in acute distress.     Appearance: She is well-developed. She is ill-appearing. She is not toxic-appearing.      Interventions: Nasal cannula in place.   HENT:      Head: Normocephalic and atraumatic.      Nose: No congestion or rhinorrhea.      Mouth/Throat:      Mouth: Mucous membranes are moist.      Pharynx: Oropharynx is clear.   Eyes:      General: No scleral icterus.        Right eye: No discharge.         Left eye: No discharge.      Extraocular Movements: Extraocular movements intact.      Conjunctiva/sclera: Conjunctivae normal.   Cardiovascular:      Rate and Rhythm: Normal rate and regular rhythm.      Heart sounds: No murmur heard.    No friction rub. No gallop.   Pulmonary:      Effort: Tachypnea present. No respiratory distress.      Breath sounds: No wheezing or rales.   Abdominal:      General: There is distension.       Palpations: Abdomen is soft.      Tenderness: There is abdominal tenderness.   Musculoskeletal:         General: No swelling.      Cervical back: Normal range of motion and neck supple.      Right lower leg: No edema.      Left lower leg: No edema.   Skin:     General: Skin is warm and dry.   Neurological:      General: No focal deficit present.      Mental Status: She is alert and oriented to person, place, and time.      Motor: Weakness present.       Significant Labs:  CBC:   Recent Labs   Lab 12/27/22  0542   WBC 11.14   RBC 4.67   HGB 12.4   HCT 35.3*      MCV 76*   MCH 26.6*   MCHC 35.1     CMP:   Recent Labs   Lab 12/27/22  0542 12/27/22  1722 12/28/22  0837   *   < > 119*   CALCIUM 9.0   < > 9.0   ALBUMIN 3.4*  --   --    PROT 6.6  --   --    *   < > 117*   K 3.8   < > 3.8   CO2 29   < > 31*   CL 78*   < > 76*   BUN 10   < > 14   CREATININE 0.4*   < > 0.5   ALKPHOS 70  --   --    ALT 14  --   --    AST 22  --   --    BILITOT 0.9  --   --     < > = values in this interval not displayed.

## 2022-12-28 NOTE — HPI
Ms. Mcgee is a 73 y/o F patient with HTN, HLD, hypothyroidism, recent URI 3 weeks prior to admission, balance difficulites who presented to the ED on 12/23 for 3 weeks of weakness, LIRA, decreased appetite, and constipation with intermittent lower abdominal cramping. Per daughter, patient is typically independent, lives alone, cooks her own meals, since she has been ill she has had decreased PO intake and drinking coffee, tea, and eating cereal. She has had progressive worsening HTN in the last 3 weeks and was started on losartan-HCTZ and metoprolol. Prior to this she was only taking lisinopril per daughter. Admitted for HTN emergency and was found to have Na 120. Received IV fluids and admitted to hospital medicine. For her hypernatremia, her thiazide was held and she was placed on fluid restriction. Urine studies from 12/25 showed Uosm of 802 and HANH of 109. Her BP has been labile since admission requiring both IV antihypertensives as well as fluid boluses. The patient's hyponatremia persisted despite fluid restriction and she became increasingly lethargic and weak so CCM was consulted for further management.      Upon evaluation, patient is somnolent. Opens eyes to voice, unable to engage in full conversation. Speaks mainly single words. Denies thirst. Reports SOB and generalized abdominal pain a/w constipation. No CP, N/V/D, dysuria, edema. Most recent vitals: /60, pulse 82, R 18, SpO2 94% on RA. Na trend 119->115->117.

## 2022-12-28 NOTE — HPI
72 year old female with hypertension, hyperlipidemia, hypothyroidism, and balance difficulites who presents with hypertensive emergency and hyponatremia of 120. Urine studies indicated SIADH and volume restriction was instituted but hyponatremia persisted and she became increasingly lethargic and weak with hypotension requiring ICU transfer. She required multiple pressors and was briefly intubated for airway protection. Sodium improved with hypertonic saline. Once extubated and off pressors patient began to re-experience labile blood pressures similar to her initial presentation. Started on cardene drip. Difficulty weaning off BIPAP due to inadequate volumes and persistent shortness of breath.  - She is reportedly independent, lives alone, cooks her own meals, since she has been ill she has had decreased PO intake and drinking coffee, tea, and eating cereal. She has had progressive worsening hypertension in the last 3 weeks and was started on losartan-HCTZ and metoprolol. Prior to this she was only taking lisinopril per daughter     - CT abdomen/pelvis 01/02/2023 showing proctitis and hip abscess. The spleen, pancreas and adrenal glands are unremarkable. Went for IR aspiration of abscess. Culture showing e.coli, on zosyn  - MRI brain non-concerning. MRI spine with severe cervical stenosis and cord compression, also with mild lumbar stenosis.    - Endocrinology consulted for elevated metanephrines on urine collection and evaluation for potential pheochromocytoma versus paraganglioma    Regarding Hypertension:    Lab Results   Component Value Date    LABCORT 37.80 (H) 12/28/2022     Lab Results   Component Value Date    ACTH 7 01/02/2023     Lab Results   Component Value Date    ALDOSTERONE 10.8 12/28/2022    ALDOSTERONE 7.0 12/23/2022    ALDOSTERONE 7.4 12/23/2022    LABRENI 1.2 12/23/2022     Lab Results   Component Value Date    NORMETANEPHR 1907 (H) 01/01/2023    NORMETANEPHR 994 (H) 12/28/2022    TOTMETAURINE  2511 (H) 01/01/2023    TOTMETAURINE 1366 (H) 12/28/2022

## 2022-12-28 NOTE — ASSESSMENT & PLAN NOTE
Sodium labs consistent with SIADH- high urine Osm and Sodium  Agree with fluid restriction, can likely restrict a little more aggressively, with 800-1000cc/day

## 2022-12-28 NOTE — ASSESSMENT & PLAN NOTE
Known history of uncontrolled HTN, w/ labile BP since admit.   -current meds include amlodipine 10, coreg 25, losartan 100  -now on lower side of normal  -will hold amlo and losartan, continue coreg  -will avoid IV meds if possible as has had hypotension following IV BP meds

## 2022-12-28 NOTE — ASSESSMENT & PLAN NOTE
Known history of uncontrolled HTN, w/ labile BP since admit.   -current meds include amlodipine 10, coreg 25, losartan 100  -now on lower side of normal  -will hold amlo and losartan, continue coreg  -will avoid IV meds if possible as has had hypotension following IV BP meds  -renal artery duplex to eval for renal artery stenosis

## 2022-12-28 NOTE — PLAN OF CARE
"Endocrinology Plan of Care  12/28/2022 at 2:42 PM    Ms. Mcgee is a 71 yo F patient with essential hypertension, hyperlipidemia, hypothyroidism, balance difficulites who presented to the ED on 12/23 for 3 weeks of weakness, dyspnea on exertion, decreased appetite, and constipation with intermittent lower abdominal cramping. Patient denies any chest pain, N/V/D, dysuria, edema, abdominal distension. On admission, she had hypertensive urgency and was found to have Na 120. Received IV fluids and admitted to hospital medicine. For her hyponatremia, workup c/w likely SIADH plus HCTZ which was "recently" started per notes. Due to persistent hyponatremia with encepphalopathy she was stepped up to the ICU today (12/28/2022) for HTS.    Her BP has been labile since admission requiring both IV antihypertensives as well as fluid boluses for hypotension. Pt has had accompanying HA assoc with hypertensive episodes, prompting primary team to begin workup for pheochromocytoma. Serum metanephrines sent and in process and 24h urine collection in process to send for 24h urine metanephrines.    Primary team reached out to endocrine per nephrology request due to concern for "hyponatremia and labile BP concerning for pheo vs pituitary adenoma". Pituitary MRI had been ordered by primary as well as 8am cortisol which resulted slightly elevated.     Recommendations:  - the endocrine causes of hypertension are pheochromocytoma, Cushing's, and hyperaldosteronism.   - Workup for pheo underway but not yet resulted. In addition, plasma susanne/renin ratio was ordered to eval for hyperaldosteronism.   - 8am cortisol elevated (37) which we would expect as a normal physiologic response to acute illness. She does not have any other s/sx concerning for Cushing's. In order to assess for hypercortisolemia, a dexamethasone suppression test is needed, however - not very reliable in the acute setting. Unless other reasons to suspect hypercortisolemia, we " "do not feel this warrants further inpatient workup. As an aside, Cushing's does not cause hyponatremia.  - HypoNa labs appear to be c/w SIADH and/or HCTZ-related. Looking back, Na from earlier this month (12/2022) and 11/2022 reveal hyponatremia to mid-high 120s, suggesting unclear time course for hyponatremia and possible with mildly low baseline Na. HCTZ was started "recently" per notes this admission, however it appears she has been prescribed HCTZ as far back as 6/2018 per chart review. She was previously prescribed HCTZ as part of combination anti-HTN regimens. Thiazide-induced hyponatremia can occur within 2wks of starting the medication, however it can occur at any time during thiazide therapy. Suspect this is contributor along with SIADH.  - There is no need for pituitary MRI at this time as there is no reason to suspect pituitary pathology with the information thus far (No evidence of other pituitary hormone derangements, TFTs wnl, no changes in vision or sx of mass-effect). SIADH is a biochemical diagnosis and does not warrant imaging unless other reason to suspect pituitary pathology, which this patient does not have at this time. SIADH management per primary and nephro.  - Other than following up on labs in process already, we do not have additional recommendations at this time. Please call/consult endocrine if metanephrines or susanne/renin results abnormal suggesting endocrine cause of hypertension.      I discussed this with my attending, Dr Maldonado, as well as hospital medicine, nephrology, and medical ICU teams. Please call (or secure chat) with any additional questions.       Libra Holder MD  [facility], 6th Floor  [address] [place], [place], [address]    Office: [phone]  Fax: [phone]        "

## 2022-12-28 NOTE — NURSING
Spoke with MarioRN report given. Patient transfer via bed with O2 @1 N/C. Rush catheter with 24hrs. Urine in progress and specimen on ice. No s/s of discomfort or distress. Daughter notified of pt transfer to room 6043

## 2022-12-28 NOTE — PT/OT/SLP DISCHARGE
Occupational Therapy Discharge Summary    Dominique cMgee  MRN: [id number]   Principal Problem: Hyponatremia      Patient Discharged from acute Occupational Therapy on 12/28/22.  Please refer to prior OT note dated 12/24/22 for functional status.    Assessment:       Pt w fluctuating BP and transferred to ICU. New OT orders needed.     Objective:     GOALS:   Multidisciplinary Problems       Occupational Therapy Goals          Problem: Occupational Therapy    Goal Priority Disciplines Outcome Interventions   Occupational Therapy Goal     OT, PT/OT Ongoing, Progressing    Description: Goals to be met by: 1/23/23     Patient will increase functional independence with ADLs by performing:    UE Dressing with Modified [place].  LE Dressing with Modified [place].  Grooming while standing with Modified independent.  Toileting from toilet with Modified [place] for hygiene and clothing management.   Supine to sit with Modified [place].  Toilet transfer to toilet with Modified [place].                         Reasons for Discontinuation of Therapy Services  Pt transferred to ICU       Plan:     Patient Discharged to:  Cardiac ICU    12/28/2022

## 2022-12-28 NOTE — SUBJECTIVE & OBJECTIVE
Interval History: Pt received PO hydralazine overnight and dropped from 180s systolic to 80s systolic. In AM, pt appeared to be more somnolent and confused on exam. Nephrology and ICU consulted for symptomatic hyponatremia, and pt will be admitted to MICU for hypertonic saline.    Review of Systems   Constitutional:  Positive for fatigue. Negative for chills and fever.   HENT:  Negative for congestion and sore throat.    Respiratory:  Positive for shortness of breath. Negative for cough and chest tightness.    Cardiovascular:  Negative for chest pain and leg swelling.   Gastrointestinal:  Negative for abdominal pain, constipation, diarrhea, nausea and vomiting.   Endocrine: Negative for polydipsia and polyuria.   Genitourinary:  Negative for difficulty urinating and dysuria.   Musculoskeletal:  Negative for arthralgias and back pain.   Skin:  Negative for color change.   Neurological:  Positive for dizziness, weakness, light-headedness and headaches.        Chronic balance problems   Psychiatric/Behavioral:  Negative for agitation, behavioral problems and confusion.    Objective:     Vital Signs (Most Recent):  Temp: 97.5 °F (36.4 °C) (12/28/22 1125)  Pulse: 82 (12/28/22 1125)  Resp: 18 (12/28/22 1125)  BP: 119/60 (12/28/22 1125)  SpO2: (!) 94 % (12/28/22 1316)   Vital Signs (24h Range):  Temp:  [96.3 °F (35.7 °C)-98 °F (36.7 °C)] 97.5 °F (36.4 °C)  Pulse:  [] 82  Resp:  [18-32] 18  SpO2:  [94 %-100 %] 94 %  BP: ()/(51-98) 119/60     Weight: 59.4 kg (131 lb)  Body mass index is 22.49 kg/m².    Intake/Output Summary (Last 24 hours) at 12/28/2022 1407  Last data filed at 12/28/2022 0645  Gross per 24 hour   Intake --   Output 500 ml   Net -500 ml      Physical Exam  Vitals and nursing note reviewed.   Constitutional:       Appearance: Normal appearance.   HENT:      Head: Normocephalic and atraumatic.      Nose: Nose normal.      Mouth/Throat:      Mouth: Mucous membranes are dry.      Pharynx: Oropharynx  is clear.   Eyes:      Extraocular Movements: Extraocular movements intact.      Pupils: Pupils are equal, round, and reactive to light.   Cardiovascular:      Rate and Rhythm: Normal rate and regular rhythm.      Pulses: Normal pulses.      Heart sounds: Normal heart sounds.   Pulmonary:      Effort: Pulmonary effort is normal. No respiratory distress.      Breath sounds: Normal breath sounds.   Abdominal:      General: Abdomen is flat. There is no distension.      Tenderness: There is no abdominal tenderness.   Musculoskeletal:         General: Normal range of motion.      Cervical back: Normal range of motion.   Skin:     General: Skin is warm and dry.   Neurological:      General: No focal deficit present.      Mental Status: She is lethargic and confused.      Motor: Weakness present.   Psychiatric:         Mood and Affect: Mood normal.         Behavior: Behavior normal.       Significant Labs: All pertinent labs within the past 24 hours have been reviewed.  CBC:   Recent Labs   Lab 12/27/22  0542 12/28/22  1042   WBC 11.14 11.58   HGB 12.4 11.0*   HCT 35.3* 32.7*    384     CMP:   Recent Labs   Lab 12/27/22  0542 12/27/22  1722 12/27/22  1951 12/28/22  0837   * 115* 116* 117*   K 3.8 3.8 3.8 3.8   CL 78* 77* 76* 76*   CO2 29 29 29 31*   * 133* 150* 119*   BUN 10 12 12 14   CREATININE 0.4* 0.5 0.5 0.5   CALCIUM 9.0 9.1 9.3 9.0   PROT 6.6  --   --   --    ALBUMIN 3.4*  --   --   --    BILITOT 0.9  --   --   --    ALKPHOS 70  --   --   --    AST 22  --   --   --    ALT 14  --   --   --    ANIONGAP 10 9 11 10       Significant Imaging: I have reviewed all pertinent imaging results/findings within the past 24 hours.

## 2022-12-28 NOTE — AI DETERIORATION ALERT
"RAPID RESPONSE NURSE AI ALERT       AI alert received.    Chart Reviewed: 12/28/2022, 11:44 AM    MRN: [id number]  Bed: 8073/8073 A    Dx: Hypertensive urgency    Dominique Mcgee has a past medical history of Hyperlipidemia, Hypertension, and Hypothyroidism.    Last VS: /60 (BP Location: Left arm, Patient Position: Lying)   Pulse 82   Temp 97.5 °F (36.4 °C) (Oral)   Resp 18   Ht 5' 4" (1.626 m)   Wt 59.4 kg (131 lb)   SpO2 (!) 94%   Breastfeeding No   BMI 22.49 kg/m²     24H Vital Sign Range:  Temp:  [96.3 °F (35.7 °C)-98 °F (36.7 °C)]   Pulse:  []   Resp:  [17-32]   BP: ()/(51-98)   SpO2:  [94 %-100 %]     Level of Consciousness (AVPU): alert    Recent Labs     12/26/22  0523 12/27/22  0542 12/28/22  1042   WBC 7.37 11.14 11.58   HGB 11.6* 12.4 11.0*   HCT 35.7* 35.3* 32.7*    428 384       Recent Labs     12/26/22  0523 12/26/22  1653 12/27/22  0542 12/27/22  1722 12/27/22  1951 12/28/22  0837   *   < > 117* 115* 116* 117*   K 4.1   < > 3.8 3.8 3.8 3.8   CL 80*   < > 78* 77* 76* 76*   CO2 30*   < > 29 29 29 31*   CREATININE 0.5   < > 0.4* 0.5 0.5 0.5   GLU 90   < > 127* 133* 150* 119*   PHOS 2.9  --  2.8  --   --   --    MG 1.7  --  1.8  --   --   --     < > = values in this interval not displayed.        No results for input(s): PH, PCO2, PO2, HCO3, POCSATURATED, BE in the last 72 hours.     OXYGEN:  Flow (L/min): 1          MEWS score: 1    charge RNTra  contacted. No concerns verbalized at this time. Instructed to call [phone] for further concerns or assistance.    Tj Link RN        "

## 2022-12-28 NOTE — SUBJECTIVE & OBJECTIVE
Past Medical History:   Diagnosis Date    Hyperlipidemia     Hypertension     Hypothyroidism        Past Surgical History:   Procedure Laterality Date    HYSTERECTOMY         Review of patient's allergies indicates:   Allergen Reactions    Hydralazine analogues      Precipitous drop in BP with IV formulation. Avoid if possible    Shellfish containing products Swelling       Family History       Problem Relation (Age of Onset)    Breast cancer Sister (55), Sister (65)          Tobacco Use    Smoking status: Never    Smokeless tobacco: Not on file   Substance and Sexual Activity    Alcohol use: Not on file    Drug use: Never    Sexual activity: Not on file      Review of Systems   Constitutional:  Positive for activity change, appetite change and fatigue.   HENT:  Negative for congestion and sore throat.    Eyes:  Negative for pain and visual disturbance.   Respiratory:  Negative for cough and shortness of breath.    Cardiovascular:  Negative for chest pain and leg swelling.   Gastrointestinal:  Positive for abdominal pain. Negative for abdominal distention and diarrhea.   Genitourinary:  Negative for dysuria and frequency.   Musculoskeletal:  Negative for myalgias and neck stiffness.   Skin:  Negative for color change and rash.   Neurological:  Negative for dizziness and headaches.   Psychiatric/Behavioral:  Negative for agitation and confusion.    Objective:     Vital Signs (Most Recent):  Temp: 97.5 °F (36.4 °C) (12/28/22 1125)  Pulse: 82 (12/28/22 1125)  Resp: 18 (12/28/22 1125)  BP: 119/60 (12/28/22 1125)  SpO2: (!) 94 % (12/28/22 1316)   Vital Signs (24h Range):  Temp:  [96.3 °F (35.7 °C)-98 °F (36.7 °C)] 97.5 °F (36.4 °C)  Pulse:  [] 82  Resp:  [18-32] 18  SpO2:  [94 %-100 %] 94 %  BP: ()/(51-98) 119/60   Weight: 59.4 kg (131 lb)  Body mass index is 22.49 kg/m².      Intake/Output Summary (Last 24 hours) at 12/28/2022 1344  Last data filed at 12/28/2022 0645  Gross per 24 hour   Intake --   Output  500 ml   Net -500 ml       Physical Exam  Vitals and nursing note reviewed.   Constitutional:       General: She is not in acute distress.     Appearance: Normal appearance.   HENT:      Head: Normocephalic and atraumatic.      Nose: Nose normal.      Mouth/Throat:      Mouth: Mucous membranes are dry.      Pharynx: Oropharynx is clear.   Eyes:      Extraocular Movements: Extraocular movements intact.      Conjunctiva/sclera: Conjunctivae normal.      Pupils: Pupils are equal, round, and reactive to light.   Neck:      Comments: No dorsal fat pad or cervical adiposity  Cardiovascular:      Rate and Rhythm: Normal rate and regular rhythm.      Pulses: Normal pulses.      Heart sounds: Normal heart sounds. No murmur heard.  Pulmonary:      Effort: Pulmonary effort is normal.      Breath sounds: Normal breath sounds. No wheezing or rales.   Abdominal:      General: Abdomen is flat. Bowel sounds are normal. There is no distension.      Palpations: Abdomen is soft.      Tenderness: There is no abdominal tenderness.      Comments: No abdominal striae    Musculoskeletal:         General: No swelling. Normal range of motion.      Cervical back: Normal range of motion and neck supple.      Right lower leg: No edema.      Left lower leg: No edema.   Skin:     General: Skin is warm.      Capillary Refill: Capillary refill takes less than 2 seconds.   Neurological:      General: No focal deficit present.      Mental Status: She is alert. She is disoriented.      Cranial Nerves: No cranial nerve deficit.      Motor: No weakness.      Comments: Non-conversational       Vents:     Lines/Drains/Airways       Drain  Duration             Female External Urinary Catheter 12/23/22 1438 4 days              Peripheral Intravenous Line  Duration                  Peripheral IV - Single Lumen 12/23/22 1837 20 G Right Antecubital 4 days                  Significant Labs:    CBC/Anemia Profile:  Recent Labs   Lab 12/27/22  0542 12/28/22  1042    WBC 11.14 11.58   HGB 12.4 11.0*   HCT 35.3* 32.7*    384   MCV 76* 77*   RDW 12.4 12.3        Chemistries:  Recent Labs   Lab 12/27/22  0542 12/27/22  1722 12/27/22  1951 12/28/22  0837   * 115* 116* 117*   K 3.8 3.8 3.8 3.8   CL 78* 77* 76* 76*   CO2 29 29 29 31*   BUN 10 12 12 14   CREATININE 0.4* 0.5 0.5 0.5   CALCIUM 9.0 9.1 9.3 9.0   ALBUMIN 3.4*  --   --   --    PROT 6.6  --   --   --    BILITOT 0.9  --   --   --    ALKPHOS 70  --   --   --    ALT 14  --   --   --    AST 22  --   --   --    MG 1.8  --   --   --    PHOS 2.8  --   --   --        Lactic Acid: No results for input(s): LACTATE in the last 48 hours.  All pertinent labs within the past 24 hours have been reviewed.    Significant Imaging: I have reviewed all pertinent imaging results/findings within the past 24 hours.

## 2022-12-28 NOTE — CONSULTS
This patient was seen and evaluated by Critical Care Team 1. CCM 1 will assume care of patient. Full H&P to follow.     Christal Cespedes MD   Internal Medicine, PGY-3  Critical Care Medicine

## 2022-12-28 NOTE — CONSULTS
"[facility]  Nephrology  Consult Note    Patient Name: Dominique Mcgee  MRN: [id number]  Admission Date: 12/23/2022  Hospital Length of Stay: 5 days  Attending Provider: Lexis Purdy*   Primary Care Physician: Briana Leblanc MD  Principal Problem:Hypertensive urgency    Inpatient consult to Nephrology  Consult performed by: Christian Avila NP  Consult ordered by: Estrellita Thomas MD  Reason for consult: Hyponatremia        Subjective:     HPI: Dominique Mcgee is a 73 yo female with Hx of HTN, HLD, and hypothyroidism who presents to the ED for evaluation of weakness, fatigue, abdominal pain and cough with SOB that had been persisting for several weeks.  She was hypertensive in the ED with SBP > 200 and chemisitries notable for a low sodium level of 120.  According to records, her BP medications have been adjusted prior to arrival for uncontrolled hypertension and noting that was on a combination of Losartan and HCTZ.  HCTZ was discontinued and she was started on Amlodipine, Carvedilol and losartan. Urine studies were obtained on 12/25 revealing elevated UOSM of 802 and elevated Martin of 109.  She was placed on fluid restrictions, but Na levels have continued to stay low so Nephrology was consulted on 12/28 for further evaluation.  She is awake alert and oriented on examination, but endorses feeling "tired". She has had poor appetite as well since arrival to the hospital with continued complaints of abdominal pain.  Her blood pressures continue to be erratic, often times being hypertensive with episodes of hypotension on night.  She has had CTH which was negative for acute findings with CT abdomen pending.  AM cortisol level on day of consultation elevated at 37.8.  She is lethargic on exam and after discussing with the primary team this has progressively worsened since Monday.        Past Medical History:   Diagnosis Date    Hyperlipidemia     Hypertension     Hypothyroidism        Past " Surgical History:   Procedure Laterality Date    HYSTERECTOMY         Review of patient's allergies indicates:   Allergen Reactions    Hydralazine analogues      Precipitous drop in BP with IV formulation. Avoid if possible    Shellfish containing products Swelling     Current Facility-Administered Medications   Medication Frequency    acetaminophen tablet 650 mg Q6H PRN    albuterol-ipratropium 2.5 mg-0.5 mg/3 mL nebulizer solution 3 mL Q6H WAKE    [START ON 12/29/2022] amLODIPine tablet 10 mg Daily    atorvastatin tablet 10 mg Daily    capsaicin 0.025 % cream BID    carvediloL tablet 25 mg BID    dextrose 10% bolus 125 mL 125 mL PRN    dextrose 10% bolus 250 mL 250 mL PRN    enoxaparin injection 40 mg Daily    glucagon (human recombinant) injection 1 mg PRN    glucose chewable tablet 16 g PRN    glucose chewable tablet 24 g PRN    levothyroxine tablet 50 mcg Before breakfast    losartan tablet 100 mg Daily    methocarbamoL tablet 500 mg TID PRN    naloxone 0.4 mg/mL injection 0.02 mg PRN    polyethylene glycol packet 17 g BID    senna-docusate 8.6-50 mg per tablet 1 tablet Daily    sodium chloride 0.9% flush 10 mL Q12H PRN     Family History       Problem Relation (Age of Onset)    Breast cancer Sister (55), Sister (65)          Tobacco Use    Smoking status: Never    Smokeless tobacco: Not on file   Substance and Sexual Activity    Alcohol use: Not on file    Drug use: Never    Sexual activity: Not on file     Review of Systems   Constitutional:  Positive for activity change, appetite change and fatigue. Negative for fever.   HENT:  Negative for congestion, facial swelling, postnasal drip, rhinorrhea, sinus pressure and sinus pain.    Respiratory:  Positive for shortness of breath. Negative for cough and chest tightness.    Cardiovascular:  Negative for chest pain, palpitations and leg swelling.   Gastrointestinal:  Positive for abdominal distention, abdominal pain and constipation.    Genitourinary:  Negative for difficulty urinating.   Musculoskeletal:  Positive for arthralgias and back pain. Negative for myalgias.   Skin:  Negative for color change, rash and wound.   Neurological:  Negative for dizziness, light-headedness and headaches.   Psychiatric/Behavioral:  Negative for agitation, behavioral problems and confusion.    Objective:     Vital Signs (Most Recent):  Temp: 97.8 °F (36.6 °C) (12/28/22 0729)  Pulse: 93 (12/28/22 0912)  Resp: 18 (12/28/22 0912)  BP: (!) 179/79 (12/28/22 0912)  SpO2: 99 % (12/28/22 0912)   Vital Signs (24h Range):  Temp:  [96.3 °F (35.7 °C)-98 °F (36.7 °C)] 97.8 °F (36.6 °C)  Pulse:  [] 93  Resp:  [17-32] 18  SpO2:  [96 %-100 %] 99 %  BP: ()/(51-98) 179/79     Weight: 59.4 kg (131 lb) (12/27/22 0939)  Body mass index is 22.49 kg/m².  Body surface area is 1.64 meters squared.    I/O last 3 completed shifts:  In: -   Out: 800 [Urine:800]    Physical Exam  Vitals and nursing note reviewed.   Constitutional:       General: She is not in acute distress.     Appearance: She is well-developed. She is ill-appearing. She is not toxic-appearing.      Interventions: Nasal cannula in place.   HENT:      Head: Normocephalic and atraumatic.      Nose: No congestion or rhinorrhea.      Mouth/Throat:      Mouth: Mucous membranes are moist.      Pharynx: Oropharynx is clear.   Eyes:      General: No scleral icterus.        Right eye: No discharge.         Left eye: No discharge.      Extraocular Movements: Extraocular movements intact.      Conjunctiva/sclera: Conjunctivae normal.   Cardiovascular:      Rate and Rhythm: Normal rate and regular rhythm.      Heart sounds: No murmur heard.    No friction rub. No gallop.   Pulmonary:      Effort: Tachypnea present. No respiratory distress.      Breath sounds: No wheezing or rales.   Abdominal:      General: There is distension.      Palpations: Abdomen is soft.      Tenderness: There is abdominal tenderness.    Musculoskeletal:         General: No swelling.      Cervical back: Normal range of motion and neck supple.      Right lower leg: No edema.      Left lower leg: No edema.   Skin:     General: Skin is warm and dry.   Neurological:      General: No focal deficit present.      Mental Status: She is alert and oriented to person, place, and time.      Motor: Weakness present.       Significant Labs:  CBC:   Recent Labs   Lab 12/27/22  0542   WBC 11.14   RBC 4.67   HGB 12.4   HCT 35.3*      MCV 76*   MCH 26.6*   MCHC 35.1     CMP:   Recent Labs   Lab 12/27/22  0542 12/27/22  1722 12/28/22  0837   *   < > 119*   CALCIUM 9.0   < > 9.0   ALBUMIN 3.4*  --   --    PROT 6.6  --   --    *   < > 117*   K 3.8   < > 3.8   CO2 29   < > 31*   CL 78*   < > 76*   BUN 10   < > 14   CREATININE 0.4*   < > 0.5   ALKPHOS 70  --   --    ALT 14  --   --    AST 22  --   --    BILITOT 0.9  --   --     < > = values in this interval not displayed.         Assessment/Plan:     Hyponatremia  Suspect this is Euvolemic hyponatremia but could have multiple components including medication induced (HCTZ as OP), low solute intake or underlying endocrinopathy.    Has had poor PO intake since admit.    UOSM on 12/25:  802  HANH on 12/25:  109    12/25 urine studies lean more toward an SIADH picture, but she has also had a hypochloremic metabolic alkalosis, uncontrolled hypertension although potassium levels have been WNL.    Urine metanephrines in process    Plan/Recommendations:  -recommend transfer to ICU for hypertonic saline (reported with increased lethargy, weakness)  -recommend starting at 30 cc/hr with sodium checks q 4 hours    -goal rise in Na 6-8 mmol /24h  -recheck urine Na and Urine OSM  -recommend endocrine consult for possible endocrinopathy leading to hyponatremia  -recommend MRI head to assess pituitary  -recommend abdominal CT to assess adrenal glands  -will get renal vascular US to r/o PAVEL  -strict  I/O's      Metabolic alkalosis  Check VBG      Essential hypertension  -check renal vascular US to r/o PAVEL  -renin/Nathan  -elevated cortisol      Christian Webb, NP  Nephrology  [facility]

## 2022-12-28 NOTE — H&P
[facility]  History & Physical    Patient Name: Dominique Mcgee  MRN: [id number]  Admission Date: 12/23/2022  Hospital Length of Stay: 5 days  Code Status: Full Code  Attending Physician: Nathan Loaiza MD   Primary Care Provider: Briana Leblanc MD   Principal Problem: Hyponatremia    Subjective:     HPI:  Ms. Mcgee is a 73 y/o F patient with a PMHx of essential hypertension, hyperlipidemia, hypothyroidism, balance difficulites who presented to the ED on 12/23 for 3 weeks of weakness, dyspnea on exertion, decreased appetite, and constipation with intermittent lower abdominal cramping. Patient denies any chest pain, N/V/D, dysuria, edema, abdominal distension. On admission, she had hypertensive urgency and was found to have Na 120. Received IV fluids and admitted to hospital medicine. For her hypernatremia, her thiazide was held and she was placed on fluid restriction. Urine studies from 12/25 showed Uosm of 802 and HANH of 109. Her BP has been labile since admission requiring both IV antihypertensives as well as fluid boluses. The patient's hypernatremia persisted despite fluid restriction and she became increasingly lethargic and weak so CCM was consulted for further management.      Upon evaluation, patient is somnolent. Opens eyes to voice, unable to engage in full conversation. Speaks mainly single words. Most recent vitals: /60, pulse 82, R 18, SpO2 94% on RA. Na 119->115->117.       Hospital/ICU Course:  No notes on file     Past Medical History:   Diagnosis Date    Hyperlipidemia     Hypertension     Hypothyroidism        Past Surgical History:   Procedure Laterality Date    HYSTERECTOMY         Review of patient's allergies indicates:   Allergen Reactions    Hydralazine analogues      Precipitous drop in BP with IV formulation. Avoid if possible    Shellfish containing products Swelling       Family History       Problem Relation (Age of Onset)    Breast  cancer Sister (55), Sister (65)          Tobacco Use    Smoking status: Never    Smokeless tobacco: Not on file   Substance and Sexual Activity    Alcohol use: Not on file    Drug use: Never    Sexual activity: Not on file      Review of Systems   Constitutional:  Positive for activity change, appetite change and fatigue.   HENT:  Negative for congestion and sore throat.    Eyes:  Negative for pain and visual disturbance.   Respiratory:  Negative for cough and shortness of breath.    Cardiovascular:  Negative for chest pain and leg swelling.   Gastrointestinal:  Positive for abdominal pain. Negative for abdominal distention and diarrhea.   Genitourinary:  Negative for dysuria and frequency.   Musculoskeletal:  Negative for myalgias and neck stiffness.   Skin:  Negative for color change and rash.   Neurological:  Negative for dizziness and headaches.   Psychiatric/Behavioral:  Negative for agitation and confusion.    Objective:     Vital Signs (Most Recent):  Temp: 97.5 °F (36.4 °C) (12/28/22 1125)  Pulse: 82 (12/28/22 1125)  Resp: 18 (12/28/22 1125)  BP: 119/60 (12/28/22 1125)  SpO2: (!) 94 % (12/28/22 1316)   Vital Signs (24h Range):  Temp:  [96.3 °F (35.7 °C)-98 °F (36.7 °C)] 97.5 °F (36.4 °C)  Pulse:  [] 82  Resp:  [18-32] 18  SpO2:  [94 %-100 %] 94 %  BP: ()/(51-98) 119/60   Weight: 59.4 kg (131 lb)  Body mass index is 22.49 kg/m².      Intake/Output Summary (Last 24 hours) at 12/28/2022 1343  Last data filed at 12/28/2022 0645  Gross per 24 hour   Intake --   Output 500 ml   Net -500 ml       Physical Exam  Vitals and nursing note reviewed.   Constitutional:       General: She is not in acute distress.     Appearance: Normal appearance.   HENT:      Head: Normocephalic and atraumatic.      Nose: Nose normal.      Mouth/Throat:      Mouth: Mucous membranes are dry.      Pharynx: Oropharynx is clear.   Eyes:      Extraocular Movements: Extraocular movements intact.      Conjunctiva/sclera:  Conjunctivae normal.      Pupils: Pupils are equal, round, and reactive to light.   Neck:      Comments: No dorsal fat pad or cervical adiposity  Cardiovascular:      Rate and Rhythm: Normal rate and regular rhythm.      Pulses: Normal pulses.      Heart sounds: Normal heart sounds. No murmur heard.  Pulmonary:      Effort: Pulmonary effort is normal.      Breath sounds: Normal breath sounds. No wheezing or rales.   Abdominal:      General: Abdomen is flat. Bowel sounds are normal. There is no distension.      Palpations: Abdomen is soft.      Tenderness: There is no abdominal tenderness.      Comments: No abdominal striae    Musculoskeletal:         General: No swelling. Normal range of motion.      Cervical back: Normal range of motion and neck supple.      Right lower leg: No edema.      Left lower leg: No edema.   Skin:     General: Skin is warm.      Capillary Refill: Capillary refill takes less than 2 seconds.   Neurological:      General: No focal deficit present.      Mental Status: She is alert. She is disoriented.      Cranial Nerves: No cranial nerve deficit.      Motor: No weakness.      Comments: Non-conversational       Vents:     Lines/Drains/Airways       Drain  Duration             Female External Urinary Catheter 12/23/22 1438 4 days              Peripheral Intravenous Line  Duration                  Peripheral IV - Single Lumen 12/23/22 1837 20 G Right Antecubital 4 days                  Significant Labs:    CBC/Anemia Profile:  Recent Labs   Lab 12/27/22  0542 12/28/22  1042   WBC 11.14 11.58   HGB 12.4 11.0*   HCT 35.3* 32.7*    384   MCV 76* 77*   RDW 12.4 12.3        Chemistries:  Recent Labs   Lab 12/27/22  0542 12/27/22  1722 12/27/22  1951 12/28/22  0837   * 115* 116* 117*   K 3.8 3.8 3.8 3.8   CL 78* 77* 76* 76*   CO2 29 29 29 31*   BUN 10 12 12 14   CREATININE 0.4* 0.5 0.5 0.5   CALCIUM 9.0 9.1 9.3 9.0   ALBUMIN 3.4*  --   --   --    PROT 6.6  --   --   --    BILITOT 0.9  --    --   --    ALKPHOS 70  --   --   --    ALT 14  --   --   --    AST 22  --   --   --    MG 1.8  --   --   --    PHOS 2.8  --   --   --        Lactic Acid: No results for input(s): LACTATE in the last 48 hours.  All pertinent labs within the past 24 hours have been reviewed.    Significant Imaging: I have reviewed all pertinent imaging results/findings within the past 24 hours.    Assessment/Plan:     Cardiac/Vascular  Hypertensive urgency  See essential hypertension      Hyperlipidemia  Continue home statin    Essential hypertension  Known history of uncontrolled HTN, w/ labile BP since admit.   -current meds include amlodipine 10, coreg 25, losartan 100  -now on lower side of normal  -will hold amlo and losartan, continue coreg  -will avoid IV meds if possible as has had hypotension following IV BP meds  -renal artery duplex to eval for renal artery stenosis    Dyspnea on exertion  Began prior to admission. Has been requiring 2L via NC to maintain oxygenation  -O2 via NC PRN    Renal/  * Hyponatremia  Patient started having weakness around 3 weeks ago after possible viral illness, also has had decreased appetite and more recently experiencing shortness of breath.  Sodium of 120 on admit, has been fluctuating since, now around 116-117. UOSM 802, HANH 109  Has become increasingly lethargic since admission, concern due to hyponatremia  Possibility of SIADH based on urine studies  -transfer to ICU  -nephrology consulted  -hypertonic saline at 40cc/hr w/ q2 sodium checks and adjustment as necessary  -goal of 123 by 12/29 AM    Metabolic alkalosis  Initially could have been related to diuretic use. Bicarb remains elevated after stopping HCTZ.    GI  Generalized abdominal pain  Possibly related to constipation. Will check CT AP to further eval and r/o other causes.    Constipation  Continue miralax BID and senna-doc daily    Other  Hypothermia  Considering new onset will check BCx and start zosyn. Using bear hugger for  warming.    Generalized weakness  See hypernatremia.        Critical Care Daily Checklist:    A: Awake: RASS Goal/Actual Goal:    Actual:     B: Spontaneous Breathing Trial Performed?  n/a   C: SAT & SBT Coordinated?  n/a                      D: Delirium: CAM-ICU     E: Early Mobility Performed? Yes   F: Feeding Goal:    Status:     Current Diet Order   Procedures    Diet Adult Regular (IDDSI Level 7) Fluid - 800mL     Order Specific Question:   Fluid restriction:     Answer:   Fluid - 800mL      AS: Analgesia/Sedation n/a   T: Thromboembolic Prophylaxis lovenox   H: HOB > 300 Yes   U: Stress Ulcer Prophylaxis (if needed) n/a   G: Glucose Control No insulin needs at this time   B: Bowel Function Stool Occurrence: 0   I: Indwelling Catheter (Lines & Rush) Necessity n/a   D: De-escalation of Antimicrobials/Pharmacotherapies n/a    Plan for the day/ETD Hypertonic saline, start abx    Code Status:  Family/Goals of Care: Full Code         Critical secondary to Patient has a condition that poses threat to life and bodily function: hypernatremia  Patient is currently on drug therapy requiring intensive monitoring for toxicity: hypertonic saline     Critical care was time spent personally by me on the following activities: development of treatment plan with patient or surrogate and bedside caregivers, discussions with consultants, evaluation of patient's response to treatment, examination of patient, ordering and performing treatments and interventions, ordering and review of laboratory studies, ordering and review of radiographic studies, pulse oximetry, re-evaluation of patient's condition. This critical care time did not overlap with that of any other provider or involve time for any procedures.     Beto Avelar MD  Critical Care Medicine  [facility]

## 2022-12-28 NOTE — ASSESSMENT & PLAN NOTE
Sodium of 120 on admission, received 1L NS. Possible in the setting of thiazide use, decreased intake  - Repeat BMP after fluids with similar Na  - Urine studies pending/Serum osm pending  - Slowly improving while holding thiazide  - Daily CMP  - Na of 119 on 12/26 with increased U osm and Urine Na, concerning for SIADH. Fluid restriction ordered.  - Slight increase in Na with fluid restriction. Will continue fluid restriction and obtain CT head  - F/u BMP q12  - Pt with increased somnolence and fatigue on 12/28, to be admitted to ICU for hypertonic saline

## 2022-12-28 NOTE — ASSESSMENT & PLAN NOTE
On home losartan, metoprolol, HCTZ. Seems to be uncontrolled as of 6 months ago. Recently lisinopril switched to losartan given cough, resolved.     - Metoprolol transitioned to Coreg. Coreg increased to25mg BID  - Losartan increased to 100mg QD  - Pt very sensitive to BP meds, so PRN IV/PO hydralazine and IV labetalol discontinued  - Treatment as above

## 2022-12-28 NOTE — ASSESSMENT & PLAN NOTE
73 y/o F hx of HTN, HLD who presented to the ED with complaint of weakness and fatigue. On admission, pt hypertensive with systolics up to 200s. Home regimen: HCTZ 25mg qd, losartan 50mg qd, metoprolol succinate 50mg qd. On admit Toprol transitioned to Coreg 6.25mg BID for better control and uptitrated to 12.5mg BID. HCTZ discontinued s/o hyponatremia and hypokalemia. On 12/26 patient received dose of IV labetalol 20mg followed by IV hydralazine 5mg with resulting drop in BP to 90s/50s. On 12/27 patient's BP continuing to uptrend again. MICU consulted for concern for hypertensive emergency.    On evaluation, patient awake and alert and in mild distress. Pt reporting diffuse crampy abdominal pain. She denies any current chest pain or discomfort, headaches, or vision changes. /83. EKG sinus rhythm with nonspecific T-wave changes in lateral leads.     Plan:  -- would recommend aggressive treatment of patient's constipation.  -- f/u KUB and can utilize enema for constipation  -- if patient persistently hypertensive despite improvement in abdominal pain and constipation, would recommend uptitrating coreg to 25mg BID, followed by amlodipine 10mg qd  -- please avoid use of IV hydralazine.   -- f/u troponin, if elevated trend to plateau    Patient stable for continued management on stepdown unit at this time.

## 2022-12-28 NOTE — ASSESSMENT & PLAN NOTE
- f/u KUB  - if high stool burden, would give an enema (glycerine or brown bomb)  - Can continue aggressive bowel regimen. Can uptitrate senna to BID, miralax to 34g BID as needed until BM (if enema isn't working overnight)  - Important to treat given pain likely driving some HTN

## 2022-12-28 NOTE — CARE UPDATE
RAPID RESPONSE NURSE ROUND       Rounding completed with charge RNTra for HTN reports Patient hasn't received their am medications. No additional concerns verbalized at this time. Instructed to call [phone] for further concerns or assistance.

## 2022-12-29 PROBLEM — M71.051 ABSCESS OF BURSA OF RIGHT HIP: Status: ACTIVE | Noted: 2022-01-01

## 2022-12-29 PROBLEM — R57.9 SHOCK: Status: ACTIVE | Noted: 2022-01-01

## 2022-12-29 PROBLEM — E78.5 HYPERLIPIDEMIA: Status: RESOLVED | Noted: 2022-01-01 | Resolved: 2022-01-01

## 2022-12-29 PROBLEM — J96.01 ACUTE HYPOXEMIC RESPIRATORY FAILURE: Status: ACTIVE | Noted: 2022-01-01

## 2022-12-29 PROBLEM — R60.0 FLUID COLLECTION (EDEMA) IN THE ARMS, LEGS, HANDS AND FEET: Status: ACTIVE | Noted: 2022-01-01

## 2022-12-29 PROBLEM — K62.89 PROCTITIS: Status: ACTIVE | Noted: 2022-01-01

## 2022-12-29 PROBLEM — R65.20 SEVERE SEPSIS: Status: ACTIVE | Noted: 2022-01-01

## 2022-12-29 PROBLEM — A41.9 SEVERE SEPSIS: Status: ACTIVE | Noted: 2022-01-01

## 2022-12-29 PROBLEM — I16.0 HYPERTENSIVE URGENCY: Status: RESOLVED | Noted: 2022-01-01 | Resolved: 2022-01-01

## 2022-12-29 NOTE — ASSESSMENT & PLAN NOTE
Initial labs concerning for SIADH vs diuretic induced (HCTZ as OP), low solute intake.  Na did not improve with fluid restrictions and Nephrology was consulted on 12/28.      Has had poor PO intake since admit.    UOSM on 12/25:  802  HANH on 12/25:  109    Repeat on 12/28:   UOSM:  572  HANH:  18  Consistent with Hypovolemic Hyponatremia    Corrected with hypertonic therapy    Urine metanephrines in process    Plan/Recommendations:  -corrected to 127 on 0900 labs (up from 117 over the past 24 hours  -recommend DDAVP 1 mcg IV x 1 now with D5W bolus of 250 ml  -check Na levels q 4 hours.    -recommend recommend to maintain Na at ~ 125 over the next 12 hours.    -strict I/O's

## 2022-12-29 NOTE — PROCEDURES
"Dominique Mcgee is a 72 y.o. female patient.    Temp: 98.7 °F (37.1 °C) (12/28/22 2300)  Pulse: 83 (12/29/22 0230)  Resp: 16 (12/29/22 0230)  BP: (!) 156/69 (12/29/22 0230)  SpO2: 100 % (12/29/22 0230)  Weight: 59.4 kg (131 lb) (12/27/22 0939)  Height: 5' 4" (162.6 cm) (12/29/22 0144)       Arterial Line    Date/Time: 12/29/2022 2:48 AM  Location procedure was performed: [facility] [place] [facility]  Performed by: Molly Henderson DO  Authorized by: Molly Henderson DO   Consent Done: Yes  Time out: Immediately prior to procedure a "time out" was called to verify the correct patient, procedure, equipment, support staff and site/side marked as required.  Location: right radial  Complications: Yes (Unsuccessful attempt)        12/29/2022    "

## 2022-12-29 NOTE — PROGRESS NOTES
"[facility] - [facility]  Nephrology  Progress Note    Patient Name: Dominique Mcgee  MRN: [id number]  Admission Date: 12/23/2022  Hospital Length of Stay: 6 days  Attending Provider: Nathan Loaiza MD   Primary Care Physician: Briana Leblanc MD  Principal Problem:Hyponatremia    Subjective:     HPI: Dominique Mcgee is a 73 yo female with Hx of HTN, HLD, and hypothyroidism who presents to the ED for evaluation of weakness, fatigue, abdominal pain and cough with SOB that had been persisting for several weeks.  She was hypertensive in the ED with SBP > 200 and chemisitries notable for a low sodium level of 120.  According to records, her BP medications have been adjusted prior to arrival for uncontrolled hypertension and noting that was on a combination of Losartan and HCTZ.  HCTZ was discontinued and she was started on Amlodipine, Carvedilol and losartan. Urine studies were obtained on 12/25 revealing elevated UOSM of 802 and elevated Martin of 109.  She was placed on fluid restrictions, but Na levels have continued to stay low so Nephrology was consulted on 12/28 for further evaluation.  She is awake alert and oriented on examination, but endorses feeling "tired". She has had poor appetite as well since arrival to the hospital with continued complaints of abdominal pain.  Her blood pressures continue to be erratic, often times being hypertensive with episodes of hypotension on night.  She has had CTH which was negative for acute findings with CT abdomen pending.  AM cortisol level on day of consultation elevated at 37.8.  She is lethargic on exam and after discussing with the primary team this has progressively worsened since Monday.        Interval History:   Stepped up to ICU, received 3% NS from [id number] then discontinued.  Repeat urine Na low which is more consistent with hypovolemic hyponatremia.  She was intubated early this morning for respiratory distress.    She was started on Abx, infectious workup in " process.  UOP has improved, Serum Na up to 127 on 9 am labs from 117 yesterday.      Review of patient's allergies indicates:   Allergen Reactions    Hydralazine analogues      Precipitous drop in BP with IV formulation. Avoid if possible    Shellfish containing products Swelling     Current Facility-Administered Medications   Medication Frequency    acetaminophen tablet 650 mg Q6H PRN    albuterol-ipratropium 2.5 mg-0.5 mg/3 mL nebulizer solution 3 mL Q6H WAKE    atorvastatin tablet 10 mg Daily    azithromycin (ZITHROMAX) 500 mg in dextrose 5 % 250 mL IVPB Q24H    capsaicin 0.025 % cream BID    dexmedetomidine (PRECEDEX) 400mcg/100mL 0.9% NaCL infusion Continuous    dextrose 10% bolus 125 mL 125 mL PRN    dextrose 10% bolus 250 mL 250 mL PRN    enoxaparin injection 40 mg Daily    etomidate (AMIDATE) 2 mg/mL injection     glucagon (human recombinant) injection 1 mg PRN    glucose chewable tablet 16 g PRN    glucose chewable tablet 24 g PRN    glycerin adult suppository 1 suppository Once PRN    hydrocortisone sodium succinate injection 100 mg Q8H    levothyroxine tablet 50 mcg Before breakfast    LIDOcaine HCL 10 mg/ml (1%) 10 mg/mL (1 %) injection     methocarbamoL tablet 500 mg TID PRN    multivitamin tablet Daily    mupirocin 2 % ointment BID    naloxone 0.4 mg/mL injection 0.02 mg PRN    NORepinephrine 4 mg in dextrose 5% 250 mL infusion (premix) (titrating) Continuous    piperacillin-tazobactam (ZOSYN) 4.5 g in sodium chloride 0.9 % 100 mL IVPB (MB+) Q8H    polyethylene glycol packet 17 g BID    rocuronium 10 mg/mL injection     senna-docusate 8.6-50 mg per tablet 1 tablet Daily    sodium chloride 0.9% flush 10 mL Q12H PRN    thiamine (B-1) 500 mg in dextrose 5 % 100 mL IVPB TID    vancomycin - pharmacy to dose pharmacy to manage frequency    vancomycin 1,250 mg in dextrose 5 % 250 mL IVPB Once    vasopressin (PITRESSIN) 0.2 Units/mL in dextrose 5 % 100 mL infusion Continuous     vasopressin (PITRESSIN) 20 unit/mL injection        Objective:     Vital Signs (Most Recent):  Temp: 97.5 °F (36.4 °C) (12/29/22 0300)  Pulse: 72 (12/29/22 0900)  Resp: 18 (12/29/22 0900)  BP: (!) 156/69 (12/29/22 0230)  SpO2: 100 % (12/29/22 0900)   Vital Signs (24h Range):  Temp:  [95.5 °F (35.3 °C)-98.7 °F (37.1 °C)] 97.5 °F (36.4 °C)  Pulse:  [64-94] 72  Resp:  [16-34] 18  SpO2:  [94 %-100 %] 100 %  BP: ()/(49-69) 156/69  Arterial Line BP: (110-156)/(46-79) 124/46     Weight: 59.4 kg (131 lb) (12/27/22 0939)  Body mass index is 22.49 kg/m².  Body surface area is 1.64 meters squared.    I/O last 3 completed shifts:  In: 681 [I.V.:627.5; IV Piggyback:53.5]  Out: 1175 [Urine:1175]    Physical Exam  Vitals and nursing note reviewed.   Constitutional:       General: She is not in acute distress.     Appearance: She is well-developed. She is ill-appearing. She is not toxic-appearing.      Interventions: She is sedated and intubated. Nasal cannula in place.   HENT:      Head: Normocephalic and atraumatic.      Nose: No congestion or rhinorrhea.      Mouth/Throat:      Mouth: Mucous membranes are moist.      Pharynx: Oropharynx is clear.   Eyes:      General: No scleral icterus.        Right eye: No discharge.         Left eye: No discharge.      Extraocular Movements: Extraocular movements intact.      Conjunctiva/sclera: Conjunctivae normal.   Cardiovascular:      Rate and Rhythm: Normal rate and regular rhythm.      Heart sounds: No murmur heard.    No friction rub. No gallop.   Pulmonary:      Effort: Tachypnea present. No respiratory distress. She is intubated.      Breath sounds: No wheezing or rales.   Abdominal:      General: There is distension.      Palpations: Abdomen is soft.      Tenderness: There is no abdominal tenderness.   Musculoskeletal:         General: No swelling.      Cervical back: Normal range of motion and neck supple.      Right lower leg: No edema.      Left lower leg: No edema.    Skin:     General: Skin is warm and dry.       Significant Labs:  CBC:   Recent Labs   Lab 12/29/22  0201   WBC 14.55*   RBC 3.28*   HGB 8.9*   HCT 26.3*      MCV 80*   MCH 27.1   MCHC 33.8     CMP:   Recent Labs   Lab 12/27/22  0542 12/27/22  1722 12/29/22  0944   *   < > 154*   CALCIUM 9.0   < > 8.5*   ALBUMIN 3.4*  --   --    PROT 6.6  --   --    *   < > 127*   K 3.8   < > 3.4*   CO2 29   < > 26   CL 78*   < > 91*   BUN 10   < > 17   CREATININE 0.4*   < > 0.6   ALKPHOS 70  --   --    ALT 14  --   --    AST 22  --   --    BILITOT 0.9  --   --     < > = values in this interval not displayed.          Assessment/Plan:     * Hyponatremia  Initial labs concerning for SIADH vs diuretic induced (HCTZ as OP), low solute intake.  Na did not improve with fluid restrictions and Nephrology was consulted on 12/28.      Has had poor PO intake since admit.    UOSM on 12/25:  802  HANH on 12/25:  109    Repeat on 12/28:   UOSM:  572  HANH:  18  Consistent with Hypovolemic Hyponatremia    Corrected with hypertonic therapy    Urine metanephrines in process    Plan/Recommendations:  -corrected to 127 on 0900 labs (up from 117 over the past 24 hours  -recommend DDAVP 1 mcg IV x 1 now with D5W bolus of 250 ml  -check Na levels q 4 hours.    -recommend recommend to maintain Na at ~ 125 over the next 12 hours.    -strict I/O's      Metabolic alkalosis  Improved with IVFs  -likely a component of contraction alkalosis      Essential hypertension  -renin/susanne pending  -currently hypotensive on low dose pressor support   -infectious workup pending    Right renal artery stenosis noted on renal US (60-79%).  No acute intervention needed now while in ICU, will evaluate when more stable for further interventions      Christian Webb, NP  Nephrology  [facility] - [facility]

## 2022-12-29 NOTE — SUBJECTIVE & OBJECTIVE
Interval History/Significant Events: Increasing pressor requirements overnight. Required intubation and central line placement. Remains sedated this morning. Responds to commands when off sedation.    Review of Systems   Unable to perform ROS: Intubated   Objective:     Vital Signs (Most Recent):  Temp: 97.5 °F (36.4 °C) (12/29/22 0300)  Pulse: 69 (12/29/22 1201)  Resp: 19 (12/29/22 1201)  BP: (!) 122/49 (12/29/22 1100)  SpO2: 98 % (12/29/22 1201)   Vital Signs (24h Range):  Temp:  [95.5 °F (35.3 °C)-98.7 °F (37.1 °C)] 97.5 °F (36.4 °C)  Pulse:  [64-94] 69  Resp:  [16-34] 19  SpO2:  [94 %-100 %] 98 %  BP: ()/(49-69) 122/49  Arterial Line BP: (110-156)/(46-79) 124/46   Weight: 59.4 kg (131 lb)  Body mass index is 22.49 kg/m².      Intake/Output Summary (Last 24 hours) at 12/29/2022 1206  Last data filed at 12/29/2022 0949  Gross per 24 hour   Intake 2348.14 ml   Output 1050 ml   Net 1298.14 ml       Physical Exam  Vitals and nursing note reviewed.   Constitutional:       Appearance: She is ill-appearing.      Interventions: She is intubated.   HENT:      Head: Normocephalic and atraumatic.   Eyes:      Extraocular Movements: Extraocular movements intact.      Pupils: Pupils are equal, round, and reactive to light.   Cardiovascular:      Rate and Rhythm: Normal rate and regular rhythm.      Pulses: Normal pulses.      Heart sounds: Normal heart sounds. No murmur heard.  Pulmonary:      Effort: She is intubated.      Breath sounds: Normal air entry. Transmitted upper airway sounds present. No wheezing or rales.   Abdominal:      General: Abdomen is flat. There is distension.      Palpations: Abdomen is soft.      Tenderness: There is no abdominal tenderness.   Musculoskeletal:         General: No swelling.      Right lower leg: No edema.      Left lower leg: No edema.   Skin:     General: Skin is warm and dry.      Findings: No bruising or rash.   Neurological:      Comments: On sedation       Vents:  Vent Mode:  A/C (12/29/22 1201)  Ventilator Initiated: Yes (12/29/22 0144)  Set Rate: 18 BPM (12/29/22 1201)  Vt Set: 350 mL (12/29/22 1201)  PEEP/CPAP: 5 cmH20 (12/29/22 1201)  Oxygen Concentration (%): 60 (12/29/22 1201)  Peak Airway Pressure: 23 cmH20 (12/29/22 1201)  Plateau Pressure: 0 cmH20 (12/29/22 1201)  Total Ve: 6.67 L/m (12/29/22 1201)  Negative Inspiratory Force (cm H2O): 0 (12/29/22 1201)  F/VT Ratio<105 (RSBI): (!) 51.63 (12/29/22 1201)  Lines/Drains/Airways       Central Venous Catheter Line  Duration             Percutaneous Central Line Insertion/Assessment - Triple Lumen  12/29/22 0043 right internal jugular <1 day              Drain  Duration                  Urethral Catheter 12/27/22 1535 1 day         NG/OG Tube 12/29/22 0149 [place] sump 18 Fr. Left mouth <1 day         Rectal Tube 12/29/22 0332 rectal tube w/ balloon (indicate number of mLs) 45 Fr. <1 day              Airway  Duration                  Airway - Non-Surgical 12/29/22 0139 <1 day              Arterial Line  Duration             Arterial Line 12/29/22 0219 Left Radial <1 day              Peripheral Intravenous Line  Duration                  Peripheral IV - Single Lumen 12/28/22 1530 20 G Anterior;Right Forearm <1 day         Peripheral IV - Single Lumen 12/28/22 1545 18 G Left Antecubital <1 day         Peripheral IV - Single Lumen 12/28/22 1755 20 G;1 3/4 in Anterior;Right Upper Arm <1 day                  Significant Labs:    CBC/Anemia Profile:  Recent Labs   Lab 12/28/22  1042 12/29/22  0201   WBC 11.58 14.55*   HGB 11.0* 8.9*   HCT 32.7* 26.3*    316   MCV 77* 80*   RDW 12.3 12.6        Chemistries:  Recent Labs   Lab 12/28/22  1339 12/28/22  1349 12/28/22  2228 12/29/22  0201 12/29/22  0944   NA  --    < > 123* 124*  124* 127*   K  --    < > 4.1 3.8  3.8 3.4*   CL  --    < > 83* 89*  89* 91*   CO2  --    < > 35* 25  25 26   BUN  --    < > 23 22  22 17   CREATININE  --    < > 0.8 0.7  0.7 0.6   CALCIUM  --    < > 8.8 7.6*   7.6* 8.5*   MG 2.0  --   --   --  1.9   PHOS 4.2  --   --   --  1.5*    < > = values in this interval not displayed.       Blood Culture:   Recent Labs   Lab 12/28/22  1509 12/29/22  0012 12/29/22  0017   LABBLOO No Growth to date No Growth to date No Growth to date     All pertinent labs within the past 24 hours have been reviewed.    Significant Imaging:  I have reviewed all pertinent imaging results/findings within the past 24 hours.

## 2022-12-29 NOTE — PLAN OF CARE
CM[facility] DAILY GOALS       A: Awake    RASS: Goal -    Actual - RASS ([place] Agitation-Sedation Scale): 0-->alert and calm   Restraint necessity: Clinical Justification: Removing medical devices  B: Breathe   SBT: Not attempted   C: Coordinate A & B, analgesics/sedatives   Pain: managed    SAT: Not attempted  D: Delirium   CAM-ICU: Overall CAM-ICU: Negative  E: Early(intubated/ Progressive (non-intubated) Mobility   MOVE Screen: Fail   Activity: Activity Management: Patient unable to perform activities  FAS: Feeding/Nutrition   Diet order: Diet/Nutrition Received: NPO,    T: Thrombus   DVT prophylaxis: VTE Required Core Measure: Pharmacological prophylaxis initiated/maintained  H: HOB Elevation   Head of Bed (HOB) Positioning: HOB at 30-45 degrees  U: Ulcer Prophylaxis   GI: yes  G: Glucose control   managed    S: Skin   Bathing/Skin Care: bath, partial, incontinence care, linen changed     Pressure Reduction Devices: foam padding utilized  Pressure Reduction Techniques: frequent weight shift encouraged, weight shift assistance provided  Skin Protection: incontinence pads utilized, adhesive use limited, tubing/devices free from skin contact  B: Bowel Function   no issues   I: Indwelling Catheters   [place] necessity:      Urethral Catheter 12/27/22 1535-Reason for Continuing Urinary Catheterization: Critically ill in ICU and requiring hourly monitoring of intake/output   CVC necessity: Yes  D: De-escalation Antibiotics   No    Family/Goals of care/Code Status   Code Status: Full Code    24H Vital Sign Range  Temp:  [97.5 °F (36.4 °C)-98.7 °F (37.1 °C)]   Pulse:  [60-94]   Resp:  [16-34]   BP: ()/(49-76)   SpO2:  [92 %-100 %]   Arterial Line BP: (110-156)/(45-79)      Shift Events   Patient went to IR for aspirate of mass in right hip, MRI of spine and head performed,  monitoring Na q 4 hours.    VS and assessment per flow sheet, patient progressing towards goals as tolerated, plan of care reviewed with family,  concerns addressed, will continue to monitor.    Lisa Hwang

## 2022-12-29 NOTE — PROGRESS NOTES
[facility]  Critical Care Medicine  Progress Note    Patient Name: Dominique Mcgee  MRN: [id number]  Admission Date: 12/23/2022  Hospital Length of Stay: 6 days  Code Status: Full Code  Attending Provider: Nathan Loaiza MD  Primary Care Provider: Briana Leblnac MD   Principal Problem: Hyponatremia    Subjective:     HPI:  Ms. Mcgee is a 73 y/o F patient with HTN, HLD, hypothyroidism, recent URI 3 weeks prior to admission, balance difficulites who presented to the ED on 12/23 for 3 weeks of weakness, LIRA, decreased appetite, and constipation with intermittent lower abdominal cramping. Per daughter, patient is typically independent, lives alone, cooks her own meals, since she has been ill she has had decreased PO intake and drinking coffee, tea, and eating cereal. She has had progressive worsening HTN in the last 3 weeks and was started on losartan-HCTZ and metoprolol. Prior to this she was only taking lisinopril per daughter. Admitted for HTN emergency and was found to have Na 120. Received IV fluids and admitted to hospital medicine. For her hypernatremia, her thiazide was held and she was placed on fluid restriction. Urine studies from 12/25 showed Uosm of 802 and HANH of 109. Her BP has been labile since admission requiring both IV antihypertensives as well as fluid boluses. The patient's hyponatremia persisted despite fluid restriction and she became increasingly lethargic and weak so CCM was consulted for further management.      Upon evaluation, patient is somnolent. Opens eyes to voice, unable to engage in full conversation. Speaks mainly single words. Denies thirst. Reports SOB and generalized abdominal pain a/w constipation. No CP, N/V/D, dysuria, edema. Most recent vitals: /60, pulse 82, R 18, SpO2 94% on RA. Na trend 119->115->117.       Hospital/ICU Course:  Once transferred to ICU patient became hypotensive requiring multiple pressors. Patient intubated for airway protection.  Art line and central line placed. Sodium improved with hypertonic saline. CT abdomen/pelvis showing proctitis and hip abscess. Went for IR aspiration of abscess.      Interval History/Significant Events: Increasing pressor requirements overnight. Required intubation and central line placement. Remains sedated this morning. Responds to commands when off sedation.    Review of Systems   Unable to perform ROS: Intubated   Objective:     Vital Signs (Most Recent):  Temp: 97.5 °F (36.4 °C) (12/29/22 0300)  Pulse: 69 (12/29/22 1201)  Resp: 19 (12/29/22 1201)  BP: (!) 122/49 (12/29/22 1100)  SpO2: 98 % (12/29/22 1201)   Vital Signs (24h Range):  Temp:  [95.5 °F (35.3 °C)-98.7 °F (37.1 °C)] 97.5 °F (36.4 °C)  Pulse:  [64-94] 69  Resp:  [16-34] 19  SpO2:  [94 %-100 %] 98 %  BP: ()/(49-69) 122/49  Arterial Line BP: (110-156)/(46-79) 124/46   Weight: 59.4 kg (131 lb)  Body mass index is 22.49 kg/m².      Intake/Output Summary (Last 24 hours) at 12/29/2022 1206  Last data filed at 12/29/2022 0949  Gross per 24 hour   Intake 2348.14 ml   Output 1050 ml   Net 1298.14 ml       Physical Exam  Vitals and nursing note reviewed.   Constitutional:       Appearance: She is ill-appearing.      Interventions: She is intubated.   HENT:      Head: Normocephalic and atraumatic.   Eyes:      Extraocular Movements: Extraocular movements intact.      Pupils: Pupils are equal, round, and reactive to light.   Cardiovascular:      Rate and Rhythm: Normal rate and regular rhythm.      Pulses: Normal pulses.      Heart sounds: Normal heart sounds. No murmur heard.  Pulmonary:      Effort: She is intubated.      Breath sounds: Normal air entry. Transmitted upper airway sounds present. No wheezing or rales.   Abdominal:      General: Abdomen is flat. There is distension.      Palpations: Abdomen is soft.      Tenderness: There is no abdominal tenderness.   Musculoskeletal:         General: No swelling.      Right lower leg: No edema.      Left  lower leg: No edema.   Skin:     General: Skin is warm and dry.      Findings: No bruising or rash.   Neurological:      Comments: On sedation       Vents:  Vent Mode: A/C (12/29/22 1201)  Ventilator Initiated: Yes (12/29/22 0144)  Set Rate: 18 BPM (12/29/22 1201)  Vt Set: 350 mL (12/29/22 1201)  PEEP/CPAP: 5 cmH20 (12/29/22 1201)  Oxygen Concentration (%): 60 (12/29/22 1201)  Peak Airway Pressure: 23 cmH20 (12/29/22 1201)  Plateau Pressure: 0 cmH20 (12/29/22 1201)  Total Ve: 6.67 L/m (12/29/22 1201)  Negative Inspiratory Force (cm H2O): 0 (12/29/22 1201)  F/VT Ratio<105 (RSBI): (!) 51.63 (12/29/22 1201)  Lines/Drains/Airways       Central Venous Catheter Line  Duration             Percutaneous Central Line Insertion/Assessment - Triple Lumen  12/29/22 0043 right internal jugular <1 day              Drain  Duration                  Urethral Catheter 12/27/22 1535 1 day         NG/OG Tube 12/29/22 0149 [place] sump 18 Fr. Left mouth <1 day         Rectal Tube 12/29/22 0332 rectal tube w/ balloon (indicate number of mLs) 45 Fr. <1 day              Airway  Duration                  Airway - Non-Surgical 12/29/22 0139 <1 day              Arterial Line  Duration             Arterial Line 12/29/22 0219 Left Radial <1 day              Peripheral Intravenous Line  Duration                  Peripheral IV - Single Lumen 12/28/22 1530 20 G Anterior;Right Forearm <1 day         Peripheral IV - Single Lumen 12/28/22 1545 18 G Left Antecubital <1 day         Peripheral IV - Single Lumen 12/28/22 1755 20 G;1 3/4 in Anterior;Right Upper Arm <1 day                  Significant Labs:    CBC/Anemia Profile:  Recent Labs   Lab 12/28/22  1042 12/29/22  0201   WBC 11.58 14.55*   HGB 11.0* 8.9*   HCT 32.7* 26.3*    316   MCV 77* 80*   RDW 12.3 12.6        Chemistries:  Recent Labs   Lab 12/28/22  1339 12/28/22  1349 12/28/22  2228 12/29/22  0201 12/29/22  0944   NA  --    < > 123* 124*  124* 127*   K  --    < > 4.1 3.8  3.8 3.4*    CL  --    < > 83* 89*  89* 91*   CO2  --    < > 35* 25  25 26   BUN  --    < > 23 22  22 17   CREATININE  --    < > 0.8 0.7  0.7 0.6   CALCIUM  --    < > 8.8 7.6*  7.6* 8.5*   MG 2.0  --   --   --  1.9   PHOS 4.2  --   --   --  1.5*    < > = values in this interval not displayed.       Blood Culture:   Recent Labs   Lab 12/28/22  1509 12/29/22  0012 12/29/22  0017   LABBLOO No Growth to date No Growth to date No Growth to date     All pertinent labs within the past 24 hours have been reviewed.    Significant Imaging:  I have reviewed all pertinent imaging results/findings within the past 24 hours.      ABG  Recent Labs   Lab 12/29/22  0321   PH 7.475*   PO2 99   PCO2 41.2   HCO3 30.3*   BE 7     Assessment/Plan:     Pulmonary  Acute hypoxemic respiratory failure  Patient with Hypoxic Respiratory failure which is Acute.  she is not on home oxygen. Supplemental oxygen was provided and noted- Vent Mode: A/C  Oxygen Concentration (%):  [] 60  Resp Rate Total:  [18 br/min] 18 br/min  Vt Set:  [350 mL] 350 mL  PEEP/CPAP:  [5 cmH20] 5 cmH20  Mean Airway Pressure:  [8.2 cmH20-9.3 cmH20] 9.3 cmH20.   Signs/symptoms of respiratory failure include- tachypnea, increased work of breathing and respiratory distress. Contributing diagnoses includes - Pleural effusion and atelectasis Labs and images were reviewed. Patient Has recent ABG, which has been reviewed. Will treat underlying causes and adjust management of respiratory failure as follows- unclear etiology of pleural effusions, patient notes recent URI 3 weeks ago that has left her short of breath since then, on vanc/zosyn and azithro    Cardiac/Vascular  Shock  Patient with hypotension initially requiring levo and epi. Now only requiring levo. Titrate with goal MAP 60.    Hyperlipemia  Continue home statin    Essential hypertension  Known history of uncontrolled HTN, w/ labile BP since admit.   -current meds include amlodipine 10, coreg 25, losartan 100  -now on  lower side of normal  -will hold amlo and losartan, continue coreg  -will avoid IV meds if possible as has had hypotension following IV BP meds  -renal artery duplex to eval for renal artery stenosis    Dyspnea on exertion  Began prior to admission. Has been requiring 2L via NC to maintain oxygenation  -O2 via NC PRN    Renal/  * Hyponatremia  Patient started having weakness around 3 weeks ago after possible viral illness, also has had decreased appetite and more recently experiencing shortness of breath.  Sodium of 120 on admit, has been fluctuating since, now around 116-117. UOSM 802, HANH 109  Has become increasingly lethargic since admission, concern due to hyponatremia  Likely hypovolemic hyponatremia in setting of diuretic use, decreased intake and possible GI loses    -nephrology consulted, appreciate recs  -s/p hypertonic saline, sodium now 127  -500cc bolus of D5W  -continue q4 BMP    Metabolic alkalosis  Initially could have been related to diuretic use. Bicarb remains elevated after stopping HCTZ.    ID  Severe sepsis  This patient does have evidence of infective focus  My overall impression is septic shock.  Source: proctitis vs hip abscess  Antibiotics given-   Antibiotics (From admission, onward)    Start     Stop Route Frequency Ordered    12/29/22 1030  vancomycin 1,250 mg in dextrose 5 % 250 mL IVPB         -- IV Once 12/29/22 0930    12/29/22 1019  vancomycin - pharmacy to dose  (vancomycin IVPB)        See Hyperspace for full Linked Orders Report.    -- IV pharmacy to manage frequency 12/29/22 0919    12/29/22 0100  mupirocin 2 % ointment         01/02 2059 Nasl 2 times daily 12/28/22 2345    12/28/22 2015  azithromycin (ZITHROMAX) 500 mg in dextrose 5 % 250 mL IVPB         12/31 2014 IV Every 24 hours (non-standard times) 12/28/22 1913    12/28/22 1515  piperacillin-tazobactam (ZOSYN) 4.5 g in sodium chloride 0.9 % 100 mL IVPB (MB+)         -- IV Every 8 hours (non-standard times) 12/28/22 1412         Latest lactate reviewed-  Recent Labs   Lab 12/28/22  1508 12/29/22  0201   LACTATE 1.0 0.6     Organ dysfunction indicated by Acute respiratory failure and encephalopathy    Shock with decreased perfusion noted, Fluid challenge  was given at 30cc/kg    Post- resuscitation assessment- (Done after fluids given for shock)  Vital signs post fluid administrations were-  Temp Readings from Last 1 Encounters:   12/29/22 97.5 °F (36.4 °C) (Oral)     BP Readings from Last 1 Encounters:   12/29/22 (!) 122/49     Pulse Readings from Last 1 Encounters:   12/29/22 69       Perfusion exam was performed within 6 hours of septic shock presentation after bolus shows Adequate tissue perfusion assessed by invasive monitoring  Will continue Pressors- Levophed for MAP of 60  Source control achieved by: vanc, zosyn, azithromycin          GI  Proctitis  CT scan with findings of stercoral proctitis.   -continue bowel regimen  -antimicrobial coverage with vanc/zosyn    Generalized abdominal pain  Patient with constipation and ileus. CT scan consistent with ileus and stercoral proctitis which is likely related to chronic constipation.    Constipation  Continue miralax BID and senna-doc daily    Orthopedic  Abscess of bursa of right hip  CT scan showing possible hematoma vs abscess around right hip. Plan for aspiration with IR to eval for possible source of infection.    Other  Hypothermia  Considering new onset will check BCx and start zosyn. Using bear hugger for warming.    Generalized weakness  See hypernatremia.     Critical Care Daily Checklist:    A: Awake: RASS Goal/Actual Goal:   sedated  Actual: Manning Agitation Sedation Scale (RASS): Alert and calm   B: Spontaneous Breathing Trial Performed? Spon. Breathing Trial Initiated?: Not initiated (12/29/22 0729)   C: SAT & SBT Coordinated?  n/a                      D: Delirium: CAM-ICU Overall CAM-ICU: Negative   E: Early Mobility Performed? Yes   F: Feeding Goal:    Status:      Current Diet Order   Procedures    Diet NPO      AS: Analgesia/Sedation precedex   T: Thromboembolic Prophylaxis yes   H: HOB > 300 Yes   U: Stress Ulcer Prophylaxis (if needed) no   G: Glucose Control n/a   B: Bowel Function Stool Occurrence: 1   I: Indwelling Catheter (Lines & Rush) Necessity Rush, central line   D: De-escalation of Antimicrobials/Pharmacotherapies no    Plan for the day/ETD IR, MRI    Code Status:  Family/Goals of Care: Full Code         Critical secondary to Patient has a condition that poses threat to life and bodily function: sepsis, respiratory distress       Critical care was time spent personally by me on the following activities: development of treatment plan with patient or surrogate and bedside caregivers, discussions with consultants, evaluation of patient's response to treatment, examination of patient, ordering and performing treatments and interventions, ordering and review of laboratory studies, ordering and review of radiographic studies, pulse oximetry, re-evaluation of patient's condition. This critical care time did not overlap with that of any other provider or involve time for any procedures.     Beto Avelar MD  Critical Care Medicine  [facility] - Cardiac Medical ICU

## 2022-12-29 NOTE — HOSPITAL COURSE
Once transferred to ICU patient became hypotensive requiring multiple pressors. Patient intubated for airway protection. Art line and central line placed. Sodium improved with hypertonic saline. CT abdomen/pelvis showing proctitis and hip abscess. Went for IR aspiration of abscess. Culture showing e.coli, on zosyn. MRI brain non-concerning. MRI spine with severe cervical stenosis and cord compression, also with mild lumbar stenosis. NSGY consulted with plans for outpatient follow-up. Repeat CXR with increased LLL consolidation and pleural effusion. Extubated to bipap. Once extubated and off pressors patient began to re-experience labile blood pressures similar to her initial presentation. Started on cardene drip. Difficulty weaning off BIPAP due to inadequate volumes and persistent shortness of breath. Extensive workup done regarding difficulties coming off BIPAP. Neurology consulted. ID was consulted for hip abscess- continued on zosyn. Repeat imaging concerning for pneumonia; started on linezolid. Ortho consulted for hip abscess that re-occurred on repeat imaging. Requested IR re-drain abscess and re-culture. Started on IVIG. Ongoing discussions with neurology and neurosurgery regarding cervical stenosis/cord injury as possible etiology of respiratory muscle weakness. Neurosurgery considering intervention but would prefer if patient received tracheostomy prior to laminectomy. Urine metanephrines came back elevated on two collections. Endocrinology consulted. Repeating urine metanephrines as first sets were collected during use of vasopressors. Also uptitrating blood pressure medications and using PO meds when able (off and on BiPAP). Discussed next steps w/ patient and daughter as patient has persistent respiratory muscle weakness resulting in dependence on BIPAP that has not improved. Began having fevers overnight of 1/11. Started on vanc, cefepime, and fluconazole. Palliative care team discussed with patient who  expressed her wishes not to have a tracheostomy and decided to have code status of DNR/DNI. Bcx grew yeast; vanc/cefepime discontinued. Family discussion 1/16 to express patient's wishes and course of action moving forward. On 1/17, pt was noted to increasingly uncomfortable with the BiPAP mask. After discussing with palliative care, was started on dilauded drip. Pt became increasingly hypoxemic early morning on 1/18 with spO2 50. Had a seizure like activity received versed. Also became bradycardic to the 50. The decision was made to remove bipap given pt was deemed to be in dying process at that time and she is DNR/DNI. Family was notified and they were amenable. Pt passed away on 1/18 at 6:31 am.

## 2022-12-29 NOTE — PLAN OF CARE
R hip abscess aspiration completed without incident or complaint. Pt tolerated well. VSS. No signs or symptoms of distress noted.R hip site covered w Band-Aid.  Pt will be transferred back to ICU via ICU RNx2 and RT.

## 2022-12-29 NOTE — PROGRESS NOTES
Pharmacokinetic Initial Assessment: IV Vancomycin    Assessment/Plan:  - Vancomycin 1250 mg IV q24h.   - Draw trough on 12/31 @1200 prior to 3rd dose, goal 10-20 mcg/mL.    Pharmacy will continue to follow and monitor vancomycin.      Please contact pharmacy at extension [phone] with any questions regarding this assessment.     Thank you for the consult,   Saroj Zhong     Patient brief summary:  Dominique Mcgee is a 72 y.o. female initiated on antimicrobial therapy with IV Vancomycin for treatment of suspected sepsis    Drug Allergies:   Review of patient's allergies indicates:   Allergen Reactions    Hydralazine analogues      Precipitous drop in BP with IV formulation. Avoid if possible    Shellfish containing products Swelling     Actual Body Weight:   59.4 kg    Renal Function:   Estimated Creatinine Clearance: 75.3 mL/min (based on SCr of 0.6 mg/dL).,     Dialysis Method (if applicable):  N/A

## 2022-12-29 NOTE — SUBJECTIVE & OBJECTIVE
Interval History:   Stepped up to ICU, received 3% NS from 4082-9140 then discontinued.  Repeat urine Na low which is more consistent with hypovolemic hyponatremia.  She was intubated early this morning for respiratory distress.    She was started on Abx, infectious workup in process.  UOP has improved, Serum Na up to 127 on 9 am labs from 117 yesterday.      Review of patient's allergies indicates:   Allergen Reactions    Hydralazine analogues      Precipitous drop in BP with IV formulation. Avoid if possible    Shellfish containing products Swelling     Current Facility-Administered Medications   Medication Frequency    acetaminophen tablet 650 mg Q6H PRN    albuterol-ipratropium 2.5 mg-0.5 mg/3 mL nebulizer solution 3 mL Q6H WAKE    atorvastatin tablet 10 mg Daily    azithromycin (ZITHROMAX) 500 mg in dextrose 5 % 250 mL IVPB Q24H    capsaicin 0.025 % cream BID    dexmedetomidine (PRECEDEX) 400mcg/100mL 0.9% NaCL infusion Continuous    dextrose 10% bolus 125 mL 125 mL PRN    dextrose 10% bolus 250 mL 250 mL PRN    enoxaparin injection 40 mg Daily    etomidate (AMIDATE) 2 mg/mL injection     glucagon (human recombinant) injection 1 mg PRN    glucose chewable tablet 16 g PRN    glucose chewable tablet 24 g PRN    glycerin adult suppository 1 suppository Once PRN    hydrocortisone sodium succinate injection 100 mg Q8H    levothyroxine tablet 50 mcg Before breakfast    LIDOcaine HCL 10 mg/ml (1%) 10 mg/mL (1 %) injection     methocarbamoL tablet 500 mg TID PRN    multivitamin tablet Daily    mupirocin 2 % ointment BID    naloxone 0.4 mg/mL injection 0.02 mg PRN    NORepinephrine 4 mg in dextrose 5% 250 mL infusion (premix) (titrating) Continuous    piperacillin-tazobactam (ZOSYN) 4.5 g in sodium chloride 0.9 % 100 mL IVPB (MB+) Q8H    polyethylene glycol packet 17 g BID    rocuronium 10 mg/mL injection     senna-docusate 8.6-50 mg per tablet 1 tablet Daily    sodium chloride 0.9% flush 10  mL Q12H PRN    thiamine (B-1) 500 mg in dextrose 5 % 100 mL IVPB TID    vancomycin - pharmacy to dose pharmacy to manage frequency    vancomycin 1,250 mg in dextrose 5 % 250 mL IVPB Once    vasopressin (PITRESSIN) 0.2 Units/mL in dextrose 5 % 100 mL infusion Continuous    vasopressin (PITRESSIN) 20 unit/mL injection        Objective:     Vital Signs (Most Recent):  Temp: 97.5 °F (36.4 °C) (12/29/22 0300)  Pulse: 72 (12/29/22 0900)  Resp: 18 (12/29/22 0900)  BP: (!) 156/69 (12/29/22 0230)  SpO2: 100 % (12/29/22 0900)   Vital Signs (24h Range):  Temp:  [95.5 °F (35.3 °C)-98.7 °F (37.1 °C)] 97.5 °F (36.4 °C)  Pulse:  [64-94] 72  Resp:  [16-34] 18  SpO2:  [94 %-100 %] 100 %  BP: ()/(49-69) 156/69  Arterial Line BP: (110-156)/(46-79) 124/46     Weight: 59.4 kg (131 lb) (12/27/22 0939)  Body mass index is 22.49 kg/m².  Body surface area is 1.64 meters squared.    I/O last 3 completed shifts:  In: 681 [I.V.:627.5; IV Piggyback:53.5]  Out: 1175 [Urine:1175]    Physical Exam  Vitals and nursing note reviewed.   Constitutional:       General: She is not in acute distress.     Appearance: She is well-developed. She is ill-appearing. She is not toxic-appearing.      Interventions: She is sedated and intubated. Nasal cannula in place.   HENT:      Head: Normocephalic and atraumatic.      Nose: No congestion or rhinorrhea.      Mouth/Throat:      Mouth: Mucous membranes are moist.      Pharynx: Oropharynx is clear.   Eyes:      General: No scleral icterus.        Right eye: No discharge.         Left eye: No discharge.      Extraocular Movements: Extraocular movements intact.      Conjunctiva/sclera: Conjunctivae normal.   Cardiovascular:      Rate and Rhythm: Normal rate and regular rhythm.      Heart sounds: No murmur heard.    No friction rub. No gallop.   Pulmonary:      Effort: Tachypnea present. No respiratory distress. She is intubated.      Breath sounds: No wheezing or rales.   Abdominal:      General: There  is distension.      Palpations: Abdomen is soft.      Tenderness: There is no abdominal tenderness.   Musculoskeletal:         General: No swelling.      Cervical back: Normal range of motion and neck supple.      Right lower leg: No edema.      Left lower leg: No edema.   Skin:     General: Skin is warm and dry.       Significant Labs:  CBC:   Recent Labs   Lab 12/29/22  0201   WBC 14.55*   RBC 3.28*   HGB 8.9*   HCT 26.3*      MCV 80*   MCH 27.1   MCHC 33.8     CMP:   Recent Labs   Lab 12/27/22  0542 12/27/22  1722 12/29/22  0944   *   < > 154*   CALCIUM 9.0   < > 8.5*   ALBUMIN 3.4*  --   --    PROT 6.6  --   --    *   < > 127*   K 3.8   < > 3.4*   CO2 29   < > 26   CL 78*   < > 91*   BUN 10   < > 17   CREATININE 0.4*   < > 0.6   ALKPHOS 70  --   --    ALT 14  --   --    AST 22  --   --    BILITOT 0.9  --   --     < > = values in this interval not displayed.

## 2022-12-29 NOTE — ASSESSMENT & PLAN NOTE
This patient does have evidence of infective focus  My overall impression is septic shock.  Source: proctitis vs hip abscess  Antibiotics given-   Antibiotics (From admission, onward)    Start     Stop Route Frequency Ordered    12/29/22 1030  vancomycin 1,250 mg in dextrose 5 % 250 mL IVPB         -- IV Once 12/29/22 0930    12/29/22 1019  vancomycin - pharmacy to dose  (vancomycin IVPB)        See Hyperspace for full Linked Orders Report.    -- IV pharmacy to manage frequency 12/29/22 0919    12/29/22 0100  mupirocin 2 % ointment         01/02 2059 Nasl 2 times daily 12/28/22 2345 12/28/22 2015  azithromycin (ZITHROMAX) 500 mg in dextrose 5 % 250 mL IVPB         12/31 2014 IV Every 24 hours (non-standard times) 12/28/22 1913    12/28/22 1515  piperacillin-tazobactam (ZOSYN) 4.5 g in sodium chloride 0.9 % 100 mL IVPB (MB+)         -- IV Every 8 hours (non-standard times) 12/28/22 1412        Latest lactate reviewed-  Recent Labs   Lab 12/28/22  1508 12/29/22  0201   LACTATE 1.0 0.6     Organ dysfunction indicated by Acute respiratory failure and encephalopathy    Shock with decreased perfusion noted, Fluid challenge  was given at 30cc/kg    Post- resuscitation assessment- (Done after fluids given for shock)  Vital signs post fluid administrations were-  Temp Readings from Last 1 Encounters:   12/29/22 97.5 °F (36.4 °C) (Oral)     BP Readings from Last 1 Encounters:   12/29/22 (!) 122/49     Pulse Readings from Last 1 Encounters:   12/29/22 69       Perfusion exam was performed within 6 hours of septic shock presentation after bolus shows Adequate tissue perfusion assessed by invasive monitoring  Will continue Pressors- Levophed for MAP of 60  Source control achieved by: vanc, zosyn, azithromycin

## 2022-12-29 NOTE — ASSESSMENT & PLAN NOTE
Patient with Hypoxic Respiratory failure which is Acute.  she is not on home oxygen. Supplemental oxygen was provided and noted- Vent Mode: A/C  Oxygen Concentration (%):  [] 60  Resp Rate Total:  [18 br/min] 18 br/min  Vt Set:  [350 mL] 350 mL  PEEP/CPAP:  [5 cmH20] 5 cmH20  Mean Airway Pressure:  [8.2 cmH20-9.3 cmH20] 9.3 cmH20.   Signs/symptoms of respiratory failure include- tachypnea, increased work of breathing and respiratory distress. Contributing diagnoses includes - Pleural effusion and atelectasis Labs and images were reviewed. Patient Has recent ABG, which has been reviewed. Will treat underlying causes and adjust management of respiratory failure as follows- unclear etiology of pleural effusions, patient notes recent URI 3 weeks ago that has left her short of breath since then, on vanc/zosyn and azithro

## 2022-12-29 NOTE — PROCEDURES
"Dominique Mcgee is a 72 y.o. female patient.    Temp: 98.7 °F (37.1 °C) (12/28/22 2300)  Pulse: 83 (12/29/22 0230)  Resp: 16 (12/29/22 0230)  BP: (!) 156/69 (12/29/22 0230)  SpO2: 100 % (12/29/22 0230)  Weight: 59.4 kg (131 lb) (12/27/22 0939)  Height: 5' 4" (162.6 cm) (12/29/22 0144)       Central Line    Date/Time: 12/29/2022 2:45 AM  Performed by: Molly Henderson DO  Authorized by: Molly Henderson DO     Location procedure was performed:  [facility] CRITICAL C[facility] MEDICINE  Consent Done ?:  Yes  Time out complete?: Verified correct patient, procedure, equipment, staff, and site/side    Indications:  Med administration and vascular access  Preparation:  Skin prepped with ChloraPrep  Location:  Right internal jugular  Catheter type:  Triple lumen  Catheter size:  7 Fr  Inserted Catheter Length (cm):  16  Ultrasound guidance: Yes    Manometry: Yes    Number of attempts:  1  Securement:  Line sutured, chlorhexidine patch, sterile dressing applied and blood return through all ports  Complications: No    XRay:  Placement verified by x-ray    12/29/2022    "

## 2022-12-29 NOTE — PLAN OF CARE
Pt arrived to Interventional Radiology room ct via ICU bed w ICU RN x2 and Resp for R Hip abscess aspiration. Plan of care reviewed with patient, patient verbalized understanding.  Name verified using two identifiers.  Allergies verified.  Labs, orders and consent reviewed on chart.  Pt oriented to unit and staff.  See flow sheet for documentation, monitoring and medication administration. Will continue to monitor.

## 2022-12-29 NOTE — PROCEDURES
"Dominique Mcgee is a 72 y.o. female patient.    Temp: 98.7 °F (37.1 °C) (12/28/22 2300)  Pulse: 90 (12/29/22 0144)  Resp: 18 (12/29/22 0144)  BP: (!) 106/55 (12/29/22 0144)  SpO2: 100 % (12/29/22 0144)  Weight: 59.4 kg (131 lb) (12/27/22 0939)  Height: 5' 4" (162.6 cm) (12/29/22 0144)       Intubation    Date/Time: 12/29/2022 1:48 AM  Location procedure was performed: [facility] CRITICAL CARE MEDICINE  Performed by: Elmo Ramsey MD  Authorized by: Elmo Ramsey MD   Pre-operative diagnosis: septic shock  Post-operative diagnosis: septic shock  Consent Done: Emergent Situation  Indications: respiratory distress, respiratory failure, airway protection and hypoxemia  Intubation method: video-assisted  Patient status: unconscious  Preoxygenation: BVM  Sedatives: etomidate  Paralytic: rocuronium  Laryngoscope size: Mac 4  Tube size: 8.0 mm  Tube type: cuffed  Number of attempts: 1  Cricoid pressure: no  Cords visualized: yes  Post-procedure assessment: chest rise and CO2 detector  Breath sounds: equal and absent over the epigastrium  Cuff inflated: yes  ETT to lip: 22 cm  ETT to teeth: 21 cm  Tube secured with: ETT miranda  Chest x-ray findings: endotracheal tube in appropriate position  Patient tolerance: Patient tolerated the procedure well with no immediate complications  Complications: No  Estimated blood loss (mL): 0  Specimens: No  Implants: No        12/29/2022    "

## 2022-12-29 NOTE — PLAN OF CARE
CMICU DAILY GOALS       A: Awake    RASS: Goal -    Actual -     Restraint necessity:    B: Breathe   SBT: Not intubated   C: Coordinate A & B, analgesics/sedatives   Pain: managed    SAT: Not intubated.  D: Delirium   CAM-ICU: Overall CAM-ICU: Negative  E: Early(intubated/ Progressive (non-intubated) Mobility   MOVE Screen: Fail   Activity: Activity Management: Patient unable to perform activities  FAS: Feeding/Nutrition   Diet order:  ,    T: Thrombus   DVT prophylaxis:    H: HOB Elevation   Head of Bed (HOB) Positioning: HOB at 30-45 degrees  U: Ulcer Prophylaxis   GI: no  G: Glucose control   managed    S: Skin   Bathing/Skin Care: bath, complete     Pressure Reduction Devices: foam padding utilized  Pressure Reduction Techniques: frequent weight shift encouraged, weight shift assistance provided  Skin Protection: incontinence pads utilized, adhesive use limited, tubing/devices free from skin contact  B: Bowel Function   no issues   I: Indwelling Catheters   Rush necessity:      Urethral Catheter 12/27/22 1535-Reason for Continuing Urinary Catheterization: Critically ill in ICU and requiring hourly monitoring of intake/output   CVC necessity: No  D: De-escalation Antibiotics   Yes    Family/Goals of care/Code Status   Code Status: Full Code    24H Vital Sign Range  Temp:  [95.5 °F (35.3 °C)-98 °F (36.7 °C)]   Pulse:  []   Resp:  [16-34]   BP: ()/(51-91)   SpO2:  [94 %-100 %]      Shift Events   Transferred from 8 floor via rapid response, placed on sodium chloride 3%, CT of head and abdomen performed, and ABX started. Continuing to monitor sodium.    VS and assessment per flow sheet, patient progressing towards goals as tolerated, plan of care reviewed with family, concerns addressed, will continue to monitor.    Lisa Hwang

## 2022-12-29 NOTE — ASSESSMENT & PLAN NOTE
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   -continue bowel regimen  -antimicrobial coverage with vanc/zosyn

## 2022-12-29 NOTE — ASSESSMENT & PLAN NOTE
CT scan showing possible hematoma vs abscess around right hip. Plan for aspiration with IR to eval for possible source of infection.

## 2022-12-29 NOTE — CONSULTS
"Percutaneous Drain Placement/Aspiration Consult Note  Interventional Radiology    Consult Requested By: Molly Henderson DO  Reason for Consult: "Please eval for drainage of multiloculated fluid collection concerning for possible abscess. Abscess suspected in setting of worsening shock"    SUBJECTIVE:     Chief Complaint:  R hip/trochanter fluid collection    History of Present Illness:  Dominique Mcgee is a 72 y.o. female with a PMHx of essential hypertension, hyperlipidemia, hypothyroidism, balance difficulites who was admitted from the ED on 12/23 for hypertensive urgency and hyponatremia, Na 120. Hospital course notable for persistent hyponatremia despite medication changes and fluid restriction as well as worsening lethargy prompting transfer to ICU on 12/28/22. Further workup in ICU including pan CT on 12/28/22 revealed "2.9 x 2.6 x 5.7 cm multiloculated fluid collection within the soft tissues posterior to the right lesser trochanter; may represent hematoma in setting of recent trauma vs distended bursda/bursitis vs abscess". Interventional Radiology has been consulted for image guided percutaneous drain placement/aspiration for management of  multiloculated fluid collection posterior to the R hip/lesser trochanter . The pt's WBC is 14.55 up from 11.58, lactate WNL, blood cultures NGTD.She is currently afebrile. BP fluctuating from 90/50s-150/60s, HR WNL. She is intubated and sedated.    Review of Systems   Unable to perform ROS: Intubated     Scheduled Meds:   albuterol-ipratropium  3 mL Nebulization Q6H WAKE    atorvastatin  10 mg Oral Daily    azithromycin  500 mg Intravenous Q24H    capsaicin   Topical (Top) BID    enoxaparin  40 mg Subcutaneous Daily    etomidate        fludrocortisone  100 mcg Oral Daily    hydrocortisone sodium succinate  100 mg Intravenous Q8H    levothyroxine  50 mcg Oral Before breakfast    LIDOcaine HCL 10 mg/ml (1%)        multivitamin  1 tablet Oral Daily    mupirocin   Nasal BID "    piperacillin-tazobactam (ZOSYN) 4.5 g/100 mL in NS IVPB (MB+)  4.5 g Intravenous Q8H    polyethylene glycol  17 g Oral BID    rocuronium        senna-docusate 8.6-50 mg  1 tablet Oral Daily    thiamine (VITAMIN B1) IVPB  500 mg Intravenous TID    vasopressin         Continuous Infusions:   dexmedetomidine (PRECEDEX) infusion 0.5 mcg/kg/hr (12/29/22 0600)    NORepinephrine bitartrate-D5W 0.02 mcg/kg/min (12/29/22 0644)    vasopressin 0.04 Units/min (12/29/22 0235)     PRN Meds:acetaminophen, dextrose 10%, dextrose 10%, glucagon (human recombinant), glucose, glucose, glycerin adult, methocarbamoL, naloxone, sodium chloride 0.9%    Review of patient's allergies indicates:   Allergen Reactions    Hydralazine analogues      Precipitous drop in BP with IV formulation. Avoid if possible    Shellfish containing products Swelling       Past Medical History:   Diagnosis Date    Hyperlipidemia     Hypertension     Hypothyroidism      Past Surgical History:   Procedure Laterality Date    HYSTERECTOMY       Family History   Problem Relation Age of Onset    Breast cancer Sister 55    Breast cancer Sister 65     Social History     Tobacco Use    Smoking status: Never   Substance Use Topics    Drug use: Never       OBJECTIVE:     Vital Signs (Most Recent)  Temp: 97.5 °F (36.4 °C) (12/29/22 0300)  Pulse: 69 (12/29/22 0800)  Resp: 18 (12/29/22 0800)  BP: (!) 156/69 (12/29/22 0230)  SpO2: 100 % (12/29/22 0800)    Physical Exam:  Physical Exam  Vitals and nursing note reviewed.   Constitutional:       General: She is not in acute distress.     Appearance: She is ill-appearing.      Comments: Intubated and sedated   HENT:      Head: Normocephalic and atraumatic.   Pulmonary:      Effort: Pulmonary effort is normal. No respiratory distress.   Abdominal:      General: There is no distension.      Tenderness: There is no abdominal tenderness.   Musculoskeletal:         General: No swelling.   Skin:     General: Skin is warm and dry.       Coloration: Skin is not jaundiced.       Laboratory  I have reviewed all pertinent lab results within the past 24 hours.  CBC:   Recent Labs   Lab 12/29/22  0201   WBC 14.55*   RBC 3.28*   HGB 8.9*   HCT 26.3*      MCV 80*   MCH 27.1   MCHC 33.8     BMP:   Recent Labs   Lab 12/29/22  0944   *   *   K 3.4*   CL 91*   CO2 26   BUN 17   CREATININE 0.6   CALCIUM 8.5*   MG 1.9     CMP:   Recent Labs   Lab 12/27/22  0542 12/27/22  1722 12/29/22  0944   *   < > 154*   CALCIUM 9.0   < > 8.5*   ALBUMIN 3.4*  --   --    PROT 6.6  --   --    *   < > 127*   K 3.8   < > 3.4*   CO2 29   < > 26   CL 78*   < > 91*   BUN 10   < > 17   CREATININE 0.4*   < > 0.6   ALKPHOS 70  --   --    ALT 14  --   --    AST 22  --   --    BILITOT 0.9  --   --     < > = values in this interval not displayed.     LFTs:   Recent Labs   Lab 12/27/22  0542   ALT 14   AST 22   ALKPHOS 70   BILITOT 0.9   PROT 6.6   ALBUMIN 3.4*     Coagulation: No results for input(s): LABPROT, INR, APTT in the last 168 hours.    ASA/Mallampati  ASA: 3  Mallampati: 3    Imaging:  Recent imaging studies including CT a/p on 12/28/22 which was independently reviewed by Milton Cancino:.     EXAMINATION:  CT CHEST ABDOMEN PELVIS WITHOUT CONTRAST(XPD)     CLINICAL HISTORY:  Sepsis;shortness of breath, recent URI, lower abd pain/constipation, eval for adrenal tumors;     TECHNIQUE:  Low dose axial images, sagittal and coronal reformations were obtained from the thoracic inlet to the pubic without intravenous contrast.  Oral contrast was not administered.     COMPARISON:  Abdominal radiograph 12/27/2022.  Thyroid ultrasound 12/10/2022.     FINDINGS:  Base of Neck: Multinodular right thyroid gland with hypodense lesion measuring up to 1.6 cm (axial series 301, image 10).     Thoracic soft tissues: Unremarkable.     Aorta: Left-sided aortic arch with 2 branch vessels noting common origin of the innominate and left common carotid arteries.  No  aneurysm and mild calcific atherosclerosis.     Heart: Normal size. No effusion.  Multi-vessel coronary artery calcifications.     Carolyn/Mediastinum: No pathologic meghna enlargement.     Airways: Patent.     Lungs/Pleura: Biapical fibronodular scarring.  Moderate centrilobular emphysema with upper lobe predominance.  Mild bronchiectasis at the bilateral lower lobes, right more so than left.  No suspicious pulmonary nodules.  Small bilateral pleural effusions, right greater than left.  Compressive atelectasis of the bilateral lower lobes.     Esophagus: Unremarkable.     Liver: Normal size and attenuation. No focal lesions.     Gallbladder: No calcified gallstones.     Bile Ducts: No dilatation.     Pancreas: No mass. No peripancreatic fat stranding.     Spleen: Unremarkable.     Adrenals: Normal size.  Nonspecific punctate calcifications in the bilateral adrenal glands.     Kidneys/Ureters: The kidneys are normal in size and location.  No mass noting limited evaluation without intravenous contrast.  No hydroureteronephrosis.     Bladder: The urinary bladder is decompressed around a Rush catheter, noting non-dependent air.     Reproductive organs: Uterus is surgically absent.     GI Tract/Mesentery: The stomach is unremarkable.  The small bowel loops are normal in caliber without evidence of obstruction or inflammation.  The appendix is normal in caliber noting focus of air.  Moderate distension of the entirety of the large bowel loops with air-fluid levels, the transverse colon measures up to 5.4 cm in diameter.  No focal transition point to suggest high-grade obstruction.  Moderate stool distension of the rectum with perirectal fat stranding and presacral edema, compatible with stercoral proctitis.     Peritoneal Space: No ascites or free air.     Retroperitoneum: No significant adenopathy.  Prominent bilateral inguinal lymph nodes, none pathologically enlarged.     Abdominal wall: Fat containing umbilical hernia.   Multiple foci of subcutaneous gas in the right anterior abdominal wall likely represents recent injection.     Vasculature: Abdominal aorta is normal in course and caliber with severe aortoiliac calcific atherosclerosis.  No aneurysm.     Bones: Diffuse osteopenia.  Multilevel degenerative changes to the entire spine.  No acute fracture. No osseous destructive process.     Lower extremities: 2.9 x 2.6 x 5.7 cm multiloculated fluid collection within the soft tissues posterior to the right lesser trochanter (axial series 302, image 179; coronal series 604, image 95) may represent hematoma in the setting of recent trauma versus distended bursa/bursitis versus abscess in the proper clinical setting.  Partially visualized lipoma in the left rectus femoris.     Impression:     1. Moderate distension of the colonic bowel loops with air-fluid levels likely reflect ileus.  No evidence of high-grade obstruction.  2. Moderate stool distension of the rectum with findings compatible with stercoral proctitis.  3. Multiloculated fluid collection within the soft tissues posterior to the right hip/lesser trochanter may represent hematoma in the setting of recent trauma versus distended bursa/bursitis versus abscess.  Clinical correlation is advised.  4. Multinodular right thyroid gland.  Clinical correlation with prior thyroid ultrasound 12/10/2022.  5. Small bilateral pleural effusions, right greater than left, with associated compressive atelectasis of the lower lobes.  6. Multi-vessel coronary artery calcifications.  7. Severe abdominal aortoiliac calcific atherosclerosis without aneurysmal dilatation.  8. Additional findings as above.  This report was flagged in Epic as abnormal.     Electronically signed by resident: Vamshi Hussein  Date:                                            12/28/2022  Time:                                           17:48     Electronically signed by: Ravinder Polanco MD  Date:                                             12/28/2022  Time:                                           21:05  ASSESSMENT/PLAN:     Assessment:  72 y.o. female with a PMHx of essential hypertension, hyperlipidemia, hypothyroidism, balance difficuliteswho has been referred to IR for image guided percutaneous aspiration for management of  R hip/trochanter fluid collection . The procedure was discussed in great detail with the patient including thorough explanations of the potential risks and benefits of percutaneous drain placement/aspiration. Risks include sepsis, severe infection, hemorrhage, damage to surrounding structures, catheter malfunction, inability to remove catheter, and catheter dislodgment. The patient is a candidate for US guided percutaneous drain aspiration of the fluid collection posterior to the R hip/trochanter under moderate sedation. Plan discussed with ordering physician.The pt verbalized understanding of the plan and would like to proceed.    Plan:  Will proceed with US guided percutaneous aspiration of the fluid collection posterior to the R hip/trochanter under moderate sedation on 12/29/22.   Please keep pt NPO.  Anticoagulation history reviewed.  Coagulation labs reviewed.  Thank you for the consult. IR will continue to follow. Please contact with questions via [employer] secure chat.    Linsey Prasad PA-C  Interventional Radiology  [unfilled]

## 2022-12-29 NOTE — ASSESSMENT & PLAN NOTE
Patient started having weakness around 3 weeks ago after possible viral illness, also has had decreased appetite and more recently experiencing shortness of breath.  Sodium of 120 on admit, has been fluctuating since, now around 116-117. UOSM 802, HANH 109  Has become increasingly lethargic since admission, concern due to hyponatremia  Likely hypovolemic hyponatremia in setting of diuretic use, decreased intake and possible GI loses    -nephrology consulted, appreciate recs  -s/p hypertonic saline, sodium now 127  -500cc bolus of D5W  -continue q4 BMP

## 2022-12-29 NOTE — ASSESSMENT & PLAN NOTE
-renin/susanne pending  -currently hypotensive on low dose pressor support   -infectious workup pending    Right renal artery stenosis noted on renal US (60-79%).  No acute intervention needed now while in ICU, will evaluate when more stable for further interventions

## 2022-12-29 NOTE — PROCEDURES
"Dominique Mcgee is a 72 y.o. female patient.    Temp: 98.7 °F (37.1 °C) (12/28/22 2300)  Pulse: 83 (12/29/22 0230)  Resp: 16 (12/29/22 0230)  BP: (!) 156/69 (12/29/22 0230)  SpO2: 100 % (12/29/22 0230)  Weight: 59.4 kg (131 lb) (12/27/22 0939)  Height: 5' 4" (162.6 cm) (12/29/22 0144)       Arterial Line    Date/Time: 12/29/2022 2:47 AM  Location procedure was performed: [facility] CRITICAL CARE MEDICINE  Performed by: Octaviano De La Torre MD  Authorized by: Octaviano De La Torre MD   Pre-op Diagnosis: septic shock  Post-operative diagnosis: septic shock  Consent Done: Emergent Situation  Indications: hemodynamic monitoring  Location: left radial    Patient sedated: yes  Sedatives: etomidate  Seldinger technique: Seldinger technique used  Number of attempts: 2  Complications: No  Specimens: No  Implants: No  Post-procedure: line sutured and dressing applied  Patient tolerance: Patient tolerated the procedure well with no immediate complications  Comments: Procedure performed after patient was intubated.         12/29/2022    "

## 2022-12-29 NOTE — ASSESSMENT & PLAN NOTE
Patient with constipation and ileus. CT scan consistent with ileus and stercoral proctitis which is likely related to chronic constipation.

## 2022-12-29 NOTE — ASSESSMENT & PLAN NOTE
Patient with hypotension initially requiring levo and epi. Now only requiring levo. Titrate with goal MAP 60.

## 2022-12-30 PROBLEM — E83.39 HYPOPHOSPHATEMIA: Status: ACTIVE | Noted: 2022-01-01

## 2022-12-30 PROBLEM — R57.9 SHOCK: Status: RESOLVED | Noted: 2022-01-01 | Resolved: 2022-01-01

## 2022-12-30 PROBLEM — E44.0 MODERATE MALNUTRITION: Status: ACTIVE | Noted: 2022-01-01

## 2022-12-30 PROBLEM — I70.1 RIGHT RENAL ARTERY STENOSIS: Status: ACTIVE | Noted: 2022-01-01

## 2022-12-30 NOTE — ASSESSMENT & PLAN NOTE
Nutrition Problem:  Moderate Protein-Calorie Malnutrition  Malnutrition in the context of Acute Illness/Injury    Related to (etiology):  Inability to consume sufficient energy     Signs and Symptoms (as evidenced by):  Energy Intake: <75% of estimated energy requirement for > 1 week   Body Fat Depletion: mild and moderate depletion of orbitals   Muscle Mass Depletion: mild and moderate depletion of temples and clavicle region     Interventions(treatment strategy):  Collaboration of nutrition care w/ other providers  EN    Nutrition Diagnosis Status:  New

## 2022-12-30 NOTE — PLAN OF CARE
CMICU DAILY GOALS       A: Awake    RASS: Goal -    Actual - RASS (Manning Agitation-Sedation Scale): 0-->alert and calm   Restraint necessity: Clinical Justification: Removing medical devices  B: Breathe   SBT: Fail   C: Coordinate A & B, analgesics/sedatives   Pain: managed    SAT: Fail  D: Delirium   CAM-ICU: Overall CAM-ICU: Negative  E: Early(intubated/ Progressive (non-intubated) Mobility   MOVE Screen: Fail   Activity: Activity Management: Patient unable to perform activities  FAS: Feeding/Nutrition   Diet order: Diet/Nutrition Received: NPO,    T: Thrombus   DVT prophylaxis: VTE Required Core Measure: Pharmacological prophylaxis initiated/maintained  H: HOB Elevation   Head of Bed (HOB) Positioning: HOB at 45 degrees  U: Ulcer Prophylaxis   GI: no  G: Glucose control   managed    S: Skin   Bathing/Skin Care: bath, partial, incontinence care, linen changed     Pressure Reduction Devices: foam padding utilized  Pressure Reduction Techniques: frequent weight shift encouraged, weight shift assistance provided  Skin Protection: incontinence pads utilized, adhesive use limited, tubing/devices free from skin contact  B: Bowel Function   no issues   I: Indwelling Catheters   Rush necessity:      Urethral Catheter 12/27/22 1535-Reason for Continuing Urinary Catheterization: Critically ill in ICU and requiring hourly monitoring of intake/output   CVC necessity: No  D: De-escalation Antibiotics   No    Family/Goals of care/Code Status   Code Status: Full Code    24H Vital Sign Range  Temp:  [97.5 °F (36.4 °C)-98.7 °F (37.1 °C)]   Pulse:  [60-94]   Resp:  [16-34]   BP: ()/(49-76)   SpO2:  [92 %-100 %]   Arterial Line BP: (110-156)/(45-79)      Shift Events   Patient arrived to 6070 in  and tachypnic, patient intubated and started on sedation medications. Rush placed at bedside with temp probe.     VS and assessment per flow sheet, patient progressing towards goals as tolerated, plan of care reviewed with  family, concerns addressed, will continue to monitor.    Lisa Hwang

## 2022-12-30 NOTE — ASSESSMENT & PLAN NOTE
Known history of uncontrolled HTN, w/ labile BP since admit.   -BP meds held in setting of septic shock  -will avoid IV meds if possible as has had hypotension following IV BP meds  -renal artery duplex showing R renal artery stenosis, L renal artery unable to be visualized  -Will monitor and add back meds as needed

## 2022-12-30 NOTE — PLAN OF CARE
[facility] Cardiac Medical [facility]  Discharge Reassessment    Primary Care Provider: Briana Leblanc MD    Expected Discharge Date: 1/3/2023    Reassessment (most recent)       Discharge Reassessment - 12/30/22 1532          Discharge Reassessment    Assessment Type Discharge Planning Reassessment     Did the patient's condition or plan change since previous assessment? No     Discharge Plan discussed with: Adult children     Name(s) and Number(s) francisco javier Cook. cp# [phone]     Communicated TRICIA with patient/caregiver Date not available/Unable to determine     Discharge Plan A Long-term acute care facility (LTAC)     Discharge Plan B Home with family;Home Health     DME Needed Upon Discharge  other (see comments)   TBD    Discharge Barriers Identified None     Why the patient remains in the hospital Requires continued medical care        Post-Acute Status    Post-Acute Authorization Placement     Post-Acute Placement Status Pending medical clearance/testing     Discharge Delays None known at this time                   Patient not stable for discharge @ this time.  Patient currently ventilated.  SW will continue to follow patient's progress to discharge from MICU.  SW remains available for any family concerns or needs.    Misti Nicole LMSW  [facility]  X [phone]

## 2022-12-30 NOTE — PLAN OF CARE
Recommendations     If able to extubate & advance diet, rec'd Regular diet (texture per SLP).  If unable to extubate, resume TFs. Rec'd Impact Peptide @ 40 mL/hr = 1440 kcals, 90 g of protein, 739 mL fluid.  RD to monitor & follow-up.     Goals: Meet % EEN, EPN by RD f/u date  Nutrition Goal Status: new  Communication of RD Recs: reviewed with RN

## 2022-12-30 NOTE — ASSESSMENT & PLAN NOTE
This patient does have evidence of infective focus  My overall impression is septic shock.  Source: proctitis vs pneumonia  Antibiotics given-   Antibiotics (From admission, onward)    Start     Stop Route Frequency Ordered    12/30/22 1300  vancomycin 1,250 mg in dextrose 5 % 250 mL IVPB         -- IV Every 24 hours (non-standard times) 12/29/22 1343    12/29/22 1019  vancomycin - pharmacy to dose  (vancomycin IVPB)        See [facility] for full Linked Orders Report.    -- IV pharmacy to manage frequency 12/29/22 0919    12/29/22 0100  mupirocin 2 % ointment         01/02 2059 Nasl 2 times daily 12/28/22 2345    12/28/22 2015  azithromycin (ZITHROMAX) 500 mg in dextrose 5 % 250 mL IVPB         12/31 2059 IV Every 24 hours (non-standard times) 12/28/22 1913    12/28/22 1515  piperacillin-tazobactam (ZOSYN) 4.5 g in sodium chloride 0.9 % 100 mL IVPB (MB+)         -- IV Every 8 hours (non-standard times) 12/28/22 1412        Latest lactate reviewed-  Recent Labs   Lab 12/28/22  1508 12/29/22  0201   LACTATE 1.0 0.6     Organ dysfunction indicated by Acute respiratory failure and encephalopathy    Shock with decreased perfusion noted, Fluid challenge  was given at 30cc/kg    Post- resuscitation assessment- (Done after fluids given for shock)  Vital signs post fluid administrations were-  Temp Readings from Last 1 Encounters:   12/30/22 98 °F (36.7 °C) (Oral)     BP Readings from Last 1 Encounters:   12/30/22 (!) 145/72     Pulse Readings from Last 1 Encounters:   12/30/22 106       Perfusion exam was performed within 6 hours of septic shock presentation after bolus shows Adequate tissue perfusion assessed by invasive monitoring  Will continue Pressors- Levophed for MAP of 60  Source control achieved by: vanc, zosyn, azithromycin

## 2022-12-30 NOTE — PROGRESS NOTES
[facility]  Critical Care Medicine  Progress Note    Patient Name: Dominique Mcgee  MRN: [id number]  Admission Date: 12/23/2022  Hospital Length of Stay: 7 days  Code Status: Full Code  Attending Provider: Nathan Loaiza MD  Primary Care Provider: Briana Leblanc MD   Principal Problem: Hyponatremia    Subjective:     HPI:  Ms. Mcgee is a 71 y/o F patient with HTN, HLD, hypothyroidism, recent URI 3 weeks prior to admission, balance difficulites who presented to the ED on 12/23 for 3 weeks of weakness, LIRA, decreased appetite, and constipation with intermittent lower abdominal cramping. Per daughter, patient is typically independent, lives alone, cooks her own meals, since she has been ill she has had decreased PO intake and drinking coffee, tea, and eating cereal. She has had progressive worsening HTN in the last 3 weeks and was started on losartan-HCTZ and metoprolol. Prior to this she was only taking lisinopril per daughter. Admitted for HTN emergency and was found to have Na 120. Received IV fluids and admitted to hospital medicine. For her hypernatremia, her thiazide was held and she was placed on fluid restriction. Urine studies from 12/25 showed Uosm of 802 and HANH of 109. Her BP has been labile since admission requiring both IV antihypertensives as well as fluid boluses. The patient's hyponatremia persisted despite fluid restriction and she became increasingly lethargic and weak so CCM was consulted for further management.      Upon evaluation, patient is somnolent. Opens eyes to voice, unable to engage in full conversation. Speaks mainly single words. Denies thirst. Reports SOB and generalized abdominal pain a/w constipation. No CP, N/V/D, dysuria, edema. Most recent vitals: /60, pulse 82, R 18, SpO2 94% on RA. Na trend 119->115->117.       Hospital/ICU Course:  Once transferred to ICU patient became hypotensive requiring multiple pressors. Patient intubated for airway protection.  Art line and central line placed. Sodium improved with hypertonic saline. CT abdomen/pelvis showing proctitis and hip abscess. Went for IR aspiration of abscess. Fluid analysis not concerning for infection. MRI brain non-concerning. MRI spine with severe cervical stenosis and cord compression, also with mild lumbar stenosis. Repeat CXR with increased LLL consolidation and pleural effusion.      Interval History/Significant Events: Off sedation, responding to commands. Remains intubated.    Review of Systems   Unable to perform ROS: Intubated   Objective:     Vital Signs (Most Recent):  Temp: 98 °F (36.7 °C) (12/30/22 1101)  Pulse: 106 (12/30/22 1306)  Resp: (!) 27 (12/30/22 1306)  BP: (!) 145/72 (12/30/22 1101)  SpO2: (!) 94 % (12/30/22 1306)   Vital Signs (24h Range):  Temp:  [97.4 °F (36.3 °C)-98.2 °F (36.8 °C)] 98 °F (36.7 °C)  Pulse:  [] 106  Resp:  [16-28] 27  SpO2:  [94 %-100 %] 94 %  BP: (130-145)/(72-82) 145/72  Arterial Line BP: (116-175)/(45-67) 175/65   Weight: 59.4 kg (130 lb 15.3 oz)  Body mass index is 22.48 kg/m².      Intake/Output Summary (Last 24 hours) at 12/30/2022 1340  Last data filed at 12/30/2022 1300  Gross per 24 hour   Intake 2366.29 ml   Output 2165 ml   Net 201.29 ml         Physical Exam  Vitals and nursing note reviewed.   Constitutional:       Appearance: She is ill-appearing.      Interventions: She is intubated.   HENT:      Head: Normocephalic and atraumatic.   Eyes:      Extraocular Movements: Extraocular movements intact.      Pupils: Pupils are equal, round, and reactive to light.   Cardiovascular:      Rate and Rhythm: Normal rate and regular rhythm.      Pulses: Normal pulses.      Heart sounds: Normal heart sounds. No murmur heard.  Pulmonary:      Effort: She is intubated.      Breath sounds: Normal air entry. Transmitted upper airway sounds present. No wheezing or rales.   Abdominal:      General: Abdomen is flat. There is no distension.      Palpations: Abdomen is  soft.      Tenderness: There is no abdominal tenderness.   Musculoskeletal:         General: No swelling.      Right lower leg: No edema.      Left lower leg: No edema.   Skin:     General: Skin is warm and dry.      Findings: No bruising or rash.   Neurological:      Comments: Alert, following commands       Vents:  Vent Mode: A/C (12/30/22 1304)  Ventilator Initiated: Yes (12/29/22 0144)  Set Rate: 12 BPM (12/30/22 1304)  Vt Set: 350 mL (12/30/22 0722)  Pressure Support: 14 cmH20 (12/30/22 1139)  PEEP/CPAP: 6 cmH20 (12/30/22 1304)  Oxygen Concentration (%): 30 (12/30/22 1304)  Peak Airway Pressure: 22 cmH20 (12/30/22 1304)  Plateau Pressure: 20 cmH20 (12/30/22 1304)  Total Ve: 8.84 L/m (12/30/22 1304)  Negative Inspiratory Force (cm H2O): 0 (12/30/22 1304)  F/VT Ratio<105 (RSBI): (!) 74.93 (12/30/22 1304)  Lines/Drains/Airways       Central Venous Catheter Line  Duration             Percutaneous Central Line Insertion/Assessment - Triple Lumen  12/29/22 0043 right internal jugular 1 day              Drain  Duration                  Urethral Catheter 12/27/22 1535 2 days         NG/OG Tube 12/29/22 0149 [place] sump 18 Fr. Left mouth 1 day         Rectal Tube 12/29/22 0332 rectal tube w/ balloon (indicate number of mLs) 45 Fr. 1 day              Airway  Duration                  Airway - Non-Surgical 12/29/22 0139 1 day              Arterial Line  Duration             Arterial Line 12/29/22 0219 Left Radial 1 day              Peripheral Intravenous Line  Duration                  Peripheral IV - Single Lumen 12/28/22 1530 20 G Anterior;Right Forearm 1 day         Peripheral IV - Single Lumen 12/28/22 1545 18 G Left Antecubital 1 day         Peripheral IV - Single Lumen 12/28/22 1755 20 G;1 3/4 in Anterior;Right Upper Arm 1 day                  Significant Labs:    CBC/Anemia Profile:  Recent Labs   Lab 12/29/22  0201 12/30/22  0430   WBC 14.55* 15.79*   HGB 8.9* 10.1*   HCT 26.3* 30.3*    360   MCV 80* 78*    RDW 12.6 12.7          Chemistries:  Recent Labs   Lab 12/29/22  0944 12/29/22  1327 12/30/22  0316 12/30/22  0627 12/30/22  0931 12/30/22  1014   *   < > 126* 128* 129*  --    K 3.4*   < > 3.2* 4.0 3.8  --    CL 91*   < > 91* 95 93*  --    CO2 26   < > 23 23 26  --    BUN 17   < > 11 11 11  --    CREATININE 0.6   < > 0.5 0.5 0.5  --    CALCIUM 8.5*   < > 8.2* 8.0* 8.1*  --    MG 1.9  --  1.8  --   --   --    PHOS 1.5*  --  <1.0*  --   --  4.7*    < > = values in this interval not displayed.         Blood Culture:   Recent Labs   Lab 12/28/22  1509 12/29/22  0012 12/29/22  0017   LABBLOO No Growth to date  No Growth to date No Growth to date  No Growth to date No Growth to date  No Growth to date       All pertinent labs within the past 24 hours have been reviewed.    Significant Imaging:  I have reviewed all pertinent imaging results/findings within the past 24 hours.      ABG  Recent Labs   Lab 12/30/22  1253   PH 7.497*   PO2 66*   PCO2 37.0   HCO3 28.7*   BE 5     Assessment/Plan:     Pulmonary  Acute hypoxemic respiratory failure  Patient with Hypoxic Respiratory failure which is Acute.  she is not on home oxygen. Supplemental oxygen was provided and noted- Vent Mode: A/C  Oxygen Concentration (%):  [30-40] 30  Resp Rate Total:  [16 br/min-28 br/min] 26 br/min  Vt Set:  [350 mL] 350 mL  PEEP/CPAP:  [5 cmH20-6 cmH20] 6 cmH20  Pressure Support:  [14 cmH20] 14 cmH20  Mean Airway Pressure:  [9 jnM52-39 cmH20] 12 cmH20.   Signs/symptoms of respiratory failure include- tachypnea, increased work of breathing and respiratory distress. Contributing diagnoses includes - Pleural effusion and atelectasis Labs and images were reviewed. Patient Has recent ABG, which has been reviewed. Will treat underlying causes and adjust management of respiratory failure as follows- unclear etiology of pleural effusions, patient notes recent URI 3 weeks ago that has left her short of breath since then, on vanc/zosyn and  azithro  Repeat CXR with increased consolidation in LLL  -continue abx  -SBT, possible extubation    Cardiac/Vascular  Right renal artery stenosis  Noted on renal US 12/28    -no acute intervention while in ICU, likely etiology for fluctuating blood pressure    Hyperlipemia  Continue home statin    Essential hypertension  Known history of uncontrolled HTN, w/ labile BP since admit.   -BP meds held in setting of septic shock  -will avoid IV meds if possible as has had hypotension following IV BP meds  -renal artery duplex showing R renal artery stenosis, L renal artery unable to be visualized  -Will monitor and add back meds as needed    Dyspnea on exertion  Began prior to admission. Has been requiring 2L via NC to maintain oxygenation  -O2 via NC PRN    Renal/  * Hyponatremia  Patient started having weakness around 3 weeks ago after possible viral illness, also has had decreased appetite and more recently experiencing shortness of breath.  Sodium of 120 on admit, has been fluctuating since, now around 116-117. UOSM 802, HANH 109  Has become increasingly lethargic since admission, concern due to hyponatremia  Likely hypovolemic hyponatremia in setting of diuretic use, decreased intake and possible GI loses    -nephrology consulted, appreciate recs  -s/p hypertonic saline, sodium now improving  -continue q4 BMP  -consider NS infusion pending BMP    Hypophosphatemia  Replete, check repeat lab    Metabolic alkalosis  Initially could have been related to diuretic use. Bicarb remains elevated after stopping HCTZ.    ID  Severe sepsis  This patient does have evidence of infective focus  My overall impression is septic shock.  Source: proctitis vs pneumonia  Antibiotics given-   Antibiotics (From admission, onward)    Start     Stop Route Frequency Ordered    12/30/22 1300  vancomycin 1,250 mg in dextrose 5 % 250 mL IVPB         -- IV Every 24 hours (non-standard times) 12/29/22 1343    12/29/22 1019  vancomycin - pharmacy  to dose  (vancomycin IVPB)        See [facility] for full Linked Orders Report.    -- IV pharmacy to manage frequency 12/29/22 0919    12/29/22 0100  mupirocin 2 % ointment         01/02 2059 Nasl 2 times daily 12/28/22 2345    12/28/22 2015  azithromycin (ZITHROMAX) 500 mg in dextrose 5 % 250 mL IVPB         12/31 2059 IV Every 24 hours (non-standard times) 12/28/22 1913    12/28/22 1515  piperacillin-tazobactam (ZOSYN) 4.5 g in sodium chloride 0.9 % 100 mL IVPB (MB+)         -- IV Every 8 hours (non-standard times) 12/28/22 1412        Latest lactate reviewed-  Recent Labs   Lab 12/28/22  1508 12/29/22  0201   LACTATE 1.0 0.6     Organ dysfunction indicated by Acute respiratory failure and encephalopathy    Shock with decreased perfusion noted, Fluid challenge  was given at 30cc/kg    Post- resuscitation assessment- (Done after fluids given for shock)  Vital signs post fluid administrations were-  Temp Readings from Last 1 Encounters:   12/30/22 98 °F (36.7 °C) (Oral)     BP Readings from Last 1 Encounters:   12/30/22 (!) 145/72     Pulse Readings from Last 1 Encounters:   12/30/22 106       Perfusion exam was performed within 6 hours of septic shock presentation after bolus shows Adequate tissue perfusion assessed by invasive monitoring  Will continue Pressors- Levophed for MAP of 60  Source control achieved by: vanc, zosyn, azithromycin          Endocrine  Moderate malnutrition  Nutrition consulted. Most recent weight and BMI monitored-          Malnutrition (Moderate to Severe)  Energy Intake (Malnutrition): less than 75% for greater than 7 days              Measurements:  Wt Readings from Last 1 Encounters:   12/30/22 59.4 kg (130 lb 15.3 oz)   Body mass index is 22.48 kg/m².    Recommendations: Recommendation/Intervention: 1.  Goals: Meet % EEN, EPN by RD f/u date    Patient has been screened and assessed by RD. RD will follow patient.      GI  Proctitis  CT scan with findings of stercoral proctitis.    -continue bowel regimen  -antimicrobial coverage with vanc/zosyn/azithro    Generalized abdominal pain  Patient with constipation and ileus. CT scan consistent with ileus and stercoral proctitis which is likely related to chronic constipation.    Constipation  Continue miralax BID and senna-doc daily    Orthopedic  Abscess of bursa of right hip  CT scan showing possible hematoma vs abscess around right hip. Plan for aspiration with IR to eval for possible source of infection. Fluid analysis and gram stain not concerning for infection.    Other  Hypothermia  Considering new onset will check BCx and start zosyn. Using bear hugger for warming.    Generalized weakness  See hypernatremia.       Critical Care Daily Checklist:    A: Awake: RASS Goal/Actual Goal:    Actual: [place] Agitation Sedation Scale (RASS): Alert and calm   B: Spontaneous Breathing Trial Performed? Spon. Breathing Trial Initiated?: Not initiated (12/29/22 0729)   C: SAT & SBT Coordinated?  yes                      D: Delirium: CAM-ICU Overall CAM-ICU: Negative   E: Early Mobility Performed? Yes   F: Feeding Goal: Goals: Meet % EEN, EPN by RD f/u date  Status: Nutrition Goal Status: new   Current Diet Order   Procedures    Diet NPO      AS: Analgesia/Sedation no   T: Thromboembolic Prophylaxis yes   H: HOB > 300 Yes   U: Stress Ulcer Prophylaxis (if needed) yes   G: Glucose Control yes   B: Bowel Function Stool Occurrence: 2   I: Indwelling Catheter (Lines & Rush) Necessity yes   D: De-escalation of Antimicrobials/Pharmacotherapies no    Plan for the day/ETD SBT, possible extubation    Code Status:  Family/Goals of Care: Full Code         Critical secondary to Patient has a condition that poses threat to life and bodily function: Severe Respiratory Distress and hyponatremia      Critical care was time spent personally by me on the following activities: development of treatment plan with patient or surrogate and bedside caregivers,  discussions with consultants, evaluation of patient's response to treatment, examination of patient, ordering and performing treatments and interventions, ordering and review of laboratory studies, ordering and review of radiographic studies, pulse oximetry, re-evaluation of patient's condition. This critical care time did not overlap with that of any other provider or involve time for any procedures.     Beto Avelar MD  Critical Care Medicine  [facility] - Cardiac Medical [facility]

## 2022-12-30 NOTE — PROGRESS NOTES
"[facility] - [facility]  Nephrology  Progress Note    Patient Name: Dominique Mcgee  MRN: [id number]  Admission Date: 12/23/2022  Hospital Length of Stay: 7 days  Attending Provider: Nathan Loaiza MD   Primary Care Physician: Briana Leblanc MD  Principal Problem:Hyponatremia    Subjective:     HPI: Dominique Mcgee is a 73 yo female with Hx of HTN, HLD, and hypothyroidism who presents to the ED for evaluation of weakness, fatigue, abdominal pain and cough with SOB that had been persisting for several weeks.  She was hypertensive in the ED with SBP > 200 and chemisitries notable for a low sodium level of 120.  According to records, her BP medications have been adjusted prior to arrival for uncontrolled hypertension and noting that was on a combination of Losartan and HCTZ.  HCTZ was discontinued and she was started on Amlodipine, Carvedilol and losartan. Urine studies were obtained on 12/25 revealing elevated UOSM of 802 and elevated Martin of 109.  She was placed on fluid restrictions, but Na levels have continued to stay low so Nephrology was consulted on 12/28 for further evaluation.  She is awake alert and oriented on examination, but endorses feeling "tired". She has had poor appetite as well since arrival to the hospital with continued complaints of abdominal pain.  Her blood pressures continue to be erratic, often times being hypertensive with episodes of hypotension on night.  She has had CTH which was negative for acute findings with CT abdomen pending.  AM cortisol level on day of consultation elevated at 37.8.  She is lethargic on exam and after discussing with the primary team this has progressively worsened since Monday.        Interval History:   Serum Na has remained stable over the past 24 hours, 129 this morning.  Phos < 1.0 on AM labs, now s/p 40 mmol of NaPhos replacement.  Alert on exam, remains intubated.  Possible extubation today.    Review of patient's allergies indicates:   Allergen " Reactions    Hydralazine analogues      Precipitous drop in BP with IV formulation. Avoid if possible    Shellfish containing products Swelling     Current Facility-Administered Medications   Medication Frequency    acetaminophen tablet 650 mg Q6H PRN    albuterol-ipratropium 2.5 mg-0.5 mg/3 mL nebulizer solution 3 mL Q6H WAKE    atorvastatin tablet 10 mg Daily    azithromycin (ZITHROMAX) 500 mg in dextrose 5 % 250 mL IVPB Q24H    capsaicin 0.025 % cream BID    dextrose 10% bolus 125 mL 125 mL PRN    dextrose 10% bolus 250 mL 250 mL PRN    enoxaparin injection 40 mg Daily    glucagon (human recombinant) injection 1 mg PRN    glucose chewable tablet 16 g PRN    glucose chewable tablet 24 g PRN    glycerin adult suppository 1 suppository Once PRN    insulin aspart U-100 pen 0-5 Units Q6H PRN    levothyroxine tablet 50 mcg Before breakfast    methocarbamoL tablet 500 mg TID PRN    multivitamin tablet Daily    mupirocin 2 % ointment BID    naloxone 0.4 mg/mL injection 0.02 mg PRN    pantoprazole injection 40 mg Daily    piperacillin-tazobactam (ZOSYN) 4.5 g in sodium chloride 0.9 % 100 mL IVPB (MB+) Q8H    polyethylene glycol packet 17 g BID    senna-docusate 8.6-50 mg per tablet 1 tablet Daily    sodium chloride 0.9% flush 10 mL Q12H PRN    thiamine (B-1) 500 mg in dextrose 5 % 100 mL IVPB TID    vancomycin - pharmacy to dose pharmacy to manage frequency    vancomycin 1,250 mg in dextrose 5 % 250 mL IVPB Q24H       Objective:     Vital Signs (Most Recent):  Temp: 98 °F (36.7 °C) (12/30/22 1101)  Pulse: 102 (12/30/22 1139)  Resp: (!) 25 (12/30/22 1139)  BP: (!) 145/72 (12/30/22 1101)  SpO2: 97 % (12/30/22 1139)   Vital Signs (24h Range):  Temp:  [97.4 °F (36.3 °C)-98.2 °F (36.8 °C)] 98 °F (36.7 °C)  Pulse:  [] 102  Resp:  [16-28] 25  SpO2:  [92 %-100 %] 97 %  BP: (130-145)/(72-82) 145/72  Arterial Line BP: (116-160)/(45-61) 155/61     Weight: 59.4 kg (131 lb) (12/27/22 3367)  Body mass  index is 22.49 kg/m².  Body surface area is 1.64 meters squared.    I/O last 3 completed shifts:  In: 3528.2 [I.V.:890.8; NG/GT:160; IV Piggyback:2477.4]  Out: 3100 [Urine:3100]    Physical Exam  Vitals and nursing note reviewed.   Constitutional:       General: She is not in acute distress.     Appearance: She is well-developed. She is ill-appearing. She is not toxic-appearing.      Interventions: She is intubated.   HENT:      Head: Normocephalic and atraumatic.      Nose: No congestion or rhinorrhea.      Mouth/Throat:      Mouth: Mucous membranes are moist.      Pharynx: Oropharynx is clear.   Eyes:      General: No scleral icterus.        Right eye: No discharge.         Left eye: No discharge.      Extraocular Movements: Extraocular movements intact.      Conjunctiva/sclera: Conjunctivae normal.   Cardiovascular:      Rate and Rhythm: Normal rate and regular rhythm.      Heart sounds: No murmur heard.    No friction rub. No gallop.   Pulmonary:      Effort: Tachypnea present. No respiratory distress. She is intubated.      Breath sounds: No wheezing or rales.   Abdominal:      General: There is distension.      Palpations: Abdomen is soft.      Tenderness: There is no abdominal tenderness.   Musculoskeletal:         General: Swelling (RUE) present.      Cervical back: Normal range of motion and neck supple.      Right lower leg: No edema.      Left lower leg: No edema.   Skin:     General: Skin is warm and dry.   Neurological:      Mental Status: She is alert.       Significant Labs:  CBC:   Recent Labs   Lab 12/30/22  0430   WBC 15.79*   RBC 3.91*   HGB 10.1*   HCT 30.3*      MCV 78*   MCH 25.8*   MCHC 33.3     CMP:   Recent Labs   Lab 12/27/22  0542 12/27/22  1722 12/30/22  0931   *   < > 122*   CALCIUM 9.0   < > 8.1*   ALBUMIN 3.4*  --   --    PROT 6.6  --   --    *   < > 129*   K 3.8   < > 3.8   CO2 29   < > 26   CL 78*   < > 93*   BUN 10   < > 11   CREATININE 0.4*   < > 0.5   ALKPHOS  70  --   --    ALT 14  --   --    AST 22  --   --    BILITOT 0.9  --   --     < > = values in this interval not displayed.          Assessment/Plan:     * Hyponatremia  Initial labs concerning for SIADH vs diuretic induced (HCTZ as OP), low solute intake.  Na did not improve with fluid restrictions and Nephrology was consulted on 12/28.      Has had poor PO intake since admit.    UOSM on 12/25:  802  HANH on 12/25:  109    Repeat on 12/28:   UOSM:  572  HANH:  18  Consistent with Hypovolemic Hyponatremia    Corrected with hypertonic therapy    Urine metanephrines in process    Plan/Recommendations:  -corrected to 129 on 0900 labs.  Improved from 127 over the past 24 hours.  -check Na levels q 4 hours.     -strict I/O's  -she is slowly correcting on her own, monitor for now and continue trending sodiums.  May consider NS infusion this afternoon pending SNa trends.    Hypophosphatemia  Phos < 1 on AM labs  -replaced with 40 mmol this morning, improved to 4.7.  -recommend rechecking phos levels this PM labs  -?phos wasting?  Check UPhos  -SPEP/SYLVIE  -noted to have had elevated FLC ratio    Essential hypertension  -renin/susanne pending  -normotensive  -infectious workup pending    Right renal artery stenosis noted on renal US (60-79%).  No acute intervention needed now while in ICU, will evaluate when more stable for further interventions      Christian Webb, NP  Nephrology  [facility] - [facility]

## 2022-12-30 NOTE — SUBJECTIVE & OBJECTIVE
Interval History:   Serum Na has remained stable over the past 24 hours, 129 this morning.  Phos < 1.0 on AM labs, now s/p 40 mmol of NaPhos replacement.  Alert on exam, remains intubated.  Possible extubation today.    Review of patient's allergies indicates:   Allergen Reactions    Hydralazine analogues      Precipitous drop in BP with IV formulation. Avoid if possible    Shellfish containing products Swelling     Current Facility-Administered Medications   Medication Frequency    acetaminophen tablet 650 mg Q6H PRN    albuterol-ipratropium 2.5 mg-0.5 mg/3 mL nebulizer solution 3 mL Q6H WAKE    atorvastatin tablet 10 mg Daily    azithromycin (ZITHROMAX) 500 mg in dextrose 5 % 250 mL IVPB Q24H    capsaicin 0.025 % cream BID    dextrose 10% bolus 125 mL 125 mL PRN    dextrose 10% bolus 250 mL 250 mL PRN    enoxaparin injection 40 mg Daily    glucagon (human recombinant) injection 1 mg PRN    glucose chewable tablet 16 g PRN    glucose chewable tablet 24 g PRN    glycerin adult suppository 1 suppository Once PRN    insulin aspart U-100 pen 0-5 Units Q6H PRN    levothyroxine tablet 50 mcg Before breakfast    methocarbamoL tablet 500 mg TID PRN    multivitamin tablet Daily    mupirocin 2 % ointment BID    naloxone 0.4 mg/mL injection 0.02 mg PRN    pantoprazole injection 40 mg Daily    piperacillin-tazobactam (ZOSYN) 4.5 g in sodium chloride 0.9 % 100 mL IVPB (MB+) Q8H    polyethylene glycol packet 17 g BID    senna-docusate 8.6-50 mg per tablet 1 tablet Daily    sodium chloride 0.9% flush 10 mL Q12H PRN    thiamine (B-1) 500 mg in dextrose 5 % 100 mL IVPB TID    vancomycin - pharmacy to dose pharmacy to manage frequency    vancomycin 1,250 mg in dextrose 5 % 250 mL IVPB Q24H       Objective:     Vital Signs (Most Recent):  Temp: 98 °F (36.7 °C) (12/30/22 1101)  Pulse: 102 (12/30/22 1139)  Resp: (!) 25 (12/30/22 1139)  BP: (!) 145/72 (12/30/22 1101)  SpO2: 97 % (12/30/22 1139)   Vital Signs (24h Range):  Temp:  [97.4  °F (36.3 °C)-98.2 °F (36.8 °C)] 98 °F (36.7 °C)  Pulse:  [] 102  Resp:  [16-28] 25  SpO2:  [92 %-100 %] 97 %  BP: (130-145)/(72-82) 145/72  Arterial Line BP: (116-160)/(45-61) 155/61     Weight: 59.4 kg (131 lb) (12/27/22 0939)  Body mass index is 22.49 kg/m².  Body surface area is 1.64 meters squared.    I/O last 3 completed shifts:  In: 3528.2 [I.V.:890.8; NG/GT:160; IV Piggyback:2477.4]  Out: 3100 [Urine:3100]    Physical Exam  Vitals and nursing note reviewed.   Constitutional:       General: She is not in acute distress.     Appearance: She is well-developed. She is ill-appearing. She is not toxic-appearing.      Interventions: She is intubated.   HENT:      Head: Normocephalic and atraumatic.      Nose: No congestion or rhinorrhea.      Mouth/Throat:      Mouth: Mucous membranes are moist.      Pharynx: Oropharynx is clear.   Eyes:      General: No scleral icterus.        Right eye: No discharge.         Left eye: No discharge.      Extraocular Movements: Extraocular movements intact.      Conjunctiva/sclera: Conjunctivae normal.   Cardiovascular:      Rate and Rhythm: Normal rate and regular rhythm.      Heart sounds: No murmur heard.    No friction rub. No gallop.   Pulmonary:      Effort: Tachypnea present. No respiratory distress. She is intubated.      Breath sounds: No wheezing or rales.   Abdominal:      General: There is distension.      Palpations: Abdomen is soft.      Tenderness: There is no abdominal tenderness.   Musculoskeletal:         General: Swelling (RUE) present.      Cervical back: Normal range of motion and neck supple.      Right lower leg: No edema.      Left lower leg: No edema.   Skin:     General: Skin is warm and dry.   Neurological:      Mental Status: She is alert.       Significant Labs:  CBC:   Recent Labs   Lab 12/30/22  0430   WBC 15.79*   RBC 3.91*   HGB 10.1*   HCT 30.3*      MCV 78*   MCH 25.8*   MCHC 33.3     CMP:   Recent Labs   Lab 12/27/22  0542  12/27/22  1722 12/30/22  0931   *   < > 122*   CALCIUM 9.0   < > 8.1*   ALBUMIN 3.4*  --   --    PROT 6.6  --   --    *   < > 129*   K 3.8   < > 3.8   CO2 29   < > 26   CL 78*   < > 93*   BUN 10   < > 11   CREATININE 0.4*   < > 0.5   ALKPHOS 70  --   --    ALT 14  --   --    AST 22  --   --    BILITOT 0.9  --   --     < > = values in this interval not displayed.

## 2022-12-30 NOTE — ASSESSMENT & PLAN NOTE
Patient with Hypoxic Respiratory failure which is Acute.  she is not on home oxygen. Supplemental oxygen was provided and noted- Vent Mode: A/C  Oxygen Concentration (%):  [30-40] 30  Resp Rate Total:  [16 br/min-28 br/min] 26 br/min  Vt Set:  [350 mL] 350 mL  PEEP/CPAP:  [5 cmH20-6 cmH20] 6 cmH20  Pressure Support:  [14 cmH20] 14 cmH20  Mean Airway Pressure:  [9 dvS35-35 cmH20] 12 cmH20.   Signs/symptoms of respiratory failure include- tachypnea, increased work of breathing and respiratory distress. Contributing diagnoses includes - Pleural effusion and atelectasis Labs and images were reviewed. Patient Has recent ABG, which has been reviewed. Will treat underlying causes and adjust management of respiratory failure as follows- unclear etiology of pleural effusions, patient notes recent URI 3 weeks ago that has left her short of breath since then, on vanc/zosyn and azithro  Repeat CXR with increased consolidation in LLL  -continue abx  -SBT, possible extubation

## 2022-12-30 NOTE — ASSESSMENT & PLAN NOTE
-renin/susanne pending  -normotensive  -infectious workup pending    Right renal artery stenosis noted on renal US (60-79%).  No acute intervention needed now while in ICU, will evaluate when more stable for further interventions

## 2022-12-30 NOTE — ASSESSMENT & PLAN NOTE
Initial labs concerning for SIADH vs diuretic induced (HCTZ as OP), low solute intake.  Na did not improve with fluid restrictions and Nephrology was consulted on 12/28.      Has had poor PO intake since admit.    UOSM on 12/25:  802  HANH on 12/25:  109    Repeat on 12/28:   UOSM:  572  HANH:  18  Consistent with Hypovolemic Hyponatremia    Corrected with hypertonic therapy    Urine metanephrines in process    Plan/Recommendations:  -corrected to 129 on 0900 labs.  Improved from 127 over the past 24 hours.  -check Na levels q 4 hours.     -strict I/O's  -she is slowly correcting on her own, monitor for now and continue trending sodiums.  May consider NS infusion this afternoon pending SNa trends.

## 2022-12-30 NOTE — ASSESSMENT & PLAN NOTE
Patient started having weakness around 3 weeks ago after possible viral illness, also has had decreased appetite and more recently experiencing shortness of breath.  Sodium of 120 on admit, has been fluctuating since, now around 116-117. UOSM 802, HANH 109  Has become increasingly lethargic since admission, concern due to hyponatremia  Likely hypovolemic hyponatremia in setting of diuretic use, decreased intake and possible GI loses    -nephrology consulted, appreciate recs  -s/p hypertonic saline, sodium now improving  -continue q4 BMP  -consider NS infusion pending BMP

## 2022-12-30 NOTE — ASSESSMENT & PLAN NOTE
Phos < 1 on AM labs  -replaced with 40 mmol this morning, improved to 4.7.  -recommend rechecking phos levels this PM labs  -?phos wasting?  Check UPhos  -SPEP/SYLVIE  -noted to have had elevated FLC ratio

## 2022-12-30 NOTE — ASSESSMENT & PLAN NOTE
Nutrition consulted. Most recent weight and BMI monitored-          Malnutrition (Moderate to Severe)  Energy Intake (Malnutrition): less than 75% for greater than 7 days              Measurements:  Wt Readings from Last 1 Encounters:   12/30/22 59.4 kg (130 lb 15.3 oz)   Body mass index is 22.48 kg/m².    Recommendations: Recommendation/Intervention: 1.  Goals: Meet % EEN, EPN by RD f/u date    Patient has been screened and assessed by RD. RD will follow patient.

## 2022-12-30 NOTE — ASSESSMENT & PLAN NOTE
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   -continue bowel regimen  -antimicrobial coverage with vanc/zosyn/azithro

## 2022-12-30 NOTE — PROGRESS NOTES
[facility] - [facility]  Adult Nutrition  Progress Note    SUMMARY       Recommendations    If able to extubate & advance diet, rec'd Regular diet (texture per SLP).  If unable to extubate, resume TFs. Rec'd Impact Peptide @ 40 mL/hr = 1440 kcals, 90 g of protein, 739 mL fluid.  RD to monitor & follow-up.    Goals: Meet % EEN, EPN by RD f/u date  Nutrition Goal Status: new  Communication of RD Recs: reviewed with RN    Assessment and Plan    Moderate malnutrition    Nutrition Problem:  Moderate Protein-Calorie Malnutrition  Malnutrition in the context of Acute Illness/Injury    Related to (etiology):  Inability to consume sufficient energy     Signs and Symptoms (as evidenced by):  Energy Intake: <75% of estimated energy requirement for > 1 week   Body Fat Depletion: mild and moderate depletion of orbitals   Muscle Mass Depletion: mild and moderate depletion of temples and clavicle region     Interventions(treatment strategy):  Collaboration of nutrition care w/ other providers  EN    Nutrition Diagnosis Status:  New    Malnutrition Assessment    Energy Intake (Malnutrition): less than 75% for greater than 7 days   Orbital Region (Subcutaneous Fat Loss): mild depletion   Fowler Region (Muscle Loss): moderate depletion  Clavicle Bone Region (Muscle Loss): mild depletion     Reason for Assessment    Reason For Assessment: new tube feeding  Diagnosis: other (see comments) (Hyponatremia)  Relevant Medical History: HTN, HLD  Interdisciplinary Rounds: attended    General Information Comments: Intubated w/ no family at bedside. TFs initiated, however currently held 2/2 possible extubation today (per RN). Per H & P, pt w/ decreased appetite PTA x 3 weeks; unsure of UBW (most recent was 180# x 2018). Visual NFPE (2/2 nsg at bedside) - mild-moderate wasting noted. RD feels pt meets criteria for moderate malnutrition. Please see PES statement for details. S/p R. hip abscess aspiration.  Nutrition Discharge  "Planning: Pending clinical course    Nutrition/Diet History    Spiritual, Cultural Beliefs, Scientologist Practices, Values that Affect Care: no  Food Allergies: shellfish  Factors Affecting Nutritional Intake: on mechanical ventilation, NPO    Anthropometrics    Temp: 98 °F (36.7 °C)  Height: 5' 4" (162.6 cm)  Height (inches): 64 in  Weight Method: Bed Scale  Weight: 59.4 kg (130 lb 15.3 oz)  Weight (lb): 130.95 lb  Ideal Body Weight (IBW), Female: 120 lb  % Ideal Body Weight, Female (lb): 109.13 %  BMI (Calculated): 22.5  BMI Grade: 18.5-24.9 - normal    Lab/Procedures/Meds    Pertinent Labs Reviewed: reviewed  Pertinent Labs Comments: Na 128  Pertinent Medications Reviewed: reviewed  Pertinent Medications Comments: Levophed, Vasopressin    Estimated/Assessed Needs    Weight Used For Calorie Calculations: 59.4 kg (130 lb 15.3 oz)    Energy Calorie Requirements (kcal): 1371 kcal/d  Energy Need Method: [facility]    Protein Requirements: 71-89 g/d (1.2-1.5 g/kg)  Weight Used For Protein Calculations: 59.4 kg (130 lb 15.3 oz)    Estimated Fluid Requirement Method: other (see comments) (Per MD or 1 mL/kcal)  RDA Method (mL): 1371    Nutrition Prescription Ordered    Current Diet Order: NPO  Current Nutrition Support Formula Ordered: Isosource 1.5    Evaluation of Received Nutrient/Fluid Intake    I/O: +3L since admit    Comments: LBM: 12/30    Nutrition Risk    Level of Risk/Frequency of Follow-up:  (1x/week)     Monitor and Evaluation    Food and Nutrient Intake: energy intake, food and beverage intake, enteral nutrition intake  Food and Nutrient Adminstration: diet order, enteral and parenteral nutrition administration  Physical Activity and Function: nutrition-related ADLs and IADLs  Anthropometric Measurements: weight, weight change  Biochemical Data, Medical Tests and Procedures: glucose/endocrine profile, inflammatory profile, gastrointestinal profile, electrolyte and renal panel  Nutrition-Focused Physical " Findings: overall appearance     Nutrition Follow-Up    RD Follow-up?: Yes

## 2022-12-30 NOTE — ASSESSMENT & PLAN NOTE
CT scan showing possible hematoma vs abscess around right hip. Plan for aspiration with IR to eval for possible source of infection. Fluid analysis and gram stain not concerning for infection.

## 2022-12-30 NOTE — SUBJECTIVE & OBJECTIVE
Interval History/Significant Events: Off sedation, responding to commands. Remains intubated.    Review of Systems   Unable to perform ROS: Intubated   Objective:     Vital Signs (Most Recent):  Temp: 98 °F (36.7 °C) (12/30/22 1101)  Pulse: 106 (12/30/22 1306)  Resp: (!) 27 (12/30/22 1306)  BP: (!) 145/72 (12/30/22 1101)  SpO2: (!) 94 % (12/30/22 1306)   Vital Signs (24h Range):  Temp:  [97.4 °F (36.3 °C)-98.2 °F (36.8 °C)] 98 °F (36.7 °C)  Pulse:  [] 106  Resp:  [16-28] 27  SpO2:  [94 %-100 %] 94 %  BP: (130-145)/(72-82) 145/72  Arterial Line BP: (116-175)/(45-67) 175/65   Weight: 59.4 kg (130 lb 15.3 oz)  Body mass index is 22.48 kg/m².      Intake/Output Summary (Last 24 hours) at 12/30/2022 1340  Last data filed at 12/30/2022 1300  Gross per 24 hour   Intake 2366.29 ml   Output 2165 ml   Net 201.29 ml         Physical Exam  Vitals and nursing note reviewed.   Constitutional:       Appearance: She is ill-appearing.      Interventions: She is intubated.   HENT:      Head: Normocephalic and atraumatic.   Eyes:      Extraocular Movements: Extraocular movements intact.      Pupils: Pupils are equal, round, and reactive to light.   Cardiovascular:      Rate and Rhythm: Normal rate and regular rhythm.      Pulses: Normal pulses.      Heart sounds: Normal heart sounds. No murmur heard.  Pulmonary:      Effort: She is intubated.      Breath sounds: Normal air entry. Transmitted upper airway sounds present. No wheezing or rales.   Abdominal:      General: Abdomen is flat. There is no distension.      Palpations: Abdomen is soft.      Tenderness: There is no abdominal tenderness.   Musculoskeletal:         General: No swelling.      Right lower leg: No edema.      Left lower leg: No edema.   Skin:     General: Skin is warm and dry.      Findings: No bruising or rash.   Neurological:      Comments: Alert, following commands       Vents:  Vent Mode: A/C (12/30/22 4284)  Ventilator Initiated: Yes (12/29/22 0144)  Set  Rate: 12 BPM (12/30/22 1304)  Vt Set: 350 mL (12/30/22 0722)  Pressure Support: 14 cmH20 (12/30/22 1139)  PEEP/CPAP: 6 cmH20 (12/30/22 1304)  Oxygen Concentration (%): 30 (12/30/22 1304)  Peak Airway Pressure: 22 cmH20 (12/30/22 1304)  Plateau Pressure: 20 cmH20 (12/30/22 1304)  Total Ve: 8.84 L/m (12/30/22 1304)  Negative Inspiratory Force (cm H2O): 0 (12/30/22 1304)  F/VT Ratio<105 (RSBI): (!) 74.93 (12/30/22 1304)  Lines/Drains/Airways       Central Venous Catheter Line  Duration             Percutaneous Central Line Insertion/Assessment - Triple Lumen  12/29/22 0043 right internal jugular 1 day              Drain  Duration                  Urethral Catheter 12/27/22 1535 2 days         NG/OG Tube 12/29/22 0149 [place] sump 18 Fr. Left mouth 1 day         Rectal Tube 12/29/22 0332 rectal tube w/ balloon (indicate number of mLs) 45 Fr. 1 day              Airway  Duration                  Airway - Non-Surgical 12/29/22 0139 1 day              Arterial Line  Duration             Arterial Line 12/29/22 0219 Left Radial 1 day              Peripheral Intravenous Line  Duration                  Peripheral IV - Single Lumen 12/28/22 1530 20 G Anterior;Right Forearm 1 day         Peripheral IV - Single Lumen 12/28/22 1545 18 G Left Antecubital 1 day         Peripheral IV - Single Lumen 12/28/22 1755 20 G;1 3/4 in Anterior;Right Upper Arm 1 day                  Significant Labs:    CBC/Anemia Profile:  Recent Labs   Lab 12/29/22  0201 12/30/22  0430   WBC 14.55* 15.79*   HGB 8.9* 10.1*   HCT 26.3* 30.3*    360   MCV 80* 78*   RDW 12.6 12.7          Chemistries:  Recent Labs   Lab 12/29/22  0944 12/29/22  1327 12/30/22  0316 12/30/22  0627 12/30/22  0931 12/30/22  1014   *   < > 126* 128* 129*  --    K 3.4*   < > 3.2* 4.0 3.8  --    CL 91*   < > 91* 95 93*  --    CO2 26   < > 23 23 26  --    BUN 17   < > 11 11 11  --    CREATININE 0.6   < > 0.5 0.5 0.5  --    CALCIUM 8.5*   < > 8.2* 8.0* 8.1*  --    MG 1.9  --   1.8  --   --   --    PHOS 1.5*  --  <1.0*  --   --  4.7*    < > = values in this interval not displayed.         Blood Culture:   Recent Labs   Lab 12/28/22  1509 12/29/22  0012 12/29/22  0017   LABBLOO No Growth to date  No Growth to date No Growth to date  No Growth to date No Growth to date  No Growth to date       All pertinent labs within the past 24 hours have been reviewed.    Significant Imaging:  I have reviewed all pertinent imaging results/findings within the past 24 hours.

## 2022-12-31 PROBLEM — M48.02 STENOSIS, CERVICAL SPINE: Status: ACTIVE | Noted: 2022-01-01

## 2022-12-31 NOTE — ASSESSMENT & PLAN NOTE
Noted on MRI spine. Of note patient has been experiencing episodes of vertigo and decreased balance which could be related. NSGY consulted, appreciate recs.

## 2022-12-31 NOTE — SUBJECTIVE & OBJECTIVE
Interval History/Significant Events: On Bipap today. When removed becomes hypertensive and tachy. Feeling better.    Review of Systems   Unable to perform ROS: Acuity of condition   Objective:     Vital Signs (Most Recent):  Temp: 97.6 °F (36.4 °C) (12/31/22 0701)  Pulse: 97 (12/31/22 0800)  Resp: (!) 24 (12/31/22 0800)  BP: (!) 142/74 (12/31/22 0706)  SpO2: 99 % (12/31/22 0800)   Vital Signs (24h Range):  Temp:  [97.6 °F (36.4 °C)-98.3 °F (36.8 °C)] 97.6 °F (36.4 °C)  Pulse:  [] 97  Resp:  [12-39] 24  SpO2:  [92 %-100 %] 99 %  BP: (116-168)/(54-80) 142/74  Arterial Line BP: ()/(48-78) 157/64   Weight: 59.4 kg (130 lb 15.3 oz)  Body mass index is 22.48 kg/m².      Intake/Output Summary (Last 24 hours) at 12/31/2022 0849  Last data filed at 12/31/2022 0701  Gross per 24 hour   Intake 1788.41 ml   Output 1425 ml   Net 363.41 ml         Physical Exam  Vitals and nursing note reviewed.   Constitutional:       Appearance: She is ill-appearing.   HENT:      Head: Normocephalic and atraumatic.   Eyes:      Extraocular Movements: Extraocular movements intact.      Pupils: Pupils are equal, round, and reactive to light.   Cardiovascular:      Rate and Rhythm: Normal rate and regular rhythm.      Pulses: Normal pulses.      Heart sounds: Normal heart sounds. No murmur heard.  Pulmonary:      Effort: Tachypnea present.      Breath sounds: Normal air entry. No wheezing or rales.   Abdominal:      General: Abdomen is flat. There is no distension.      Palpations: Abdomen is soft.      Tenderness: There is no abdominal tenderness.   Musculoskeletal:         General: No swelling.      Right lower leg: No edema.      Left lower leg: No edema.   Skin:     General: Skin is warm and dry.      Findings: No bruising or rash.   Neurological:      General: No focal deficit present.      Comments: Alert, following commands   Psychiatric:         Mood and Affect: Mood normal.         Behavior: Behavior normal.       Vents:  Vent  Mode: A/C (12/30/22 1528)  Ventilator Initiated: Yes (12/29/22 0144)  Set Rate: 12 BPM (12/30/22 1528)  Vt Set: 350 mL (12/30/22 0722)  Pressure Support: 14 cmH20 (12/30/22 1139)  PEEP/CPAP: 6 cmH20 (12/30/22 1528)  Oxygen Concentration (%): 40 (12/31/22 0800)  Peak Airway Pressure: 22 cmH20 (12/30/22 1528)  Plateau Pressure: 20 cmH20 (12/30/22 1528)  Total Ve: 9.73 L/m (12/30/22 1528)  Negative Inspiratory Force (cm H2O): 0 (12/30/22 1528)  F/VT Ratio<105 (RSBI): (!) 58.05 (12/30/22 1528)  Lines/Drains/Airways       Central Venous Catheter Line  Duration             Percutaneous Central Line Insertion/Assessment - Triple Lumen  12/29/22 0043 right internal jugular 2 days              Drain  Duration                  Urethral Catheter 12/27/22 1535 3 days         Rectal Tube 12/29/22 0332 rectal tube w/ balloon (indicate number of mLs) 45 Fr. 2 days              Arterial Line  Duration             Arterial Line 12/29/22 0219 Left Radial 2 days              Peripheral Intravenous Line  Duration                  Peripheral IV - Single Lumen 12/28/22 1545 18 G Left Antecubital 2 days         Peripheral IV - Single Lumen 12/28/22 1755 20 G;1 3/4 in Anterior;Right Upper Arm 2 days                  Significant Labs:    CBC/Anemia Profile:  Recent Labs   Lab 12/30/22  0430 12/31/22  0521   WBC 15.79* 15.61*   HGB 10.1* 9.7*   HCT 30.3* 28.9*    304   MCV 78* 80*   RDW 12.7 13.3          Chemistries:  Recent Labs   Lab 12/29/22  0944 12/29/22  1327 12/30/22  0316 12/30/22  0627 12/30/22  0931 12/30/22  1014 12/30/22  1413 12/30/22  1823 12/31/22  0143 12/31/22  0521   *   < > 126*   < > 129*  --  128*  --  133*  --    K 3.4*   < > 3.2*   < > 3.8  --  3.2*  --  4.2  --    CL 91*   < > 91*   < > 93*  --  94*  --  101  --    CO2 26   < > 23   < > 26  --  26  --  26  --    BUN 17   < > 11   < > 11  --  8  --  8  --    CREATININE 0.6   < > 0.5   < > 0.5  --  0.5  --  0.6  --    CALCIUM 8.5*   < > 8.2*   < > 8.1*  --   8.3*  --  8.2*  --    MG 1.9  --  1.8  --   --   --   --   --   --  2.2   PHOS 1.5*  --  <1.0*  --   --  4.7*  --  2.9  --  2.6*    < > = values in this interval not displayed.       All pertinent labs within the past 24 hours have been reviewed.    Significant Imaging:  I have reviewed all pertinent imaging results/findings within the past 24 hours.

## 2022-12-31 NOTE — CONSULTS
[facility]  Neurosurgery  Consult Note    Inpatient consult to Neurosurgery  Consult performed by: Tonja Guerra MD  Consult ordered by: Marsha Arzate MD        Subjective:     Chief Complaint/Reason for Admission: cervical stenosis    History of Present Illness: Dominique Mcgee is a 72 y.o. female with PMH of HTN, HLD, hypothyroidism, recent URI 3 weeks prior to admission and balance difficulites who presented to the ED on 12/23 for 3 weeks of weakness, LIRA, decreased appetite, and constipation with intermittent lower abdominal cramping. She was admitted to the MICU for hypertension/blood pressure (currently fluctuating) and hyponatremia management. There was concern for infection and a MRI pan spine was performed demonstrating cervical stenosis for which NSGY was consulted. Patient endorses lower lumbar pain and shoulder pain (d/t bilateral rotator cuff tears), but denies neck pain. She does not have any issues writing and does not endorse difficulties with fine finger movements.       Medications Prior to Admission   Medication Sig Dispense Refill Last Dose    alendronate-vitamin D3 (FOSAMAX PLUS D) 70 mg- 2,800 unit per tablet Take 1 tablet by mouth every 7 days.   12/22/2022    hydroCHLOROthiazide (HYDRODIURIL) 25 MG tablet Take 25 mg by mouth once daily.   12/22/2022    levothyroxine (SYNTHROID) 50 MCG tablet Take 50 mcg by mouth once daily.   12/22/2022    losartan (COZAAR) 50 MG tablet Take 50 mg by mouth once daily.   12/22/2022    meloxicam (MOBIC) 15 MG tablet Take 15 mg by mouth once daily.   12/22/2022    metoprolol succinate (TOPROL-XL) 50 MG 24 hr tablet Take 50 mg by mouth once daily.   12/22/2022    simvastatin (ZOCOR) 20 MG tablet Take 20 mg by mouth every evening.   12/22/2022       Review of patient's allergies indicates:   Allergen Reactions    Hydralazine analogues      Precipitous drop in BP with IV formulation. Avoid if possible    Shellfish containing  products Swelling       Past Medical History:   Diagnosis Date    Hyperlipidemia     Hypertension     Hypothyroidism     Shock 12/29/2022     Past Surgical History:   Procedure Laterality Date    HYSTERECTOMY       Family History       Problem Relation (Age of Onset)    Breast cancer Sister (55), Sister (65)          Tobacco Use    Smoking status: Never    Smokeless tobacco: Not on file   Substance and Sexual Activity    Alcohol use: Not on file    Drug use: Never    Sexual activity: Not on file     Review of Systems   Reason unable to perform ROS: on BiPAP and did not tolerate well taking it off.   Objective:     Weight: 59.4 kg (130 lb 15.3 oz)  Body mass index is 22.48 kg/m².  Vital Signs (Most Recent):  Temp: 97.9 °F (36.6 °C) (12/31/22 1101)  Pulse: (!) 128 (12/31/22 1400)  Resp: (!) 39 (12/31/22 1400)  BP: (!) 194/84 (12/31/22 1400)  SpO2: 97 % (12/31/22 1400)   Vital Signs (24h Range):  Temp:  [97.6 °F (36.4 °C)-98.2 °F (36.8 °C)] 97.9 °F (36.6 °C)  Pulse:  [] 128  Resp:  [12-39] 39  SpO2:  [92 %-100 %] 97 %  BP: (116-194)/(54-84) 194/84  Arterial Line BP: ()/(48-84) 165/84     Date 12/31/22 0700 - 01/01/23 0659   Shift 3655-6489 7730-5822 2591-9378 24 Hour Total   INTAKE   I.V.(mL/kg) 92.8(1.6)   92.8(1.6)   IV Piggyback 671.6   671.6   Shift Total(mL/kg) 764.4(12.9)   764.4(12.9)   OUTPUT   Urine(mL/kg/hr) 1445(3)   1445   Shift Total(mL/kg) 1445(24.3)   1445(24.3)   Weight (kg) 59.4 59.4 59.4 59.4              Vent Mode: A/C  Oxygen Concentration (%):  [30-40] 30  Resp Rate Total:  [23 br/min-27 br/min] 27 br/min  PEEP/CPAP:  [6 cmH20] 6 cmH20  Mean Airway Pressure:  [11 ezZ74-38 cmH20] 12 cmH20         Rectal Tube 12/29/22 0332 rectal tube w/ balloon (indicate number of mLs) 45 Fr. (Active)   Balloon Inflation Volume (mL) 45 12/31/22 0300   Reposition balloon deflated;drainage bags for BMS & Rush on opposite sides of bed 12/31/22 0300   Outcome stool evacuated 12/31/22 0300   Stool  "Color Brown 12/31/22 0300   Insertion Site Appearance no redness, warmth, tenderness, skin breakdown, drainage 12/31/22 1101   Flush/Irrigation flushed w/;water 12/29/22 1505            Urethral Catheter 12/27/22 1535 (Active)   Site Assessment Clean;Intact;Dry 12/31/22 1101   Collection Container Urimeter 12/31/22 1101   Securement Method secured to top of thigh w/ adhesive device 12/31/22 1101   Catheter Care Performed yes 12/31/22 1101   Reason for Continuing Urinary Catheterization Critically ill in ICU and requiring hourly monitoring of intake/output 12/31/22 1101   CAUTI Prevention Bundle Securement Device in place with 1" slack;Intact seal between catheter & drainage tubing;Drainage bag/urimeter off the floor;Sheeting clip in use;No dependent loops or kinks;Drainage bag/urimeter not overfilled (<2/3 full);Drainage bag/urimeter below bladder 12/31/22 1101   Output (mL) 145 mL 12/31/22 1400       Physical Exam  General: Awake, alert, oriented  Head: Non-traumatic, normocephalic  Neck: No tenderness to palpation  CVS: Normal rate and rhythm  Pulm: on BiPAP  GI: Abdomen nontender  MSK: Moves all extremities  Skin: Dry, intact    Neurosurgery Physical Exam  AOx3  BUE 3/5 pain limited delts 4+ bi/monika (bilateral rotator cuff tear),  5/5 bilateral handgrip  BLE 4+/5 hf, 5/5 distally  SILT  No Hoffmans/clonus    Significant Labs:  Recent Labs   Lab 12/30/22  0316 12/30/22  0627 12/30/22  1413 12/31/22  0143 12/31/22  0521 12/31/22  0820      < > 176* 114*  --  123*   *   < > 128* 133*  --  135*   K 3.2*   < > 3.2* 4.2  --  3.7   CL 91*   < > 94* 101  --  99   CO2 23   < > 26 26  --  28   BUN 11   < > 8 8  --  9   CREATININE 0.5   < > 0.5 0.6  --  0.6   CALCIUM 8.2*   < > 8.3* 8.2*  --  8.6*   MG 1.8  --   --   --  2.2  --     < > = values in this interval not displayed.     Recent Labs   Lab 12/30/22  0430 12/31/22  0521   WBC 15.79* 15.61*   HGB 10.1* 9.7*   HCT 30.3* 28.9*    304     No results " for input(s): LABPT, INR, APTT in the last 48 hours.  Microbiology Results (last 7 days)       Procedure Component Value Units Date/Time    Culture, body fluid - Bactec [[id number]]  (Abnormal) Collected: 12/29/22 1130    Order Status: Completed Specimen: Joint Fluid from Hip, Right Updated: 12/31/22 1155     Body Fluid Culture, Sterile Gram stain: Gram negative rods      ESCHERICHIA COLI  Susceptibility pending      Narrative:      Joint Fluid    Blood culture [[id number]] Collected: 12/29/22 0012    Order Status: Completed Specimen: Blood from Peripheral, Antecubital, Right Updated: 12/31/22 0612     Blood Culture, Routine No Growth to date      No Growth to date      No Growth to date    Blood culture [[id number]] Collected: 12/29/22 0017    Order Status: Completed Specimen: Blood from Peripheral, Antecubital, Left Updated: 12/31/22 0612     Blood Culture, Routine No Growth to date      No Growth to date      No Growth to date    Blood culture [[id number]] Collected: 12/28/22 1509    Order Status: Completed Specimen: Blood from Peripheral, Upper Arm, Right Updated: 12/30/22 1812     Blood Culture, Routine No Growth to date      No Growth to date      No Growth to date    Narrative:      Collection has been rescheduled by [facility] at 12/28/2022 14:11 Reason:   Patient is moved to a different floor.  Collection has been rescheduled by [facility] at 12/28/2022 14:11 Reason:   Patient is moved to a different floor.    Blood culture [[id number]] Collected: 12/28/22 1445    Order Status: Completed Specimen: Blood from Peripheral, Forearm, Right Updated: 12/30/22 1612     Blood Culture, Routine No Growth to date      No Growth to date      No Growth to date    Narrative:      Collection has been rescheduled by 12 at 12/28/2022 14:11 Reason:   Patient is moved to a different floor.  Collection has been rescheduled by 12 at 12/28/2022 14:11 Reason:   Patient is moved to a different floor.    Gram stain [[id number]] Collected:  12/29/22 1130    Order Status: Completed Specimen: Joint Fluid from Hip, Right Updated: 12/30/22 3247     Gram Stain Result No WBC's, epithelial cells or organisms seen    Narrative:      Joint Fluid          All pertinent labs from the last 24 hours have been reviewed.    Significant Diagnostics:  I have reviewed all pertinent imaging results/findings within the past 24 hours.    Assessment/Plan:     Stenosis, cervical spine  Dominique Mcgee is a 72 y.o. female with PMH of HTN, HLD, hypothyroidism, balance difficulites who presented to the ED on 12/23 for 3 weeks of weakness, LIRA, decreased appetite, and constipation with intermittent lower abdominal cramping s/p URI. She was admitted to the MICU for blood pressure management and hyponatremia. NSGY consulted for cervical stenosis seen on MRI obtained to evaluate for infection.    --Patient seen at bedside by NSGY  --MRI pan spine 12/29: cervical stenosis worst at C4-5, noncompressive herniated T6 disc  --CT c spine 12/31 for preop planning showing OPLL  --Recommend that any further intubation be fiberoptic iline intubation  --No acute neurosurgical intervention  --Can follow up in outpatient clinic in post acute setting after this admission        Thank you for your consult. I will sign off. Please contact us if you have any additional questions.    Tonja Guerra MD  Neurosurgery  [facility]

## 2022-12-31 NOTE — ASSESSMENT & PLAN NOTE
-renin/susanne within normal limits  -right renal artery stenosis noted on renal US (60-79%)  -no acute intervention needed now while in ICU, will evaluate when more stable for further interventions

## 2022-12-31 NOTE — PROGRESS NOTES
[facility]  Critical Care Medicine  Progress Note    Patient Name: Dominique Mcgee  MRN: [id number]  Admission Date: 12/23/2022  Hospital Length of Stay: 8 days  Code Status: Full Code  Attending Provider: Nathan Loaiza MD  Primary Care Provider: Briana eLblanc MD   Principal Problem: Hyponatremia    Subjective:     HPI:  Ms. Mcgee is a 73 y/o F patient with HTN, HLD, hypothyroidism, recent URI 3 weeks prior to admission, balance difficulites who presented to the ED on 12/23 for 3 weeks of weakness, LIRA, decreased appetite, and constipation with intermittent lower abdominal cramping. Per daughter, patient is typically independent, lives alone, cooks her own meals, since she has been ill she has had decreased PO intake and drinking coffee, tea, and eating cereal. She has had progressive worsening HTN in the last 3 weeks and was started on losartan-HCTZ and metoprolol. Prior to this she was only taking lisinopril per daughter. Admitted for HTN emergency and was found to have Na 120. Received IV fluids and admitted to hospital medicine. For her hypernatremia, her thiazide was held and she was placed on fluid restriction. Urine studies from 12/25 showed Uosm of 802 and HANH of 109. Her BP has been labile since admission requiring both IV antihypertensives as well as fluid boluses. The patient's hyponatremia persisted despite fluid restriction and she became increasingly lethargic and weak so CCM was consulted for further management.      Upon evaluation, patient is somnolent. Opens eyes to voice, unable to engage in full conversation. Speaks mainly single words. Denies thirst. Reports SOB and generalized abdominal pain a/w constipation. No CP, N/V/D, dysuria, edema. Most recent vitals: /60, pulse 82, R 18, SpO2 94% on RA. Na trend 119->115->117.       Hospital/ICU Course:  Once transferred to ICU patient became hypotensive requiring multiple pressors. Patient intubated for airway protection.  Art line and central line placed. Sodium improved with hypertonic saline. CT abdomen/pelvis showing proctitis and hip abscess. Went for IR aspiration of abscess. Fluid analysis not concerning for infection. MRI brain non-concerning. MRI spine with severe cervical stenosis and cord compression, also with mild lumbar stenosis. Repeat CXR with increased LLL consolidation and pleural effusion. Extubated to bipap. Once extubated and off pressors patient began to re-experience labile blood pressures similar to her initial presentation. Started on cardene drip.      Interval History/Significant Events: On Bipap today. When removed becomes hypertensive and tachy. Feeling better.    Review of Systems   Unable to perform ROS: Acuity of condition   Objective:     Vital Signs (Most Recent):  Temp: 97.6 °F (36.4 °C) (12/31/22 0701)  Pulse: 97 (12/31/22 0800)  Resp: (!) 24 (12/31/22 0800)  BP: (!) 142/74 (12/31/22 0706)  SpO2: 99 % (12/31/22 0800)   Vital Signs (24h Range):  Temp:  [97.6 °F (36.4 °C)-98.3 °F (36.8 °C)] 97.6 °F (36.4 °C)  Pulse:  [] 97  Resp:  [12-39] 24  SpO2:  [92 %-100 %] 99 %  BP: (116-168)/(54-80) 142/74  Arterial Line BP: ()/(48-78) 157/64   Weight: 59.4 kg (130 lb 15.3 oz)  Body mass index is 22.48 kg/m².      Intake/Output Summary (Last 24 hours) at 12/31/2022 0849  Last data filed at 12/31/2022 0701  Gross per 24 hour   Intake 1788.41 ml   Output 1425 ml   Net 363.41 ml         Physical Exam  Vitals and nursing note reviewed.   Constitutional:       Appearance: She is ill-appearing.   HENT:      Head: Normocephalic and atraumatic.   Eyes:      Extraocular Movements: Extraocular movements intact.      Pupils: Pupils are equal, round, and reactive to light.   Cardiovascular:      Rate and Rhythm: Normal rate and regular rhythm.      Pulses: Normal pulses.      Heart sounds: Normal heart sounds. No murmur heard.  Pulmonary:      Effort: Tachypnea present.      Breath sounds: Normal air entry. No  wheezing or rales.   Abdominal:      General: Abdomen is flat. There is no distension.      Palpations: Abdomen is soft.      Tenderness: There is no abdominal tenderness.   Musculoskeletal:         General: No swelling.      Right lower leg: No edema.      Left lower leg: No edema.   Skin:     General: Skin is warm and dry.      Findings: No bruising or rash.   Neurological:      General: No focal deficit present.      Comments: Alert, following commands   Psychiatric:         Mood and Affect: Mood normal.         Behavior: Behavior normal.       Vents:  Vent Mode: A/C (12/30/22 1528)  Ventilator Initiated: Yes (12/29/22 0144)  Set Rate: 12 BPM (12/30/22 1528)  Vt Set: 350 mL (12/30/22 0722)  Pressure Support: 14 cmH20 (12/30/22 1139)  PEEP/CPAP: 6 cmH20 (12/30/22 1528)  Oxygen Concentration (%): 40 (12/31/22 0800)  Peak Airway Pressure: 22 cmH20 (12/30/22 1528)  Plateau Pressure: 20 cmH20 (12/30/22 1528)  Total Ve: 9.73 L/m (12/30/22 1528)  Negative Inspiratory Force (cm H2O): 0 (12/30/22 1528)  F/VT Ratio<105 (RSBI): (!) 58.05 (12/30/22 1528)  Lines/Drains/Airways       Central Venous Catheter Line  Duration             Percutaneous Central Line Insertion/Assessment - Triple Lumen  12/29/22 0043 right internal jugular 2 days              Drain  Duration                  Urethral Catheter 12/27/22 1535 3 days         Rectal Tube 12/29/22 0332 rectal tube w/ balloon (indicate number of mLs) 45 Fr. 2 days              Arterial Line  Duration             Arterial Line 12/29/22 0219 Left Radial 2 days              Peripheral Intravenous Line  Duration                  Peripheral IV - Single Lumen 12/28/22 1545 18 G Left Antecubital 2 days         Peripheral IV - Single Lumen 12/28/22 1755 20 G;1 3/4 in Anterior;Right Upper Arm 2 days                  Significant Labs:    CBC/Anemia Profile:  Recent Labs   Lab 12/30/22  0430 12/31/22  0521   WBC 15.79* 15.61*   HGB 10.1* 9.7*   HCT 30.3* 28.9*    304   MCV 78*  80*   RDW 12.7 13.3          Chemistries:  Recent Labs   Lab 12/29/22  0944 12/29/22  1327 12/30/22  0316 12/30/22  0627 12/30/22  0931 12/30/22  1014 12/30/22  1413 12/30/22  1823 12/31/22  0143 12/31/22  0521   *   < > 126*   < > 129*  --  128*  --  133*  --    K 3.4*   < > 3.2*   < > 3.8  --  3.2*  --  4.2  --    CL 91*   < > 91*   < > 93*  --  94*  --  101  --    CO2 26   < > 23   < > 26  --  26  --  26  --    BUN 17   < > 11   < > 11  --  8  --  8  --    CREATININE 0.6   < > 0.5   < > 0.5  --  0.5  --  0.6  --    CALCIUM 8.5*   < > 8.2*   < > 8.1*  --  8.3*  --  8.2*  --    MG 1.9  --  1.8  --   --   --   --   --   --  2.2   PHOS 1.5*  --  <1.0*  --   --  4.7*  --  2.9  --  2.6*    < > = values in this interval not displayed.       All pertinent labs within the past 24 hours have been reviewed.    Significant Imaging:  I have reviewed all pertinent imaging results/findings within the past 24 hours.      ABG  Recent Labs   Lab 12/31/22  1102   PH 7.489*   PO2 66*   PCO2 43.1   HCO3 32.8*   BE 9     Assessment/Plan:     Neuro  Stenosis, cervical spine  Noted on MRI spine. Of note patient has been experiencing episodes of vertigo and decreased balance which could be related. NSGY consulted, appreciate recs.    Pulmonary  Acute hypoxemic respiratory failure  Patient with Hypoxic Respiratory failure which is Acute.  she is not on home oxygen. Supplemental oxygen was provided and noted- Vent Mode: A/C  Oxygen Concentration (%):  [30-40] 40  Resp Rate Total:  [14 br/min-28 br/min] 27 br/min  PEEP/CPAP:  [5 cmH20-6 cmH20] 6 cmH20  Pressure Support:  [14 cmH20] 14 cmH20  Mean Airway Pressure:  [9 kiV34-63 cmH20] 12 cmH20.   Signs/symptoms of respiratory failure include- tachypnea, increased work of breathing and respiratory distress. Contributing diagnoses includes - Pleural effusion and atelectasis Labs and images were reviewed. Patient Has recent ABG, which has been reviewed. Will treat underlying causes and  adjust management of respiratory failure as follows- unclear etiology of pleural effusions, patient notes recent URI 3 weeks ago that has left her short of breath since then, on broad spectrum abx  Repeat CXR with increased consolidation in LLL  Extubated to Bipap, wean as tolerated    Cardiac/Vascular  Right renal artery stenosis  -no acute intervention while in ICU, likely etiology for fluctuating blood pressure    Hyperlipemia  Continue home statin    Essential hypertension  Known history of uncontrolled HTN, w/ labile BP since admit.   -BP meds held in setting of septic shock  -will avoid IV meds if possible as has had hypotension following IV BP meds  -renal artery duplex showing R renal artery stenosis, L renal artery unable to be visualized  -Will monitor and add back meds as tolerated, cardene drip while unable to tolerate PO    Dyspnea on exertion  Began prior to admission. Has been requiring 2L via NC to maintain oxygenation  -O2 via NC PRN    Renal/  * Hyponatremia  Patient started having weakness around 3 weeks ago after possible viral illness, also has had decreased appetite and more recently experiencing shortness of breath.  Sodium of 120 on admit, has been fluctuating since, now around 116-117. UOSM 802, HANH 109  Has become increasingly lethargic since admission, concern due to hyponatremia  Likely hypovolemic hyponatremia in setting of diuretic use, decreased intake and possible GI loses    -nephrology consulted, appreciate recs  -s/p hypertonic saline, sodium now improving  -BMP q12    Hypophosphatemia  Replete, check repeat lab    Metabolic alkalosis  Initially could have been related to diuretic use. Bicarb remains elevated after stopping HCTZ.    ID  Severe sepsis  This patient does have evidence of infective focus  My overall impression is septic shock.  Source: proctitis vs pneumonia vs hip abscess  Antibiotics given-   Antibiotics (From admission, onward)    Start     Stop Route Frequency  Ordered    12/30/22 1300  vancomycin 1,250 mg in dextrose 5 % 250 mL IVPB         -- IV Every 24 hours (non-standard times) 12/29/22 1343    12/29/22 1019  vancomycin - pharmacy to dose  (vancomycin IVPB)        See Hyperspace for full Linked Orders Report.    -- IV pharmacy to manage frequency 12/29/22 0919    12/29/22 0100  mupirocin 2 % ointment         01/02 2059 Nasl 2 times daily 12/28/22 2345    12/28/22 1515  piperacillin-tazobactam (ZOSYN) 4.5 g in sodium chloride 0.9 % 100 mL IVPB (MB+)         -- IV Every 8 hours (non-standard times) 12/28/22 1412        Latest lactate reviewed-  Recent Labs   Lab 12/28/22  1508 12/29/22  0201   LACTATE 1.0 0.6     Organ dysfunction indicated by Acute respiratory failure and encephalopathy    Shock with decreased perfusion noted, Fluid challenge  was given at 30cc/kg    Post- resuscitation assessment- (Done after fluids given for shock)  Vital signs post fluid administrations were-  Temp Readings from Last 1 Encounters:   12/31/22 97.6 °F (36.4 °C) (Axillary)     BP Readings from Last 1 Encounters:   12/31/22 (!) 140/73     Pulse Readings from Last 1 Encounters:   12/31/22 91       Perfusion exam was performed within 6 hours of septic shock presentation after bolus shows Adequate tissue perfusion assessed by invasive monitoring  Will discontinue Pressors  Source control achieved by: vanc, zosyn, azithromycin          Endocrine  Moderate malnutrition  Nutrition consulted. Most recent weight and BMI monitored-          Malnutrition (Moderate to Severe)  Energy Intake (Malnutrition): less than 75% for greater than 7 days              Measurements:  Wt Readings from Last 1 Encounters:   12/30/22 59.4 kg (130 lb 15.3 oz)   Body mass index is 22.48 kg/m².    Recommendations: Recommendation/Intervention: 1.  Goals: Meet % EEN, EPN by RD f/u date    Patient has been screened and assessed by RD. RD will follow patient.      GI  Proctitis  CT scan with findings of stercoral  proctitis.   -continue bowel regimen  -on antibiotics    Generalized abdominal pain  Patient with constipation and ileus. CT scan consistent with ileus and stercoral proctitis which is likely related to chronic constipation.    Constipation  Continue miralax BID and senna-doc daily    Orthopedic  Abscess of bursa of right hip  CT scan showing possible hematoma vs abscess around right hip. Plan for aspiration with IR to eval for possible source of infection. Fluid analysis and gram stain not concerning for infection.    Other  Hypothermia  Considering new onset will check BCx and start zosyn. Using bear hugger for warming.    Generalized weakness  See hypernatremia.       Critical Care Daily Checklist:    A: Awake: RASS Goal/Actual Goal:    Actual: Manning Agitation Sedation Scale (RASS): Drowsy   B: Spontaneous Breathing Trial Performed? Spon. Breathing Trial Initiated?: Not initiated (12/29/22 0729)   C: SAT & SBT Coordinated?  n/a                      D: Delirium: CAM-ICU Overall CAM-ICU: Negative   E: Early Mobility Performed? Yes   F: Feeding Goal: Goals: Meet % EEN, EPN by RD f/u date  Status: Nutrition Goal Status: new   Current Diet Order   Procedures    Diet NPO      AS: Analgesia/Sedation mild   T: Thromboembolic Prophylaxis yes   H: HOB > 300 Yes   U: Stress Ulcer Prophylaxis (if needed) n/a   G: Glucose Control no   B: Bowel Function Stool Occurrence: 2   I: Indwelling Catheter (Lines & Rush) Necessity yes   D: De-escalation of Antimicrobials/Pharmacotherapies yes    Plan for the day/ETD Wean bipap as tim    Code Status:  Family/Goals of Care: Full Code         Critical secondary to Patient has a condition that poses threat to life and bodily function: Severe Respiratory Distress      Critical care was time spent personally by me on the following activities: development of treatment plan with patient or surrogate and bedside caregivers, discussions with consultants, evaluation of patient's  response to treatment, examination of patient, ordering and performing treatments and interventions, ordering and review of laboratory studies, ordering and review of radiographic studies, pulse oximetry, re-evaluation of patient's condition. This critical care time did not overlap with that of any other provider or involve time for any procedures.     Beto Avelar MD  Critical Care Medicine  [facility] [place] [facility]

## 2022-12-31 NOTE — PROGRESS NOTES
"[facility]  Nephrology  Progress Note    Patient Name: Dominique Mcgee  MRN: [id number]  Admission Date: 12/23/2022  Hospital Length of Stay: 8 days  Attending Provider: Nathan Loaiza MD   Primary Care Physician: Briana Leblanc MD  Principal Problem:Hyponatremia    Subjective:     HPI: Dominique Mcgee is a 71 yo female with Hx of HTN, HLD, and hypothyroidism who presents to the ED for evaluation of weakness, fatigue, abdominal pain and cough with SOB that had been persisting for several weeks.  She was hypertensive in the ED with SBP > 200 and chemisitries notable for a low sodium level of 120.  According to records, her BP medications have been adjusted prior to arrival for uncontrolled hypertension and noting that was on a combination of Losartan and HCTZ.  HCTZ was discontinued and she was started on Amlodipine, Carvedilol and losartan. Urine studies were obtained on 12/25 revealing elevated UOSM of 802 and elevated Martin of 109.  She was placed on fluid restrictions, but Na levels have continued to stay low so Nephrology was consulted on 12/28 for further evaluation.  She is awake alert and oriented on examination, but endorses feeling "tired". She has had poor appetite as well since arrival to the hospital with continued complaints of abdominal pain.  Her blood pressures continue to be erratic, often times being hypertensive with episodes of hypotension on night.  She has had CTH which was negative for acute findings with CT abdomen pending.  AM cortisol level on day of consultation elevated at 37.8.  She is lethargic on exam and after discussing with the primary team this has progressively worsened since Monday.        Interval History: Patient seen and examined this morning. Extubated yesterday to BiPAP which she remains on at this time. Afebrile with pulse ranging from 110-70s bpm. Systolic blood pressures ranging from 190-70s via arterial line (on and off Cardene infusion). She is " saturating +92% on BiPAP FiO2 40% with urine output of 1.4 liters in the last 24 hours (net positive ~120 mL). Renal function stable and sodium improved however needs phosphorous replenishment.     Review of patient's allergies indicates:   Allergen Reactions    Hydralazine analogues      Precipitous drop in BP with IV formulation. Avoid if possible    Shellfish containing products Swelling     Current Facility-Administered Medications   Medication Frequency    acetaminophen tablet 650 mg Q6H PRN    albuterol-ipratropium 2.5 mg-0.5 mg/3 mL nebulizer solution 3 mL Q6H WAKE    atorvastatin tablet 10 mg Daily    capsaicin 0.025 % cream BID    dexmedetomidine (PRECEDEX) 400mcg/100mL 0.9% NaCL infusion Continuous    dextrose 10% bolus 125 mL 125 mL PRN    dextrose 10% bolus 250 mL 250 mL PRN    enoxaparin injection 40 mg Daily    glucagon (human recombinant) injection 1 mg PRN    glucose chewable tablet 16 g PRN    glucose chewable tablet 24 g PRN    glycerin adult suppository 1 suppository Once PRN    insulin aspart U-100 pen 0-5 Units Q6H PRN    levothyroxine tablet 50 mcg Before breakfast    methocarbamoL tablet 500 mg TID PRN    multivitamin tablet Daily    mupirocin 2 % ointment BID    naloxone 0.4 mg/mL injection 0.02 mg PRN    niCARdipine 40 mg/200 mL (0.2 mg/mL) infusion Continuous    pantoprazole injection 40 mg Daily    piperacillin-tazobactam (ZOSYN) 4.5 g in sodium chloride 0.9 % 100 mL IVPB (MB+) Q8H    polyethylene glycol packet 17 g BID    senna-docusate 8.6-50 mg per tablet 1 tablet Daily    sodium chloride 0.9% flush 10 mL Q12H PRN    sodium phosphate 15 mmol in dextrose 5 % 250 mL IVPB Once       Objective:     Vital Signs (Most Recent):  Temp: 97.6 °F (36.4 °C) (12/31/22 0701)  Pulse: 108 (12/31/22 1000)  Resp: (!) 33 (12/31/22 1000)  BP: (!) 179/79 (12/31/22 1000)  SpO2: 95 % (12/31/22 1000)   Vital Signs (24h Range):  Temp:  [97.6 °F (36.4 °C)-98.3 °F (36.8 °C)] 97.6 °F (36.4 °C)  Pulse:  []  108  Resp:  [12-39] 33  SpO2:  [92 %-100 %] 95 %  BP: (116-179)/(54-80) 179/79  Arterial Line BP: ()/(48-82) 190/82     Weight: 59.4 kg (130 lb 15.3 oz) (12/30/22 1203)  Body mass index is 22.48 kg/m².  Body surface area is 1.64 meters squared.    I/O last 3 completed shifts:  In: 1938.6 [I.V.:578.8; NG/GT:160; IV Piggyback:1199.8]  Out: 2145 [Urine:2145]    Physical Exam  Vitals and nursing note reviewed.   Constitutional:       General: She is not in acute distress.     Appearance: She is well-developed. She is ill-appearing. She is not toxic-appearing.      Interventions: Face mask in place.   HENT:      Head: Normocephalic and atraumatic.      Nose: No congestion or rhinorrhea.      Mouth/Throat:      Mouth: Mucous membranes are moist.      Pharynx: Oropharynx is clear.   Eyes:      General: No scleral icterus.        Right eye: No discharge.         Left eye: No discharge.      Extraocular Movements: Extraocular movements intact.      Conjunctiva/sclera: Conjunctivae normal.   Cardiovascular:      Rate and Rhythm: Normal rate and regular rhythm.      Heart sounds: No murmur heard.    No friction rub. No gallop.   Pulmonary:      Effort: Tachypnea present. No respiratory distress.      Breath sounds: No wheezing or rales.   Abdominal:      General: There is distension.      Palpations: Abdomen is soft.      Tenderness: There is no abdominal tenderness.   Musculoskeletal:         General: Swelling (RUE) present.      Cervical back: Normal range of motion and neck supple.      Right lower leg: No edema.      Left lower leg: No edema.   Skin:     General: Skin is warm and dry.       Significant Labs:  CBC:   Recent Labs   Lab 12/31/22  0521   WBC 15.61*   RBC 3.62*   HGB 9.7*   HCT 28.9*      MCV 80*   MCH 26.8*   MCHC 33.6       CMP:   Recent Labs   Lab 12/27/22  0542 12/27/22  1722 12/31/22  0820   *   < > 123*   CALCIUM 9.0   < > 8.6*   ALBUMIN 3.4*  --   --    PROT 6.6  --   --    *    < > 135*   K 3.8   < > 3.7   CO2 29   < > 28   CL 78*   < > 99   BUN 10   < > 9   CREATININE 0.4*   < > 0.6   ALKPHOS 70  --   --    ALT 14  --   --    AST 22  --   --    BILITOT 0.9  --   --     < > = values in this interval not displayed.            Assessment/Plan:     * Hyponatremia  Initial labs concerning for SIADH vs diuretic induced (HCTZ as OP), low solute intake.  Na did not improve with fluid restrictions and Nephrology was consulted on 12/28.      Has had poor PO intake since admit.    UOSM on 12/25:  802  HANH on 12/25:  109    Repeat on 12/28:   UOSM:  572  HANH:  18  Consistent with Hypovolemic Hyponatremia    Corrected with hypertonic therapy    Urine metanephrines in process    Plan/Recommendations:  -improving to almost normalized this morning with value of 135  -can decrease frequency of serum sodiums     -strict I/O's    Hypophosphatemia  - primary team to replenish phosphorous     Metabolic alkalosis  -improved with IVFs      Essential hypertension  -renin/susanne within normal limits  -right renal artery stenosis noted on renal US (60-79%)  -no acute intervention needed now while in ICU, will evaluate when more stable for further interventions      Thank you for your consult. We will follow peripherally.  Please contact us if you have any additional questions.    Tj Dunbar MD  Nephrology  [facility]

## 2022-12-31 NOTE — SUBJECTIVE & OBJECTIVE
Interval History: Patient seen and examined this morning. Extubated yesterday to BiPAP which she remains on at this time. Afebrile with pulse ranging from 110-70s bpm. Systolic blood pressures ranging from 190-70s via arterial line (on and off Cardene infusion). She is saturating +92% on BiPAP FiO2 40% with urine output of 1.4 liters in the last 24 hours (net positive ~120 mL). Renal function stable and sodium improved however needs phosphorous replenishment.     Review of patient's allergies indicates:   Allergen Reactions    Hydralazine analogues      Precipitous drop in BP with IV formulation. Avoid if possible    Shellfish containing products Swelling     Current Facility-Administered Medications   Medication Frequency    acetaminophen tablet 650 mg Q6H PRN    albuterol-ipratropium 2.5 mg-0.5 mg/3 mL nebulizer solution 3 mL Q6H WAKE    atorvastatin tablet 10 mg Daily    capsaicin 0.025 % cream BID    dexmedetomidine (PRECEDEX) 400mcg/100mL 0.9% NaCL infusion Continuous    dextrose 10% bolus 125 mL 125 mL PRN    dextrose 10% bolus 250 mL 250 mL PRN    enoxaparin injection 40 mg Daily    glucagon (human recombinant) injection 1 mg PRN    glucose chewable tablet 16 g PRN    glucose chewable tablet 24 g PRN    glycerin adult suppository 1 suppository Once PRN    insulin aspart U-100 pen 0-5 Units Q6H PRN    levothyroxine tablet 50 mcg Before breakfast    methocarbamoL tablet 500 mg TID PRN    multivitamin tablet Daily    mupirocin 2 % ointment BID    naloxone 0.4 mg/mL injection 0.02 mg PRN    niCARdipine 40 mg/200 mL (0.2 mg/mL) infusion Continuous    pantoprazole injection 40 mg Daily    piperacillin-tazobactam (ZOSYN) 4.5 g in sodium chloride 0.9 % 100 mL IVPB (MB+) Q8H    polyethylene glycol packet 17 g BID    senna-docusate 8.6-50 mg per tablet 1 tablet Daily    sodium chloride 0.9% flush 10 mL Q12H PRN    sodium phosphate 15 mmol in dextrose 5 % 250 mL IVPB Once       Objective:     Vital Signs (Most  Recent):  Temp: 97.6 °F (36.4 °C) (12/31/22 0701)  Pulse: 108 (12/31/22 1000)  Resp: (!) 33 (12/31/22 1000)  BP: (!) 179/79 (12/31/22 1000)  SpO2: 95 % (12/31/22 1000)   Vital Signs (24h Range):  Temp:  [97.6 °F (36.4 °C)-98.3 °F (36.8 °C)] 97.6 °F (36.4 °C)  Pulse:  [] 108  Resp:  [12-39] 33  SpO2:  [92 %-100 %] 95 %  BP: (116-179)/(54-80) 179/79  Arterial Line BP: ()/(48-82) 190/82     Weight: 59.4 kg (130 lb 15.3 oz) (12/30/22 1203)  Body mass index is 22.48 kg/m².  Body surface area is 1.64 meters squared.    I/O last 3 completed shifts:  In: 1938.6 [I.V.:578.8; NG/GT:160; IV Piggyback:1199.8]  Out: 2145 [Urine:2145]    Physical Exam  Vitals and nursing note reviewed.   Constitutional:       General: She is not in acute distress.     Appearance: She is well-developed. She is ill-appearing. She is not toxic-appearing.      Interventions: Face mask in place.   HENT:      Head: Normocephalic and atraumatic.      Nose: No congestion or rhinorrhea.      Mouth/Throat:      Mouth: Mucous membranes are moist.      Pharynx: Oropharynx is clear.   Eyes:      General: No scleral icterus.        Right eye: No discharge.         Left eye: No discharge.      Extraocular Movements: Extraocular movements intact.      Conjunctiva/sclera: Conjunctivae normal.   Cardiovascular:      Rate and Rhythm: Normal rate and regular rhythm.      Heart sounds: No murmur heard.    No friction rub. No gallop.   Pulmonary:      Effort: Tachypnea present. No respiratory distress.      Breath sounds: No wheezing or rales.   Abdominal:      General: There is distension.      Palpations: Abdomen is soft.      Tenderness: There is no abdominal tenderness.   Musculoskeletal:         General: Swelling (RUE) present.      Cervical back: Normal range of motion and neck supple.      Right lower leg: No edema.      Left lower leg: No edema.   Skin:     General: Skin is warm and dry.       Significant Labs:  CBC:   Recent Labs   Lab  12/31/22  0521   WBC 15.61*   RBC 3.62*   HGB 9.7*   HCT 28.9*      MCV 80*   MCH 26.8*   MCHC 33.6       CMP:   Recent Labs   Lab 12/27/22  0542 12/27/22  1722 12/31/22  0820   *   < > 123*   CALCIUM 9.0   < > 8.6*   ALBUMIN 3.4*  --   --    PROT 6.6  --   --    *   < > 135*   K 3.8   < > 3.7   CO2 29   < > 28   CL 78*   < > 99   BUN 10   < > 9   CREATININE 0.4*   < > 0.6   ALKPHOS 70  --   --    ALT 14  --   --    AST 22  --   --    BILITOT 0.9  --   --     < > = values in this interval not displayed.

## 2022-12-31 NOTE — ASSESSMENT & PLAN NOTE
Patient started having weakness around 3 weeks ago after possible viral illness, also has had decreased appetite and more recently experiencing shortness of breath.  Sodium of 120 on admit, has been fluctuating since, now around 116-117. UOSM 802, HANH 109  Has become increasingly lethargic since admission, concern due to hyponatremia  Likely hypovolemic hyponatremia in setting of diuretic use, decreased intake and possible GI loses    -nephrology consulted, appreciate recs  -s/p hypertonic saline, sodium now improving  -BMP q12

## 2022-12-31 NOTE — PT/OT/SLP PROGRESS
Speech Language Pathology  Pt not seen    Dominique Mcgee  MRN: [id number]    Patient not seen today secondary to pt on continuous BiPAP upon attempt. SLP will follow up.   12/31/2022

## 2022-12-31 NOTE — ASSESSMENT & PLAN NOTE
Initial labs concerning for SIADH vs diuretic induced (HCTZ as OP), low solute intake.  Na did not improve with fluid restrictions and Nephrology was consulted on 12/28.      Has had poor PO intake since admit.    UOSM on 12/25:  802  HANH on 12/25:  109    Repeat on 12/28:   UOSM:  572  HANH:  18  Consistent with Hypovolemic Hyponatremia    Corrected with hypertonic therapy    Urine metanephrines in process    Plan/Recommendations:  -improving to almost normalized this morning with value of 135  -can decrease frequency of serum sodiums     -strict I/O's

## 2022-12-31 NOTE — ASSESSMENT & PLAN NOTE
Dominique Mcgee is a 72 y.o. female with PMH of HTN, HLD, hypothyroidism, balance difficulites who presented to the ED on 12/23 for 3 weeks of weakness, LIRA, decreased appetite, and constipation with intermittent lower abdominal cramping s/p URI. She was admitted to the MICU for blood pressure management and hyponatremia. NSGY consulted for cervical stenosis seen on MRI obtained to evaluate for infection.    --Patient seen at bedside by NSGY  --MRI pan spine 12/29: cervical stenosis worst at C4-5, noncompressive herniated T6 disc  --CT c spine 12/31 for preop planning showing OPLL  --Recommend that any further intubation be fiberoptic iline intubation  --No acute neurosurgical intervention  --Can follow up in outpatient clinic in post acute setting after this admission

## 2022-12-31 NOTE — ASSESSMENT & PLAN NOTE
Patient with Hypoxic Respiratory failure which is Acute.  she is not on home oxygen. Supplemental oxygen was provided and noted- Vent Mode: A/C  Oxygen Concentration (%):  [30-40] 40  Resp Rate Total:  [14 br/min-28 br/min] 27 br/min  PEEP/CPAP:  [5 cmH20-6 cmH20] 6 cmH20  Pressure Support:  [14 cmH20] 14 cmH20  Mean Airway Pressure:  [9 yeD53-09 cmH20] 12 cmH20.   Signs/symptoms of respiratory failure include- tachypnea, increased work of breathing and respiratory distress. Contributing diagnoses includes - Pleural effusion and atelectasis Labs and images were reviewed. Patient Has recent ABG, which has been reviewed. Will treat underlying causes and adjust management of respiratory failure as follows- unclear etiology of pleural effusions, patient notes recent URI 3 weeks ago that has left her short of breath since then, on broad spectrum abx  Repeat CXR with increased consolidation in LLL  Extubated to Bipap, wean as tolerated

## 2022-12-31 NOTE — SUBJECTIVE & OBJECTIVE
Medications Prior to Admission   Medication Sig Dispense Refill Last Dose    alendronate-vitamin D3 (FOSAMAX PLUS D) 70 mg- 2,800 unit per tablet Take 1 tablet by mouth every 7 days.   12/22/2022    hydroCHLOROthiazide (HYDRODIURIL) 25 MG tablet Take 25 mg by mouth once daily.   12/22/2022    levothyroxine (SYNTHROID) 50 MCG tablet Take 50 mcg by mouth once daily.   12/22/2022    losartan (COZAAR) 50 MG tablet Take 50 mg by mouth once daily.   12/22/2022    meloxicam (MOBIC) 15 MG tablet Take 15 mg by mouth once daily.   12/22/2022    metoprolol succinate (TOPROL-XL) 50 MG 24 hr tablet Take 50 mg by mouth once daily.   12/22/2022    simvastatin (ZOCOR) 20 MG tablet Take 20 mg by mouth every evening.   12/22/2022       Review of patient's allergies indicates:   Allergen Reactions    Hydralazine analogues      Precipitous drop in BP with IV formulation. Avoid if possible    Shellfish containing products Swelling       Past Medical History:   Diagnosis Date    Hyperlipidemia     Hypertension     Hypothyroidism     Shock 12/29/2022     Past Surgical History:   Procedure Laterality Date    HYSTERECTOMY       Family History       Problem Relation (Age of Onset)    Breast cancer Sister (55), Sister (65)          Tobacco Use    Smoking status: Never    Smokeless tobacco: Not on file   Substance and Sexual Activity    Alcohol use: Not on file    Drug use: Never    Sexual activity: Not on file     Review of Systems   Reason unable to perform ROS: on BiPAP and did not tolerate well taking it off.   Objective:     Weight: 59.4 kg (130 lb 15.3 oz)  Body mass index is 22.48 kg/m².  Vital Signs (Most Recent):  Temp: 97.9 °F (36.6 °C) (12/31/22 1101)  Pulse: (!) 128 (12/31/22 1400)  Resp: (!) 39 (12/31/22 1400)  BP: (!) 194/84 (12/31/22 1400)  SpO2: 97 % (12/31/22 1400)   Vital Signs (24h Range):  Temp:  [97.6 °F (36.4 °C)-98.2 °F (36.8 °C)] 97.9 °F (36.6 °C)  Pulse:  [] 128  Resp:  [12-39] 39  SpO2:  [92 %-100 %] 97 %  BP:  "(116-194)/(54-84) 194/84  Arterial Line BP: ()/(48-84) 165/84     Date 12/31/22 0700 - 01/01/23 0659   Shift 2822-5160 0445-5051 3881-5319 24 Hour Total   INTAKE   I.V.(mL/kg) 92.8(1.6)   92.8(1.6)   IV Piggyback 671.6   671.6   Shift Total(mL/kg) 764.4(12.9)   764.4(12.9)   OUTPUT   Urine(mL/kg/hr) 1445(3)   1445   Shift Total(mL/kg) 1445(24.3)   1445(24.3)   Weight (kg) 59.4 59.4 59.4 59.4              Vent Mode: A/C  Oxygen Concentration (%):  [30-40] 30  Resp Rate Total:  [23 br/min-27 br/min] 27 br/min  PEEP/CPAP:  [6 cmH20] 6 cmH20  Mean Airway Pressure:  [11 dpC74-08 cmH20] 12 cmH20         Rectal Tube 12/29/22 0332 rectal tube w/ balloon (indicate number of mLs) 45 Fr. (Active)   Balloon Inflation Volume (mL) 45 12/31/22 0300   Reposition balloon deflated;drainage bags for BMS & Rush on opposite sides of bed 12/31/22 0300   Outcome stool evacuated 12/31/22 0300   Stool Color Brown 12/31/22 0300   Insertion Site Appearance no redness, warmth, tenderness, skin breakdown, drainage 12/31/22 1101   Flush/Irrigation flushed w/;water 12/29/22 1505            Urethral Catheter 12/27/22 1535 (Active)   Site Assessment Clean;Intact;Dry 12/31/22 1101   Collection Container Urimeter 12/31/22 1101   Securement Method secured to top of thigh w/ adhesive device 12/31/22 1101   Catheter Care Performed yes 12/31/22 1101   Reason for Continuing Urinary Catheterization Critically ill in ICU and requiring hourly monitoring of intake/output 12/31/22 1101   CAUTI Prevention Bundle Securement Device in place with 1" slack;Intact seal between catheter & drainage tubing;Drainage bag/urimeter off the floor;Sheeting clip in use;No dependent loops or kinks;Drainage bag/urimeter not overfilled (<2/3 full);Drainage bag/urimeter below bladder 12/31/22 1101   Output (mL) 145 mL 12/31/22 1400       Physical Exam  General: Awake, alert, oriented  Head: Non-traumatic, normocephalic  Neck: No tenderness to palpation  CVS: Normal rate " and rhythm  Pulm: on BiPAP  GI: Abdomen nontender  MSK: Moves all extremities  Skin: Dry, intact    Neurosurgery Physical Exam  AOx3  BUE 3/5 pain limited delts 4+ bi/monika (bilateral rotator cuff tear),  5/5 bilateral handgrip  BLE 4+/5 hf, 5/5 distally  SILT  No Hoffmans/clonus    Significant Labs:  Recent Labs   Lab 12/30/22  0316 12/30/22  0627 12/30/22  1413 12/31/22  0143 12/31/22  0521 12/31/22  0820      < > 176* 114*  --  123*   *   < > 128* 133*  --  135*   K 3.2*   < > 3.2* 4.2  --  3.7   CL 91*   < > 94* 101  --  99   CO2 23   < > 26 26  --  28   BUN 11   < > 8 8  --  9   CREATININE 0.5   < > 0.5 0.6  --  0.6   CALCIUM 8.2*   < > 8.3* 8.2*  --  8.6*   MG 1.8  --   --   --  2.2  --     < > = values in this interval not displayed.     Recent Labs   Lab 12/30/22  0430 12/31/22  0521   WBC 15.79* 15.61*   HGB 10.1* 9.7*   HCT 30.3* 28.9*    304     No results for input(s): LABPT, INR, APTT in the last 48 hours.  Microbiology Results (last 7 days)       Procedure Component Value Units Date/Time    Culture, body fluid - Bactec [[id number]]  (Abnormal) Collected: 12/29/22 1130    Order Status: Completed Specimen: Joint Fluid from Hip, Right Updated: 12/31/22 1155     Body Fluid Culture, Sterile Gram stain: Gram negative rods      ESCHERICHIA COLI  Susceptibility pending      Narrative:      Joint Fluid    Blood culture [[id number]] Collected: 12/29/22 0012    Order Status: Completed Specimen: Blood from Peripheral, Antecubital, Right Updated: 12/31/22 0612     Blood Culture, Routine No Growth to date      No Growth to date      No Growth to date    Blood culture [[id number]] Collected: 12/29/22 0017    Order Status: Completed Specimen: Blood from Peripheral, Antecubital, Left Updated: 12/31/22 0612     Blood Culture, Routine No Growth to date      No Growth to date      No Growth to date    Blood culture [[id number]] Collected: 12/28/22 1509    Order Status: Completed Specimen: Blood from  Peripheral, Upper Arm, Right Updated: 12/30/22 1812     Blood Culture, Routine No Growth to date      No Growth to date      No Growth to date    Narrative:      Collection has been rescheduled by [facility] at 12/28/2022 14:11 Reason:   Patient is moved to a different floor.  Collection has been rescheduled by 12 at 12/28/2022 14:11 Reason:   Patient is moved to a different floor.    Blood culture [[id number]] Collected: 12/28/22 1445    Order Status: Completed Specimen: Blood from Peripheral, Forearm, Right Updated: 12/30/22 1612     Blood Culture, Routine No Growth to date      No Growth to date      No Growth to date    Narrative:      Collection has been rescheduled by [facility] at 12/28/2022 14:11 Reason:   Patient is moved to a different floor.  Collection has been rescheduled by [facility] at 12/28/2022 14:11 Reason:   Patient is moved to a different floor.    Gram stain [[id number]] Collected: 12/29/22 1130    Order Status: Completed Specimen: Joint Fluid from Hip, Right Updated: 12/30/22 0335     Gram Stain Result No WBC's, epithelial cells or organisms seen    Narrative:      Joint Fluid          All pertinent labs from the last 24 hours have been reviewed.    Significant Diagnostics:  I have reviewed all pertinent imaging results/findings within the past 24 hours.

## 2022-12-31 NOTE — PLAN OF CARE
Problem: Adult Inpatient Plan of Care  Goal: Plan of Care Review  Outcome: Ongoing, Progressing  Goal: Patient-Specific Goal (Individualized)  Outcome: Ongoing, Progressing  Goal: Absence of Hospital-Acquired Illness or Injury  Outcome: Ongoing, Progressing  Goal: Optimal Comfort and Wellbeing  Outcome: Ongoing, Progressing  Goal: Readiness for Transition of Care  Outcome: Ongoing, Progressing     Problem: Infection  Goal: Absence of Infection Signs and Symptoms  Outcome: Ongoing, Progressing     No acute events throughout shift.  Intermittently requiring rita gtt, HR and BP increase with stimulation, MD aware, precedex gtt continued.  CT spine completed. Assessment and VS per flow sheet, pt progressing towards goals as tolerated, plan of care reviewed with pt and daughter, all concerns addressed. Will continue to monitor.

## 2022-12-31 NOTE — ASSESSMENT & PLAN NOTE
This patient does have evidence of infective focus  My overall impression is septic shock.  Source: proctitis vs pneumonia vs hip abscess  Antibiotics given-   Antibiotics (From admission, onward)    Start     Stop Route Frequency Ordered    12/30/22 1300  vancomycin 1,250 mg in dextrose 5 % 250 mL IVPB         -- IV Every 24 hours (non-standard times) 12/29/22 1343    12/29/22 1019  vancomycin - pharmacy to dose  (vancomycin IVPB)        See [facility] for full Linked Orders Report.    -- IV pharmacy to manage frequency 12/29/22 0919    12/29/22 0100  mupirocin 2 % ointment         01/02 2059 Nasl 2 times daily 12/28/22 2345    12/28/22 1515  piperacillin-tazobactam (ZOSYN) 4.5 g in sodium chloride 0.9 % 100 mL IVPB (MB+)         -- IV Every 8 hours (non-standard times) 12/28/22 1412        Latest lactate reviewed-  Recent Labs   Lab 12/28/22  1508 12/29/22  0201   LACTATE 1.0 0.6     Organ dysfunction indicated by Acute respiratory failure and encephalopathy    Shock with decreased perfusion noted, Fluid challenge  was given at 30cc/kg    Post- resuscitation assessment- (Done after fluids given for shock)  Vital signs post fluid administrations were-  Temp Readings from Last 1 Encounters:   12/31/22 97.6 °F (36.4 °C) (Axillary)     BP Readings from Last 1 Encounters:   12/31/22 (!) 140/73     Pulse Readings from Last 1 Encounters:   12/31/22 91       Perfusion exam was performed within 6 hours of septic shock presentation after bolus shows Adequate tissue perfusion assessed by invasive monitoring  Will discontinue Pressors  Source control achieved by: vanc, zosyn, azithromycin

## 2022-12-31 NOTE — HPI
Dominique Mcgee is a 72 y.o. female with PMH of HTN, HLD, hypothyroidism, recent URI 3 weeks prior to admission and balance difficulites who presented to the ED on 12/23 for 3 weeks of weakness, LIRA, decreased appetite, and constipation with intermittent lower abdominal cramping. She was admitted to the MICU for hypertension/blood pressure (currently fluctuating) and hyponatremia management. There was concern for infection and a MRI pan spine was performed demonstrating cervical stenosis for which NSGY was consulted. Patient endorses lower lumbar pain and shoulder pain (d/t bilateral rotator cuff tears), but denies neck pain. She does not have any issues writing and does not endorse difficulties with fine finger movements.

## 2022-12-31 NOTE — PROGRESS NOTES
Therapy with Vancomycin discontinued by provider.  Pharmacy will sign off, please re-consult as needed.

## 2022-12-31 NOTE — ASSESSMENT & PLAN NOTE
Known history of uncontrolled HTN, w/ labile BP since admit.   -BP meds held in setting of septic shock  -will avoid IV meds if possible as has had hypotension following IV BP meds  -renal artery duplex showing R renal artery stenosis, L renal artery unable to be visualized  -Will monitor and add back meds as tolerated, cardene drip while unable to tolerate PO

## 2023-01-01 VITALS
WEIGHT: 130.94 LBS | RESPIRATION RATE: 10 BRPM | HEIGHT: 64 IN | DIASTOLIC BLOOD PRESSURE: 57 MMHG | BODY MASS INDEX: 22.35 KG/M2 | OXYGEN SATURATION: 91 % | SYSTOLIC BLOOD PRESSURE: 112 MMHG | TEMPERATURE: 97 F

## 2023-01-01 PROBLEM — R06.02 SHORTNESS OF BREATH: Status: ACTIVE | Noted: 2023-01-01

## 2023-01-01 LAB
ABO + RH BLD: NORMAL
ACHR BIND AB SER-SCNC: 0 NMOL/L
ACTH PLAS-MCNC: 7 PG/ML (ref 0–46)
ALBUMIN SERPL BCP-MCNC: 1.8 G/DL (ref 3.5–5.2)
ALBUMIN SERPL BCP-MCNC: 2.3 G/DL (ref 3.5–5.2)
ALBUMIN SERPL ELPH-MCNC: 2.75 G/DL (ref 3.35–5.55)
ALDOLASE SERPL-CCNC: 8.5 U/L (ref 1.2–7.6)
ALLENS TEST: ABNORMAL
ALP SERPL-CCNC: 107 U/L (ref 55–135)
ALP SERPL-CCNC: 67 U/L (ref 55–135)
ALPHA1 GLOB SERPL ELPH-MCNC: 0.37 G/DL (ref 0.17–0.41)
ALPHA2 GLOB SERPL ELPH-MCNC: 0.56 G/DL (ref 0.43–0.99)
ALT SERPL W/O P-5'-P-CCNC: 26 U/L (ref 10–44)
ALT SERPL W/O P-5'-P-CCNC: 61 U/L (ref 10–44)
AMYLOID BETA-PROTEIN: 81 PG/ML (ref 20–80)
ANA SER QL IF: NORMAL
ANION GAP SERPL CALC-SCNC: 10 MMOL/L (ref 8–16)
ANION GAP SERPL CALC-SCNC: 11 MMOL/L (ref 8–16)
ANION GAP SERPL CALC-SCNC: 11 MMOL/L (ref 8–16)
ANION GAP SERPL CALC-SCNC: 12 MMOL/L (ref 8–16)
ANION GAP SERPL CALC-SCNC: 4 MMOL/L (ref 8–16)
ANION GAP SERPL CALC-SCNC: 4 MMOL/L (ref 8–16)
ANION GAP SERPL CALC-SCNC: 5 MMOL/L (ref 8–16)
ANION GAP SERPL CALC-SCNC: 6 MMOL/L (ref 8–16)
ANION GAP SERPL CALC-SCNC: 6 MMOL/L (ref 8–16)
ANION GAP SERPL CALC-SCNC: 7 MMOL/L (ref 8–16)
ANION GAP SERPL CALC-SCNC: 8 MMOL/L (ref 8–16)
ANION GAP SERPL CALC-SCNC: 9 MMOL/L (ref 8–16)
ANISOCYTOSIS BLD QL SMEAR: SLIGHT
ANNOTATION COMMENT IMP: ABNORMAL
ANTI SM/RNP ANTIBODY: 0.08 RATIO (ref 0–0.99)
ANTI-SM/RNP INTERPRETATION: NEGATIVE
AST SERPL-CCNC: 33 U/L (ref 10–40)
AST SERPL-CCNC: 57 U/L (ref 10–40)
B-GLOBULIN SERPL ELPH-MCNC: 0.58 G/DL (ref 0.5–1.1)
BACTERIA #/AREA URNS AUTO: ABNORMAL /HPF
BACTERIA #/AREA URNS AUTO: ABNORMAL /HPF
BACTERIA BLD CULT: ABNORMAL
BACTERIA BLD CULT: NORMAL
BACTERIA FLD CULT: ABNORMAL
BACTERIA FLD CULT: ABNORMAL
BACTERIA SPEC AEROBE CULT: NO GROWTH
BACTERIA SPEC ANAEROBE CULT: NORMAL
BASOPHILS # BLD AUTO: 0.02 K/UL (ref 0–0.2)
BASOPHILS # BLD AUTO: 0.03 K/UL (ref 0–0.2)
BASOPHILS # BLD AUTO: 0.03 K/UL (ref 0–0.2)
BASOPHILS # BLD AUTO: 0.04 K/UL (ref 0–0.2)
BASOPHILS # BLD AUTO: 0.05 K/UL (ref 0–0.2)
BASOPHILS # BLD AUTO: 0.06 K/UL (ref 0–0.2)
BASOPHILS # BLD AUTO: 0.09 K/UL (ref 0–0.2)
BASOPHILS # BLD AUTO: ABNORMAL K/UL (ref 0–0.2)
BASOPHILS # BLD AUTO: ABNORMAL K/UL (ref 0–0.2)
BASOPHILS NFR BLD: 0 % (ref 0–1.9)
BASOPHILS NFR BLD: 0.1 % (ref 0–1.9)
BASOPHILS NFR BLD: 0.2 % (ref 0–1.9)
BASOPHILS NFR BLD: 0.3 % (ref 0–1.9)
BASOPHILS NFR BLD: 0.5 % (ref 0–1.9)
BASOPHILS NFR BLD: 0.5 % (ref 0–1.9)
BASOPHILS NFR BLD: 0.7 % (ref 0–1.9)
BASOPHILS NFR BLD: 0.7 % (ref 0–1.9)
BASOPHILS NFR BLD: 0.8 % (ref 0–1.9)
BASOPHILS NFR BLD: 0.9 % (ref 0–1.9)
BASOPHILS NFR BLD: 3 % (ref 0–1.9)
BILIRUB SERPL-MCNC: 0.2 MG/DL (ref 0.1–1)
BILIRUB SERPL-MCNC: 0.3 MG/DL (ref 0.1–1)
BILIRUB UR QL STRIP: NEGATIVE
BILIRUB UR QL STRIP: NEGATIVE
BLD GP AB SCN CELLS X3 SERPL QL: NORMAL
BLD PROD TYP BPU: NORMAL
BLOOD UNIT EXPIRATION DATE: NORMAL
BLOOD UNIT TYPE CODE: 9500
BLOOD UNIT TYPE: NORMAL
BUN SERPL-MCNC: 10 MG/DL (ref 8–23)
BUN SERPL-MCNC: 10 MG/DL (ref 8–23)
BUN SERPL-MCNC: 11 MG/DL (ref 8–23)
BUN SERPL-MCNC: 11 MG/DL (ref 8–23)
BUN SERPL-MCNC: 12 MG/DL (ref 8–23)
BUN SERPL-MCNC: 12 MG/DL (ref 8–23)
BUN SERPL-MCNC: 13 MG/DL (ref 8–23)
BUN SERPL-MCNC: 13 MG/DL (ref 8–23)
BUN SERPL-MCNC: 14 MG/DL (ref 8–23)
BUN SERPL-MCNC: 15 MG/DL (ref 8–23)
BUN SERPL-MCNC: 16 MG/DL (ref 8–23)
BUN SERPL-MCNC: 17 MG/DL (ref 8–23)
BUN SERPL-MCNC: 18 MG/DL (ref 8–23)
BUN SERPL-MCNC: 20 MG/DL (ref 8–23)
BUN SERPL-MCNC: 31 MG/DL (ref 8–23)
BUN SERPL-MCNC: 48 MG/DL (ref 8–23)
BUN SERPL-MCNC: 58 MG/DL (ref 8–23)
BUN SERPL-MCNC: 7 MG/DL (ref 8–23)
BUN SERPL-MCNC: 9 MG/DL (ref 8–23)
BURR CELLS BLD QL SMEAR: ABNORMAL
CA-I BLDV-SCNC: 1.18 MMOL/L (ref 1.06–1.42)
CA-I BLDV-SCNC: 1.19 MMOL/L (ref 1.06–1.42)
CALCIUM SERPL-MCNC: 7.9 MG/DL (ref 8.7–10.5)
CALCIUM SERPL-MCNC: 8 MG/DL (ref 8.7–10.5)
CALCIUM SERPL-MCNC: 8 MG/DL (ref 8.7–10.5)
CALCIUM SERPL-MCNC: 8.1 MG/DL (ref 8.7–10.5)
CALCIUM SERPL-MCNC: 8.2 MG/DL (ref 8.7–10.5)
CALCIUM SERPL-MCNC: 8.3 MG/DL (ref 8.7–10.5)
CALCIUM SERPL-MCNC: 8.6 MG/DL (ref 8.7–10.5)
CALCIUM SERPL-MCNC: 8.7 MG/DL (ref 8.7–10.5)
CALCIUM SERPL-MCNC: 8.8 MG/DL (ref 8.7–10.5)
CALCIUM SERPL-MCNC: 8.8 MG/DL (ref 8.7–10.5)
CALCIUM SERPL-MCNC: 9.1 MG/DL (ref 8.7–10.5)
CALCIUM SERPL-MCNC: 9.2 MG/DL (ref 8.7–10.5)
CALCIUM SERPL-MCNC: 9.2 MG/DL (ref 8.7–10.5)
CALCIUM SERPL-MCNC: 9.4 MG/DL (ref 8.7–10.5)
CHLORIDE SERPL-SCNC: 101 MMOL/L (ref 95–110)
CHLORIDE SERPL-SCNC: 101 MMOL/L (ref 95–110)
CHLORIDE SERPL-SCNC: 102 MMOL/L (ref 95–110)
CHLORIDE SERPL-SCNC: 104 MMOL/L (ref 95–110)
CHLORIDE SERPL-SCNC: 105 MMOL/L (ref 95–110)
CHLORIDE SERPL-SCNC: 108 MMOL/L (ref 95–110)
CHLORIDE SERPL-SCNC: 110 MMOL/L (ref 95–110)
CHLORIDE SERPL-SCNC: 111 MMOL/L (ref 95–110)
CHLORIDE SERPL-SCNC: 113 MMOL/L (ref 95–110)
CHLORIDE SERPL-SCNC: 114 MMOL/L (ref 95–110)
CHLORIDE SERPL-SCNC: 98 MMOL/L (ref 95–110)
CHLORIDE SERPL-SCNC: 99 MMOL/L (ref 95–110)
CK SERPL-CCNC: 49 U/L (ref 20–180)
CLARITY UR REFRACT.AUTO: ABNORMAL
CLARITY UR REFRACT.AUTO: ABNORMAL
CO2 SERPL-SCNC: 21 MMOL/L (ref 23–29)
CO2 SERPL-SCNC: 22 MMOL/L (ref 23–29)
CO2 SERPL-SCNC: 25 MMOL/L (ref 23–29)
CO2 SERPL-SCNC: 26 MMOL/L (ref 23–29)
CO2 SERPL-SCNC: 26 MMOL/L (ref 23–29)
CO2 SERPL-SCNC: 27 MMOL/L (ref 23–29)
CO2 SERPL-SCNC: 27 MMOL/L (ref 23–29)
CO2 SERPL-SCNC: 29 MMOL/L (ref 23–29)
CO2 SERPL-SCNC: 30 MMOL/L (ref 23–29)
CO2 SERPL-SCNC: 32 MMOL/L (ref 23–29)
CO2 SERPL-SCNC: 33 MMOL/L (ref 23–29)
CO2 SERPL-SCNC: 33 MMOL/L (ref 23–29)
CO2 SERPL-SCNC: 34 MMOL/L (ref 23–29)
CO2 SERPL-SCNC: 34 MMOL/L (ref 23–29)
CO2 SERPL-SCNC: 35 MMOL/L (ref 23–29)
CO2 SERPL-SCNC: 35 MMOL/L (ref 23–29)
CO2 SERPL-SCNC: 36 MMOL/L (ref 23–29)
CO2 SERPL-SCNC: 37 MMOL/L (ref 23–29)
CO2 SERPL-SCNC: 37 MMOL/L (ref 23–29)
CODING SYSTEM: NORMAL
COLLECT DURATION TIME UR: 24 H
COLOR UR AUTO: ABNORMAL
COLOR UR AUTO: YELLOW
CORTIS SERPL-MCNC: 5.5 UG/DL
CREAT SERPL-MCNC: 0.4 MG/DL (ref 0.5–1.4)
CREAT SERPL-MCNC: 0.5 MG/DL (ref 0.5–1.4)
CREAT SERPL-MCNC: 0.6 MG/DL (ref 0.5–1.4)
CREAT SERPL-MCNC: 0.8 MG/DL (ref 0.5–1.4)
CREAT SERPL-MCNC: 1.4 MG/DL (ref 0.5–1.4)
CREAT SERPL-MCNC: 2.2 MG/DL (ref 0.5–1.4)
CREAT UR-MCNC: 135 MG/DL (ref 15–325)
CRP SERPL-MCNC: 81.4 MG/L (ref 0–8.2)
DACRYOCYTES BLD QL SMEAR: ABNORMAL
DELSYS: ABNORMAL
DIFFERENTIAL METHOD: ABNORMAL
DISPENSE STATUS: NORMAL
EJ AB SER QL: NOT DETECTED
ENA JO1 AB SER IA-ACNC: NORMAL AI
EOSINOPHIL # BLD AUTO: 0 K/UL (ref 0–0.5)
EOSINOPHIL # BLD AUTO: 0.1 K/UL (ref 0–0.5)
EOSINOPHIL # BLD AUTO: 0.1 K/UL (ref 0–0.5)
EOSINOPHIL # BLD AUTO: 0.2 K/UL (ref 0–0.5)
EOSINOPHIL # BLD AUTO: 0.3 K/UL (ref 0–0.5)
EOSINOPHIL # BLD AUTO: 0.3 K/UL (ref 0–0.5)
EOSINOPHIL # BLD AUTO: 0.4 K/UL (ref 0–0.5)
EOSINOPHIL # BLD AUTO: 0.4 K/UL (ref 0–0.5)
EOSINOPHIL # BLD AUTO: 0.5 K/UL (ref 0–0.5)
EOSINOPHIL # BLD AUTO: ABNORMAL K/UL (ref 0–0.5)
EOSINOPHIL # BLD AUTO: ABNORMAL K/UL (ref 0–0.5)
EOSINOPHIL NFR BLD: 0 % (ref 0–8)
EOSINOPHIL NFR BLD: 0.2 % (ref 0–8)
EOSINOPHIL NFR BLD: 0.3 % (ref 0–8)
EOSINOPHIL NFR BLD: 0.7 % (ref 0–8)
EOSINOPHIL NFR BLD: 1.2 % (ref 0–8)
EOSINOPHIL NFR BLD: 2.6 % (ref 0–8)
EOSINOPHIL NFR BLD: 3.2 % (ref 0–8)
EOSINOPHIL NFR BLD: 3.6 % (ref 0–8)
EOSINOPHIL NFR BLD: 4.6 % (ref 0–8)
EOSINOPHIL NFR BLD: 4.8 % (ref 0–8)
EOSINOPHIL NFR BLD: 5.9 % (ref 0–8)
EP: 5
ERYTHROCYTE [DISTWIDTH] IN BLOOD BY AUTOMATED COUNT: 13.5 % (ref 11.5–14.5)
ERYTHROCYTE [DISTWIDTH] IN BLOOD BY AUTOMATED COUNT: 13.7 % (ref 11.5–14.5)
ERYTHROCYTE [DISTWIDTH] IN BLOOD BY AUTOMATED COUNT: 13.9 % (ref 11.5–14.5)
ERYTHROCYTE [DISTWIDTH] IN BLOOD BY AUTOMATED COUNT: 14 % (ref 11.5–14.5)
ERYTHROCYTE [DISTWIDTH] IN BLOOD BY AUTOMATED COUNT: 14 % (ref 11.5–14.5)
ERYTHROCYTE [DISTWIDTH] IN BLOOD BY AUTOMATED COUNT: 14.1 % (ref 11.5–14.5)
ERYTHROCYTE [DISTWIDTH] IN BLOOD BY AUTOMATED COUNT: 14.2 % (ref 11.5–14.5)
ERYTHROCYTE [DISTWIDTH] IN BLOOD BY AUTOMATED COUNT: 14.4 % (ref 11.5–14.5)
ERYTHROCYTE [DISTWIDTH] IN BLOOD BY AUTOMATED COUNT: 15.1 % (ref 11.5–14.5)
ERYTHROCYTE [DISTWIDTH] IN BLOOD BY AUTOMATED COUNT: 15.7 % (ref 11.5–14.5)
ERYTHROCYTE [DISTWIDTH] IN BLOOD BY AUTOMATED COUNT: 17.3 % (ref 11.5–14.5)
ERYTHROCYTE [DISTWIDTH] IN BLOOD BY AUTOMATED COUNT: 17.8 % (ref 11.5–14.5)
ERYTHROCYTE [SEDIMENTATION RATE] IN BLOOD BY PHOTOMETRIC METHOD: 42 MM/HR (ref 0–36)
ERYTHROCYTE [SEDIMENTATION RATE] IN BLOOD BY WESTERGREN METHOD: 15 MM/H
ERYTHROCYTE [SEDIMENTATION RATE] IN BLOOD BY WESTERGREN METHOD: 15 MM/H
ERYTHROCYTE [SEDIMENTATION RATE] IN BLOOD BY WESTERGREN METHOD: 8 MM/H
EST. GFR  (NO RACE VARIABLE): 23.5 ML/MIN/1.73 M^2
EST. GFR  (NO RACE VARIABLE): 40.5 ML/MIN/1.73 M^2
EST. GFR  (NO RACE VARIABLE): >60 ML/MIN/1.73 M^2
FIO2: 30
FIO2: 30
FIO2: 40
GAMMA GLOB SERPL ELPH-MCNC: 0.64 G/DL (ref 0.67–1.58)
GLUCOSE SERPL-MCNC: 100 MG/DL (ref 70–110)
GLUCOSE SERPL-MCNC: 104 MG/DL (ref 70–110)
GLUCOSE SERPL-MCNC: 104 MG/DL (ref 70–110)
GLUCOSE SERPL-MCNC: 105 MG/DL (ref 70–110)
GLUCOSE SERPL-MCNC: 106 MG/DL (ref 70–110)
GLUCOSE SERPL-MCNC: 107 MG/DL (ref 70–110)
GLUCOSE SERPL-MCNC: 109 MG/DL (ref 70–110)
GLUCOSE SERPL-MCNC: 115 MG/DL (ref 70–110)
GLUCOSE SERPL-MCNC: 119 MG/DL (ref 70–110)
GLUCOSE SERPL-MCNC: 121 MG/DL (ref 70–110)
GLUCOSE SERPL-MCNC: 123 MG/DL (ref 70–110)
GLUCOSE SERPL-MCNC: 126 MG/DL (ref 70–110)
GLUCOSE SERPL-MCNC: 128 MG/DL (ref 70–110)
GLUCOSE SERPL-MCNC: 131 MG/DL (ref 70–110)
GLUCOSE SERPL-MCNC: 145 MG/DL (ref 70–110)
GLUCOSE SERPL-MCNC: 148 MG/DL (ref 70–110)
GLUCOSE SERPL-MCNC: 154 MG/DL (ref 70–110)
GLUCOSE SERPL-MCNC: 167 MG/DL (ref 70–110)
GLUCOSE SERPL-MCNC: 203 MG/DL (ref 70–110)
GLUCOSE SERPL-MCNC: 99 MG/DL (ref 70–110)
GLUCOSE SERPL-MCNC: 99 MG/DL (ref 70–110)
GLUCOSE UR QL STRIP: ABNORMAL
GLUCOSE UR QL STRIP: ABNORMAL
GRAM STN SPEC: NORMAL
GRAM STN SPEC: NORMAL
HCO3 UR-SCNC: 35.6 MMOL/L (ref 24–28)
HCO3 UR-SCNC: 38.4 MMOL/L (ref 24–28)
HCO3 UR-SCNC: 38.4 MMOL/L (ref 24–28)
HCT VFR BLD AUTO: 21.8 % (ref 37–48.5)
HCT VFR BLD AUTO: 22.7 % (ref 37–48.5)
HCT VFR BLD AUTO: 23.4 % (ref 37–48.5)
HCT VFR BLD AUTO: 23.5 % (ref 37–48.5)
HCT VFR BLD AUTO: 23.6 % (ref 37–48.5)
HCT VFR BLD AUTO: 24.7 % (ref 37–48.5)
HCT VFR BLD AUTO: 24.9 % (ref 37–48.5)
HCT VFR BLD AUTO: 25.1 % (ref 37–48.5)
HCT VFR BLD AUTO: 25.2 % (ref 37–48.5)
HCT VFR BLD AUTO: 27 % (ref 37–48.5)
HCT VFR BLD AUTO: 27.2 % (ref 37–48.5)
HCT VFR BLD AUTO: 28.1 % (ref 37–48.5)
HCT VFR BLD AUTO: 31 % (ref 37–48.5)
HCT VFR BLD AUTO: 31.3 % (ref 37–48.5)
HCT VFR BLD AUTO: 32 % (ref 37–48.5)
HGB BLD-MCNC: 10.5 G/DL (ref 12–16)
HGB BLD-MCNC: 6.8 G/DL (ref 12–16)
HGB BLD-MCNC: 7 G/DL (ref 12–16)
HGB BLD-MCNC: 7.1 G/DL (ref 12–16)
HGB BLD-MCNC: 7.3 G/DL (ref 12–16)
HGB BLD-MCNC: 7.3 G/DL (ref 12–16)
HGB BLD-MCNC: 7.4 G/DL (ref 12–16)
HGB BLD-MCNC: 7.6 G/DL (ref 12–16)
HGB BLD-MCNC: 7.7 G/DL (ref 12–16)
HGB BLD-MCNC: 7.8 G/DL (ref 12–16)
HGB BLD-MCNC: 8.1 G/DL (ref 12–16)
HGB BLD-MCNC: 8.6 G/DL (ref 12–16)
HGB BLD-MCNC: 8.7 G/DL (ref 12–16)
HGB BLD-MCNC: 9.4 G/DL (ref 12–16)
HGB BLD-MCNC: 9.6 G/DL (ref 12–16)
HGB UR QL STRIP: ABNORMAL
HGB UR QL STRIP: ABNORMAL
HYALINE CASTS UR QL AUTO: 9 /LPF
HYPOCHROMIA BLD QL SMEAR: ABNORMAL
HYPOCHROMIA BLD QL SMEAR: ABNORMAL
IMM GRANULOCYTES # BLD AUTO: 0.03 K/UL (ref 0–0.04)
IMM GRANULOCYTES # BLD AUTO: 0.04 K/UL (ref 0–0.04)
IMM GRANULOCYTES # BLD AUTO: 0.05 K/UL (ref 0–0.04)
IMM GRANULOCYTES # BLD AUTO: 0.08 K/UL (ref 0–0.04)
IMM GRANULOCYTES # BLD AUTO: 0.11 K/UL (ref 0–0.04)
IMM GRANULOCYTES # BLD AUTO: 0.12 K/UL (ref 0–0.04)
IMM GRANULOCYTES # BLD AUTO: 0.13 K/UL (ref 0–0.04)
IMM GRANULOCYTES # BLD AUTO: 0.15 K/UL (ref 0–0.04)
IMM GRANULOCYTES # BLD AUTO: 0.23 K/UL (ref 0–0.04)
IMM GRANULOCYTES # BLD AUTO: ABNORMAL K/UL (ref 0–0.04)
IMM GRANULOCYTES # BLD AUTO: ABNORMAL K/UL (ref 0–0.04)
IMM GRANULOCYTES NFR BLD AUTO: 0.3 % (ref 0–0.5)
IMM GRANULOCYTES NFR BLD AUTO: 0.4 % (ref 0–0.5)
IMM GRANULOCYTES NFR BLD AUTO: 0.4 % (ref 0–0.5)
IMM GRANULOCYTES NFR BLD AUTO: 0.5 % (ref 0–0.5)
IMM GRANULOCYTES NFR BLD AUTO: 0.6 % (ref 0–0.5)
IMM GRANULOCYTES NFR BLD AUTO: 0.7 % (ref 0–0.5)
IMM GRANULOCYTES NFR BLD AUTO: 0.8 % (ref 0–0.5)
IMM GRANULOCYTES NFR BLD AUTO: 1.4 % (ref 0–0.5)
IMM GRANULOCYTES NFR BLD AUTO: 2.1 % (ref 0–0.5)
IMM GRANULOCYTES NFR BLD AUTO: ABNORMAL % (ref 0–0.5)
IMM GRANULOCYTES NFR BLD AUTO: ABNORMAL % (ref 0–0.5)
INFLUENZA A, MOLECULAR: NOT DETECTED
INFLUENZA B, MOLECULAR: NOT DETECTED
INTERPRETATION SERPL IFE-IMP: NORMAL
IP: 14
IP: 15
IP: 15
KETONES UR QL STRIP: ABNORMAL
KETONES UR QL STRIP: NEGATIVE
KU AB SER QL: NOT DETECTED
L PNEUMO AG UR QL IA: NEGATIVE
LDH SERPL L TO P-CCNC: 258 U/L (ref 110–260)
LEUKOCYTE ESTERASE UR QL STRIP: NEGATIVE
LEUKOCYTE ESTERASE UR QL STRIP: NEGATIVE
LYMPHOCYTES # BLD AUTO: 0.3 K/UL (ref 1–4.8)
LYMPHOCYTES # BLD AUTO: 0.4 K/UL (ref 1–4.8)
LYMPHOCYTES # BLD AUTO: 0.4 K/UL (ref 1–4.8)
LYMPHOCYTES # BLD AUTO: 0.6 K/UL (ref 1–4.8)
LYMPHOCYTES # BLD AUTO: 0.8 K/UL (ref 1–4.8)
LYMPHOCYTES # BLD AUTO: 1 K/UL (ref 1–4.8)
LYMPHOCYTES # BLD AUTO: 1.1 K/UL (ref 1–4.8)
LYMPHOCYTES # BLD AUTO: 1.2 K/UL (ref 1–4.8)
LYMPHOCYTES # BLD AUTO: 1.2 K/UL (ref 1–4.8)
LYMPHOCYTES # BLD AUTO: 1.5 K/UL (ref 1–4.8)
LYMPHOCYTES # BLD AUTO: 2 K/UL (ref 1–4.8)
LYMPHOCYTES # BLD AUTO: ABNORMAL K/UL (ref 1–4.8)
LYMPHOCYTES # BLD AUTO: ABNORMAL K/UL (ref 1–4.8)
LYMPHOCYTES NFR BLD: 1.2 % (ref 18–48)
LYMPHOCYTES NFR BLD: 10 % (ref 18–48)
LYMPHOCYTES NFR BLD: 11 % (ref 18–48)
LYMPHOCYTES NFR BLD: 12.8 % (ref 18–48)
LYMPHOCYTES NFR BLD: 13.5 % (ref 18–48)
LYMPHOCYTES NFR BLD: 13.6 % (ref 18–48)
LYMPHOCYTES NFR BLD: 13.8 % (ref 18–48)
LYMPHOCYTES NFR BLD: 16.7 % (ref 18–48)
LYMPHOCYTES NFR BLD: 3.7 % (ref 18–48)
LYMPHOCYTES NFR BLD: 4 % (ref 18–48)
LYMPHOCYTES NFR BLD: 4.4 % (ref 18–48)
LYMPHOCYTES NFR BLD: 4.9 % (ref 18–48)
LYMPHOCYTES NFR BLD: 7.5 % (ref 18–48)
LYMPHOCYTES NFR BLD: 9.2 % (ref 18–48)
LYMPHOCYTES NFR BLD: 9.6 % (ref 18–48)
MAGNESIUM SERPL-MCNC: 1.8 MG/DL (ref 1.6–2.6)
MAGNESIUM SERPL-MCNC: 1.9 MG/DL (ref 1.6–2.6)
MAGNESIUM SERPL-MCNC: 2 MG/DL (ref 1.6–2.6)
MAGNESIUM SERPL-MCNC: 2.1 MG/DL (ref 1.6–2.6)
MAGNESIUM SERPL-MCNC: 2.2 MG/DL (ref 1.6–2.6)
MAGNESIUM SERPL-MCNC: 2.3 MG/DL (ref 1.6–2.6)
MAGNESIUM SERPL-MCNC: 2.5 MG/DL (ref 1.6–2.6)
MCH RBC QN AUTO: 25.6 PG (ref 27–31)
MCH RBC QN AUTO: 25.8 PG (ref 27–31)
MCH RBC QN AUTO: 25.9 PG (ref 27–31)
MCH RBC QN AUTO: 26 PG (ref 27–31)
MCH RBC QN AUTO: 26 PG (ref 27–31)
MCH RBC QN AUTO: 26.1 PG (ref 27–31)
MCH RBC QN AUTO: 26.2 PG (ref 27–31)
MCH RBC QN AUTO: 26.2 PG (ref 27–31)
MCH RBC QN AUTO: 26.4 PG (ref 27–31)
MCH RBC QN AUTO: 26.4 PG (ref 27–31)
MCH RBC QN AUTO: 26.6 PG (ref 27–31)
MCH RBC QN AUTO: 27.3 PG (ref 27–31)
MCHC RBC AUTO-ENTMCNC: 29.1 G/DL (ref 32–36)
MCHC RBC AUTO-ENTMCNC: 29.8 G/DL (ref 32–36)
MCHC RBC AUTO-ENTMCNC: 30 G/DL (ref 32–36)
MCHC RBC AUTO-ENTMCNC: 30.1 G/DL (ref 32–36)
MCHC RBC AUTO-ENTMCNC: 30.6 G/DL (ref 32–36)
MCHC RBC AUTO-ENTMCNC: 30.8 G/DL (ref 32–36)
MCHC RBC AUTO-ENTMCNC: 30.8 G/DL (ref 32–36)
MCHC RBC AUTO-ENTMCNC: 30.9 G/DL (ref 32–36)
MCHC RBC AUTO-ENTMCNC: 31 G/DL (ref 32–36)
MCHC RBC AUTO-ENTMCNC: 31 G/DL (ref 32–36)
MCHC RBC AUTO-ENTMCNC: 31.2 G/DL (ref 32–36)
MCHC RBC AUTO-ENTMCNC: 31.2 G/DL (ref 32–36)
MCHC RBC AUTO-ENTMCNC: 31.5 G/DL (ref 32–36)
MCHC RBC AUTO-ENTMCNC: 32.2 G/DL (ref 32–36)
MCHC RBC AUTO-ENTMCNC: 32.8 G/DL (ref 32–36)
MCV RBC AUTO: 80 FL (ref 82–98)
MCV RBC AUTO: 82 FL (ref 82–98)
MCV RBC AUTO: 83 FL (ref 82–98)
MCV RBC AUTO: 83 FL (ref 82–98)
MCV RBC AUTO: 84 FL (ref 82–98)
MCV RBC AUTO: 85 FL (ref 82–98)
MCV RBC AUTO: 86 FL (ref 82–98)
MCV RBC AUTO: 87 FL (ref 82–98)
MCV RBC AUTO: 87 FL (ref 82–98)
MCV RBC AUTO: 88 FL (ref 82–98)
MCV RBC AUTO: 89 FL (ref 82–98)
METANEPH 24H UR-MRATE: 372 MCG/24 H
METANEPH 24H UR-MRATE: 509 MCG/24 H
METANEPH 24H UR-MRATE: 604 MCG/24 H
METANEPH FREE SERPL-MCNC: 103 PG/ML
METANEPH FREE SERPL-MCNC: 199 PG/ML
METANEPH FREE SERPL-MCNC: 235 PG/ML
METANEPHS 24H UR-MRATE: 1366 MCG/24 H
METANEPHS 24H UR-MRATE: 1928 MCG/24 H
METANEPHS 24H UR-MRATE: 2511 MCG/24 H
METANEPHS SERPL-MCNC: 1034 PG/ML
METANEPHS SERPL-MCNC: 1054 PG/ML
METANEPHS SERPL-MCNC: 490 PG/ML
MI2 AB SER QL: NOT DETECTED
MICROSCOPIC COMMENT: ABNORMAL
MICROSCOPIC COMMENT: ABNORMAL
MIN VOL: 5.3
MIN VOL: 6.7
MIN VOL: 7.3
MODE: ABNORMAL
MONOCYTES # BLD AUTO: 0.5 K/UL (ref 0.3–1)
MONOCYTES # BLD AUTO: 0.7 K/UL (ref 0.3–1)
MONOCYTES # BLD AUTO: 0.8 K/UL (ref 0.3–1)
MONOCYTES # BLD AUTO: 0.8 K/UL (ref 0.3–1)
MONOCYTES # BLD AUTO: 0.9 K/UL (ref 0.3–1)
MONOCYTES # BLD AUTO: 1 K/UL (ref 0.3–1)
MONOCYTES # BLD AUTO: 1 K/UL (ref 0.3–1)
MONOCYTES # BLD AUTO: 1.1 K/UL (ref 0.3–1)
MONOCYTES # BLD AUTO: 1.3 K/UL (ref 0.3–1)
MONOCYTES # BLD AUTO: 1.6 K/UL (ref 0.3–1)
MONOCYTES # BLD AUTO: ABNORMAL K/UL (ref 0.3–1)
MONOCYTES # BLD AUTO: ABNORMAL K/UL (ref 0.3–1)
MONOCYTES NFR BLD: 1.8 % (ref 4–15)
MONOCYTES NFR BLD: 10.3 % (ref 4–15)
MONOCYTES NFR BLD: 10.4 % (ref 4–15)
MONOCYTES NFR BLD: 10.7 % (ref 4–15)
MONOCYTES NFR BLD: 11 % (ref 4–15)
MONOCYTES NFR BLD: 11.4 % (ref 4–15)
MONOCYTES NFR BLD: 2 % (ref 4–15)
MONOCYTES NFR BLD: 4 % (ref 4–15)
MONOCYTES NFR BLD: 6.3 % (ref 4–15)
MONOCYTES NFR BLD: 7.3 % (ref 4–15)
MONOCYTES NFR BLD: 7.3 % (ref 4–15)
MONOCYTES NFR BLD: 7.8 % (ref 4–15)
MONOCYTES NFR BLD: 8.3 % (ref 4–15)
MONOCYTES NFR BLD: 8.8 % (ref 4–15)
MONOCYTES NFR BLD: 9.7 % (ref 4–15)
MUSK ANTIBODY TEST: 0 NMOL/L (ref 0–0.02)
MYELOCYTES NFR BLD MANUAL: 1 %
NEUTROPHILS # BLD AUTO: 12.5 K/UL (ref 1.8–7.7)
NEUTROPHILS # BLD AUTO: 19.3 K/UL (ref 1.8–7.7)
NEUTROPHILS # BLD AUTO: 24.3 K/UL (ref 1.8–7.7)
NEUTROPHILS # BLD AUTO: 5 K/UL (ref 1.8–7.7)
NEUTROPHILS # BLD AUTO: 5.3 K/UL (ref 1.8–7.7)
NEUTROPHILS # BLD AUTO: 6.4 K/UL (ref 1.8–7.7)
NEUTROPHILS # BLD AUTO: 6.4 K/UL (ref 1.8–7.7)
NEUTROPHILS # BLD AUTO: 6.7 K/UL (ref 1.8–7.7)
NEUTROPHILS # BLD AUTO: 7.7 K/UL (ref 1.8–7.7)
NEUTROPHILS # BLD AUTO: 8.3 K/UL (ref 1.8–7.7)
NEUTROPHILS # BLD AUTO: 8.4 K/UL (ref 1.8–7.7)
NEUTROPHILS # BLD AUTO: 8.4 K/UL (ref 1.8–7.7)
NEUTROPHILS # BLD AUTO: 8.5 K/UL (ref 1.8–7.7)
NEUTROPHILS NFR BLD: 68.4 % (ref 38–73)
NEUTROPHILS NFR BLD: 70.1 % (ref 38–73)
NEUTROPHILS NFR BLD: 71 % (ref 38–73)
NEUTROPHILS NFR BLD: 71.2 % (ref 38–73)
NEUTROPHILS NFR BLD: 73 % (ref 38–73)
NEUTROPHILS NFR BLD: 75.2 % (ref 38–73)
NEUTROPHILS NFR BLD: 77.1 % (ref 38–73)
NEUTROPHILS NFR BLD: 80 % (ref 38–73)
NEUTROPHILS NFR BLD: 80.2 % (ref 38–73)
NEUTROPHILS NFR BLD: 82.1 % (ref 38–73)
NEUTROPHILS NFR BLD: 85.7 % (ref 38–73)
NEUTROPHILS NFR BLD: 86.6 % (ref 38–73)
NEUTROPHILS NFR BLD: 88.4 % (ref 38–73)
NEUTROPHILS NFR BLD: 92 % (ref 38–73)
NEUTROPHILS NFR BLD: 96.4 % (ref 38–73)
NEUTS BAND NFR BLD MANUAL: 1 %
NEUTS BAND NFR BLD MANUAL: 3 %
NITRITE UR QL STRIP: NEGATIVE
NITRITE UR QL STRIP: NEGATIVE
NON-SQ EPI CELLS #/AREA URNS AUTO: 0 /HPF
NON-SQ EPI CELLS #/AREA URNS AUTO: <1 /HPF
NORMETANEPH FREE SERPL-MCNC: 387 PG/ML
NORMETANEPH FREE SERPL-MCNC: 819 PG/ML
NORMETANEPH FREE SERPL-MCNC: 835 PG/ML
NORMETANEPHRINE 24H UR-MRATE: 1419 MCG/24 H
NORMETANEPHRINE 24H UR-MRATE: 1907 MCG/24 H
NORMETANEPHRINE 24H UR-MRATE: 994 MCG/24 H
NRBC BLD-RTO: 0 /100 WBC
OJ AB SER QL: NOT DETECTED
OVALOCYTES BLD QL SMEAR: ABNORMAL
PATHOLOGIST INTERPRETATION IFE: NORMAL
PATHOLOGIST INTERPRETATION SPE: NORMAL
PCO2 BLDA: 51.7 MMHG (ref 35–45)
PCO2 BLDA: 58.7 MMHG (ref 35–45)
PCO2 BLDA: 63.5 MMHG (ref 35–45)
PH SMN: 7.39 [PH] (ref 7.35–7.45)
PH SMN: 7.42 [PH] (ref 7.35–7.45)
PH SMN: 7.45 [PH] (ref 7.35–7.45)
PH UR STRIP: 5 [PH] (ref 5–8)
PH UR STRIP: 5 [PH] (ref 5–8)
PHOSPHATE SERPL-MCNC: 1.8 MG/DL (ref 2.7–4.5)
PHOSPHATE SERPL-MCNC: 2.3 MG/DL (ref 2.7–4.5)
PHOSPHATE SERPL-MCNC: 2.3 MG/DL (ref 2.7–4.5)
PHOSPHATE SERPL-MCNC: 2.4 MG/DL (ref 2.7–4.5)
PHOSPHATE SERPL-MCNC: 2.6 MG/DL (ref 2.7–4.5)
PHOSPHATE SERPL-MCNC: 2.6 MG/DL (ref 2.7–4.5)
PHOSPHATE SERPL-MCNC: 2.7 MG/DL (ref 2.7–4.5)
PHOSPHATE SERPL-MCNC: 2.7 MG/DL (ref 2.7–4.5)
PHOSPHATE SERPL-MCNC: 2.8 MG/DL (ref 2.7–4.5)
PHOSPHATE SERPL-MCNC: 2.8 MG/DL (ref 2.7–4.5)
PHOSPHATE SERPL-MCNC: 3 MG/DL (ref 2.7–4.5)
PHOSPHATE SERPL-MCNC: 3 MG/DL (ref 2.7–4.5)
PHOSPHATE SERPL-MCNC: 3.4 MG/DL (ref 2.7–4.5)
PHOSPHATE SERPL-MCNC: 4 MG/DL (ref 2.7–4.5)
PL12 AB SER QL: NOT DETECTED
PL7 AB SER QL: NOT DETECTED
PLATELET # BLD AUTO: 164 K/UL (ref 150–450)
PLATELET # BLD AUTO: 242 K/UL (ref 150–450)
PLATELET # BLD AUTO: 251 K/UL (ref 150–450)
PLATELET # BLD AUTO: 256 K/UL (ref 150–450)
PLATELET # BLD AUTO: 261 K/UL (ref 150–450)
PLATELET # BLD AUTO: 266 K/UL (ref 150–450)
PLATELET # BLD AUTO: 272 K/UL (ref 150–450)
PLATELET # BLD AUTO: 324 K/UL (ref 150–450)
PLATELET # BLD AUTO: 354 K/UL (ref 150–450)
PLATELET # BLD AUTO: 355 K/UL (ref 150–450)
PLATELET # BLD AUTO: 357 K/UL (ref 150–450)
PLATELET # BLD AUTO: 396 K/UL (ref 150–450)
PLATELET # BLD AUTO: 49 K/UL (ref 150–450)
PLATELET # BLD AUTO: 84 K/UL (ref 150–450)
PLATELET # BLD AUTO: 99 K/UL (ref 150–450)
PLATELET BLD QL SMEAR: ABNORMAL
PMV BLD AUTO: 10 FL (ref 9.2–12.9)
PMV BLD AUTO: 10.2 FL (ref 9.2–12.9)
PMV BLD AUTO: 10.2 FL (ref 9.2–12.9)
PMV BLD AUTO: 10.6 FL (ref 9.2–12.9)
PMV BLD AUTO: 13 FL (ref 9.2–12.9)
PMV BLD AUTO: 14 FL (ref 9.2–12.9)
PMV BLD AUTO: 9.5 FL (ref 9.2–12.9)
PMV BLD AUTO: 9.6 FL (ref 9.2–12.9)
PMV BLD AUTO: 9.7 FL (ref 9.2–12.9)
PMV BLD AUTO: 9.8 FL (ref 9.2–12.9)
PO2 BLDA: 107 MMHG (ref 80–100)
PO2 BLDA: 39 MMHG (ref 40–60)
PO2 BLDA: 41 MMHG (ref 40–60)
POC BE: 12 MMOL/L
POC BE: 13 MMOL/L
POC BE: 14 MMOL/L
POC SATURATED O2: 73 % (ref 95–100)
POC SATURATED O2: 74 % (ref 95–100)
POC SATURATED O2: 98 % (ref 95–100)
POC TCO2: 37 MMOL/L (ref 23–27)
POC TCO2: 40 MMOL/L (ref 24–29)
POC TCO2: 40 MMOL/L (ref 24–29)
POCT GLUCOSE: 102 MG/DL (ref 70–110)
POCT GLUCOSE: 103 MG/DL (ref 70–110)
POCT GLUCOSE: 103 MG/DL (ref 70–110)
POCT GLUCOSE: 105 MG/DL (ref 70–110)
POCT GLUCOSE: 105 MG/DL (ref 70–110)
POCT GLUCOSE: 106 MG/DL (ref 70–110)
POCT GLUCOSE: 107 MG/DL (ref 70–110)
POCT GLUCOSE: 108 MG/DL (ref 70–110)
POCT GLUCOSE: 108 MG/DL (ref 70–110)
POCT GLUCOSE: 109 MG/DL (ref 70–110)
POCT GLUCOSE: 110 MG/DL (ref 70–110)
POCT GLUCOSE: 111 MG/DL (ref 70–110)
POCT GLUCOSE: 111 MG/DL (ref 70–110)
POCT GLUCOSE: 112 MG/DL (ref 70–110)
POCT GLUCOSE: 112 MG/DL (ref 70–110)
POCT GLUCOSE: 113 MG/DL (ref 70–110)
POCT GLUCOSE: 114 MG/DL (ref 70–110)
POCT GLUCOSE: 116 MG/DL (ref 70–110)
POCT GLUCOSE: 116 MG/DL (ref 70–110)
POCT GLUCOSE: 117 MG/DL (ref 70–110)
POCT GLUCOSE: 118 MG/DL (ref 70–110)
POCT GLUCOSE: 118 MG/DL (ref 70–110)
POCT GLUCOSE: 119 MG/DL (ref 70–110)
POCT GLUCOSE: 120 MG/DL (ref 70–110)
POCT GLUCOSE: 121 MG/DL (ref 70–110)
POCT GLUCOSE: 121 MG/DL (ref 70–110)
POCT GLUCOSE: 122 MG/DL (ref 70–110)
POCT GLUCOSE: 123 MG/DL (ref 70–110)
POCT GLUCOSE: 127 MG/DL (ref 70–110)
POCT GLUCOSE: 129 MG/DL (ref 70–110)
POCT GLUCOSE: 129 MG/DL (ref 70–110)
POCT GLUCOSE: 130 MG/DL (ref 70–110)
POCT GLUCOSE: 130 MG/DL (ref 70–110)
POCT GLUCOSE: 131 MG/DL (ref 70–110)
POCT GLUCOSE: 132 MG/DL (ref 70–110)
POCT GLUCOSE: 134 MG/DL (ref 70–110)
POCT GLUCOSE: 135 MG/DL (ref 70–110)
POCT GLUCOSE: 135 MG/DL (ref 70–110)
POCT GLUCOSE: 136 MG/DL (ref 70–110)
POCT GLUCOSE: 137 MG/DL (ref 70–110)
POCT GLUCOSE: 137 MG/DL (ref 70–110)
POCT GLUCOSE: 139 MG/DL (ref 70–110)
POCT GLUCOSE: 141 MG/DL (ref 70–110)
POCT GLUCOSE: 143 MG/DL (ref 70–110)
POCT GLUCOSE: 144 MG/DL (ref 70–110)
POCT GLUCOSE: 152 MG/DL (ref 70–110)
POCT GLUCOSE: 153 MG/DL (ref 70–110)
POCT GLUCOSE: 157 MG/DL (ref 70–110)
POCT GLUCOSE: 157 MG/DL (ref 70–110)
POCT GLUCOSE: 158 MG/DL (ref 70–110)
POCT GLUCOSE: 163 MG/DL (ref 70–110)
POCT GLUCOSE: 172 MG/DL (ref 70–110)
POCT GLUCOSE: 174 MG/DL (ref 70–110)
POCT GLUCOSE: 186 MG/DL (ref 70–110)
POCT GLUCOSE: 187 MG/DL (ref 70–110)
POCT GLUCOSE: 216 MG/DL (ref 70–110)
POCT GLUCOSE: 87 MG/DL (ref 70–110)
POCT GLUCOSE: 91 MG/DL (ref 70–110)
POCT GLUCOSE: 92 MG/DL (ref 70–110)
POCT GLUCOSE: 98 MG/DL (ref 70–110)
POCT GLUCOSE: 99 MG/DL (ref 70–110)
POIKILOCYTOSIS BLD QL SMEAR: SLIGHT
POTASSIUM SERPL-SCNC: 2.9 MMOL/L (ref 3.5–5.1)
POTASSIUM SERPL-SCNC: 3 MMOL/L (ref 3.5–5.1)
POTASSIUM SERPL-SCNC: 3.3 MMOL/L (ref 3.5–5.1)
POTASSIUM SERPL-SCNC: 3.4 MMOL/L (ref 3.5–5.1)
POTASSIUM SERPL-SCNC: 3.5 MMOL/L (ref 3.5–5.1)
POTASSIUM SERPL-SCNC: 3.6 MMOL/L (ref 3.5–5.1)
POTASSIUM SERPL-SCNC: 3.6 MMOL/L (ref 3.5–5.1)
POTASSIUM SERPL-SCNC: 3.7 MMOL/L (ref 3.5–5.1)
POTASSIUM SERPL-SCNC: 3.7 MMOL/L (ref 3.5–5.1)
POTASSIUM SERPL-SCNC: 3.9 MMOL/L (ref 3.5–5.1)
POTASSIUM SERPL-SCNC: 3.9 MMOL/L (ref 3.5–5.1)
POTASSIUM SERPL-SCNC: 4 MMOL/L (ref 3.5–5.1)
POTASSIUM SERPL-SCNC: 4 MMOL/L (ref 3.5–5.1)
POTASSIUM SERPL-SCNC: 4.1 MMOL/L (ref 3.5–5.1)
POTASSIUM SERPL-SCNC: 4.3 MMOL/L (ref 3.5–5.1)
POTASSIUM SERPL-SCNC: 4.3 MMOL/L (ref 3.5–5.1)
POTASSIUM SERPL-SCNC: 4.5 MMOL/L (ref 3.5–5.1)
POTASSIUM SERPL-SCNC: 4.6 MMOL/L (ref 3.5–5.1)
PREALB SERPL-MCNC: 10 MG/DL (ref 20–43)
PREALB SERPL-MCNC: 15 MG/DL (ref 20–43)
PROT SERPL-MCNC: 4.9 G/DL (ref 6–8.4)
PROT SERPL-MCNC: 6.4 G/DL (ref 6–8.4)
PROT SERPL-MCNC: 7.3 G/DL (ref 6–8.4)
PROT UR QL STRIP: ABNORMAL
PROT UR QL STRIP: NEGATIVE
PROT UR-MCNC: 90 MG/DL (ref 0–15)
PROT/CREAT UR: 0.67 MG/G{CREAT} (ref 0–0.2)
RBC # BLD AUTO: 2.58 M/UL (ref 4–5.4)
RBC # BLD AUTO: 2.69 M/UL (ref 4–5.4)
RBC # BLD AUTO: 2.69 M/UL (ref 4–5.4)
RBC # BLD AUTO: 2.79 M/UL (ref 4–5.4)
RBC # BLD AUTO: 2.83 M/UL (ref 4–5.4)
RBC # BLD AUTO: 2.87 M/UL (ref 4–5.4)
RBC # BLD AUTO: 2.91 M/UL (ref 4–5.4)
RBC # BLD AUTO: 2.99 M/UL (ref 4–5.4)
RBC # BLD AUTO: 3.01 M/UL (ref 4–5.4)
RBC # BLD AUTO: 3.17 M/UL (ref 4–5.4)
RBC # BLD AUTO: 3.19 M/UL (ref 4–5.4)
RBC # BLD AUTO: 3.23 M/UL (ref 4–5.4)
RBC # BLD AUTO: 3.62 M/UL (ref 4–5.4)
RBC # BLD AUTO: 3.72 M/UL (ref 4–5.4)
RBC # BLD AUTO: 4.01 M/UL (ref 4–5.4)
RBC #/AREA URNS AUTO: 5 /HPF (ref 0–4)
RBC #/AREA URNS AUTO: 51 /HPF (ref 0–4)
RHEUMATOID FACT SERPL-ACNC: <13 IU/ML (ref 0–15)
RSV AG BY MOLECULAR METHOD: NOT DETECTED
SAMPLE: ABNORMAL
SARS-COV-2 RNA RESP QL NAA+PROBE: NOT DETECTED
SITE: ABNORMAL
SODIUM SERPL-SCNC: 133 MMOL/L (ref 136–145)
SODIUM SERPL-SCNC: 134 MMOL/L (ref 136–145)
SODIUM SERPL-SCNC: 135 MMOL/L (ref 136–145)
SODIUM SERPL-SCNC: 136 MMOL/L (ref 136–145)
SODIUM SERPL-SCNC: 136 MMOL/L (ref 136–145)
SODIUM SERPL-SCNC: 138 MMOL/L (ref 136–145)
SODIUM SERPL-SCNC: 139 MMOL/L (ref 136–145)
SODIUM SERPL-SCNC: 139 MMOL/L (ref 136–145)
SODIUM SERPL-SCNC: 140 MMOL/L (ref 136–145)
SODIUM SERPL-SCNC: 142 MMOL/L (ref 136–145)
SODIUM SERPL-SCNC: 143 MMOL/L (ref 136–145)
SODIUM SERPL-SCNC: 145 MMOL/L (ref 136–145)
SODIUM SERPL-SCNC: 145 MMOL/L (ref 136–145)
SODIUM SERPL-SCNC: 146 MMOL/L (ref 136–145)
SODIUM SERPL-SCNC: 146 MMOL/L (ref 136–145)
SODIUM SERPL-SCNC: 148 MMOL/L (ref 136–145)
SODIUM SERPL-SCNC: 149 MMOL/L (ref 136–145)
SP GR UR STRIP: 1.02 (ref 1–1.03)
SP GR UR STRIP: 1.02 (ref 1–1.03)
SP02: 100
SP02: 100
SP02: 99
SPECIMEN VOL 24H UR: 1100 ML
SPECIMEN VOL 24H UR: 3300 ML
SPECIMEN VOL 24H UR: 700 ML
SPHEROCYTES BLD QL SMEAR: ABNORMAL
SPONT RATE: 10
SPONT RATE: 17
SPONT RATE: 18
SQUAMOUS #/AREA URNS AUTO: 0 /HPF
SQUAMOUS #/AREA URNS AUTO: 2 /HPF
SRP AB SERPL QL: NOT DETECTED
TOXIC GRANULES BLD QL SMEAR: PRESENT
TRANS ERYTHROCYTES VOL PATIENT: NORMAL ML
TRIGL SERPL-MCNC: 135 MG/DL (ref 30–150)
TRIGL SERPL-MCNC: 171 MG/DL (ref 30–150)
TROPONIN I SERPL DL<=0.01 NG/ML-MCNC: 0.02 NG/ML (ref 0–0.03)
TSH SERPL DL<=0.005 MIU/L-ACNC: 1.31 UIU/ML (ref 0.4–4)
URN SPEC COLLECT METH UR: ABNORMAL
URN SPEC COLLECT METH UR: ABNORMAL
VANCOMYCIN TROUGH SERPL-MCNC: 10 UG/ML (ref 10–22)
VGCC-N BIND AB SER-SCNC: NORMAL PMOL/L
VGCC-P/Q BIND AB SER-SCNC: 0 NMOL/L
VIT B12 SERPL-MCNC: 846 PG/ML (ref 210–950)
WBC # BLD AUTO: 10.52 K/UL (ref 3.9–12.7)
WBC # BLD AUTO: 11.07 K/UL (ref 3.9–12.7)
WBC # BLD AUTO: 11.76 K/UL (ref 3.9–12.7)
WBC # BLD AUTO: 15.22 K/UL (ref 3.9–12.7)
WBC # BLD AUTO: 21.79 K/UL (ref 3.9–12.7)
WBC # BLD AUTO: 25.26 K/UL (ref 3.9–12.7)
WBC # BLD AUTO: 26.77 K/UL (ref 3.9–12.7)
WBC # BLD AUTO: 6.59 K/UL (ref 3.9–12.7)
WBC # BLD AUTO: 6.64 K/UL (ref 3.9–12.7)
WBC # BLD AUTO: 7.66 K/UL (ref 3.9–12.7)
WBC # BLD AUTO: 8.75 K/UL (ref 3.9–12.7)
WBC # BLD AUTO: 8.95 K/UL (ref 3.9–12.7)
WBC # BLD AUTO: 8.96 K/UL (ref 3.9–12.7)
WBC # BLD AUTO: 9.45 K/UL (ref 3.9–12.7)
WBC # BLD AUTO: 9.69 K/UL (ref 3.9–12.7)
WBC #/AREA URNS AUTO: 1 /HPF (ref 0–5)
WBC #/AREA URNS AUTO: 5 /HPF (ref 0–5)
WBC TOXIC VACUOLES BLD QL SMEAR: PRESENT
YEAST UR QL AUTO: ABNORMAL
YEAST UR QL AUTO: ABNORMAL

## 2023-01-01 PROCEDURE — 63600175 PHARM REV CODE 636 W HCPCS: Performed by: STUDENT IN AN ORGANIZED HEALTH CARE EDUCATION/TRAINING PROGRAM

## 2023-01-01 PROCEDURE — 99233 PR SUBSEQUENT HOSPITAL CARE,LEVL III: ICD-10-PCS | Mod: ,,, | Performed by: STUDENT IN AN ORGANIZED HEALTH CARE EDUCATION/TRAINING PROGRAM

## 2023-01-01 PROCEDURE — 99233 SBSQ HOSP IP/OBS HIGH 50: CPT | Mod: ,,, | Performed by: PSYCHIATRY & NEUROLOGY

## 2023-01-01 PROCEDURE — 99900026 HC AIRWAY MAINTENANCE (STAT)

## 2023-01-01 PROCEDURE — 99291 CRITICAL CARE FIRST HOUR: CPT | Mod: ,,, | Performed by: INTERNAL MEDICINE

## 2023-01-01 PROCEDURE — 27000221 HC OXYGEN, UP TO 24 HOURS

## 2023-01-01 PROCEDURE — 83519 RIA NONANTIBODY: CPT

## 2023-01-01 PROCEDURE — 84100 ASSAY OF PHOSPHORUS: CPT

## 2023-01-01 PROCEDURE — 20000000 HC ICU ROOM

## 2023-01-01 PROCEDURE — 80053 COMPREHEN METABOLIC PANEL: CPT | Performed by: INTERNAL MEDICINE

## 2023-01-01 PROCEDURE — 99233 PR SUBSEQUENT HOSPITAL CARE,LEVL III: ICD-10-PCS | Mod: ,,, | Performed by: INTERNAL MEDICINE

## 2023-01-01 PROCEDURE — 94640 AIRWAY INHALATION TREATMENT: CPT

## 2023-01-01 PROCEDURE — 97535 SELF CARE MNGMENT TRAINING: CPT

## 2023-01-01 PROCEDURE — 94660 CPAP INITIATION&MGMT: CPT

## 2023-01-01 PROCEDURE — 25000003 PHARM REV CODE 250

## 2023-01-01 PROCEDURE — 63600175 PHARM REV CODE 636 W HCPCS

## 2023-01-01 PROCEDURE — 99291 CRITICAL CARE FIRST HOUR: CPT | Mod: GC,,, | Performed by: EMERGENCY MEDICINE

## 2023-01-01 PROCEDURE — 99900035 HC TECH TIME PER 15 MIN (STAT)

## 2023-01-01 PROCEDURE — 83835 ASSAY OF METANEPHRINES: CPT

## 2023-01-01 PROCEDURE — C9113 INJ PANTOPRAZOLE SODIUM, VIA: HCPCS | Performed by: STUDENT IN AN ORGANIZED HEALTH CARE EDUCATION/TRAINING PROGRAM

## 2023-01-01 PROCEDURE — 83516 IMMUNOASSAY NONANTIBODY: CPT | Mod: 59

## 2023-01-01 PROCEDURE — 51702 INSERT TEMP BLADDER CATH: CPT

## 2023-01-01 PROCEDURE — 25000003 PHARM REV CODE 250: Performed by: STUDENT IN AN ORGANIZED HEALTH CARE EDUCATION/TRAINING PROGRAM

## 2023-01-01 PROCEDURE — 27100233

## 2023-01-01 PROCEDURE — A4217 STERILE WATER/SALINE, 500 ML: HCPCS

## 2023-01-01 PROCEDURE — 94761 N-INVAS EAR/PLS OXIMETRY MLT: CPT

## 2023-01-01 PROCEDURE — 63600175 PHARM REV CODE 636 W HCPCS: Performed by: NURSE PRACTITIONER

## 2023-01-01 PROCEDURE — 84478 ASSAY OF TRIGLYCERIDES: CPT

## 2023-01-01 PROCEDURE — 99233 PR SUBSEQUENT HOSPITAL CARE,LEVL III: ICD-10-PCS | Mod: GC,,, | Performed by: INTERNAL MEDICINE

## 2023-01-01 PROCEDURE — 25000003 PHARM REV CODE 250: Performed by: INTERNAL MEDICINE

## 2023-01-01 PROCEDURE — 99291 PR CRITICAL CARE, E/M 30-74 MINUTES: ICD-10-PCS | Mod: GC,,, | Performed by: INTERNAL MEDICINE

## 2023-01-01 PROCEDURE — 99233 SBSQ HOSP IP/OBS HIGH 50: CPT | Mod: ,,, | Performed by: INTERNAL MEDICINE

## 2023-01-01 PROCEDURE — 94669 MECHANICAL CHEST WALL OSCILL: CPT

## 2023-01-01 PROCEDURE — 83735 ASSAY OF MAGNESIUM: CPT

## 2023-01-01 PROCEDURE — 80048 BASIC METABOLIC PNL TOTAL CA: CPT

## 2023-01-01 PROCEDURE — 82570 ASSAY OF URINE CREATININE: CPT | Performed by: INTERNAL MEDICINE

## 2023-01-01 PROCEDURE — 25000242 PHARM REV CODE 250 ALT 637 W/ HCPCS

## 2023-01-01 PROCEDURE — 86900 BLOOD TYPING SEROLOGIC ABO: CPT | Performed by: INTERNAL MEDICINE

## 2023-01-01 PROCEDURE — 85027 COMPLETE CBC AUTOMATED: CPT | Performed by: INTERNAL MEDICINE

## 2023-01-01 PROCEDURE — 83519 RIA NONANTIBODY: CPT | Mod: 59

## 2023-01-01 PROCEDURE — 99291 CRITICAL CARE FIRST HOUR: CPT | Mod: GC,,, | Performed by: INTERNAL MEDICINE

## 2023-01-01 PROCEDURE — P9021 RED BLOOD CELLS UNIT: HCPCS | Performed by: STUDENT IN AN ORGANIZED HEALTH CARE EDUCATION/TRAINING PROGRAM

## 2023-01-01 PROCEDURE — 94010 BREATHING CAPACITY TEST: CPT

## 2023-01-01 PROCEDURE — 82024 ASSAY OF ACTH: CPT

## 2023-01-01 PROCEDURE — B4185 PARENTERAL SOL 10 GM LIPIDS: HCPCS

## 2023-01-01 PROCEDURE — 99291 PR CRITICAL CARE, E/M 30-74 MINUTES: ICD-10-PCS | Mod: ,,, | Performed by: INTERNAL MEDICINE

## 2023-01-01 PROCEDURE — 85007 BL SMEAR W/DIFF WBC COUNT: CPT | Performed by: STUDENT IN AN ORGANIZED HEALTH CARE EDUCATION/TRAINING PROGRAM

## 2023-01-01 PROCEDURE — 99231 PR SUBSEQUENT HOSPITAL CARE,LEVL I: ICD-10-PCS | Mod: GC,,, | Performed by: INTERNAL MEDICINE

## 2023-01-01 PROCEDURE — 99223 PR INITIAL HOSPITAL CARE,LEVL III: ICD-10-PCS | Mod: ,,, | Performed by: INTERNAL MEDICINE

## 2023-01-01 PROCEDURE — 99223 1ST HOSP IP/OBS HIGH 75: CPT | Mod: ,,, | Performed by: INTERNAL MEDICINE

## 2023-01-01 PROCEDURE — 99291 PR CRITICAL CARE, E/M 30-74 MINUTES: ICD-10-PCS | Mod: ,,, | Performed by: PSYCHIATRY & NEUROLOGY

## 2023-01-01 PROCEDURE — 82085 ASSAY OF ALDOLASE: CPT

## 2023-01-01 PROCEDURE — 27100171 HC OXYGEN HIGH FLOW UP TO 24 HOURS

## 2023-01-01 PROCEDURE — 87040 BLOOD CULTURE FOR BACTERIA: CPT | Mod: 59

## 2023-01-01 PROCEDURE — 86038 ANTINUCLEAR ANTIBODIES: CPT

## 2023-01-01 PROCEDURE — 85652 RBC SED RATE AUTOMATED: CPT | Performed by: STUDENT IN AN ORGANIZED HEALTH CARE EDUCATION/TRAINING PROGRAM

## 2023-01-01 PROCEDURE — 99233 SBSQ HOSP IP/OBS HIGH 50: CPT | Mod: ,,, | Performed by: STUDENT IN AN ORGANIZED HEALTH CARE EDUCATION/TRAINING PROGRAM

## 2023-01-01 PROCEDURE — S5010 5% DEXTROSE AND 0.45% SALINE: HCPCS | Performed by: INTERNAL MEDICINE

## 2023-01-01 PROCEDURE — 99292 PR CRITICAL CARE, ADDL 30 MIN: ICD-10-PCS | Mod: GC,,, | Performed by: INTERNAL MEDICINE

## 2023-01-01 PROCEDURE — A4217 STERILE WATER/SALINE, 500 ML: HCPCS | Performed by: STUDENT IN AN ORGANIZED HEALTH CARE EDUCATION/TRAINING PROGRAM

## 2023-01-01 PROCEDURE — 93010 ELECTROCARDIOGRAM REPORT: CPT | Mod: ,,, | Performed by: INTERNAL MEDICINE

## 2023-01-01 PROCEDURE — 27200966 HC CLOSED SUCTION SYSTEM

## 2023-01-01 PROCEDURE — 94150 VITAL CAPACITY TEST: CPT

## 2023-01-01 PROCEDURE — 99223 1ST HOSP IP/OBS HIGH 75: CPT | Mod: GC,,, | Performed by: INTERNAL MEDICINE

## 2023-01-01 PROCEDURE — 99497 PR ADVNCD CARE PLAN 30 MIN: ICD-10-PCS | Mod: ,,, | Performed by: INTERNAL MEDICINE

## 2023-01-01 PROCEDURE — 87116 MYCOBACTERIA CULTURE: CPT

## 2023-01-01 PROCEDURE — 63600175 PHARM REV CODE 636 W HCPCS: Performed by: INTERNAL MEDICINE

## 2023-01-01 PROCEDURE — 80048 BASIC METABOLIC PNL TOTAL CA: CPT | Performed by: INTERNAL MEDICINE

## 2023-01-01 PROCEDURE — 87070 CULTURE OTHR SPECIMN AEROBIC: CPT

## 2023-01-01 PROCEDURE — 87106 FUNGI IDENTIFICATION YEAST: CPT | Mod: 59

## 2023-01-01 PROCEDURE — 25000003 PHARM REV CODE 250: Performed by: EMERGENCY MEDICINE

## 2023-01-01 PROCEDURE — 82330 ASSAY OF CALCIUM: CPT | Performed by: EMERGENCY MEDICINE

## 2023-01-01 PROCEDURE — 99233 PR SUBSEQUENT HOSPITAL CARE,LEVL III: ICD-10-PCS | Mod: ,,, | Performed by: NEUROLOGICAL SURGERY

## 2023-01-01 PROCEDURE — 63600175 PHARM REV CODE 636 W HCPCS: Performed by: HOSPITALIST

## 2023-01-01 PROCEDURE — 85025 COMPLETE CBC W/AUTO DIFF WBC: CPT | Performed by: INTERNAL MEDICINE

## 2023-01-01 PROCEDURE — 84443 ASSAY THYROID STIM HORMONE: CPT

## 2023-01-01 PROCEDURE — 99233 SBSQ HOSP IP/OBS HIGH 50: CPT | Mod: GC,,, | Performed by: INTERNAL MEDICINE

## 2023-01-01 PROCEDURE — 82533 TOTAL CORTISOL: CPT

## 2023-01-01 PROCEDURE — 85025 COMPLETE CBC W/AUTO DIFF WBC: CPT | Performed by: STUDENT IN AN ORGANIZED HEALTH CARE EDUCATION/TRAINING PROGRAM

## 2023-01-01 PROCEDURE — 82607 VITAMIN B-12: CPT

## 2023-01-01 PROCEDURE — 84478 ASSAY OF TRIGLYCERIDES: CPT | Performed by: EMERGENCY MEDICINE

## 2023-01-01 PROCEDURE — 63600175 PHARM REV CODE 636 W HCPCS: Performed by: EMERGENCY MEDICINE

## 2023-01-01 PROCEDURE — 99233 PR SUBSEQUENT HOSPITAL CARE,LEVL III: ICD-10-PCS | Mod: ,,, | Performed by: PSYCHIATRY & NEUROLOGY

## 2023-01-01 PROCEDURE — 84134 ASSAY OF PREALBUMIN: CPT

## 2023-01-01 PROCEDURE — 99291 PR CRITICAL CARE, E/M 30-74 MINUTES: ICD-10-PCS | Mod: GC,,, | Performed by: EMERGENCY MEDICINE

## 2023-01-01 PROCEDURE — 99292 CRITICAL CARE ADDL 30 MIN: CPT | Mod: ,,, | Performed by: PSYCHIATRY & NEUROLOGY

## 2023-01-01 PROCEDURE — 93005 ELECTROCARDIOGRAM TRACING: CPT

## 2023-01-01 PROCEDURE — 87075 CULTR BACTERIA EXCEPT BLOOD: CPT

## 2023-01-01 PROCEDURE — 37799 UNLISTED PX VASCULAR SURGERY: CPT

## 2023-01-01 PROCEDURE — 86920 COMPATIBILITY TEST SPIN: CPT | Performed by: STUDENT IN AN ORGANIZED HEALTH CARE EDUCATION/TRAINING PROGRAM

## 2023-01-01 PROCEDURE — 99223 1ST HOSP IP/OBS HIGH 75: CPT | Mod: ,,, | Performed by: PSYCHIATRY & NEUROLOGY

## 2023-01-01 PROCEDURE — 99223 PR INITIAL HOSPITAL CARE,LEVL III: ICD-10-PCS | Mod: GC,,, | Performed by: INTERNAL MEDICINE

## 2023-01-01 PROCEDURE — 99233 SBSQ HOSP IP/OBS HIGH 50: CPT | Mod: ,,, | Performed by: NEUROLOGICAL SURGERY

## 2023-01-01 PROCEDURE — 86431 RHEUMATOID FACTOR QUANT: CPT

## 2023-01-01 PROCEDURE — 25000242 PHARM REV CODE 250 ALT 637 W/ HCPCS: Performed by: INTERNAL MEDICINE

## 2023-01-01 PROCEDURE — 97530 THERAPEUTIC ACTIVITIES: CPT

## 2023-01-01 PROCEDURE — 81001 URINALYSIS AUTO W/SCOPE: CPT

## 2023-01-01 PROCEDURE — 36430 TRANSFUSION BLD/BLD COMPNT: CPT

## 2023-01-01 PROCEDURE — 80048 BASIC METABOLIC PNL TOTAL CA: CPT | Mod: 91

## 2023-01-01 PROCEDURE — 92610 EVALUATE SWALLOWING FUNCTION: CPT

## 2023-01-01 PROCEDURE — B4185 PARENTERAL SOL 10 GM LIPIDS: HCPCS | Performed by: STUDENT IN AN ORGANIZED HEALTH CARE EDUCATION/TRAINING PROGRAM

## 2023-01-01 PROCEDURE — 99231 SBSQ HOSP IP/OBS SF/LOW 25: CPT | Mod: GC,,, | Performed by: INTERNAL MEDICINE

## 2023-01-01 PROCEDURE — 82803 BLOOD GASES ANY COMBINATION: CPT

## 2023-01-01 PROCEDURE — 99292 PR CRITICAL CARE, ADDL 30 MIN: ICD-10-PCS | Mod: ,,, | Performed by: PSYCHIATRY & NEUROLOGY

## 2023-01-01 PROCEDURE — 86235 NUCLEAR ANTIGEN ANTIBODY: CPT | Mod: 59

## 2023-01-01 PROCEDURE — 99291 CRITICAL CARE FIRST HOUR: CPT | Mod: ,,, | Performed by: PSYCHIATRY & NEUROLOGY

## 2023-01-01 PROCEDURE — 25500020 PHARM REV CODE 255: Performed by: INTERNAL MEDICINE

## 2023-01-01 PROCEDURE — 93010 EKG 12-LEAD: ICD-10-PCS | Mod: ,,, | Performed by: INTERNAL MEDICINE

## 2023-01-01 PROCEDURE — 80053 COMPREHEN METABOLIC PANEL: CPT | Performed by: STUDENT IN AN ORGANIZED HEALTH CARE EDUCATION/TRAINING PROGRAM

## 2023-01-01 PROCEDURE — 99497 ADVNCD CARE PLAN 30 MIN: CPT | Mod: ,,, | Performed by: STUDENT IN AN ORGANIZED HEALTH CARE EDUCATION/TRAINING PROGRAM

## 2023-01-01 PROCEDURE — 99292 CRITICAL CARE ADDL 30 MIN: CPT | Mod: GC,,, | Performed by: INTERNAL MEDICINE

## 2023-01-01 PROCEDURE — 87102 FUNGUS ISOLATION CULTURE: CPT

## 2023-01-01 PROCEDURE — 25000003 PHARM REV CODE 250: Performed by: HOSPITALIST

## 2023-01-01 PROCEDURE — 84484 ASSAY OF TROPONIN QUANT: CPT

## 2023-01-01 PROCEDURE — 86140 C-REACTIVE PROTEIN: CPT | Performed by: STUDENT IN AN ORGANIZED HEALTH CARE EDUCATION/TRAINING PROGRAM

## 2023-01-01 PROCEDURE — 36415 COLL VENOUS BLD VENIPUNCTURE: CPT | Performed by: INTERNAL MEDICINE

## 2023-01-01 PROCEDURE — 85007 BL SMEAR W/DIFF WBC COUNT: CPT | Performed by: INTERNAL MEDICINE

## 2023-01-01 PROCEDURE — 87205 SMEAR GRAM STAIN: CPT

## 2023-01-01 PROCEDURE — 83835 ASSAY OF METANEPHRINES: CPT | Performed by: INTERNAL MEDICINE

## 2023-01-01 PROCEDURE — 80202 ASSAY OF VANCOMYCIN: CPT | Performed by: INTERNAL MEDICINE

## 2023-01-01 PROCEDURE — 63600175 PHARM REV CODE 636 W HCPCS: Mod: JG | Performed by: STUDENT IN AN ORGANIZED HEALTH CARE EDUCATION/TRAINING PROGRAM

## 2023-01-01 PROCEDURE — 97164 PT RE-EVAL EST PLAN CARE: CPT

## 2023-01-01 PROCEDURE — 99497 PR ADVNCD CARE PLAN 30 MIN: ICD-10-PCS | Mod: ,,, | Performed by: STUDENT IN AN ORGANIZED HEALTH CARE EDUCATION/TRAINING PROGRAM

## 2023-01-01 PROCEDURE — 0241U SARS-COV2 (COVID) WITH FLU/RSV BY PCR: CPT

## 2023-01-01 PROCEDURE — 63600175 PHARM REV CODE 636 W HCPCS: Mod: JG

## 2023-01-01 PROCEDURE — 99223 PR INITIAL HOSPITAL CARE,LEVL III: ICD-10-PCS | Mod: GC,,, | Performed by: ORTHOPAEDIC SURGERY

## 2023-01-01 PROCEDURE — 94668 MNPJ CHEST WALL SBSQ: CPT

## 2023-01-01 PROCEDURE — 99497 ADVNCD CARE PLAN 30 MIN: CPT | Mod: ,,, | Performed by: INTERNAL MEDICINE

## 2023-01-01 PROCEDURE — 87206 SMEAR FLUORESCENT/ACID STAI: CPT

## 2023-01-01 PROCEDURE — 97110 THERAPEUTIC EXERCISES: CPT

## 2023-01-01 PROCEDURE — 36415 COLL VENOUS BLD VENIPUNCTURE: CPT

## 2023-01-01 PROCEDURE — 85027 COMPLETE CBC AUTOMATED: CPT | Performed by: STUDENT IN AN ORGANIZED HEALTH CARE EDUCATION/TRAINING PROGRAM

## 2023-01-01 PROCEDURE — 97168 OT RE-EVAL EST PLAN CARE: CPT

## 2023-01-01 PROCEDURE — 83835 ASSAY OF METANEPHRINES: CPT | Performed by: STUDENT IN AN ORGANIZED HEALTH CARE EDUCATION/TRAINING PROGRAM

## 2023-01-01 PROCEDURE — 99498 PR ADVNCD CARE PLAN ADDL 30 MIN: ICD-10-PCS | Mod: ,,, | Performed by: INTERNAL MEDICINE

## 2023-01-01 PROCEDURE — 83615 LACTATE (LD) (LDH) ENZYME: CPT

## 2023-01-01 PROCEDURE — 82330 ASSAY OF CALCIUM: CPT | Performed by: STUDENT IN AN ORGANIZED HEALTH CARE EDUCATION/TRAINING PROGRAM

## 2023-01-01 PROCEDURE — 99498 ADVNCD CARE PLAN ADDL 30 MIN: CPT | Mod: ,,, | Performed by: INTERNAL MEDICINE

## 2023-01-01 PROCEDURE — 94667 MNPJ CHEST WALL 1ST: CPT

## 2023-01-01 PROCEDURE — 99223 PR INITIAL HOSPITAL CARE,LEVL III: ICD-10-PCS | Mod: ,,, | Performed by: PSYCHIATRY & NEUROLOGY

## 2023-01-01 PROCEDURE — 82550 ASSAY OF CK (CPK): CPT

## 2023-01-01 PROCEDURE — 99223 1ST HOSP IP/OBS HIGH 75: CPT | Mod: GC,,, | Performed by: ORTHOPAEDIC SURGERY

## 2023-01-01 PROCEDURE — 92526 ORAL FUNCTION THERAPY: CPT

## 2023-01-01 RX ORDER — IPRATROPIUM BROMIDE AND ALBUTEROL SULFATE 2.5; .5 MG/3ML; MG/3ML
3 SOLUTION RESPIRATORY (INHALATION)
Status: DISCONTINUED | OUTPATIENT
Start: 2023-01-01 | End: 2023-01-01 | Stop reason: HOSPADM

## 2023-01-01 RX ORDER — GUAIFENESIN 100 MG/5ML
200 SOLUTION ORAL EVERY 4 HOURS PRN
Status: DISCONTINUED | OUTPATIENT
Start: 2023-01-01 | End: 2023-01-01

## 2023-01-01 RX ORDER — LABETALOL HCL 20 MG/4 ML
10 SYRINGE (ML) INTRAVENOUS EVERY 4 HOURS
Status: DISCONTINUED | OUTPATIENT
Start: 2023-01-01 | End: 2023-01-01

## 2023-01-01 RX ORDER — POLYETHYLENE GLYCOL 3350 17 G/17G
17 POWDER, FOR SOLUTION ORAL DAILY
Status: DISCONTINUED | OUTPATIENT
Start: 2023-01-01 | End: 2023-01-01

## 2023-01-01 RX ORDER — MORPHINE SULFATE 2 MG/ML
2 INJECTION, SOLUTION INTRAMUSCULAR; INTRAVENOUS
Status: DISCONTINUED | OUTPATIENT
Start: 2023-01-01 | End: 2023-01-01

## 2023-01-01 RX ORDER — MAGNESIUM SULFATE HEPTAHYDRATE 40 MG/ML
2 INJECTION, SOLUTION INTRAVENOUS ONCE
Status: COMPLETED | OUTPATIENT
Start: 2023-01-01 | End: 2023-01-01

## 2023-01-01 RX ORDER — GUAIFENESIN 600 MG/1
600 TABLET, EXTENDED RELEASE ORAL 2 TIMES DAILY
Status: DISCONTINUED | OUTPATIENT
Start: 2023-01-01 | End: 2023-01-01

## 2023-01-01 RX ORDER — POTASSIUM CHLORIDE 14.9 MG/ML
20 INJECTION INTRAVENOUS ONCE
Status: COMPLETED | OUTPATIENT
Start: 2023-01-01 | End: 2023-01-01

## 2023-01-01 RX ORDER — POTASSIUM CHLORIDE 29.8 MG/ML
40 INJECTION INTRAVENOUS ONCE
Status: COMPLETED | OUTPATIENT
Start: 2023-01-01 | End: 2023-01-01

## 2023-01-01 RX ORDER — MIDAZOLAM HYDROCHLORIDE 1 MG/ML
1 INJECTION INTRAMUSCULAR; INTRAVENOUS
Status: DISCONTINUED | OUTPATIENT
Start: 2023-01-01 | End: 2023-01-01

## 2023-01-01 RX ORDER — FLUCONAZOLE 2 MG/ML
400 INJECTION, SOLUTION INTRAVENOUS
Status: DISCONTINUED | OUTPATIENT
Start: 2023-01-01 | End: 2023-01-01

## 2023-01-01 RX ORDER — DEXTROSE MONOHYDRATE 100 MG/ML
INJECTION, SOLUTION INTRAVENOUS CONTINUOUS PRN
Status: DISCONTINUED | OUTPATIENT
Start: 2023-01-01 | End: 2023-01-01

## 2023-01-01 RX ORDER — LABETALOL HCL 20 MG/4 ML
10 SYRINGE (ML) INTRAVENOUS EVERY 6 HOURS PRN
Status: DISCONTINUED | OUTPATIENT
Start: 2023-01-01 | End: 2023-01-01

## 2023-01-01 RX ORDER — LABETALOL HCL 20 MG/4 ML
20 SYRINGE (ML) INTRAVENOUS EVERY 6 HOURS PRN
Status: DISCONTINUED | OUTPATIENT
Start: 2023-01-01 | End: 2023-01-01

## 2023-01-01 RX ORDER — ACETAMINOPHEN 325 MG/1
650 TABLET ORAL EVERY 6 HOURS PRN
Status: DISCONTINUED | OUTPATIENT
Start: 2023-01-01 | End: 2023-01-01

## 2023-01-01 RX ORDER — LORAZEPAM 2 MG/ML
1 INJECTION INTRAMUSCULAR ONCE
Status: COMPLETED | OUTPATIENT
Start: 2023-01-01 | End: 2023-01-01

## 2023-01-01 RX ORDER — CHLORTHALIDONE 25 MG/1
25 TABLET ORAL DAILY
Status: DISCONTINUED | OUTPATIENT
Start: 2023-01-01 | End: 2023-01-01

## 2023-01-01 RX ORDER — DEXTROSE MONOHYDRATE 50 MG/ML
INJECTION, SOLUTION INTRAVENOUS CONTINUOUS
Status: DISCONTINUED | OUTPATIENT
Start: 2023-01-01 | End: 2023-01-01

## 2023-01-01 RX ORDER — IPRATROPIUM BROMIDE AND ALBUTEROL SULFATE 2.5; .5 MG/3ML; MG/3ML
3 SOLUTION RESPIRATORY (INHALATION) EVERY 6 HOURS
Status: DISCONTINUED | OUTPATIENT
Start: 2023-01-01 | End: 2023-01-01

## 2023-01-01 RX ORDER — MIDAZOLAM HYDROCHLORIDE 1 MG/ML
0.5 INJECTION INTRAMUSCULAR; INTRAVENOUS EVERY 4 HOURS PRN
Status: DISCONTINUED | OUTPATIENT
Start: 2023-01-01 | End: 2023-01-01

## 2023-01-01 RX ORDER — CARVEDILOL 6.25 MG/1
6.25 TABLET ORAL 2 TIMES DAILY
Status: DISCONTINUED | OUTPATIENT
Start: 2023-01-01 | End: 2023-01-01

## 2023-01-01 RX ORDER — LORAZEPAM 2 MG/ML
2 INJECTION INTRAMUSCULAR
Status: CANCELLED | OUTPATIENT
Start: 2023-01-01

## 2023-01-01 RX ORDER — SODIUM CHLORIDE FOR INHALATION 3 %
4 VIAL, NEBULIZER (ML) INHALATION
Status: DISCONTINUED | OUTPATIENT
Start: 2023-01-01 | End: 2023-01-01

## 2023-01-01 RX ORDER — LEVOTHYROXINE SODIUM 50 UG/1
50 TABLET ORAL
Status: DISCONTINUED | OUTPATIENT
Start: 2023-01-01 | End: 2023-01-01

## 2023-01-01 RX ORDER — ACETAMINOPHEN 650 MG/1
650 SUPPOSITORY RECTAL EVERY 6 HOURS PRN
Status: DISCONTINUED | OUTPATIENT
Start: 2023-01-01 | End: 2023-01-01 | Stop reason: HOSPADM

## 2023-01-01 RX ORDER — AMLODIPINE BESYLATE 10 MG/1
10 TABLET ORAL DAILY
Status: DISCONTINUED | OUTPATIENT
Start: 2023-01-01 | End: 2023-01-01

## 2023-01-01 RX ORDER — INSULIN ASPART 100 [IU]/ML
1-10 INJECTION, SOLUTION INTRAVENOUS; SUBCUTANEOUS EVERY 4 HOURS PRN
Status: DISCONTINUED | OUTPATIENT
Start: 2023-01-01 | End: 2023-01-01

## 2023-01-01 RX ORDER — LOSARTAN POTASSIUM 50 MG/1
100 TABLET ORAL DAILY
Status: DISCONTINUED | OUTPATIENT
Start: 2023-01-01 | End: 2023-01-01

## 2023-01-01 RX ORDER — METOPROLOL TARTRATE 1 MG/ML
5 INJECTION, SOLUTION INTRAVENOUS EVERY 6 HOURS
Status: DISCONTINUED | OUTPATIENT
Start: 2023-01-01 | End: 2023-01-01

## 2023-01-01 RX ORDER — HYDROCODONE BITARTRATE AND ACETAMINOPHEN 500; 5 MG/1; MG/1
TABLET ORAL
Status: DISCONTINUED | OUTPATIENT
Start: 2023-01-01 | End: 2023-01-01 | Stop reason: HOSPADM

## 2023-01-01 RX ORDER — LOSARTAN POTASSIUM 50 MG/1
50 TABLET ORAL ONCE
Status: DISCONTINUED | OUTPATIENT
Start: 2023-01-01 | End: 2023-01-01

## 2023-01-01 RX ORDER — DEXTROSE MONOHYDRATE AND SODIUM CHLORIDE 5; .9 G/100ML; G/100ML
INJECTION, SOLUTION INTRAVENOUS CONTINUOUS
Status: DISCONTINUED | OUTPATIENT
Start: 2023-01-01 | End: 2023-01-01

## 2023-01-01 RX ORDER — LEVOTHYROXINE SODIUM 20 UG/ML
25 INJECTION, SOLUTION INTRAVENOUS DAILY
Status: DISCONTINUED | OUTPATIENT
Start: 2023-01-01 | End: 2023-01-01

## 2023-01-01 RX ORDER — LABETALOL HCL 20 MG/4 ML
10 SYRINGE (ML) INTRAVENOUS EVERY 6 HOURS
Status: DISCONTINUED | OUTPATIENT
Start: 2023-01-01 | End: 2023-01-01

## 2023-01-01 RX ORDER — ACETAMINOPHEN 10 MG/ML
1000 INJECTION, SOLUTION INTRAVENOUS ONCE
Status: COMPLETED | OUTPATIENT
Start: 2023-01-01 | End: 2023-01-01

## 2023-01-01 RX ORDER — LOSARTAN POTASSIUM 25 MG/1
25 TABLET ORAL DAILY
Status: DISCONTINUED | OUTPATIENT
Start: 2023-01-01 | End: 2023-01-01

## 2023-01-01 RX ORDER — LABETALOL HCL 20 MG/4 ML
20 SYRINGE (ML) INTRAVENOUS EVERY 4 HOURS
Status: DISCONTINUED | OUTPATIENT
Start: 2023-01-01 | End: 2023-01-01

## 2023-01-01 RX ORDER — LABETALOL HCL 20 MG/4 ML
5 SYRINGE (ML) INTRAVENOUS ONCE
Status: COMPLETED | OUTPATIENT
Start: 2023-01-01 | End: 2023-01-01

## 2023-01-01 RX ORDER — LORAZEPAM 2 MG/ML
2 INJECTION INTRAMUSCULAR
Status: DISCONTINUED | OUTPATIENT
Start: 2023-01-01 | End: 2023-01-01

## 2023-01-01 RX ORDER — ACETYLCYSTEINE 200 MG/ML
2 SOLUTION ORAL; RESPIRATORY (INHALATION) EVERY 4 HOURS PRN
Status: DISCONTINUED | OUTPATIENT
Start: 2023-01-01 | End: 2023-01-01

## 2023-01-01 RX ORDER — GLUCAGON 1 MG
1 KIT INJECTION
Status: DISCONTINUED | OUTPATIENT
Start: 2023-01-01 | End: 2023-01-01

## 2023-01-01 RX ORDER — MIDAZOLAM HYDROCHLORIDE 1 MG/ML
2 INJECTION INTRAMUSCULAR; INTRAVENOUS
Status: DISCONTINUED | OUTPATIENT
Start: 2023-01-01 | End: 2023-01-01 | Stop reason: HOSPADM

## 2023-01-01 RX ORDER — LABETALOL HCL 20 MG/4 ML
10 SYRINGE (ML) INTRAVENOUS ONCE
Status: COMPLETED | OUTPATIENT
Start: 2023-01-01 | End: 2023-01-01

## 2023-01-01 RX ORDER — LABETALOL HCL 20 MG/4 ML
20 SYRINGE (ML) INTRAVENOUS EVERY 6 HOURS
Status: DISCONTINUED | OUTPATIENT
Start: 2023-01-01 | End: 2023-01-01

## 2023-01-01 RX ORDER — SODIUM CHLORIDE FOR INHALATION 3 %
4 VIAL, NEBULIZER (ML) INHALATION EVERY 4 HOURS
Status: DISCONTINUED | OUTPATIENT
Start: 2023-01-01 | End: 2023-01-01

## 2023-01-01 RX ORDER — HYDROMORPHONE HYDROCHLORIDE 1 MG/ML
0.2 INJECTION, SOLUTION INTRAMUSCULAR; INTRAVENOUS; SUBCUTANEOUS
Status: DISCONTINUED | OUTPATIENT
Start: 2023-01-01 | End: 2023-01-01

## 2023-01-01 RX ORDER — SODIUM CHLORIDE FOR INHALATION 3 %
4 VIAL, NEBULIZER (ML) INHALATION EVERY 6 HOURS
Status: DISCONTINUED | OUTPATIENT
Start: 2023-01-01 | End: 2023-01-01

## 2023-01-01 RX ORDER — DEXTROSE MONOHYDRATE AND SODIUM CHLORIDE 5; .45 G/100ML; G/100ML
INJECTION, SOLUTION INTRAVENOUS CONTINUOUS
Status: DISCONTINUED | OUTPATIENT
Start: 2023-01-01 | End: 2023-01-01

## 2023-01-01 RX ORDER — LOSARTAN POTASSIUM 50 MG/1
50 TABLET ORAL DAILY
Status: DISCONTINUED | OUTPATIENT
Start: 2023-01-01 | End: 2023-01-01

## 2023-01-01 RX ORDER — IPRATROPIUM BROMIDE AND ALBUTEROL SULFATE 2.5; .5 MG/3ML; MG/3ML
3 SOLUTION RESPIRATORY (INHALATION) ONCE
Status: DISCONTINUED | OUTPATIENT
Start: 2023-01-01 | End: 2023-01-01

## 2023-01-01 RX ORDER — IBUPROFEN 400 MG/1
400 TABLET ORAL EVERY 8 HOURS PRN
Status: DISCONTINUED | OUTPATIENT
Start: 2023-01-01 | End: 2023-01-01

## 2023-01-01 RX ORDER — POTASSIUM CHLORIDE 29.8 MG/ML
40 INJECTION INTRAVENOUS ONCE
Status: DISCONTINUED | OUTPATIENT
Start: 2023-01-01 | End: 2023-01-01

## 2023-01-01 RX ORDER — DEXTROSE MONOHYDRATE 50 MG/ML
INJECTION, SOLUTION INTRAVENOUS CONTINUOUS
Status: ACTIVE | OUTPATIENT
Start: 2023-01-01 | End: 2023-01-01

## 2023-01-01 RX ORDER — LABETALOL HCL 20 MG/4 ML
10 SYRINGE (ML) INTRAVENOUS EVERY 4 HOURS PRN
Status: DISCONTINUED | OUTPATIENT
Start: 2023-01-01 | End: 2023-01-01

## 2023-01-01 RX ORDER — FLUCONAZOLE 2 MG/ML
200 INJECTION, SOLUTION INTRAVENOUS
Status: DISCONTINUED | OUTPATIENT
Start: 2023-01-01 | End: 2023-01-01

## 2023-01-01 RX ORDER — IPRATROPIUM BROMIDE AND ALBUTEROL SULFATE 2.5; .5 MG/3ML; MG/3ML
3 SOLUTION RESPIRATORY (INHALATION) EVERY 4 HOURS
Status: DISCONTINUED | OUTPATIENT
Start: 2023-01-01 | End: 2023-01-01

## 2023-01-01 RX ORDER — FUROSEMIDE 10 MG/ML
40 INJECTION INTRAMUSCULAR; INTRAVENOUS ONCE
Status: DISCONTINUED | OUTPATIENT
Start: 2023-01-01 | End: 2023-01-01

## 2023-01-01 RX ORDER — AMLODIPINE BESYLATE 2.5 MG/1
5 TABLET ORAL DAILY
Status: DISCONTINUED | OUTPATIENT
Start: 2023-01-01 | End: 2023-01-01

## 2023-01-01 RX ORDER — POTASSIUM CHLORIDE 7.45 MG/ML
10 INJECTION INTRAVENOUS
Status: DISCONTINUED | OUTPATIENT
Start: 2023-01-01 | End: 2023-01-01

## 2023-01-01 RX ORDER — LINEZOLID 2 MG/ML
600 INJECTION, SOLUTION INTRAVENOUS
Status: DISCONTINUED | OUTPATIENT
Start: 2023-01-01 | End: 2023-01-01

## 2023-01-01 RX ORDER — AMLODIPINE BESYLATE 2.5 MG/1
5 TABLET ORAL ONCE
Status: COMPLETED | OUTPATIENT
Start: 2023-01-01 | End: 2023-01-01

## 2023-01-01 RX ORDER — MORPHINE SULFATE 2 MG/ML
1 INJECTION, SOLUTION INTRAMUSCULAR; INTRAVENOUS
Status: DISCONTINUED | OUTPATIENT
Start: 2023-01-01 | End: 2023-01-01

## 2023-01-01 RX ORDER — FLUCONAZOLE 2 MG/ML
200 INJECTION, SOLUTION INTRAVENOUS
Status: DISCONTINUED | OUTPATIENT
Start: 2023-01-01 | End: 2023-01-01 | Stop reason: HOSPADM

## 2023-01-01 RX ORDER — METOPROLOL TARTRATE 25 MG/1
25 TABLET, FILM COATED ORAL 2 TIMES DAILY
Status: DISCONTINUED | OUTPATIENT
Start: 2023-01-01 | End: 2023-01-01

## 2023-01-01 RX ORDER — LEVOTHYROXINE SODIUM ANHYDROUS 100 UG/5ML
100 INJECTION, POWDER, LYOPHILIZED, FOR SOLUTION INTRAVENOUS DAILY
Status: CANCELLED | OUTPATIENT
Start: 2023-01-01

## 2023-01-01 RX ORDER — SODIUM CHLORIDE FOR INHALATION 3 %
4 VIAL, NEBULIZER (ML) INHALATION ONCE
Status: DISCONTINUED | OUTPATIENT
Start: 2023-01-01 | End: 2023-01-01

## 2023-01-01 RX ORDER — POTASSIUM CHLORIDE 7.45 MG/ML
40 INJECTION INTRAVENOUS ONCE
Status: DISCONTINUED | OUTPATIENT
Start: 2023-01-01 | End: 2023-01-01

## 2023-01-01 RX ADMIN — LABETALOL HYDROCHLORIDE 20 MG: 5 INJECTION, SOLUTION INTRAVENOUS at 08:01

## 2023-01-01 RX ADMIN — PANTOPRAZOLE SODIUM 40 MG: 40 INJECTION, POWDER, FOR SOLUTION INTRAVENOUS at 09:01

## 2023-01-01 RX ADMIN — PIPERACILLIN SODIUM AND TAZOBACTAM SODIUM 4.5 G: 4; .5 INJECTION, POWDER, LYOPHILIZED, FOR SOLUTION INTRAVENOUS at 11:01

## 2023-01-01 RX ADMIN — AMLODIPINE BESYLATE 5 MG: 2.5 TABLET ORAL at 12:01

## 2023-01-01 RX ADMIN — PIPERACILLIN SODIUM AND TAZOBACTAM SODIUM 4.5 G: 4; .5 INJECTION, POWDER, LYOPHILIZED, FOR SOLUTION INTRAVENOUS at 08:01

## 2023-01-01 RX ADMIN — ACETAMINOPHEN 650 MG: 325 TABLET ORAL at 10:01

## 2023-01-01 RX ADMIN — MICAFUNGIN SODIUM 100 MG: 100 INJECTION, POWDER, LYOPHILIZED, FOR SOLUTION INTRAVENOUS at 08:01

## 2023-01-01 RX ADMIN — SODIUM CHLORIDE SOLN NEBU 3% 4 ML: 3 NEBU SOLN at 04:01

## 2023-01-01 RX ADMIN — SODIUM CHLORIDE SOLN NEBU 3% 4 ML: 3 NEBU SOLN at 07:01

## 2023-01-01 RX ADMIN — IPRATROPIUM BROMIDE AND ALBUTEROL SULFATE 3 ML: 2.5; .5 SOLUTION RESPIRATORY (INHALATION) at 03:01

## 2023-01-01 RX ADMIN — AMLODIPINE BESYLATE 5 MG: 2.5 TABLET ORAL at 01:01

## 2023-01-01 RX ADMIN — POTASSIUM PHOSPHATE, MONOBASIC AND POTASSIUM PHOSPHATE, DIBASIC 15 MMOL: 224; 236 INJECTION, SOLUTION, CONCENTRATE INTRAVENOUS at 11:01

## 2023-01-01 RX ADMIN — DEXTROSE: 5 SOLUTION INTRAVENOUS at 01:01

## 2023-01-01 RX ADMIN — IPRATROPIUM BROMIDE AND ALBUTEROL SULFATE 3 ML: 2.5; .5 SOLUTION RESPIRATORY (INHALATION) at 11:01

## 2023-01-01 RX ADMIN — IPRATROPIUM BROMIDE AND ALBUTEROL SULFATE 3 ML: 2.5; .5 SOLUTION RESPIRATORY (INHALATION) at 04:01

## 2023-01-01 RX ADMIN — SODIUM CHLORIDE SOLN NEBU 3% 4 ML: 3 NEBU SOLN at 08:01

## 2023-01-01 RX ADMIN — VANCOMYCIN HYDROCHLORIDE 1250 MG: 1.25 INJECTION, POWDER, LYOPHILIZED, FOR SOLUTION INTRAVENOUS at 04:01

## 2023-01-01 RX ADMIN — LINEZOLID 600 MG: 600 INJECTION, SOLUTION INTRAVENOUS at 02:01

## 2023-01-01 RX ADMIN — IPRATROPIUM BROMIDE AND ALBUTEROL SULFATE 3 ML: 2.5; .5 SOLUTION RESPIRATORY (INHALATION) at 07:01

## 2023-01-01 RX ADMIN — SODIUM CHLORIDE SOLN NEBU 3% 4 ML: 3 NEBU SOLN at 12:01

## 2023-01-01 RX ADMIN — ENOXAPARIN SODIUM 40 MG: 40 INJECTION SUBCUTANEOUS at 05:01

## 2023-01-01 RX ADMIN — METOROPROLOL TARTRATE 5 MG: 5 INJECTION, SOLUTION INTRAVENOUS at 11:01

## 2023-01-01 RX ADMIN — DEXTROSE: 5 SOLUTION INTRAVENOUS at 04:01

## 2023-01-01 RX ADMIN — ACETAMINOPHEN 650 MG: 325 TABLET ORAL at 03:01

## 2023-01-01 RX ADMIN — LABETALOL HYDROCHLORIDE 20 MG: 5 INJECTION, SOLUTION INTRAVENOUS at 09:01

## 2023-01-01 RX ADMIN — CAPSAICIN: 0.25 CREAM TOPICAL at 09:01

## 2023-01-01 RX ADMIN — CAPSAICIN: 0.25 CREAM TOPICAL at 08:01

## 2023-01-01 RX ADMIN — IPRATROPIUM BROMIDE AND ALBUTEROL SULFATE 3 ML: 2.5; .5 SOLUTION RESPIRATORY (INHALATION) at 12:01

## 2023-01-01 RX ADMIN — LABETALOL HYDROCHLORIDE 10 MG: 5 INJECTION, SOLUTION INTRAVENOUS at 03:01

## 2023-01-01 RX ADMIN — I.V. FAT EMULSION 250 ML: 20 EMULSION INTRAVENOUS at 10:01

## 2023-01-01 RX ADMIN — SODIUM CHLORIDE SOLN NEBU 3% 4 ML: 3 NEBU SOLN at 02:01

## 2023-01-01 RX ADMIN — LEVOTHYROXINE SODIUM 50 MCG: 50 TABLET ORAL at 05:01

## 2023-01-01 RX ADMIN — FLUCONAZOLE IN SODIUM CHLORIDE 400 MG: 2 INJECTION, SOLUTION INTRAVENOUS at 12:01

## 2023-01-01 RX ADMIN — SODIUM CHLORIDE SOLN NEBU 3% 4 ML: 3 NEBU SOLN at 03:01

## 2023-01-01 RX ADMIN — SODIUM CHLORIDE SOLN NEBU 3% 4 ML: 3 NEBU SOLN at 11:01

## 2023-01-01 RX ADMIN — DEXTROSE AND SODIUM CHLORIDE: 5; .45 INJECTION, SOLUTION INTRAVENOUS at 05:01

## 2023-01-01 RX ADMIN — IPRATROPIUM BROMIDE AND ALBUTEROL SULFATE 3 ML: 2.5; .5 SOLUTION RESPIRATORY (INHALATION) at 09:01

## 2023-01-01 RX ADMIN — POLYETHYLENE GLYCOL 3350 17 G: 17 POWDER, FOR SOLUTION ORAL at 08:01

## 2023-01-01 RX ADMIN — SOYBEAN OIL 250 ML: 20 INJECTION, SOLUTION INTRAVENOUS at 09:01

## 2023-01-01 RX ADMIN — HUMAN IMMUNOGLOBULIN G 25 G: 20 LIQUID INTRAVENOUS at 07:01

## 2023-01-01 RX ADMIN — DEXMEDETOMIDINE HYDROCHLORIDE 0.5 MCG/KG/HR: 4 INJECTION, SOLUTION INTRAVENOUS at 10:01

## 2023-01-01 RX ADMIN — LINEZOLID 600 MG: 600 INJECTION, SOLUTION INTRAVENOUS at 03:01

## 2023-01-01 RX ADMIN — LABETALOL HYDROCHLORIDE 10 MG: 5 INJECTION, SOLUTION INTRAVENOUS at 01:01

## 2023-01-01 RX ADMIN — MAGNESIUM SULFATE 2 G: 2 INJECTION INTRAVENOUS at 09:01

## 2023-01-01 RX ADMIN — NICARDIPINE HYDROCHLORIDE 1 MG/HR: 0.2 INJECTION, SOLUTION INTRAVENOUS at 08:01

## 2023-01-01 RX ADMIN — ACETAMINOPHEN 650 MG: 650 SUPPOSITORY RECTAL at 09:01

## 2023-01-01 RX ADMIN — METOPROLOL TARTRATE 25 MG: 25 TABLET, FILM COATED ORAL at 01:01

## 2023-01-01 RX ADMIN — ACETAMINOPHEN 650 MG: 325 TABLET ORAL at 08:01

## 2023-01-01 RX ADMIN — DEXMEDETOMIDINE HYDROCHLORIDE 0.5 MCG/KG/HR: 4 INJECTION, SOLUTION INTRAVENOUS at 12:01

## 2023-01-01 RX ADMIN — LOSARTAN POTASSIUM 25 MG: 25 TABLET, FILM COATED ORAL at 01:01

## 2023-01-01 RX ADMIN — PANTOPRAZOLE SODIUM 40 MG: 40 INJECTION, POWDER, FOR SOLUTION INTRAVENOUS at 08:01

## 2023-01-01 RX ADMIN — CEFEPIME 1 G: 1 INJECTION, POWDER, FOR SOLUTION INTRAMUSCULAR; INTRAVENOUS at 05:01

## 2023-01-01 RX ADMIN — HUMAN IMMUNOGLOBULIN G 25 G: 20 LIQUID INTRAVENOUS at 05:01

## 2023-01-01 RX ADMIN — MIDAZOLAM 0.5 MG: 1 INJECTION INTRAMUSCULAR; INTRAVENOUS at 06:01

## 2023-01-01 RX ADMIN — LABETALOL HYDROCHLORIDE 20 MG: 5 INJECTION, SOLUTION INTRAVENOUS at 10:01

## 2023-01-01 RX ADMIN — LABETALOL HYDROCHLORIDE 20 MG: 5 INJECTION, SOLUTION INTRAVENOUS at 06:01

## 2023-01-01 RX ADMIN — METOROPROLOL TARTRATE 5 MG: 5 INJECTION, SOLUTION INTRAVENOUS at 06:01

## 2023-01-01 RX ADMIN — CEFEPIME 1 G: 1 INJECTION, POWDER, FOR SOLUTION INTRAMUSCULAR; INTRAVENOUS at 06:01

## 2023-01-01 RX ADMIN — NICARDIPINE HYDROCHLORIDE 10 MG/HR: 0.2 INJECTION, SOLUTION INTRAVENOUS at 11:01

## 2023-01-01 RX ADMIN — ENOXAPARIN SODIUM 40 MG: 40 INJECTION SUBCUTANEOUS at 04:01

## 2023-01-01 RX ADMIN — CEFEPIME 1 G: 1 INJECTION, POWDER, FOR SOLUTION INTRAMUSCULAR; INTRAVENOUS at 11:01

## 2023-01-01 RX ADMIN — CEFEPIME 1 G: 1 INJECTION, POWDER, FOR SOLUTION INTRAMUSCULAR; INTRAVENOUS at 04:01

## 2023-01-01 RX ADMIN — POTASSIUM CHLORIDE 40 MEQ: 29.8 INJECTION, SOLUTION INTRAVENOUS at 08:01

## 2023-01-01 RX ADMIN — IPRATROPIUM BROMIDE AND ALBUTEROL SULFATE 3 ML: 2.5; .5 SOLUTION RESPIRATORY (INHALATION) at 02:01

## 2023-01-01 RX ADMIN — METOROPROLOL TARTRATE 5 MG: 5 INJECTION, SOLUTION INTRAVENOUS at 03:01

## 2023-01-01 RX ADMIN — SODIUM PHOSPHATE, MONOBASIC, MONOHYDRATE AND SODIUM PHOSPHATE, DIBASIC, ANHYDROUS 20.01 MMOL: 276; 142 INJECTION, SOLUTION INTRAVENOUS at 06:01

## 2023-01-01 RX ADMIN — CAPSAICIN: 0.25 CREAM TOPICAL at 10:01

## 2023-01-01 RX ADMIN — CAPSAICIN: 0.25 CREAM TOPICAL at 11:01

## 2023-01-01 RX ADMIN — LEVOTHYROXINE SODIUM 50 MCG: 50 TABLET ORAL at 06:01

## 2023-01-01 RX ADMIN — LABETALOL HYDROCHLORIDE 10 MG: 5 INJECTION, SOLUTION INTRAVENOUS at 09:01

## 2023-01-01 RX ADMIN — METOROPROLOL TARTRATE 5 MG: 5 INJECTION, SOLUTION INTRAVENOUS at 05:01

## 2023-01-01 RX ADMIN — POTASSIUM CHLORIDE 40 MEQ: 29.8 INJECTION, SOLUTION INTRAVENOUS at 09:01

## 2023-01-01 RX ADMIN — LORAZEPAM 2 MG: 2 INJECTION INTRAMUSCULAR; INTRAVENOUS at 09:01

## 2023-01-01 RX ADMIN — PIPERACILLIN SODIUM AND TAZOBACTAM SODIUM 4.5 G: 4; .5 INJECTION, POWDER, LYOPHILIZED, FOR SOLUTION INTRAVENOUS at 09:01

## 2023-01-01 RX ADMIN — POTASSIUM CHLORIDE 40 MEQ: 29.8 INJECTION, SOLUTION INTRAVENOUS at 06:01

## 2023-01-01 RX ADMIN — LABETALOL HYDROCHLORIDE 10 MG: 5 INJECTION, SOLUTION INTRAVENOUS at 06:01

## 2023-01-01 RX ADMIN — AMLODIPINE BESYLATE 5 MG: 2.5 TABLET ORAL at 08:01

## 2023-01-01 RX ADMIN — MAGNESIUM SULFATE HEPTAHYDRATE: 500 INJECTION, SOLUTION INTRAMUSCULAR; INTRAVENOUS at 09:01

## 2023-01-01 RX ADMIN — DEXMEDETOMIDINE HYDROCHLORIDE 0.3 MCG/KG/HR: 4 INJECTION, SOLUTION INTRAVENOUS at 05:01

## 2023-01-01 RX ADMIN — MORPHINE SULFATE 2 MG: 2 INJECTION, SOLUTION INTRAMUSCULAR; INTRAVENOUS at 08:01

## 2023-01-01 RX ADMIN — LABETALOL HYDROCHLORIDE 20 MG: 5 INJECTION, SOLUTION INTRAVENOUS at 12:01

## 2023-01-01 RX ADMIN — METOROPROLOL TARTRATE 5 MG: 5 INJECTION, SOLUTION INTRAVENOUS at 12:01

## 2023-01-01 RX ADMIN — DEXTROSE AND SODIUM CHLORIDE: 5; .45 INJECTION, SOLUTION INTRAVENOUS at 07:01

## 2023-01-01 RX ADMIN — LINEZOLID 600 MG: 600 INJECTION, SOLUTION INTRAVENOUS at 01:01

## 2023-01-01 RX ADMIN — MORPHINE SULFATE 1 MG: 2 INJECTION, SOLUTION INTRAMUSCULAR; INTRAVENOUS at 10:01

## 2023-01-01 RX ADMIN — SOYBEAN OIL 250 ML: 20 INJECTION, SOLUTION INTRAVENOUS at 10:01

## 2023-01-01 RX ADMIN — POTASSIUM CHLORIDE 20 MEQ: 14.9 INJECTION, SOLUTION INTRAVENOUS at 01:01

## 2023-01-01 RX ADMIN — MAGNESIUM SULFATE HEPTAHYDRATE: 500 INJECTION, SOLUTION INTRAMUSCULAR; INTRAVENOUS at 10:01

## 2023-01-01 RX ADMIN — IPRATROPIUM BROMIDE AND ALBUTEROL SULFATE 3 ML: 2.5; .5 SOLUTION RESPIRATORY (INHALATION) at 01:01

## 2023-01-01 RX ADMIN — LOSARTAN POTASSIUM 50 MG: 50 TABLET, FILM COATED ORAL at 03:01

## 2023-01-01 RX ADMIN — SODIUM CHLORIDE, POTASSIUM CHLORIDE, SODIUM LACTATE AND CALCIUM CHLORIDE 500 ML: 600; 310; 30; 20 INJECTION, SOLUTION INTRAVENOUS at 12:01

## 2023-01-01 RX ADMIN — CEFEPIME 1 G: 1 INJECTION, POWDER, FOR SOLUTION INTRAMUSCULAR; INTRAVENOUS at 08:01

## 2023-01-01 RX ADMIN — MORPHINE SULFATE 1 MG: 2 INJECTION, SOLUTION INTRAMUSCULAR; INTRAVENOUS at 01:01

## 2023-01-01 RX ADMIN — LABETALOL HYDROCHLORIDE 10 MG: 5 INJECTION, SOLUTION INTRAVENOUS at 11:01

## 2023-01-01 RX ADMIN — CAPSAICIN: 0.25 CREAM TOPICAL at 01:01

## 2023-01-01 RX ADMIN — CEFEPIME 1 G: 1 INJECTION, POWDER, FOR SOLUTION INTRAMUSCULAR; INTRAVENOUS at 02:01

## 2023-01-01 RX ADMIN — HUMAN IMMUNOGLOBULIN G 25 G: 20 LIQUID INTRAVENOUS at 08:01

## 2023-01-01 RX ADMIN — CEFEPIME 1 G: 1 INJECTION, POWDER, FOR SOLUTION INTRAMUSCULAR; INTRAVENOUS at 09:01

## 2023-01-01 RX ADMIN — PANTOPRAZOLE SODIUM 40 MG: 40 INJECTION, POWDER, FOR SOLUTION INTRAVENOUS at 10:01

## 2023-01-01 RX ADMIN — LINEZOLID 600 MG: 600 INJECTION, SOLUTION INTRAVENOUS at 05:01

## 2023-01-01 RX ADMIN — DEXMEDETOMIDINE HYDROCHLORIDE 0.4 MCG/KG/HR: 4 INJECTION, SOLUTION INTRAVENOUS at 01:01

## 2023-01-01 RX ADMIN — ACETAMINOPHEN 650 MG: 650 SUPPOSITORY RECTAL at 07:01

## 2023-01-01 RX ADMIN — CEFEPIME 1 G: 1 INJECTION, POWDER, FOR SOLUTION INTRAMUSCULAR; INTRAVENOUS at 07:01

## 2023-01-01 RX ADMIN — METOPROLOL TARTRATE 25 MG: 25 TABLET, FILM COATED ORAL at 08:01

## 2023-01-01 RX ADMIN — IPRATROPIUM BROMIDE AND ALBUTEROL SULFATE 3 ML: 2.5; .5 SOLUTION RESPIRATORY (INHALATION) at 08:01

## 2023-01-01 RX ADMIN — MUPIROCIN: 20 OINTMENT TOPICAL at 09:01

## 2023-01-01 RX ADMIN — MAGNESIUM SULFATE HEPTAHYDRATE: 500 INJECTION, SOLUTION INTRAMUSCULAR; INTRAVENOUS at 12:01

## 2023-01-01 RX ADMIN — CEFEPIME 1 G: 1 INJECTION, POWDER, FOR SOLUTION INTRAMUSCULAR; INTRAVENOUS at 03:01

## 2023-01-01 RX ADMIN — LABETALOL HYDROCHLORIDE 20 MG: 5 INJECTION, SOLUTION INTRAVENOUS at 05:01

## 2023-01-01 RX ADMIN — SENNOSIDES AND DOCUSATE SODIUM 1 TABLET: 50; 8.6 TABLET ORAL at 09:01

## 2023-01-01 RX ADMIN — LABETALOL HYDROCHLORIDE 10 MG: 5 INJECTION, SOLUTION INTRAVENOUS at 10:01

## 2023-01-01 RX ADMIN — SOYBEAN OIL 250 ML: 20 INJECTION, SOLUTION INTRAVENOUS at 12:01

## 2023-01-01 RX ADMIN — LEVOTHYROXINE SODIUM 25 MCG: 20 INJECTION, SOLUTION INTRAVENOUS at 08:01

## 2023-01-01 RX ADMIN — PIPERACILLIN SODIUM AND TAZOBACTAM SODIUM 4.5 G: 4; .5 INJECTION, POWDER, LYOPHILIZED, FOR SOLUTION INTRAVENOUS at 03:01

## 2023-01-01 RX ADMIN — LABETALOL HYDROCHLORIDE 20 MG: 5 INJECTION, SOLUTION INTRAVENOUS at 01:01

## 2023-01-01 RX ADMIN — DEXTROSE: 5 SOLUTION INTRAVENOUS at 06:01

## 2023-01-01 RX ADMIN — VANCOMYCIN HYDROCHLORIDE 1500 MG: 1.5 INJECTION, POWDER, LYOPHILIZED, FOR SOLUTION INTRAVENOUS at 04:01

## 2023-01-01 RX ADMIN — LEVOTHYROXINE SODIUM 25 MCG: 20 INJECTION, SOLUTION INTRAVENOUS at 09:01

## 2023-01-01 RX ADMIN — LOSARTAN POTASSIUM 50 MG: 50 TABLET, FILM COATED ORAL at 08:01

## 2023-01-01 RX ADMIN — ACETAMINOPHEN 1000 MG: 10 INJECTION INTRAVENOUS at 11:01

## 2023-01-01 RX ADMIN — AMLODIPINE BESYLATE 10 MG: 10 TABLET ORAL at 08:01

## 2023-01-01 RX ADMIN — CEFEPIME 2 G: 2 INJECTION, POWDER, FOR SOLUTION INTRAVENOUS at 02:01

## 2023-01-01 RX ADMIN — LORAZEPAM 1 MG: 2 INJECTION INTRAMUSCULAR; INTRAVENOUS at 01:01

## 2023-01-01 RX ADMIN — ATORVASTATIN CALCIUM 10 MG: 10 TABLET, FILM COATED ORAL at 09:01

## 2023-01-01 RX ADMIN — ENOXAPARIN SODIUM 40 MG: 40 INJECTION SUBCUTANEOUS at 06:01

## 2023-01-01 RX ADMIN — LABETALOL HYDROCHLORIDE 10 MG: 5 INJECTION, SOLUTION INTRAVENOUS at 12:01

## 2023-01-01 RX ADMIN — LABETALOL HYDROCHLORIDE 10 MG: 5 INJECTION, SOLUTION INTRAVENOUS at 08:01

## 2023-01-01 RX ADMIN — MORPHINE SULFATE 2 MG: 2 INJECTION, SOLUTION INTRAMUSCULAR; INTRAVENOUS at 01:01

## 2023-01-01 RX ADMIN — LABETALOL HYDROCHLORIDE 5 MG: 5 INJECTION, SOLUTION INTRAVENOUS at 02:01

## 2023-01-01 RX ADMIN — IOHEXOL 100 ML: 350 INJECTION, SOLUTION INTRAVENOUS at 10:01

## 2023-01-01 RX ADMIN — PIPERACILLIN SODIUM AND TAZOBACTAM SODIUM 4.5 G: 4; .5 INJECTION, POWDER, LYOPHILIZED, FOR SOLUTION INTRAVENOUS at 05:01

## 2023-01-01 RX ADMIN — LABETALOL HYDROCHLORIDE 10 MG: 5 INJECTION, SOLUTION INTRAVENOUS at 05:01

## 2023-01-01 RX ADMIN — LABETALOL HYDROCHLORIDE 20 MG: 5 INJECTION, SOLUTION INTRAVENOUS at 02:01

## 2023-01-01 RX ADMIN — IPRATROPIUM BROMIDE AND ALBUTEROL SULFATE 3 ML: .5; 3 SOLUTION RESPIRATORY (INHALATION) at 02:01

## 2023-01-01 RX ADMIN — DEXTROSE AND SODIUM CHLORIDE: 5; .45 INJECTION, SOLUTION INTRAVENOUS at 06:01

## 2023-01-01 RX ADMIN — MUPIROCIN: 20 OINTMENT TOPICAL at 08:01

## 2023-01-01 RX ADMIN — INSULIN ASPART 2 UNITS: 100 INJECTION, SOLUTION INTRAVENOUS; SUBCUTANEOUS at 04:01

## 2023-01-01 RX ADMIN — METHYLPREDNISOLONE SODIUM SUCCINATE 40 MG: 40 INJECTION, POWDER, FOR SOLUTION INTRAMUSCULAR; INTRAVENOUS at 12:01

## 2023-01-01 RX ADMIN — THERA TABS 1 TABLET: TAB at 09:01

## 2023-01-01 RX ADMIN — MORPHINE SULFATE 1 MG: 2 INJECTION, SOLUTION INTRAMUSCULAR; INTRAVENOUS at 05:01

## 2023-01-01 RX ADMIN — PIPERACILLIN SODIUM AND TAZOBACTAM SODIUM 4.5 G: 4; .5 INJECTION, POWDER, LYOPHILIZED, FOR SOLUTION INTRAVENOUS at 12:01

## 2023-01-01 RX ADMIN — DEXMEDETOMIDINE HYDROCHLORIDE 0.7 MCG/KG/HR: 4 INJECTION, SOLUTION INTRAVENOUS at 12:01

## 2023-01-01 RX ADMIN — HYDROMORPHONE HYDROCHLORIDE 0.25 MG/HR: 10 INJECTION INTRAMUSCULAR; INTRAVENOUS; SUBCUTANEOUS at 08:01

## 2023-01-01 RX ADMIN — PIPERACILLIN SODIUM AND TAZOBACTAM SODIUM 4.5 G: 4; .5 INJECTION, POWDER, LYOPHILIZED, FOR SOLUTION INTRAVENOUS at 04:01

## 2023-01-01 RX ADMIN — MORPHINE SULFATE 1 MG: 2 INJECTION, SOLUTION INTRAMUSCULAR; INTRAVENOUS at 08:01

## 2023-01-01 RX ADMIN — ACETAMINOPHEN 650 MG: 325 TABLET ORAL at 04:01

## 2023-01-01 RX ADMIN — LEVOTHYROXINE SODIUM 25 MCG: 20 INJECTION, SOLUTION INTRAVENOUS at 01:01

## 2023-01-01 RX ADMIN — FLUCONAZOLE IN SODIUM CHLORIDE 200 MG: 2 INJECTION, SOLUTION INTRAVENOUS at 01:01

## 2023-01-01 RX ADMIN — LINEZOLID 600 MG: 600 INJECTION, SOLUTION INTRAVENOUS at 04:01

## 2023-01-01 RX ADMIN — VANCOMYCIN HYDROCHLORIDE 1250 MG: 1.25 INJECTION, POWDER, LYOPHILIZED, FOR SOLUTION INTRAVENOUS at 08:01

## 2023-01-01 NOTE — PT/OT/SLP PROGRESS
Speech Language Pathology      Dominique Mcgee  MRN: [id number]    Patient not seen today secondary to BIPAP. SLP will follow-up at a later date/time when transitioned off BiPAP and appropriate for swallow evaluation.

## 2023-01-01 NOTE — ASSESSMENT & PLAN NOTE
Patient presented with approx 3 weeks of worsening shortness of breath. On transfer to ICU required intubation. Was extubated to BIPAP. Have been unable to wean off bipap due to persistent shortness of breath, tachypnea, and inadequate volumes. Trial off bipap results in tachypnea, tachycardia, and hypertension. Unclear etiology at this time. Does not appear pulmonary in nature as patient is saturating well, ABGs have been wnl. MRI C-spine showed severe cervical stenosis, NSGY was consulted, unlikely that the stenosis would be causing these issues.   -trial of lasix this afternoon  -neurology consulted, appreciate recs

## 2023-01-01 NOTE — ASSESSMENT & PLAN NOTE
Noted on MRI spine. Of note patient has been experiencing episodes of vertigo and decreased balance which could be related.   -NSGY consulted, appreciate recs.  -amb ref to NSGY outpatient  -if need for reintubation should be done with fiberoptic or glidescope

## 2023-01-01 NOTE — PLAN OF CARE
Problem: Infection  Goal: Absence of Infection Signs and Symptoms  Outcome: Ongoing, Progressing  Intervention: Prevent or Manage Infection  Flowsheets (Taken 1/1/2023 5405)  Fever Reduction/Comfort Measures: aerosol temperature decreased  Infection Management: aseptic technique maintained  Isolation Precautions: precautions maintained

## 2023-01-01 NOTE — ASSESSMENT & PLAN NOTE
Patient started having weakness around 3 weeks ago after possible viral illness, also has had decreased appetite and more recently experiencing shortness of breath.  Sodium of 120 on admit, has been fluctuating since, now around 116-117. UOSM 802, HANH 109  Has become increasingly lethargic since admission, concern due to hyponatremia  Likely hypovolemic hyponatremia in setting of diuretic use, decreased intake and possible GI loses    -nephrology consulted, appreciate recs  -RESOLVED  -BMP q12

## 2023-01-01 NOTE — PLAN OF CARE
Problem: Adult Inpatient Plan of Care  Goal: Plan of Care Review  Outcome: Ongoing, Progressing  Goal: Patient-Specific Goal (Individualized)  Outcome: Ongoing, Progressing  Goal: Absence of Hospital-Acquired Illness or Injury  Outcome: Ongoing, Progressing  Goal: Optimal Comfort and Wellbeing  Outcome: Ongoing, Progressing  Goal: Readiness for Transition of Care  Outcome: Ongoing, Progressing     Problem: Fall Injury Risk  Goal: Absence of Fall and Fall-Related Injury  Outcome: Ongoing, Progressing     Problem: Infection  Goal: Absence of Infection Signs and Symptoms  Outcome: Ongoing, Progressing     Problem: Skin Injury Risk Increased  Goal: Skin Health and Integrity  Outcome: Ongoing, Progressing     Pt changed to Airvo without desatting, with in an hour pt complained of chest tightness, cardene gtt restarted for elevated BP, ECG performed, MD notified, RT notified, placed on BiPAP pt stated full relief, troponin sent to lab per order.  Assessment and VS per flow sheet, pt progressing towards goals as tolerated, plan of care reviewed with pt, all concerns addressed.  Will continue to monitor.

## 2023-01-01 NOTE — SUBJECTIVE & OBJECTIVE
Interval History/Significant Events: Remains on BIPAP. Feels somewhat better today. Tachy over night, responded to fluid bolus.    Review of Systems   Unable to perform ROS: Acuity of condition   Objective:     Vital Signs (Most Recent):  Temp: 97.3 °F (36.3 °C) (01/01/23 1101)  Pulse: 107 (01/01/23 1143)  Resp: (!) 29 (01/01/23 1143)  BP: 139/63 (01/01/23 1143)  SpO2: 100 % (01/01/23 1143)   Vital Signs (24h Range):  Temp:  [97.3 °F (36.3 °C)-98.2 °F (36.8 °C)] 97.3 °F (36.3 °C)  Pulse:  [] 107  Resp:  [18-49] 29  SpO2:  [86 %-100 %] 100 %  BP: (100-194)/(53-84) 139/63  Arterial Line BP: ()/() 119/59   Weight: 59.4 kg (130 lb 15.3 oz)  Body mass index is 22.48 kg/m².      Intake/Output Summary (Last 24 hours) at 1/1/2023 1150  Last data filed at 1/1/2023 1100  Gross per 24 hour   Intake 1321.52 ml   Output 3110 ml   Net -1788.48 ml         Physical Exam  Vitals and nursing note reviewed.   Constitutional:       Appearance: She is ill-appearing.   HENT:      Head: Normocephalic and atraumatic.   Eyes:      Extraocular Movements: Extraocular movements intact.      Pupils: Pupils are equal, round, and reactive to light.   Cardiovascular:      Rate and Rhythm: Normal rate and regular rhythm.      Pulses: Normal pulses.      Heart sounds: Normal heart sounds. No murmur heard.  Pulmonary:      Effort: Tachypnea present.      Breath sounds: Normal air entry. No wheezing or rales.   Abdominal:      General: Abdomen is flat. There is no distension.      Palpations: Abdomen is soft.      Tenderness: There is no abdominal tenderness.   Musculoskeletal:         General: No swelling.      Right lower leg: No edema.      Left lower leg: No edema.   Skin:     General: Skin is warm and dry.      Findings: No bruising or rash.   Neurological:      General: No focal deficit present.      Comments: Alert, following commands   Psychiatric:         Mood and Affect: Mood normal.         Behavior: Behavior normal.        Vents:  Vent Mode: A/C (12/30/22 1528)  Ventilator Initiated: Yes (12/29/22 0144)  Set Rate: 12 BPM (12/30/22 1528)  Vt Set: 350 mL (12/30/22 0722)  Pressure Support: 14 cmH20 (12/30/22 1139)  PEEP/CPAP: 6 cmH20 (12/30/22 1528)  Oxygen Concentration (%): 40 (01/01/23 1143)  Peak Airway Pressure: 22 cmH20 (12/30/22 1528)  Plateau Pressure: 20 cmH20 (12/30/22 1528)  Total Ve: 9.73 L/m (12/30/22 1528)  Negative Inspiratory Force (cm H2O): 0 (12/30/22 1528)  F/VT Ratio<105 (RSBI): (!) 58.05 (12/30/22 1528)  Lines/Drains/Airways       Central Venous Catheter Line  Duration             Percutaneous Central Line Insertion/Assessment - Triple Lumen  12/29/22 0043 right internal jugular 3 days              Drain  Duration                  Urethral Catheter 12/27/22 1535 4 days         Rectal Tube 12/29/22 0332 rectal tube w/ balloon (indicate number of mLs) 45 Fr. 3 days              Arterial Line  Duration             Arterial Line 12/29/22 0219 Left Radial 3 days              Peripheral Intravenous Line  Duration                  Peripheral IV - Single Lumen 12/28/22 1545 18 G Left Antecubital 3 days         Peripheral IV - Single Lumen 12/28/22 1755 20 G;1 3/4 in Anterior;Right Upper Arm 3 days                  Significant Labs:    CBC/Anemia Profile:  Recent Labs   Lab 12/31/22  0521 01/01/23  0651   WBC 15.61* 21.79*   HGB 9.7* 10.5*   HCT 28.9* 32.0*    355   MCV 80* 80*   RDW 13.3 13.5          Chemistries:  Recent Labs   Lab 12/31/22  0521 12/31/22  0820 12/31/22  1630 01/01/23  0425 01/01/23  0825   NA  --  135* 136  --  142   K  --  3.7 3.2*  --  3.0*   CL  --  99 97  --  98   CO2  --  28 30*  --  34*   BUN  --  9 7*  --  7*   CREATININE  --  0.6 0.6  --  0.6   CALCIUM  --  8.6* 8.9  --  8.8   MG 2.2  --   --  2.0  --    PHOS 2.6*  --  3.6 4.0  --        All pertinent labs within the past 24 hours have been reviewed.    Significant Imaging:  I have reviewed all pertinent imaging results/findings within  the past 24 hours.

## 2023-01-01 NOTE — PROGRESS NOTES
[facility]  Critical Care Medicine  Progress Note    Patient Name: Dominique Mcgee  MRN: [id number]  Admission Date: 12/23/2022  Hospital Length of Stay: 9 days  Code Status: Full Code  Attending Provider: Nathan Loaiza MD  Primary Care Provider: Briana Leblanc MD   Principal Problem: Hyponatremia    Subjective:     HPI:  Ms. Mcgee is a 71 y/o F patient with HTN, HLD, hypothyroidism, recent URI 3 weeks prior to admission, balance difficulites who presented to the ED on 12/23 for 3 weeks of weakness, LIRA, decreased appetite, and constipation with intermittent lower abdominal cramping. Per daughter, patient is typically independent, lives alone, cooks her own meals, since she has been ill she has had decreased PO intake and drinking coffee, tea, and eating cereal. She has had progressive worsening HTN in the last 3 weeks and was started on losartan-HCTZ and metoprolol. Prior to this she was only taking lisinopril per daughter. Admitted for HTN emergency and was found to have Na 120. Received IV fluids and admitted to hospital medicine. For her hypernatremia, her thiazide was held and she was placed on fluid restriction. Urine studies from 12/25 showed Uosm of 802 and HANH of 109. Her BP has been labile since admission requiring both IV antihypertensives as well as fluid boluses. The patient's hyponatremia persisted despite fluid restriction and she became increasingly lethargic and weak so CCM was consulted for further management.      Upon evaluation, patient is somnolent. Opens eyes to voice, unable to engage in full conversation. Speaks mainly single words. Denies thirst. Reports SOB and generalized abdominal pain a/w constipation. No CP, N/V/D, dysuria, edema. Most recent vitals: /60, pulse 82, R 18, SpO2 94% on RA. Na trend 119->115->117.       Hospital/ICU Course:  Once transferred to ICU patient became hypotensive requiring multiple pressors. Patient intubated for airway protection.  Art line and central line placed. Sodium improved with hypertonic saline. CT abdomen/pelvis showing proctitis and hip abscess. Went for IR aspiration of abscess. Culture showing e.coli, on zosyn. MRI brain non-concerning. MRI spine with severe cervical stenosis and cord compression, also with mild lumbar stenosis. Repeat CXR with increased LLL consolidation and pleural effusion. Extubated to bipap. Once extubated and off pressors patient began to re-experience labile blood pressures similar to her initial presentation. Started on cardene drip. Difficulty weaning off BIPAP due to inadequate volumes and persistent shortness of breath.      Interval History/Significant Events: Remains on BIPAP. Feels somewhat better today. Tachy over night, responded to fluid bolus.    Review of Systems   Unable to perform ROS: Acuity of condition   Objective:     Vital Signs (Most Recent):  Temp: 97.3 °F (36.3 °C) (01/01/23 1101)  Pulse: 107 (01/01/23 1143)  Resp: (!) 29 (01/01/23 1143)  BP: 139/63 (01/01/23 1143)  SpO2: 100 % (01/01/23 1143)   Vital Signs (24h Range):  Temp:  [97.3 °F (36.3 °C)-98.2 °F (36.8 °C)] 97.3 °F (36.3 °C)  Pulse:  [] 107  Resp:  [18-49] 29  SpO2:  [86 %-100 %] 100 %  BP: (100-194)/(53-84) 139/63  Arterial Line BP: ()/() 119/59   Weight: 59.4 kg (130 lb 15.3 oz)  Body mass index is 22.48 kg/m².      Intake/Output Summary (Last 24 hours) at 1/1/2023 1150  Last data filed at 1/1/2023 1100  Gross per 24 hour   Intake 1321.52 ml   Output 3110 ml   Net -1788.48 ml         Physical Exam  Vitals and nursing note reviewed.   Constitutional:       Appearance: She is ill-appearing.   HENT:      Head: Normocephalic and atraumatic.   Eyes:      Extraocular Movements: Extraocular movements intact.      Pupils: Pupils are equal, round, and reactive to light.   Cardiovascular:      Rate and Rhythm: Normal rate and regular rhythm.      Pulses: Normal pulses.      Heart sounds: Normal heart sounds. No murmur  heard.  Pulmonary:      Effort: Tachypnea present.      Breath sounds: Normal air entry. No wheezing or rales.   Abdominal:      General: Abdomen is flat. There is no distension.      Palpations: Abdomen is soft.      Tenderness: There is no abdominal tenderness.   Musculoskeletal:         General: No swelling.      Right lower leg: No edema.      Left lower leg: No edema.   Skin:     General: Skin is warm and dry.      Findings: No bruising or rash.   Neurological:      General: No focal deficit present.      Comments: Alert, following commands   Psychiatric:         Mood and Affect: Mood normal.         Behavior: Behavior normal.       Vents:  Vent Mode: A/C (12/30/22 1528)  Ventilator Initiated: Yes (12/29/22 0144)  Set Rate: 12 BPM (12/30/22 1528)  Vt Set: 350 mL (12/30/22 0722)  Pressure Support: 14 cmH20 (12/30/22 1139)  PEEP/CPAP: 6 cmH20 (12/30/22 1528)  Oxygen Concentration (%): 40 (01/01/23 1143)  Peak Airway Pressure: 22 cmH20 (12/30/22 1528)  Plateau Pressure: 20 cmH20 (12/30/22 1528)  Total Ve: 9.73 L/m (12/30/22 1528)  Negative Inspiratory Force (cm H2O): 0 (12/30/22 1528)  F/VT Ratio<105 (RSBI): (!) 58.05 (12/30/22 1528)  Lines/Drains/Airways       Central Venous Catheter Line  Duration             Percutaneous Central Line Insertion/Assessment - Triple Lumen  12/29/22 0043 right internal jugular 3 days              Drain  Duration                  Urethral Catheter 12/27/22 1535 4 days         Rectal Tube 12/29/22 0332 rectal tube w/ balloon (indicate number of mLs) 45 Fr. 3 days              Arterial Line  Duration             Arterial Line 12/29/22 0219 Left Radial 3 days              Peripheral Intravenous Line  Duration                  Peripheral IV - Single Lumen 12/28/22 1545 18 G Left Antecubital 3 days         Peripheral IV - Single Lumen 12/28/22 1755 20 G;1 3/4 in Anterior;Right Upper Arm 3 days                  Significant Labs:    CBC/Anemia Profile:  Recent Labs   Lab 12/31/22  0239  01/01/23  0651   WBC 15.61* 21.79*   HGB 9.7* 10.5*   HCT 28.9* 32.0*    355   MCV 80* 80*   RDW 13.3 13.5          Chemistries:  Recent Labs   Lab 12/31/22  0521 12/31/22  0820 12/31/22  1630 01/01/23  0425 01/01/23  0825   NA  --  135* 136  --  142   K  --  3.7 3.2*  --  3.0*   CL  --  99 97  --  98   CO2  --  28 30*  --  34*   BUN  --  9 7*  --  7*   CREATININE  --  0.6 0.6  --  0.6   CALCIUM  --  8.6* 8.9  --  8.8   MG 2.2  --   --  2.0  --    PHOS 2.6*  --  3.6 4.0  --        All pertinent labs within the past 24 hours have been reviewed.    Significant Imaging:  I have reviewed all pertinent imaging results/findings within the past 24 hours.      ABG  Recent Labs   Lab 12/31/22  1102   PH 7.489*   PO2 66*   PCO2 43.1   HCO3 32.8*   BE 9     Assessment/Plan:     Neuro  Stenosis, cervical spine  Noted on MRI spine. Of note patient has been experiencing episodes of vertigo and decreased balance which could be related.   -NSGY consulted, appreciate recs.  -amb ref to NSGY outpatient  -if need for reintubation should be done with fiberoptic or glidescope    Pulmonary  Shortness of breath  Patient presented with approx 3 weeks of worsening shortness of breath. On transfer to ICU required intubation. Was extubated to BIPAP. Have been unable to wean off bipap due to persistent shortness of breath, tachypnea, and inadequate volumes. Trial off bipap results in tachypnea, tachycardia, and hypertension. Unclear etiology at this time. Does not appear pulmonary in nature as patient is saturating well, ABGs have been wnl. MRI C-spine showed severe cervical stenosis, NSGY was consulted, unlikely that the stenosis would be causing these issues.   -trial of lasix this afternoon  -neurology consulted, appreciate recs    Acute hypoxemic respiratory failure  Patient with Hypoxic Respiratory failure which is Acute.  she is not on home oxygen. Supplemental oxygen was provided and noted- Vent Mode: A/C  Oxygen Concentration  (%):  [30-40] 40  Resp Rate Total:  [14 br/min-28 br/min] 27 br/min  PEEP/CPAP:  [5 cmH20-6 cmH20] 6 cmH20  Pressure Support:  [14 cmH20] 14 cmH20  Mean Airway Pressure:  [9 yrH61-08 cmH20] 12 cmH20.   Signs/symptoms of respiratory failure include- tachypnea, increased work of breathing and respiratory distress. Contributing diagnoses includes - Pleural effusion and atelectasis Labs and images were reviewed. Patient Has recent ABG, which has been reviewed. Will treat underlying causes and adjust management of respiratory failure as follows- unclear etiology of pleural effusions, patient notes recent URI 3 weeks ago that has left her short of breath since then, on broad spectrum abx  Repeat CXR with increased consolidation in LLL  Extubated to Bipap, wean as tolerated    Cardiac/Vascular  Right renal artery stenosis  -no acute intervention while in ICU, likely etiology for fluctuating blood pressure    Hyperlipemia  Continue home statin    Essential hypertension  Known history of uncontrolled HTN, w/ labile BP since admit.   -BP meds held in setting of septic shock  -will avoid IV meds if possible as has had hypotension following IV BP meds  -renal artery duplex showing R renal artery stenosis, L renal artery unable to be visualized  -Will monitor and add back meds as tolerated, cardene drip while unable to tolerate PO    Dyspnea on exertion  Began prior to admission. Has been requiring 2L via NC to maintain oxygenation  -O2 via NC PRN    Renal/  * Hyponatremia  Patient started having weakness around 3 weeks ago after possible viral illness, also has had decreased appetite and more recently experiencing shortness of breath.  Sodium of 120 on admit, has been fluctuating since, now around 116-117. UOSM 802, HANH 109  Has become increasingly lethargic since admission, concern due to hyponatremia  Likely hypovolemic hyponatremia in setting of diuretic use, decreased intake and possible GI loses    -nephrology consulted,  appreciate recs  -RESOLVED  -BMP q12    Hypophosphatemia  Replete, check repeat lab    Metabolic alkalosis  Initially could have been related to diuretic use. Bicarb remains elevated after stopping HCTZ.    ID  Severe sepsis  This patient does have evidence of infective focus  My overall impression is septic shock.  Source: proctitis vs pneumonia vs hip abscess  Antibiotics given-   Antibiotics (From admission, onward)      Start     Stop Route Frequency Ordered    12/30/22 1300  vancomycin 1,250 mg in dextrose 5 % 250 mL IVPB         -- IV Every 24 hours (non-standard times) 12/29/22 1343    12/29/22 1019  vancomycin - pharmacy to dose  (vancomycin IVPB)        See [facility] for full Linked Orders Report.    -- IV pharmacy to manage frequency 12/29/22 0919    12/29/22 0100  mupirocin 2 % ointment         01/02 2059 Nasl 2 times daily 12/28/22 2345    12/28/22 1515  piperacillin-tazobactam (ZOSYN) 4.5 g in sodium chloride 0.9 % 100 mL IVPB (MB+)         -- IV Every 8 hours (non-standard times) 12/28/22 1412          Latest lactate reviewed-  Recent Labs   Lab 12/28/22  1508 12/29/22  0201   LACTATE 1.0 0.6     Organ dysfunction indicated by Acute respiratory failure and encephalopathy    Shock with decreased perfusion noted, Fluid challenge  was given at 30cc/kg    Post- resuscitation assessment- (Done after fluids given for shock)  Vital signs post fluid administrations were-  Temp Readings from Last 1 Encounters:   12/31/22 97.6 °F (36.4 °C) (Axillary)     BP Readings from Last 1 Encounters:   12/31/22 (!) 140/73     Pulse Readings from Last 1 Encounters:   12/31/22 91       Perfusion exam was performed within 6 hours of septic shock presentation after bolus shows Adequate tissue perfusion assessed by invasive monitoring  Will discontinue Pressors  Source control achieved by: vanc, zosyn, azithromycin          Endocrine  Moderate malnutrition  Nutrition consulted. Most recent weight and BMI monitored-           Malnutrition (Moderate to Severe)  Energy Intake (Malnutrition): less than 75% for greater than 7 days              Measurements:  Wt Readings from Last 1 Encounters:   12/30/22 59.4 kg (130 lb 15.3 oz)   Body mass index is 22.48 kg/m².    Recommendations: Recommendation/Intervention: 1.  Goals: Meet % EEN, EPN by RD f/u date    Patient has been screened and assessed by RD. RD will follow patient.      GI  Proctitis  CT scan with findings of stercoral proctitis.   -continue bowel regimen  -on antibiotics    Generalized abdominal pain  Patient with constipation and ileus. CT scan consistent with ileus and stercoral proctitis which is likely related to chronic constipation.    Constipation  Continue miralax BID and senna-doc daily    Orthopedic  Abscess of bursa of right hip  CT scan showing possible hematoma vs abscess around right hip. Plan for aspiration with IR to eval for possible source of infection. Fluid analysis and gram stain not concerning for infection.    Other  Hypothermia  Considering new onset will check BCx and start zosyn. Using bear hugger for warming.    Generalized weakness  See hypernatremia.       Critical Care Daily Checklist:    A: Awake: RASS Goal/Actual Goal:    Actual: Manning Agitation Sedation Scale (RASS): Alert and calm   B: Spontaneous Breathing Trial Performed? Spon. Breathing Trial Initiated?: Not initiated (12/29/22 0729)   C: SAT & SBT Coordinated?  n/a                      D: Delirium: CAM-ICU Overall CAM-ICU: Negative   E: Early Mobility Performed? Yes   F: Feeding Goal: Goals: Meet % EEN, EPN by RD f/u date  Status: Nutrition Goal Status: new   Current Diet Order   Procedures    Diet NPO      AS: Analgesia/Sedation yes   T: Thromboembolic Prophylaxis yes   H: HOB > 300 Yes   U: Stress Ulcer Prophylaxis (if needed) yes   G: Glucose Control yes   B: Bowel Function Stool Occurrence: 2   I: Indwelling Catheter (Lines & Rush) Necessity yes   D: De-escalation of  Antimicrobials/Pharmacotherapies no    Plan for the day/ETD Wean bipap, consult neuro    Code Status:  Family/Goals of Care: Full Code         Critical secondary to Patient has a condition that poses threat to life and bodily function: Severe Respiratory Distress      Critical care was time spent personally by me on the following activities: development of treatment plan with patient or surrogate and bedside caregivers, discussions with consultants, evaluation of patient's response to treatment, examination of patient, ordering and performing treatments and interventions, ordering and review of laboratory studies, ordering and review of radiographic studies, pulse oximetry, re-evaluation of patient's condition. This critical care time did not overlap with that of any other provider or involve time for any procedures.     Beto Avelar MD  Critical Care Medicine  [facility] - Cardiac Medical ICU

## 2023-01-02 PROBLEM — A41.9 SEVERE SEPSIS: Status: RESOLVED | Noted: 2022-01-01 | Resolved: 2023-01-01

## 2023-01-02 PROBLEM — E83.39 HYPOPHOSPHATEMIA: Status: RESOLVED | Noted: 2022-01-01 | Resolved: 2023-01-01

## 2023-01-02 PROBLEM — E87.1 HYPONATREMIA: Status: RESOLVED | Noted: 2022-01-01 | Resolved: 2023-01-01

## 2023-01-02 PROBLEM — R65.20 SEVERE SEPSIS: Status: RESOLVED | Noted: 2022-01-01 | Resolved: 2023-01-01

## 2023-01-02 NOTE — PT/OT/SLP PROGRESS
Physical Therapy      Patient Name:  Dominique Mcgee   MRN:  [id number]    Patient not seen today secondary to  (MD and team in room. PT unable to return for additional attempt later in PM. PT to f/u tomorrow as appropriate.).

## 2023-01-02 NOTE — CONSULTS
[facility]  Infectious Disease  Consult Note    Patient Name: Dominique Mcgee  MRN: [id number]  Admission Date: 12/23/2022  Hospital Length of Stay: 10 days  Attending Physician: Nathan Loaiza MD  Primary Care Provider: Briana Leblanc MD     Isolation Status: No active isolations    Patient information was obtained from patient, ER records and primary team.      Inpatient consult to Infectious Diseases  Consult performed by: Jose Olsen MD  Consult ordered by: Beto Avelar MD        Assessment/Plan:     Severe sepsis  72-year-old female with HTN, HLD, hypothyroidism, 3 falls in 2022 (1 in February, last in April) with concussion is in the ICU for hypoxic respiratory failure extubated and on BIPAP, placed on vancomycin, pip-tazo, azithromycin, and corticorsteroids.  CT C/A/P without contrast revealed bilateral small pleural effusions and atelectasis, right hip posterior abscess in the soft tissue, stercoral colitis.  MRI c-spine shows cervical stenosis.  12/29/22 IR aspiration right hip abscess yielding 20cc's clear synovial fluid, noting resolution of collection afterward.  Abscess cultures currently growing E. Coli.  Of note, patient developed leukocytosis 12/31/22-01/01/23.    Worsening leukocytosis may in part be 2/2 corticosteroids given previously superimposed on right hip abscess, stercoral colitis.  Discussed with radiology, left anterior leg finding on CT C/A/P may be partially visualized lipoma, does not appear infectious in nature, can consider soft tissue US.    Recommendations:  -Continue pip-tazo for now  -Will follow up right hip abscess susceptibilities  -Primary team ordering CT C/A/P and neck soft tissue with contrast, will follow up results            Thank you for your consult. I will follow-up with patient. Please contact us if you have any additional questions.    Jose Olsen MD  Infectious Disease  [facility] - Cardiac Medical ICU    Subjective:     Principal  Problem: Hyponatremia    HPI: 72-year-old female with HTN, HLD, hypothyroidism, 3 falls in 2022 (1 in February, last in April) with concussion presents to the ED with 3 weeks of weakness, dyspnea on exertion, anorexia.  Admitted with hypoxic respiratory failure requiring NC, subsequently intubated 12/28/22 for airway protection and hypoxia.  Vancomycin, pip-tazo, azithromycin, and corticorsteroids started.  CT C/A/P without contrast revealed bilateral small pleural effusions and atelectasis, right hip posterior abscess in the soft tissue, stercoral colitis.  MRI brain no acute intracranial abnormality, right mastoid effusion.  MRI c-spine shows cervical stenosis.  12/29/22 IR aspiration right hip abscess yielding 20cc's clear serous fluid, noting resolution of collection afterward.  Abscess cultures currently growing E. Coli.  Of note, patient developed leukocytosis 12/31/22-01/01/23, now up to 25 at time of initial ID consult.      Consult: 72 F w/ R hip abscess w/ e. coli, leukocytosis despite abx, would like Antibiotic recs. Ordered CTA Chest          Past Medical History:   Diagnosis Date    Hyperlipidemia     Hypertension     Hypothyroidism     Shock 12/29/2022       Past Surgical History:   Procedure Laterality Date    HYSTERECTOMY         Review of patient's allergies indicates:   Allergen Reactions    Hydralazine analogues      Precipitous drop in BP with IV formulation. Avoid if possible    Shellfish containing products Swelling       Medications:  Medications Prior to Admission   Medication Sig    alendronate-vitamin D3 (FOSAMAX PLUS D) 70 mg- 2,800 unit per tablet Take 1 tablet by mouth every 7 days.    hydroCHLOROthiazide (HYDRODIURIL) 25 MG tablet Take 25 mg by mouth once daily.    levothyroxine (SYNTHROID) 50 MCG tablet Take 50 mcg by mouth once daily.    losartan (COZAAR) 50 MG tablet Take 50 mg by mouth once daily.    meloxicam (MOBIC) 15 MG tablet Take 15 mg by mouth once daily.     metoprolol succinate (TOPROL-XL) 50 MG 24 hr tablet Take 50 mg by mouth once daily.    simvastatin (ZOCOR) 20 MG tablet Take 20 mg by mouth every evening.     Antibiotics (From admission, onward)      Start     Stop Route Frequency Ordered    12/28/22 1515  piperacillin-tazobactam (ZOSYN) 4.5 g in sodium chloride 0.9 % 100 mL IVPB (MB+)         -- IV Every 8 hours (non-standard times) 12/28/22 1412          Antifungals (From admission, onward)      None          Antivirals (From admission, onward)      None               There is no immunization history on file for this patient.    Family History       Problem Relation (Age of Onset)    Breast cancer Sister (55), Sister (65)          Social History     Socioeconomic History    Marital status:    Tobacco Use    Smoking status: Never   Substance and Sexual Activity    Drug use: Never     Review of Systems   Constitutional:  Negative for chills and fever.   HENT:  Negative for sore throat.    Respiratory:  Positive for shortness of breath.    Gastrointestinal:  Negative for diarrhea, nausea and vomiting.   Genitourinary:  Negative for dysuria and flank pain.   Musculoskeletal:  Negative for arthralgias.   Skin:  Negative for wound.   Neurological:  Negative for headaches.   Objective:     Vital Signs (Most Recent):  Temp: 98 °F (36.7 °C) (01/02/23 1143)  Pulse: 106 (01/02/23 1208)  Resp: 20 (01/02/23 1208)  BP: 134/61 (01/02/23 1206)  SpO2: 100 % (01/02/23 1208) Vital Signs (24h Range):  Temp:  [97.5 °F (36.4 °C)-98 °F (36.7 °C)] 98 °F (36.7 °C)  Pulse:  [] 106  Resp:  [17-35] 20  SpO2:  [95 %-100 %] 100 %  BP: (110-158)/(56-87) 134/61  Arterial Line BP: ()/() 102/75     Weight: 59.4 kg (130 lb 15.3 oz)  Body mass index is 22.48 kg/m².    Estimated Creatinine Clearance: 75.3 mL/min (based on SCr of 0.6 mg/dL).    Physical Exam  Vitals reviewed.   Constitutional:       General: She is not in acute distress.     Appearance: She is normal  weight. She is ill-appearing. She is not toxic-appearing or diaphoretic.   HENT:      Head: Normocephalic and atraumatic.      Right Ear: External ear normal.      Left Ear: External ear normal.   Eyes:      General: No scleral icterus.        Right eye: No discharge.         Left eye: No discharge.      Extraocular Movements: Extraocular movements intact.      Conjunctiva/sclera: Conjunctivae normal.   Cardiovascular:      Rate and Rhythm: Regular rhythm. Tachycardia present.   Pulmonary:      Effort: Pulmonary effort is normal.      Comments: -BIPAP  Abdominal:      General: Abdomen is flat. There is no distension.      Palpations: Abdomen is soft.      Tenderness: There is no abdominal tenderness.   Musculoskeletal:         General: No swelling, tenderness, deformity or signs of injury. Normal range of motion.      Cervical back: Normal range of motion.      Right lower leg: No edema.      Left lower leg: No edema.      Comments: Bilateral hips nontender on palpation   Skin:     General: Skin is warm and dry.      Findings: No lesion or rash.   Neurological:      Mental Status: She is alert and oriented to person, place, and time. Mental status is at baseline.   Psychiatric:         Mood and Affect: Mood normal.         Behavior: Behavior normal.         Thought Content: Thought content normal.         Judgment: Judgment normal.       Significant Labs: All pertinent labs within the past 24 hours have been reviewed.    Significant Imaging: I have reviewed all pertinent imaging results/findings within the past 24 hours.

## 2023-01-02 NOTE — ASSESSMENT & PLAN NOTE
Patient with Hypoxic Respiratory failure which is Acute.  she is not on home oxygen. Supplemental oxygen was provided and noted- Oxygen Concentration (%):  [40-50] 40.   Signs/symptoms of respiratory failure include- tachypnea, increased work of breathing and respiratory distress. Contributing diagnoses includes - Pleural effusion and atelectasis Labs and images were reviewed. Patient Has recent ABG, which has been reviewed. Will treat underlying causes and adjust management of respiratory failure as follows- unclear etiology of pleural effusions, patient notes recent URI 3 weeks ago that has left her short of breath since then, on broad spectrum abx  Repeat CXR with increased consolidation in LLL  Extubated to Bipap

## 2023-01-02 NOTE — ASSESSMENT & PLAN NOTE
CT scan showing possible hematoma vs abscess around right hip. Aspiration done with IR. Culture with e.coli, susceptibilities pending.   -continue zosyn  -ID consulted, appreciate recs  -US soft tissue of lower extremities

## 2023-01-02 NOTE — PROGRESS NOTES
[facility]  Critical Care Medicine  Progress Note    Patient Name: Dominique Mcgee  MRN: [id number]  Admission Date: 12/23/2022  Hospital Length of Stay: 10 days  Code Status: Full Code  Attending Provider: Nathan Loaiza MD  Primary Care Provider: Briana Leblanc MD   Principal Problem: Shortness of breath    Subjective:     HPI:  Ms. Mcgee is a 71 y/o F patient with HTN, HLD, hypothyroidism, recent URI 3 weeks prior to admission, balance difficulites who presented to the ED on 12/23 for 3 weeks of weakness, LIRA, decreased appetite, and constipation with intermittent lower abdominal cramping. Per daughter, patient is typically independent, lives alone, cooks her own meals, since she has been ill she has had decreased PO intake and drinking coffee, tea, and eating cereal. She has had progressive worsening HTN in the last 3 weeks and was started on losartan-HCTZ and metoprolol. Prior to this she was only taking lisinopril per daughter. Admitted for HTN emergency and was found to have Na 120. Received IV fluids and admitted to hospital medicine. For her hypernatremia, her thiazide was held and she was placed on fluid restriction. Urine studies from 12/25 showed Uosm of 802 and HANH of 109. Her BP has been labile since admission requiring both IV antihypertensives as well as fluid boluses. The patient's hyponatremia persisted despite fluid restriction and she became increasingly lethargic and weak so CCM was consulted for further management.      Upon evaluation, patient is somnolent. Opens eyes to voice, unable to engage in full conversation. Speaks mainly single words. Denies thirst. Reports SOB and generalized abdominal pain a/w constipation. No CP, N/V/D, dysuria, edema. Most recent vitals: /60, pulse 82, R 18, SpO2 94% on RA. Na trend 119->115->117.       Hospital/ICU Course:  Once transferred to ICU patient became hypotensive requiring multiple pressors. Patient intubated for airway  protection. Art line and central line placed. Sodium improved with hypertonic saline. CT abdomen/pelvis showing proctitis and hip abscess. Went for IR aspiration of abscess. Culture showing e.coli, on zosyn. MRI brain non-concerning. MRI spine with severe cervical stenosis and cord compression, also with mild lumbar stenosis. Repeat CXR with increased LLL consolidation and pleural effusion. Extubated to bipap. Once extubated and off pressors patient began to re-experience labile blood pressures similar to her initial presentation. Started on cardene drip. Difficulty weaning off BIPAP due to inadequate volumes and persistent shortness of breath.      Interval History/Significant Events: Remains on BIPAP. Was unable to tolerate high flow.    Review of Systems   Unable to perform ROS: Acuity of condition   Objective:     Vital Signs (Most Recent):  Temp: 98 °F (36.7 °C) (01/02/23 1143)  Pulse: 106 (01/02/23 1208)  Resp: 20 (01/02/23 1208)  BP: 134/61 (01/02/23 1206)  SpO2: 100 % (01/02/23 1208)   Vital Signs (24h Range):  Temp:  [97.5 °F (36.4 °C)-98 °F (36.7 °C)] 98 °F (36.7 °C)  Pulse:  [] 106  Resp:  [17-35] 20  SpO2:  [95 %-100 %] 100 %  BP: (117-158)/(56-87) 134/61  Arterial Line BP: ()/() 102/75   Weight: 59.4 kg (130 lb 15.3 oz)  Body mass index is 22.48 kg/m².      Intake/Output Summary (Last 24 hours) at 1/2/2023 1346  Last data filed at 1/2/2023 0445  Gross per 24 hour   Intake 361.58 ml   Output 650 ml   Net -288.42 ml         Physical Exam  Vitals and nursing note reviewed.   Constitutional:       Appearance: She is ill-appearing.   HENT:      Head: Normocephalic and atraumatic.   Eyes:      Extraocular Movements: Extraocular movements intact.      Pupils: Pupils are equal, round, and reactive to light.   Cardiovascular:      Rate and Rhythm: Normal rate and regular rhythm.      Pulses: Normal pulses.      Heart sounds: Normal heart sounds. No murmur heard.  Pulmonary:      Effort:  Tachypnea present.      Breath sounds: Normal air entry. No wheezing or rales.   Abdominal:      General: Abdomen is flat. There is no distension.      Palpations: Abdomen is soft.      Tenderness: There is no abdominal tenderness.   Musculoskeletal:         General: No swelling.      Right lower leg: No edema.      Left lower leg: No edema.   Skin:     General: Skin is warm and dry.      Findings: No bruising or rash.   Neurological:      General: No focal deficit present.      Comments: Alert, following commands   Psychiatric:         Mood and Affect: Mood normal.         Behavior: Behavior normal.       Vents:  Vent Mode: A/C (12/30/22 1528)  Ventilator Initiated: Yes (12/29/22 0144)  Set Rate: 12 BPM (12/30/22 1528)  Vt Set: 350 mL (12/30/22 0722)  Pressure Support: 14 cmH20 (12/30/22 1139)  PEEP/CPAP: 6 cmH20 (12/30/22 1528)  Oxygen Concentration (%): 40 (01/02/23 1206)  Peak Airway Pressure: 22 cmH20 (12/30/22 1528)  Plateau Pressure: 20 cmH20 (12/30/22 1528)  Total Ve: 9.73 L/m (12/30/22 1528)  Negative Inspiratory Force (cm H2O): 0 (12/30/22 1528)  F/VT Ratio<105 (RSBI): (!) 58.05 (12/30/22 1528)  Lines/Drains/Airways       Central Venous Catheter Line  Duration             Percutaneous Central Line Insertion/Assessment - Triple Lumen  12/29/22 0043 right internal jugular 4 days              Drain  Duration                  Urethral Catheter 12/27/22 1535 5 days         Rectal Tube 12/29/22 0332 rectal tube w/ balloon (indicate number of mLs) 45 Fr. 4 days              Arterial Line  Duration             Arterial Line 12/29/22 0219 Left Radial 4 days              Peripheral Intravenous Line  Duration                  Peripheral IV - Single Lumen 12/28/22 1545 18 G Left Antecubital 4 days         Peripheral IV - Single Lumen 12/28/22 1755 20 G;1 3/4 in Anterior;Right Upper Arm 4 days                  Significant Labs:    CBC/Anemia Profile:  Recent Labs   Lab 01/01/23  0651 01/02/23  0338   WBC 21.79* 25.26*    HGB 10.5* 9.6*   HCT 32.0* 31.0*    357   MCV 80* 83   RDW 13.5 13.7          Chemistries:  Recent Labs   Lab 12/31/22  1630 01/01/23  0425 01/01/23  0825 01/01/23  1658 01/02/23  0338     --  142 143 143   K 3.2*  --  3.0* 3.5 3.4*   CL 97  --  98 99 98   CO2 30*  --  34* 33* 33*   BUN 7*  --  7* 9 12   CREATININE 0.6  --  0.6 0.6 0.6   CALCIUM 8.9  --  8.8 9.2 9.1   MG  --  2.0  --   --  2.2   PHOS 3.6 4.0  --   --  3.4       All pertinent labs within the past 24 hours have been reviewed.    Significant Imaging:  I have reviewed all pertinent imaging results/findings within the past 24 hours.      ABG  Recent Labs   Lab 01/02/23  1208   PH 7.446   PO2 107*   PCO2 51.7*   HCO3 35.6*   BE 12     Assessment/Plan:     Neuro  Stenosis, cervical spine  Noted on MRI spine. Of note patient has been experiencing episodes of vertigo and decreased balance which could be related.   -NSGY consulted, appreciate recs.  -amb ref to NSGY outpatient  -if need for reintubation should be done with fiberoptic or glidescope    Pulmonary  * Shortness of breath  Patient presented with approx 3 weeks of worsening shortness of breath. On transfer to ICU required intubation. Was extubated to BIPAP. Have been unable to wean off bipap due to persistent shortness of breath, tachypnea, and inadequate volumes. Trial off bipap results in tachypnea, tachycardia, and hypertension. Unclear etiology at this time. Does not appear pulmonary in nature as patient is saturating well, ABGs have been wnl. MRI C-spine showed severe cervical stenosis, NSGY was consulted, unlikely that the stenosis would be causing these issues. Concern that difficulty breathing could be related to diaphragm weakness. Critical illness myopathy seems unlikely as patient was not intubated for long duration.  -neurology consulted, appreciate recs  -trial of ativan    Acute hypoxemic respiratory failure  Patient with Hypoxic Respiratory failure which is Acute.  she  is not on home oxygen. Supplemental oxygen was provided and noted- Oxygen Concentration (%):  [40-50] 40.   Signs/symptoms of respiratory failure include- tachypnea, increased work of breathing and respiratory distress. Contributing diagnoses includes - Pleural effusion and atelectasis Labs and images were reviewed. Patient Has recent ABG, which has been reviewed. Will treat underlying causes and adjust management of respiratory failure as follows- unclear etiology of pleural effusions, patient notes recent URI 3 weeks ago that has left her short of breath since then, on broad spectrum abx  Repeat CXR with increased consolidation in LLL  Extubated to Bipap    Cardiac/Vascular  Right renal artery stenosis  -no acute intervention while in ICU, likely etiology for fluctuating blood pressure    Hyperlipemia  Continue home statin    Essential hypertension  Known history of uncontrolled HTN, w/ labile BP since admit.   -BP meds held in setting of septic shock  -will avoid IV meds if possible as has had hypotension following IV BP meds  -renal artery duplex showing R renal artery stenosis, L renal artery unable to be visualized  -Will monitor and add back meds as tolerated, cardene drip while unable to tolerate PO    Dyspnea on exertion  Began prior to admission. Has been requiring 2L via NC to maintain oxygenation  -O2 via NC PRN    Renal/  Metabolic alkalosis  Initially could have been related to diuretic use. Bicarb remains elevated after stopping HCTZ.    Endocrine  Moderate malnutrition  Nutrition consulted. Most recent weight and BMI monitored-          Malnutrition (Moderate to Severe)  Energy Intake (Malnutrition): less than 75% for greater than 7 days              Measurements:  Wt Readings from Last 1 Encounters:   12/30/22 59.4 kg (130 lb 15.3 oz)   Body mass index is 22.48 kg/m².    Recommendations: Recommendation/Intervention: 1.  Goals: Meet % EEN, EPN by RD f/u date    Patient has been screened and  assessed by RD. RD will follow patient.      GI  Proctitis  CT scan with findings of stercoral proctitis.   -continue bowel regimen  -on antibiotics    Generalized abdominal pain  Patient with constipation and ileus. CT scan consistent with ileus and stercoral proctitis which is likely related to chronic constipation.    Constipation  Continue miralax BID and senna-doc daily    Orthopedic  Abscess of bursa of right hip  CT scan showing possible hematoma vs abscess around right hip. Aspiration done with IR. Culture with e.coli, susceptibilities pending.   -continue zosyn  -ID consulted, appreciate recs  -US soft tissue of lower extremities    Other  Hypothermia  Considering new onset will check BCx and start zosyn. Using bear hugger for warming.    Generalized weakness  See hypernatremia.       Critical Care Daily Checklist:    A: Awake: RASS Goal/Actual Goal:    Actual: Manning Agitation Sedation Scale (RASS): Alert and calm   B: Spontaneous Breathing Trial Performed? Spon. Breathing Trial Initiated?: Not initiated (12/29/22 0729)   C: SAT & SBT Coordinated?  n/a                      D: Delirium: CAM-ICU Overall CAM-ICU: Negative   E: Early Mobility Performed? Yes   F: Feeding Goal: Goals: Meet % EEN, EPN by RD f/u date  Status: Nutrition Goal Status: new   Current Diet Order   Procedures    Diet NPO      AS: Analgesia/Sedation precedex   T: Thromboembolic Prophylaxis yes   H: HOB > 300 Yes   U: Stress Ulcer Prophylaxis (if needed) no   G: Glucose Control n/a   B: Bowel Function Stool Occurrence: 2   I: Indwelling Catheter (Lines & Rush) Necessity yes   D: De-escalation of Antimicrobials/Pharmacotherapies no    Plan for the day/ETD Review consults, US of thighs, trial aervo    Code Status:  Family/Goals of Care: Full Code         Critical secondary to Patient has a condition that poses threat to life and bodily function: Severe Respiratory Distress      Critical care was time spent personally by me on the  following activities: development of treatment plan with patient or surrogate and bedside caregivers, discussions with consultants, evaluation of patient's response to treatment, examination of patient, ordering and performing treatments and interventions, ordering and review of laboratory studies, ordering and review of radiographic studies, pulse oximetry, re-evaluation of patient's condition. This critical care time did not overlap with that of any other provider or involve time for any procedures.     Beto Avelar MD  Critical Care Medicine  [facility] - Card[facility]

## 2023-01-02 NOTE — ASSESSMENT & PLAN NOTE
72-year-old female with HTN, HLD, hypothyroidism, 3 falls in 2022 (1 in February, last in April) with concussion is in the ICU for hypoxic respiratory failure extubated and on BIPAP, placed on vancomycin, pip-tazo, azithromycin, and corticorsteroids.  CT C/A/P without contrast revealed bilateral small pleural effusions and atelectasis, right hip posterior abscess in the soft tissue, stercoral colitis.  MRI c-spine shows cervical stenosis.  12/29/22 IR aspiration right hip abscess yielding 20cc's clear synovial fluid, noting resolution of collection afterward.  Abscess cultures currently growing E. Coli.  Of note, patient developed leukocytosis 12/31/22-01/01/23.    Worsening leukocytosis may in part be 2/2 corticosteroids given previously on top of right hip abscess, stercoral colitis.  Discussed with radiology, left anterior leg finding on CT C/A/P may be partially visualized lipoma, does not appear infectious in nature, can consider soft tissue US.    Recommendations:  -Continue pip-tazo for now  -Will follow up right hip abscess susceptibilities  -Primary team ordering CT C/A/P and neck soft tissue with contrast, will follow up results

## 2023-01-02 NOTE — HPI
72-year-old female with HTN, HLD, hypothyroidism, 3 falls in 2022 (1 in February, last in April) with concussion presents to the ED with 3 weeks of weakness, dyspnea on exertion, anorexia.  Admitted with hypoxic respiratory failure requiring NC, subsequently intubated 12/28/22 for airway protection and hypoxia.  Vancomycin, pip-tazo, azithromycin, and corticorsteroids started.  CT C/A/P without contrast revealed bilateral small pleural effusions and atelectasis, right hip posterior abscess in the soft tissue, stercoral colitis.  MRI brain no acute intracranial abnormality, right mastoid effusion.  MRI c-spine shows cervical stenosis.  12/29/22 IR aspiration right hip abscess yielding 20cc's clear serous fluid, noting resolution of collection afterward.  Abscess cultures currently growing E. Coli.  Of note, patient developed leukocytosis 12/31/22-01/01/23, now up to 25 at time of initial ID consult.      Consult: 72 F w/ R hip abscess w/ e. coli, leukocytosis despite abx, would like Antibiotic recs. Ordered CTA Chest

## 2023-01-02 NOTE — SUBJECTIVE & OBJECTIVE
Interval History/Significant Events: Remains on BIPAP. Was unable to tolerate high flow.    Review of Systems   Unable to perform ROS: Acuity of condition   Objective:     Vital Signs (Most Recent):  Temp: 98 °F (36.7 °C) (01/02/23 1143)  Pulse: 106 (01/02/23 1208)  Resp: 20 (01/02/23 1208)  BP: 134/61 (01/02/23 1206)  SpO2: 100 % (01/02/23 1208)   Vital Signs (24h Range):  Temp:  [97.5 °F (36.4 °C)-98 °F (36.7 °C)] 98 °F (36.7 °C)  Pulse:  [] 106  Resp:  [17-35] 20  SpO2:  [95 %-100 %] 100 %  BP: (117-158)/(56-87) 134/61  Arterial Line BP: ()/() 102/75   Weight: 59.4 kg (130 lb 15.3 oz)  Body mass index is 22.48 kg/m².      Intake/Output Summary (Last 24 hours) at 1/2/2023 1346  Last data filed at 1/2/2023 0445  Gross per 24 hour   Intake 361.58 ml   Output 650 ml   Net -288.42 ml         Physical Exam  Vitals and nursing note reviewed.   Constitutional:       Appearance: She is ill-appearing.   HENT:      Head: Normocephalic and atraumatic.   Eyes:      Extraocular Movements: Extraocular movements intact.      Pupils: Pupils are equal, round, and reactive to light.   Cardiovascular:      Rate and Rhythm: Normal rate and regular rhythm.      Pulses: Normal pulses.      Heart sounds: Normal heart sounds. No murmur heard.  Pulmonary:      Effort: Tachypnea present.      Breath sounds: Normal air entry. No wheezing or rales.   Abdominal:      General: Abdomen is flat. There is no distension.      Palpations: Abdomen is soft.      Tenderness: There is no abdominal tenderness.   Musculoskeletal:         General: No swelling.      Right lower leg: No edema.      Left lower leg: No edema.   Skin:     General: Skin is warm and dry.      Findings: No bruising or rash.   Neurological:      General: No focal deficit present.      Comments: Alert, following commands   Psychiatric:         Mood and Affect: Mood normal.         Behavior: Behavior normal.       Vents:  Vent Mode: A/C (12/30/22 1528)  Ventilator  Initiated: Yes (12/29/22 0144)  Set Rate: 12 BPM (12/30/22 1528)  Vt Set: 350 mL (12/30/22 0722)  Pressure Support: 14 cmH20 (12/30/22 1139)  PEEP/CPAP: 6 cmH20 (12/30/22 1528)  Oxygen Concentration (%): 40 (01/02/23 1206)  Peak Airway Pressure: 22 cmH20 (12/30/22 1528)  Plateau Pressure: 20 cmH20 (12/30/22 1528)  Total Ve: 9.73 L/m (12/30/22 1528)  Negative Inspiratory Force (cm H2O): 0 (12/30/22 1528)  F/VT Ratio<105 (RSBI): (!) 58.05 (12/30/22 1528)  Lines/Drains/Airways       Central Venous Catheter Line  Duration             Percutaneous Central Line Insertion/Assessment - Triple Lumen  12/29/22 0043 right internal jugular 4 days              Drain  Duration                  Urethral Catheter 12/27/22 1535 5 days         Rectal Tube 12/29/22 0332 rectal tube w/ balloon (indicate number of mLs) 45 Fr. 4 days              Arterial Line  Duration             Arterial Line 12/29/22 0219 Left Radial 4 days              Peripheral Intravenous Line  Duration                  Peripheral IV - Single Lumen 12/28/22 1545 18 G Left Antecubital 4 days         Peripheral IV - Single Lumen 12/28/22 1755 20 G;1 3/4 in Anterior;Right Upper Arm 4 days                  Significant Labs:    CBC/Anemia Profile:  Recent Labs   Lab 01/01/23  0651 01/02/23  0338   WBC 21.79* 25.26*   HGB 10.5* 9.6*   HCT 32.0* 31.0*    357   MCV 80* 83   RDW 13.5 13.7          Chemistries:  Recent Labs   Lab 12/31/22  1630 01/01/23  0425 01/01/23  0825 01/01/23  1658 01/02/23  0338     --  142 143 143   K 3.2*  --  3.0* 3.5 3.4*   CL 97  --  98 99 98   CO2 30*  --  34* 33* 33*   BUN 7*  --  7* 9 12   CREATININE 0.6  --  0.6 0.6 0.6   CALCIUM 8.9  --  8.8 9.2 9.1   MG  --  2.0  --   --  2.2   PHOS 3.6 4.0  --   --  3.4       All pertinent labs within the past 24 hours have been reviewed.    Significant Imaging:  I have reviewed all pertinent imaging results/findings within the past 24 hours.

## 2023-01-02 NOTE — SUBJECTIVE & OBJECTIVE
Past Medical History:   Diagnosis Date    Hyperlipidemia     Hypertension     Hypothyroidism     Shock 12/29/2022       Past Surgical History:   Procedure Laterality Date    HYSTERECTOMY         Review of patient's allergies indicates:   Allergen Reactions    Hydralazine analogues      Precipitous drop in BP with IV formulation. Avoid if possible    Shellfish containing products Swelling       Medications:  Medications Prior to Admission   Medication Sig    alendronate-vitamin D3 (FOSAMAX PLUS D) 70 mg- 2,800 unit per tablet Take 1 tablet by mouth every 7 days.    hydroCHLOROthiazide (HYDRODIURIL) 25 MG tablet Take 25 mg by mouth once daily.    levothyroxine (SYNTHROID) 50 MCG tablet Take 50 mcg by mouth once daily.    losartan (COZAAR) 50 MG tablet Take 50 mg by mouth once daily.    meloxicam (MOBIC) 15 MG tablet Take 15 mg by mouth once daily.    metoprolol succinate (TOPROL-XL) 50 MG 24 hr tablet Take 50 mg by mouth once daily.    simvastatin (ZOCOR) 20 MG tablet Take 20 mg by mouth every evening.     Antibiotics (From admission, onward)      Start     Stop Route Frequency Ordered    12/28/22 1515  piperacillin-tazobactam (ZOSYN) 4.5 g in sodium chloride 0.9 % 100 mL IVPB (MB+)         -- IV Every 8 hours (non-standard times) 12/28/22 1412          Antifungals (From admission, onward)      None          Antivirals (From admission, onward)      None               There is no immunization history on file for this patient.    Family History       Problem Relation (Age of Onset)    Breast cancer Sister (55), Sister (65)          Social History     Socioeconomic History    Marital status:    Tobacco Use    Smoking status: Never   Substance and Sexual Activity    Drug use: Never     Review of Systems   Constitutional:  Negative for chills and fever.   HENT:  Negative for sore throat.    Respiratory:  Positive for shortness of breath.    Gastrointestinal:  Negative for diarrhea, nausea and vomiting.    Genitourinary:  Negative for dysuria and flank pain.   Musculoskeletal:  Negative for arthralgias.   Skin:  Negative for wound.   Neurological:  Negative for headaches.   Objective:     Vital Signs (Most Recent):  Temp: 98 °F (36.7 °C) (01/02/23 1143)  Pulse: 106 (01/02/23 1208)  Resp: 20 (01/02/23 1208)  BP: 134/61 (01/02/23 1206)  SpO2: 100 % (01/02/23 1208) Vital Signs (24h Range):  Temp:  [97.5 °F (36.4 °C)-98 °F (36.7 °C)] 98 °F (36.7 °C)  Pulse:  [] 106  Resp:  [17-35] 20  SpO2:  [95 %-100 %] 100 %  BP: (110-158)/(56-87) 134/61  Arterial Line BP: ()/() 102/75     Weight: 59.4 kg (130 lb 15.3 oz)  Body mass index is 22.48 kg/m².    Estimated Creatinine Clearance: 75.3 mL/min (based on SCr of 0.6 mg/dL).    Physical Exam  Vitals reviewed.   Constitutional:       General: She is not in acute distress.     Appearance: She is normal weight. She is ill-appearing. She is not toxic-appearing or diaphoretic.   HENT:      Head: Normocephalic and atraumatic.      Right Ear: External ear normal.      Left Ear: External ear normal.   Eyes:      General: No scleral icterus.        Right eye: No discharge.         Left eye: No discharge.      Extraocular Movements: Extraocular movements intact.      Conjunctiva/sclera: Conjunctivae normal.   Cardiovascular:      Rate and Rhythm: Regular rhythm. Tachycardia present.   Pulmonary:      Effort: Pulmonary effort is normal.      Comments: -BIPAP  Abdominal:      General: Abdomen is flat. There is no distension.      Palpations: Abdomen is soft.      Tenderness: There is no abdominal tenderness.   Musculoskeletal:         General: No swelling, tenderness, deformity or signs of injury. Normal range of motion.      Cervical back: Normal range of motion.      Right lower leg: No edema.      Left lower leg: No edema.      Comments: Bilateral hips nontender on palpation   Skin:     General: Skin is warm and dry.      Findings: No lesion or rash.   Neurological:       Mental Status: She is alert and oriented to person, place, and time. Mental status is at baseline.   Psychiatric:         Mood and Affect: Mood normal.         Behavior: Behavior normal.         Thought Content: Thought content normal.         Judgment: Judgment normal.       Significant Labs: All pertinent labs within the past 24 hours have been reviewed.    Significant Imaging: I have reviewed all pertinent imaging results/findings within the past 24 hours.

## 2023-01-02 NOTE — HPI
Ms. Mcgee is a 71 y/o F female on whom general neurology is consulted for concerns for neuromuscular disease, diaphragmatic weakness or myopathy. She has a PMHx of HTN, HLD, hypothyroidism, recent URI 3 weeks prior to admission, balance difficulites who presented to the ED on 12/23 for 3 weeks of weakness, LIRA, difficulty swallowing, decreased appetite, and constipation with intermittent lower abdominal cramping. Per daughter, patient is typically independent, lives alone, cooks her own meals, since she has been ill she has had decreased PO intake and drinking coffee, tea, and eating cereal. She has had progressive worsening HTN in the last 3 weeks and was started on losartan-HCTZ and metoprolol. Prior to this she was only taking lisinopril per daughter. Admitted for HTN emergency and was found to have Na 120. Received IV fluids and admitted to hospital medicine. For her hypernatremia, her thiazide was held and she was placed on fluid restriction. Urine studies from 12/25 showed Uosm of 802 and HANH of 109. Her BP has been labile since admission requiring both IV antihypertensives as well as fluid boluses. Patient transferred to ICU for continued lethargy and weakness. She became hypotensive requiring multiple pressors. Patient intubated for airway protection on 12/28. Sodium improved with hypertonic saline, now normalized. CT abdomen/pelvis showing proctitis and hip abscess. Went for IR aspiration of abscess. Culture showing e.coli, on zosyn. MRI brain non-concerning. MRI spine with severe cervical stenosis and cord compression, also with mild lumbar stenosis. Repeat CXR with increased LLL consolidation and pleural effusion. Extubated to bipap on 12/30. Once extubated and off pressors patient began to re-experience labile blood pressures similar to her initial presentation. Started on cardene drip. Difficulty weaning off BIPAP due to inadequate volumes and persistent shortness of breath.

## 2023-01-02 NOTE — ASSESSMENT & PLAN NOTE
Patient presented with approx 3 weeks of worsening shortness of breath. On transfer to ICU required intubation. Was extubated to BIPAP. Have been unable to wean off bipap due to persistent shortness of breath, tachypnea, and inadequate volumes. Trial off bipap results in tachypnea, tachycardia, and hypertension. Unclear etiology at this time. Does not appear pulmonary in nature as patient is saturating well, ABGs have been wnl. MRI C-spine showed severe cervical stenosis, NSGY was consulted, unlikely that the stenosis would be causing these issues. Concern that difficulty breathing could be related to diaphragm weakness. Critical illness myopathy seems unlikely as patient was not intubated for long duration.  -neurology consulted, appreciate recs  -trial of ativan

## 2023-01-02 NOTE — PT/OT/SLP PROGRESS
Speech Language Pathology      Dominique Mcgee  MRN: [id number]  [id number] A      Patient not seen today secondary to BIPAP, Nursing hold (Comment). SLP will follow-up at a later date/time when transitioned off BiPAP and appropriate for swallow evaluation.

## 2023-01-02 NOTE — PLAN OF CARE
Problem: Infection  Goal: Absence of Infection Signs and Symptoms  Outcome: Ongoing, Progressing  Intervention: Prevent or Manage Infection  Flowsheets (Taken 1/2/2023 0146)  Fever Reduction/Comfort Measures: aerosol temperature decreased  Isolation Precautions: precautions initiated

## 2023-01-02 NOTE — SUBJECTIVE & OBJECTIVE
Past Medical History:   Diagnosis Date    Hyperlipidemia     Hypertension     Hyponatremia 12/23/2022    Hypophosphatemia 12/30/2022    Hypothyroidism     Severe sepsis 12/29/2022    Shock 12/29/2022       Past Surgical History:   Procedure Laterality Date    HYSTERECTOMY         Review of patient's allergies indicates:   Allergen Reactions    Hydralazine analogues      Precipitous drop in BP with IV formulation. Avoid if possible    Shellfish containing products Swelling         No current facility-administered medications on file prior to encounter.     Current Outpatient Medications on File Prior to Encounter   Medication Sig    alendronate-vitamin D3 (FOSAMAX PLUS D) 70 mg- 2,800 unit per tablet Take 1 tablet by mouth every 7 days.    hydroCHLOROthiazide (HYDRODIURIL) 25 MG tablet Take 25 mg by mouth once daily.    levothyroxine (SYNTHROID) 50 MCG tablet Take 50 mcg by mouth once daily.    losartan (COZAAR) 50 MG tablet Take 50 mg by mouth once daily.    meloxicam (MOBIC) 15 MG tablet Take 15 mg by mouth once daily.    metoprolol succinate (TOPROL-XL) 50 MG 24 hr tablet Take 50 mg by mouth once daily.    simvastatin (ZOCOR) 20 MG tablet Take 20 mg by mouth every evening.     Family History       Problem Relation (Age of Onset)    Breast cancer Sister (55), Sister (65)          Tobacco Use    Smoking status: Never    Smokeless tobacco: Not on file   Substance and Sexual Activity    Alcohol use: Not on file    Drug use: Never    Sexual activity: Not on file     Review of Systems   Unable to perform ROS: Acuity of condition   HENT:  Positive for trouble swallowing.    Respiratory:  Positive for shortness of breath.    Objective:     Vital Signs (Most Recent):  Temp: 98 °F (36.7 °C) (01/02/23 1143)  Pulse: 106 (01/02/23 1208)  Resp: 20 (01/02/23 1208)  BP: 134/61 (01/02/23 1206)  SpO2: 100 % (01/02/23 1208) Vital Signs (24h Range):  Temp:  [97.5 °F (36.4 °C)-98 °F (36.7 °C)] 98 °F (36.7 °C)  Pulse:  []  106  Resp:  [17-35] 20  SpO2:  [95 %-100 %] 100 %  BP: (117-158)/(56-87) 134/61  Arterial Line BP: ()/() 102/75     Weight: 59.4 kg (130 lb 15.3 oz)  Body mass index is 22.48 kg/m².    Physical Exam  Vitals and nursing note reviewed.   Constitutional:       Appearance: She is ill-appearing.   HENT:      Head: Normocephalic and atraumatic.   Cardiovascular:      Rate and Rhythm: Normal rate and regular rhythm.      Pulses: Normal pulses.      Heart sounds: Normal heart sounds. No murmur heard.  Pulmonary:      Effort: Tachypnea present.      Breath sounds: Normal air entry. No wheezing or rales.   Abdominal:      General: Abdomen is flat. There is no distension.      Palpations: Abdomen is soft.      Tenderness: There is no abdominal tenderness.   Musculoskeletal:         General: No swelling.      Right lower leg: No edema.      Left lower leg: No edema.   Skin:     General: Skin is warm and dry.      Findings: No bruising or rash.   Neurological:      General: No focal deficit present.      Comments: Alert, following commands   Psychiatric:         Mood and Affect: Mood normal.         Behavior: Behavior normal.     Neurological Exam:  MENTAL STATUS  Level of consciousness: alert  Orientation: oriented to person, place, and time  Attention normal. Concentration normal.  Speech: reduced phonation 2/2 SOB    CRANIAL NERVES  CN II: Visual fields full to confrontation  CN III, IV, VI: PERRL, EOMI  CN V: Facial sensation intact  CN VII: Facial expression symmetric and full  CN IX, X: Symmetric palate elevation. Phonation normal  CN XII: Tongue midline with normal movements, no atrophy    MOTOR EXAM  Muscle bulk: normal  Muscle tone: normal  Pronator drift: absent    Strength - Upper Extremities: 4/5    Strength - Lower Extremities: 4/5    REFLEXES   Biceps Triceps Brachioradialis Patellar Achilles   Right +2 +1 +2 +2 +1   Left +2 +1 +2 +2 +1       SENSORY EXAM  Light touch: intact in all 4 extremities    No  bulbar weakness found with lateral neck rotation / flexion / extension. No oral fasciculation noted below the tongue. No ocular weakness noted.            Significant Labs: Hemoglobin A1c:   Recent Labs   Lab 12/23/22  1920   HGBA1C 5.4     Blood Culture: No results for input(s): LABBLOO in the last 48 hours.  BMP:   Recent Labs   Lab 01/01/23  0425 01/01/23  0825 01/01/23 1658 01/02/23  0338   GLU  --  109 128* 106   NA  --  142 143 143   K  --  3.0* 3.5 3.4*   CL  --  98 99 98   CO2  --  34* 33* 33*   BUN  --  7* 9 12   CREATININE  --  0.6 0.6 0.6   CALCIUM  --  8.8 9.2 9.1   MG 2.0  --   --  2.2     CBC:   Recent Labs   Lab 01/01/23  0651 01/02/23  0338   WBC 21.79* 25.26*   HGB 10.5* 9.6*   HCT 32.0* 31.0*    357     CMP:   Recent Labs   Lab 01/01/23 0425 01/01/23 0825 01/01/23 1658 01/02/23  0338   GLU  --  109 128* 106   NA  --  142 143 143   K  --  3.0* 3.5 3.4*   CL  --  98 99 98   CO2  --  34* 33* 33*   BUN  --  7* 9 12   CREATININE  --  0.6 0.6 0.6   CALCIUM  --  8.8 9.2 9.1   MG 2.0  --   --  2.2   ANIONGAP  --  10 11 12     CSF Culture: No results for input(s): CSFCULTURE in the last 48 hours.  CSF Studies: No results for input(s): ALIQUT, APPEARCSF, COLORCSF, CSFWBC, CSFRBC, GLUCCSF, LDHCSF, PROTEINCSF, VDRLCSF in the last 48 hours.  Inflammatory Markers: No results for input(s): SEDRATE, CRP, PROCAL in the last 48 hours.  POCT Glucose:   Recent Labs   Lab 01/01/23 2026 01/02/23  0008 01/02/23  1338   POCTGLUCOSE 121* 109 107     Prealbumin: No results for input(s): PREALBUMIN in the last 48 hours.  Respiratory Culture: No results for input(s): GSRESP, RESPIRATORYC in the last 48 hours.  Urine Culture: No results for input(s): LABURIN in the last 48 hours.  Urine Studies: No results for input(s): COLORU, APPEARANCEUA, PHUR, SPECGRAV, PROTEINUA, GLUCUA, KETONESU, BILIRUBINUA, OCCULTUA, NITRITE, UROBILINOGEN, LEUKOCYTESUR, RBCUA, WBCUA, BACTERIA, SQUAMEPITHEL, HYALINECASTS in the last 48  hours.    Invalid input(s): FABIAN  All pertinent lab results from the past 24 hours have been reviewed.    Significant Imaging: I have reviewed and interpreted all pertinent imaging results/findings within the past 24 hours.

## 2023-01-02 NOTE — PT/OT/SLP PROGRESS
Occupational Therapy      Patient Name:  Dominique Mcgee   MRN:  [id number]    Patient not seen today secondary to medical team rounding during OT attempt; unable to make second attempt. Will follow-up as appropriate.    1/2/2023

## 2023-01-02 NOTE — CONSULTS
[facility] - Cardiac Medical ICU  Neurology  Consult Note    Patient Name: Dominique Mcgee  MRN: [id number]  Admission Date: 12/23/2022  Hospital Length of Stay: 10 days  Code Status: Full Code   Attending Provider: Nathan Loaiza MD   Consulting Provider: Manuel Lizarraga MD  Primary Care Physician: Briana Leblanc MD  Principal Problem:Shortness of breath    Inpatient consult to Neurology  Consult performed by: Manuel Lizarraga MD  Consult ordered by: Beto Avelar MD         Subjective:     Chief Complaint:  SOB     HPI:   Ms. Mcgee is a 71 y/o F female on whom general neurology is consulted for concerns for neuromuscular disease, diaphragmatic weakness or myopathy. She has a PMHx of HTN, HLD, hypothyroidism, recent URI 3 weeks prior to admission, balance difficulites who presented to the ED on 12/23 for 3 weeks of weakness, LIRA, difficulty swallowing, decreased appetite, and constipation with intermittent lower abdominal cramping. Per daughter, patient is typically independent, lives alone, cooks her own meals, since she has been ill she has had decreased PO intake and drinking coffee, tea, and eating cereal. She has had progressive worsening HTN in the last 3 weeks and was started on losartan-HCTZ and metoprolol. Prior to this she was only taking lisinopril per daughter. Admitted for HTN emergency and was found to have Na 120. Received IV fluids and admitted to hospital medicine. For her hypernatremia, her thiazide was held and she was placed on fluid restriction. Urine studies from 12/25 showed Uosm of 802 and HANH of 109. Her BP has been labile since admission requiring both IV antihypertensives as well as fluid boluses. Patient transferred to ICU for continued lethargy and weakness. She became hypotensive requiring multiple pressors. Patient intubated for airway protection on 12/28. Sodium improved with hypertonic saline, now normalized. CT abdomen/pelvis showing proctitis and hip abscess. Went for IR  aspiration of abscess. Culture showing e.coli, on zosyn. MRI brain non-concerning. MRI spine with severe cervical stenosis and cord compression, also with mild lumbar stenosis. Repeat CXR with increased LLL consolidation and pleural effusion. Extubated to bipap on 12/30. Once extubated and off pressors patient began to re-experience labile blood pressures similar to her initial presentation. Started on cardene drip. Difficulty weaning off BIPAP due to inadequate volumes and persistent shortness of breath.       Past Medical History:   Diagnosis Date    Hyperlipidemia     Hypertension     Hyponatremia 12/23/2022    Hypophosphatemia 12/30/2022    Hypothyroidism     Severe sepsis 12/29/2022    Shock 12/29/2022       Past Surgical History:   Procedure Laterality Date    HYSTERECTOMY         Review of patient's allergies indicates:   Allergen Reactions    Hydralazine analogues      Precipitous drop in BP with IV formulation. Avoid if possible    Shellfish containing products Swelling         No current facility-administered medications on file prior to encounter.     Current Outpatient Medications on File Prior to Encounter   Medication Sig    alendronate-vitamin D3 (FOSAMAX PLUS D) 70 mg- 2,800 unit per tablet Take 1 tablet by mouth every 7 days.    hydroCHLOROthiazide (HYDRODIURIL) 25 MG tablet Take 25 mg by mouth once daily.    levothyroxine (SYNTHROID) 50 MCG tablet Take 50 mcg by mouth once daily.    losartan (COZAAR) 50 MG tablet Take 50 mg by mouth once daily.    meloxicam (MOBIC) 15 MG tablet Take 15 mg by mouth once daily.    metoprolol succinate (TOPROL-XL) 50 MG 24 hr tablet Take 50 mg by mouth once daily.    simvastatin (ZOCOR) 20 MG tablet Take 20 mg by mouth every evening.     Family History       Problem Relation (Age of Onset)    Breast cancer Sister (55), Sister (65)          Tobacco Use    Smoking status: Never    Smokeless tobacco: Not on file   Substance and Sexual Activity     Alcohol use: Not on file    Drug use: Never    Sexual activity: Not on file     Review of Systems   Unable to perform ROS: Acuity of condition   HENT:  Positive for trouble swallowing.    Respiratory:  Positive for shortness of breath.    Objective:     Vital Signs (Most Recent):  Temp: 98 °F (36.7 °C) (01/02/23 1143)  Pulse: 106 (01/02/23 1208)  Resp: 20 (01/02/23 1208)  BP: 134/61 (01/02/23 1206)  SpO2: 100 % (01/02/23 1208) Vital Signs (24h Range):  Temp:  [97.5 °F (36.4 °C)-98 °F (36.7 °C)] 98 °F (36.7 °C)  Pulse:  [] 106  Resp:  [17-35] 20  SpO2:  [95 %-100 %] 100 %  BP: (117-158)/(56-87) 134/61  Arterial Line BP: ()/() 102/75     Weight: 59.4 kg (130 lb 15.3 oz)  Body mass index is 22.48 kg/m².    Physical Exam  Vitals and nursing note reviewed.   Constitutional:       Appearance: She is ill-appearing.   HENT:      Head: Normocephalic and atraumatic.   Cardiovascular:      Rate and Rhythm: Normal rate and regular rhythm.      Pulses: Normal pulses.      Heart sounds: Normal heart sounds. No murmur heard.  Pulmonary:      Effort: Tachypnea present.      Breath sounds: Normal air entry. No wheezing or rales.   Abdominal:      General: Abdomen is flat. There is no distension.      Palpations: Abdomen is soft.      Tenderness: There is no abdominal tenderness.   Musculoskeletal:         General: No swelling.      Right lower leg: No edema.      Left lower leg: No edema.   Skin:     General: Skin is warm and dry.      Findings: No bruising or rash.   Neurological:      General: No focal deficit present.      Comments: Alert, following commands   Psychiatric:         Mood and Affect: Mood normal.         Behavior: Behavior normal.     Neurological Exam:  MENTAL STATUS  Level of consciousness: alert  Orientation: oriented to person, place, and time  Attention normal. Concentration normal.  Speech: reduced phonation 2/2 SOB    CRANIAL NERVES  CN II: Visual fields full to confrontation  CN III, IV,  VI: PERRL, EOMI  CN V: Facial sensation intact  CN VII: Facial expression symmetric and full  CN IX, X: Symmetric palate elevation. Phonation normal  CN XII: Tongue midline with normal movements, no atrophy    MOTOR EXAM  Muscle bulk: normal  Muscle tone: normal  Pronator drift: absent    Strength - Upper Extremities: 4/5    Strength - Lower Extremities: 4/5    REFLEXES   Biceps Triceps Brachioradialis Patellar Achilles   Right +2 +1 +2 +2 +1   Left +2 +1 +2 +2 +1       SENSORY EXAM  Light touch: intact in all 4 extremities    No bulbar weakness found with lateral neck rotation / flexion / extension. No oral fasciculation noted below the tongue. No ocular weakness noted.            Significant Labs: Hemoglobin A1c:   Recent Labs   Lab 12/23/22  1920   HGBA1C 5.4     Blood Culture: No results for input(s): LABBLOO in the last 48 hours.  BMP:   Recent Labs   Lab 01/01/23 0425 01/01/23 0825 01/01/23 1658 01/02/23  0338   GLU  --  109 128* 106   NA  --  142 143 143   K  --  3.0* 3.5 3.4*   CL  --  98 99 98   CO2  --  34* 33* 33*   BUN  --  7* 9 12   CREATININE  --  0.6 0.6 0.6   CALCIUM  --  8.8 9.2 9.1   MG 2.0  --   --  2.2     CBC:   Recent Labs   Lab 01/01/23  0651 01/02/23  0338   WBC 21.79* 25.26*   HGB 10.5* 9.6*   HCT 32.0* 31.0*    357     CMP:   Recent Labs   Lab 01/01/23  0425 01/01/23  0825 01/01/23 1658 01/02/23  0338   GLU  --  109 128* 106   NA  --  142 143 143   K  --  3.0* 3.5 3.4*   CL  --  98 99 98   CO2  --  34* 33* 33*   BUN  --  7* 9 12   CREATININE  --  0.6 0.6 0.6   CALCIUM  --  8.8 9.2 9.1   MG 2.0  --   --  2.2   ANIONGAP  --  10 11 12     CSF Culture: No results for input(s): CSFCULTURE in the last 48 hours.  CSF Studies: No results for input(s): ALIQUT, APPEARCSF, COLORCSF, CSFWBC, CSFRBC, GLUCCSF, LDHCSF, PROTEINCSF, VDRLCSF in the last 48 hours.  Inflammatory Markers: No results for input(s): SEDRATE, CRP, PROCAL in the last 48 hours.  POCT Glucose:   Recent Labs   Lab  01/01/23 2026 01/02/23  0008 01/02/23  1338   POCTGLUCOSE 121* 109 107     Prealbumin: No results for input(s): PREALBUMIN in the last 48 hours.  Respiratory Culture: No results for input(s): GSRESP, RESPIRATORYC in the last 48 hours.  Urine Culture: No results for input(s): LABURIN in the last 48 hours.  Urine Studies: No results for input(s): COLORU, APPEARANCEUA, PHUR, SPECGRAV, PROTEINUA, GLUCUA, KETONESU, BILIRUBINUA, OCCULTUA, NITRITE, UROBILINOGEN, LEUKOCYTESUR, RBCUA, WBCUA, BACTERIA, SQUAMEPITHEL, HYALINECASTS in the last 48 hours.    Invalid input(s): WRIGHTSUR  All pertinent lab results from the past 24 hours have been reviewed.    Significant Imaging: I have reviewed and interpreted all pertinent imaging results/findings within the past 24 hours.    Assessment and Plan:     * Shortness of breath  71 yo F on whom general neurology is consulted for concerns for neuromuscular disease, diaphragmatic weakness or myopathy. Patient reports SOB and difficulty swallowing solids of 3 week duration. She is currently in the medical ICU for ongoing treatment of infectious processes, including recent hip abscess s/p IR aspiration, positive cultures with e Coli, on zosyn. MRI spine with severe cervical stenosis and some cord compression, without cord enhancement. She also has CXR with increased consolidation in LLL. She has had difficulty weaning off BiPAP.    Patient's SOB likely due to general critical illness. Myasthenia is unlikely given lack of bulbar symptoms on exam. Patient also has a normal diaphragmatic outline in the RLL on CXR. There is severe spinal stenosis C4-C5, and some signal abnormality at C3-C5.    We don't suspect a neurological cause for this patient's SOB.    Recommendations:  - respiratory mechanics, NIF Q6H  - incentive spirometry  - myasthenia gravis panel  - outpatient follow up with neurology recommended     Neurology will sign off, please call with questions.          VTE Risk Mitigation  (From admission, onward)         Ordered     enoxaparin injection 40 mg  Daily         12/23/22 1649     IP VTE HIGH RISK PATIENT  Once         12/23/22 1649     Place sequential compression device  Until discontinued         12/23/22 1649                Thank you for your consult. I will sign off. Please contact us if you have any additional questions.    Manuel Lizarraga MD  Neurology  [facility] - Cardiac Medical ICU

## 2023-01-03 NOTE — PT/OT/SLP PROGRESS
Occupational Therapy      Patient Name:  Dominique Mcgee   MRN:  [id number]    Patient not seen today secondary to RN hold 2' tenious respiratory status. Will follow-up as appropriate.    1/3/2023

## 2023-01-03 NOTE — PT/OT/SLP EVAL
Speech Language Pathology Evaluation  Bedside Swallow    Patient Name:  Dominique Mcgee   MRN:  [id number]  Admitting Diagnosis: Shortness of breath    Recommendations:                 General Recommendations:  Dysphagia therapy  Diet recommendations:  NPO, NPO  small sips of water for pleasure and meds crushed in puree.   Aspiration Precautions: Strict aspiration precautions   General Precautions: Standard,    Communication strategies:  none    History:     Past Medical History:   Diagnosis Date    Hyperlipidemia     Hypertension     Hyponatremia 12/23/2022    Hypophosphatemia 12/30/2022    Hypothyroidism     Severe sepsis 12/29/2022    Shock 12/29/2022       Past Surgical History:   Procedure Laterality Date    HYSTERECTOMY         Subjective     Patient awake and alert. \    Pain/Comfort:       Respiratory Status: High flow, flow 60 L/min, concentration 50%    Objective:     Oral Musculature Evaluation  Oral Musculature: general weakness  Dentition: present and adequate  Secretion Management: adequate  Mucosal Quality: good  Mandibular Strength and Mobility: WFL  Oral Labial Strength and Mobility: WFL  Buccal Strength and Mobility: WFL  Voice Prior to PO Intake: hoarse    Bedside Swallow Eval:   Consistencies Assessed:  Thin liquids via ice chip x2 and tsp x3    Puree via 1/2tsp x2    Oral Phase:   WFL    Pharyngeal Phase:   no overt clinical signs/symptoms of aspiration  no overt clinical signs/symptoms of pharyngeal dysphagia    Compensatory Strategies  None    Treatment: further trials not administered given high  oxygen needs. Skilled education was provided to patient re: diet recs, standard aspiration precautions of which to follow, and ongoing ST plan of care.      Assessment:     Dominique Mcgee is a 72 y.o. female with an SLP diagnosis of Dysphagia.      Goals:   Multidisciplinary Problems       SLP Goals          Problem: SLP    Goal Priority Disciplines Outcome   SLP Goal     SLP Ongoing, Progressing    Description: Speech Language Pathology Goals  Goals expected to be met by 1/10    1. Patient will participate in ongoing swallow assessment to determine least restrictive diet recommendations.                                Plan:     Patient to be seen:  4 x/week   Plan of Care expires:     Plan of Care reviewed with:  patient   SLP Follow-Up:  Yes       Discharge recommendations:  home health speech therapy   Barriers to Discharge:  None    Time Tracking:     SLP Treatment Date:   01/03/23  Speech Start Time:  0901  Speech Stop Time:  0908     Speech Total Time (min):  7 min    Billable Minutes: Eval Swallow and Oral Function 7    01/03/2023

## 2023-01-03 NOTE — PLAN OF CARE
Problem: SLP  Goal: SLP Goal  Description: Speech Language Pathology Goals  Goals expected to be met by 1/10    1. Patient will participate in ongoing swallow assessment to determine least restrictive diet recommendations.           Outcome: Ongoing, Progressing

## 2023-01-03 NOTE — ASSESSMENT & PLAN NOTE
Known history of uncontrolled HTN, w/ labile BP since admit.   -BP meds held in setting of septic shock  -will avoid IV meds if possible as has had hypotension following IV BP meds  -renal artery duplex showing R renal artery stenosis, L renal artery unable to be visualized  -Will monitor and add back meds as tolerated, cardene drip while unable to tolerate PO  - Will order swallow study since patient is currently on high flow NC

## 2023-01-03 NOTE — ASSESSMENT & PLAN NOTE
Patient with Hypoxic Respiratory failure which is Acute.  she is not on home oxygen. Supplemental oxygen was provided and noted- Oxygen Concentration (%):  [40-55] 55.   Signs/symptoms of respiratory failure include- tachypnea, increased work of breathing and respiratory distress. Contributing diagnoses includes - Pleural effusion and atelectasis Labs and images were reviewed. Patient Has recent ABG, which has been reviewed. Will treat underlying causes and adjust management of respiratory failure as follows- unclear etiology of pleural effusions, patient notes recent URI 3 weeks ago that has left her short of breath since then, on broad spectrum abx  Repeat CXR with increased consolidation in LLL  Extubated to Bipap    1/3:    Pt tolerated BIPAP overnight. Currently transitioned to high flow nasal cannula this morning (1/3).   CT Chest - Bibasilar atelectatic/consolidative change (left greater than right) with patchy right basilar opacities and small bilateral pleural effusions.  Correlation for underlying infectious process advised.    Continue Zoysn  Sputum cultures

## 2023-01-03 NOTE — SUBJECTIVE & OBJECTIVE
Interval History: Afebrile, ongoing leukocytosis.  Patient reporting suprapubic pain.  CT chest with LLL consolidation; right posterior hip fluid collection still present.    Review of Systems   Constitutional:  Negative for chills and fever.   HENT:  Negative for sore throat.    Respiratory:  Positive for shortness of breath.    Gastrointestinal:  Negative for diarrhea, nausea and vomiting.   Genitourinary:  Positive for pelvic pain. Negative for dysuria and flank pain.   Musculoskeletal:  Negative for arthralgias.   Skin:  Negative for wound.   Neurological:  Negative for headaches.   Objective:     Vital Signs (Most Recent):  Temp: 97.9 °F (36.6 °C) (01/03/23 1100)  Pulse: (!) 121 (01/03/23 1200)  Resp: (!) 34 (01/03/23 1200)  BP: 135/63 (01/03/23 1200)  SpO2: 97 % (01/03/23 1200)   Vital Signs (24h Range):  Temp:  [97.3 °F (36.3 °C)-98.8 °F (37.1 °C)] 97.9 °F (36.6 °C)  Pulse:  [100-150] 121  Resp:  [8-47] 34  SpO2:  [82 %-100 %] 97 %  BP: (106-184)/() 135/63  Arterial Line BP: ()/(41-78) 124/56     Weight: 59.4 kg (130 lb 15.3 oz)  Body mass index is 22.48 kg/m².    Estimated Creatinine Clearance: 90.4 mL/min (based on SCr of 0.5 mg/dL).    Physical Exam  Vitals reviewed.   Constitutional:       General: She is not in acute distress.     Appearance: She is normal weight. She is ill-appearing. She is not toxic-appearing or diaphoretic.   HENT:      Head: Normocephalic and atraumatic.      Right Ear: External ear normal.      Left Ear: External ear normal.   Eyes:      General: No scleral icterus.        Right eye: No discharge.         Left eye: No discharge.      Extraocular Movements: Extraocular movements intact.      Conjunctiva/sclera: Conjunctivae normal.   Cardiovascular:      Rate and Rhythm: Regular rhythm. Tachycardia present.   Pulmonary:      Effort: Pulmonary effort is normal.      Comments: -BIPAP  Abdominal:      General: Abdomen is flat. There is no distension.      Palpations:  Abdomen is soft.      Tenderness: There is abdominal tenderness.      Comments: -suprapubic tenderness   Musculoskeletal:         General: No swelling, tenderness, deformity or signs of injury. Normal range of motion.      Cervical back: Normal range of motion.      Right lower leg: No edema.      Left lower leg: No edema.      Comments: Bilateral hips nontender on palpation   Skin:     General: Skin is warm and dry.      Findings: No lesion or rash.   Neurological:      Mental Status: She is alert and oriented to person, place, and time. Mental status is at baseline.   Psychiatric:         Mood and Affect: Mood normal.         Behavior: Behavior normal.         Thought Content: Thought content normal.         Judgment: Judgment normal.       Significant Labs: All pertinent labs within the past 24 hours have been reviewed.    Significant Imaging: I have reviewed all pertinent imaging results/findings within the past 24 hours.

## 2023-01-03 NOTE — SUBJECTIVE & OBJECTIVE
Interval History/Significant Events: Tachycardic episodes with baseline in the 120's on night of 1/02. Tolerated BIPAP overnight. Was put on high flow nasal cannula this morning.    Review of Systems   Unable to perform ROS: Patient nonverbal   Objective:     Vital Signs (Most Recent):  Temp: 97.3 °F (36.3 °C) (01/03/23 0700)  Pulse: 105 (01/03/23 0725)  Resp: 17 (01/03/23 0725)  BP: (!) 106/53 (01/03/23 0700)  SpO2: 100 % (01/03/23 0725)   Vital Signs (24h Range):  Temp:  [97.3 °F (36.3 °C)-98.8 °F (37.1 °C)] 97.3 °F (36.3 °C)  Pulse:  [100-150] 105  Resp:  [8-39] 17  SpO2:  [82 %-100 %] 100 %  BP: (106-174)/() 106/53  Arterial Line BP: ()/(41-85) 128/60   Weight: 59.4 kg (130 lb 15.3 oz)  Body mass index is 22.48 kg/m².      Intake/Output Summary (Last 24 hours) at 1/3/2023 0812  Last data filed at 1/3/2023 0800  Gross per 24 hour   Intake 922.33 ml   Output 1075 ml   Net -152.67 ml       Physical Exam  Vitals and nursing note reviewed.   Constitutional:       Appearance: Normal appearance.   HENT:      Head: Normocephalic and atraumatic.      Right Ear: External ear normal.      Left Ear: External ear normal.      Nose: Nose normal.      Comments: High flow NC in place     Mouth/Throat:      Mouth: Mucous membranes are dry.   Eyes:      Extraocular Movements: Extraocular movements intact.      Conjunctiva/sclera: Conjunctivae normal.      Pupils: Pupils are equal, round, and reactive to light.   Cardiovascular:      Rate and Rhythm: Regular rhythm. Tachycardia present.      Pulses: Normal pulses.      Heart sounds: Normal heart sounds.   Pulmonary:      Effort: Pulmonary effort is normal.   Abdominal:      General: Abdomen is flat.      Palpations: Abdomen is soft.      Tenderness: There is no abdominal tenderness.   Musculoskeletal:         General: Normal range of motion.      Cervical back: Normal range of motion and neck supple.   Skin:     General: Skin is warm and dry.   Neurological:       General: No focal deficit present.      Mental Status: She is alert.   Psychiatric:         Mood and Affect: Mood normal.         Behavior: Behavior normal.       Vents:  Vent Mode: A/C (12/30/22 1528)  Ventilator Initiated: Yes (12/29/22 0144)  Set Rate: 12 BPM (12/30/22 1528)  Vt Set: 350 mL (12/30/22 0722)  Pressure Support: 14 cmH20 (12/30/22 1139)  PEEP/CPAP: 6 cmH20 (12/30/22 1528)  Oxygen Concentration (%): 55 (01/03/23 0725)  Peak Airway Pressure: 22 cmH20 (12/30/22 1528)  Plateau Pressure: 20 cmH20 (12/30/22 1528)  Total Ve: 9.73 L/m (12/30/22 1528)  Negative Inspiratory Force (cm H2O): 0 (12/30/22 1528)  F/VT Ratio<105 (RSBI): (!) 58.05 (12/30/22 1528)  Lines/Drains/Airways       Central Venous Catheter Line  Duration             Percutaneous Central Line Insertion/Assessment - Triple Lumen  12/29/22 0043 right internal jugular 5 days              Drain  Duration                  Urethral Catheter 12/27/22 1535 6 days         Rectal Tube 12/29/22 0332 rectal tube w/ balloon (indicate number of mLs) 45 Fr. 5 days              Arterial Line  Duration             Arterial Line 12/29/22 0219 Left Radial 5 days              Peripheral Intravenous Line  Duration                  Peripheral IV - Single Lumen 12/28/22 1545 18 G Left Antecubital 5 days         Peripheral IV - Single Lumen 12/28/22 1755 20 G;1 3/4 in Anterior;Right Upper Arm 5 days                  Significant Labs:    CBC/Anemia Profile:  Recent Labs   Lab 01/02/23  0338 01/03/23  0452   WBC 25.26* 26.77*   HGB 9.6* 9.4*   HCT 31.0* 31.3*    396   MCV 83 87   RDW 13.7 14.1        Chemistries:  Recent Labs   Lab 01/01/23  1658 01/02/23  0338 01/02/23  1843 01/03/23  0452    143 146*  --    K 3.5 3.4* 3.4*  --    CL 99 98 101  --    CO2 33* 33* 35*  --    BUN 9 12 16  --    CREATININE 0.6 0.6 0.6  --    CALCIUM 9.2 9.1 9.4  --    MG  --  2.2  --  2.2   PHOS  --  3.4  --  2.3*       All pertinent labs within the past 24 hours have been  reviewed.    Significant Imaging:  I have reviewed all pertinent imaging results/findings within the past 24 hours.

## 2023-01-03 NOTE — ASSESSMENT & PLAN NOTE
72-year-old female with HTN, HLD, hypothyroidism, 3 falls in 2022 (1 in February, last in April) with concussion is in the ICU for hypoxic respiratory failure extubated and on BIPAP, placed on vancomycin, pip-tazo, azithromycin, and corticorsteroids.  CT C/A/P without contrast revealed bilateral small pleural effusions and atelectasis, right hip posterior abscess in the soft tissue, stercoral colitis.  MRI c-spine shows cervical stenosis.  12/29/22 IR aspiration right hip abscess yielding 20cc's clear synovial fluid, noting resolution of collection afterward.  Abscess cultures currently growing E. Coli.  Of note, patient developed leukocytosis 12/31/22-01/01/23.    Worsened leukocytosis possibly multifactori; corticosteroids superimposed over right hip abscess/fluid collection growing E. Coli (curiously, cell count of fluid is 0), stercoral colitis, LLL pneumonia.    Recommendations:  -Continue pip-tazo for now  -Start linezolid to broaden MRSA coverage with lung penetration  -Would consult ortho regarding right hip abscess/fluid collection if further intervention necessary  -Dc galvez and replace with external female catheter if able  -Check UA with reflex to urina culture  -respiratory culture

## 2023-01-03 NOTE — ASSESSMENT & PLAN NOTE
CT scan showing possible hematoma vs abscess around right hip. Aspiration done with IR. Culture with e.coli, susceptibilities pending.   -continue zosyn  -ID consulted, appreciate recs  -US soft tissue of lower extremities - No fluid collection or other focal sonographic abnormality.

## 2023-01-03 NOTE — PROGRESS NOTES
[facility]  Infectious Disease  Progress Note    Patient Name: Dominique Mcgee  MRN: [id number]  Admission Date: 12/23/2022  Length of Stay: 11 days  Attending Physician: Nathan Loaiza MD  Primary Care Provider: Briana Leblanc MD    Isolation Status: No active isolations  Assessment/Plan:      Leukocytosis  72-year-old female with HTN, HLD, hypothyroidism, 3 falls in 2022 (1 in February, last in April) with concussion is in the ICU for hypoxic respiratory failure extubated and on BIPAP, placed on vancomycin, pip-tazo, azithromycin, and corticorsteroids.  CT C/A/P without contrast revealed bilateral small pleural effusions and atelectasis, right hip posterior abscess in the soft tissue, stercoral colitis.  MRI c-spine shows cervical stenosis.  12/29/22 IR aspiration right hip abscess yielding 20cc's clear synovial fluid, noting resolution of collection afterward.  Abscess cultures currently growing E. Coli.  Of note, patient developed leukocytosis 12/31/22-01/01/23.    Worsened leukocytosis possibly multifactori; corticosteroids superimposed over right hip abscess/fluid collection growing E. Coli (curiously, cell count of fluid is 0), stercoral colitis, LLL pneumonia.    Recommendations:  -Continue pip-tazo for now  -Start linezolid to broaden MRSA coverage with lung penetration  -Would consult ortho regarding right hip abscess/fluid collection if further intervention necessary  -Dc galvez and replace with external female catheter if able  -Check UA with reflex to urina culture  -respiratory culture            Thank you for your consult. I will follow-up with patient. Please contact us if you have any additional questions.    Jose Olsen MD  Infectious Disease  [facility] - [facility]    Subjective:     Principal Problem:Shortness of breath    HPI: 72-year-old female with HTN, HLD, hypothyroidism, 3 falls in 2022 (1 in February, last in April) with concussion presents to the ED with 3  weeks of weakness, dyspnea on exertion, anorexia.  Admitted with hypoxic respiratory failure requiring NC, subsequently intubated 12/28/22 for airway protection and hypoxia.  Vancomycin, pip-tazo, azithromycin, and corticorsteroids started.  CT C/A/P without contrast revealed bilateral small pleural effusions and atelectasis, right hip posterior abscess in the soft tissue, stercoral colitis.  MRI brain no acute intracranial abnormality, right mastoid effusion.  MRI c-spine shows cervical stenosis.  12/29/22 IR aspiration right hip abscess yielding 20cc's clear serous fluid, noting resolution of collection afterward.  Abscess cultures currently growing E. Coli.  Of note, patient developed leukocytosis 12/31/22-01/01/23, now up to 25 at time of initial ID consult.      Consult: 72 F w/ R hip abscess w/ e. coli, leukocytosis despite abx, would like Antibiotic recs. Ordered CTA Chest        Interval History: Afebrile, ongoing leukocytosis.  Patient reporting suprapubic pain.  CT chest with LLL consolidation; right posterior hip fluid collection still present.    Review of Systems   Constitutional:  Negative for chills and fever.   HENT:  Negative for sore throat.    Respiratory:  Positive for shortness of breath.    Gastrointestinal:  Negative for diarrhea, nausea and vomiting.   Genitourinary:  Positive for pelvic pain. Negative for dysuria and flank pain.   Musculoskeletal:  Negative for arthralgias.   Skin:  Negative for wound.   Neurological:  Negative for headaches.   Objective:     Vital Signs (Most Recent):  Temp: 97.9 °F (36.6 °C) (01/03/23 1100)  Pulse: (!) 121 (01/03/23 1200)  Resp: (!) 34 (01/03/23 1200)  BP: 135/63 (01/03/23 1200)  SpO2: 97 % (01/03/23 1200)   Vital Signs (24h Range):  Temp:  [97.3 °F (36.3 °C)-98.8 °F (37.1 °C)] 97.9 °F (36.6 °C)  Pulse:  [100-150] 121  Resp:  [8-47] 34  SpO2:  [82 %-100 %] 97 %  BP: (106-184)/() 135/63  Arterial Line BP: ()/(41-78) 124/56     Weight: 59.4 kg  (130 lb 15.3 oz)  Body mass index is 22.48 kg/m².    Estimated Creatinine Clearance: 90.4 mL/min (based on SCr of 0.5 mg/dL).    Physical Exam  Vitals reviewed.   Constitutional:       General: She is not in acute distress.     Appearance: She is normal weight. She is ill-appearing. She is not toxic-appearing or diaphoretic.   HENT:      Head: Normocephalic and atraumatic.      Right Ear: External ear normal.      Left Ear: External ear normal.   Eyes:      General: No scleral icterus.        Right eye: No discharge.         Left eye: No discharge.      Extraocular Movements: Extraocular movements intact.      Conjunctiva/sclera: Conjunctivae normal.   Cardiovascular:      Rate and Rhythm: Regular rhythm. Tachycardia present.   Pulmonary:      Effort: Pulmonary effort is normal.      Comments: -BIPAP  Abdominal:      General: Abdomen is flat. There is no distension.      Palpations: Abdomen is soft.      Tenderness: There is abdominal tenderness.      Comments: -suprapubic tenderness   Musculoskeletal:         General: No swelling, tenderness, deformity or signs of injury. Normal range of motion.      Cervical back: Normal range of motion.      Right lower leg: No edema.      Left lower leg: No edema.      Comments: Bilateral hips nontender on palpation   Skin:     General: Skin is warm and dry.      Findings: No lesion or rash.   Neurological:      Mental Status: She is alert and oriented to person, place, and time. Mental status is at baseline.   Psychiatric:         Mood and Affect: Mood normal.         Behavior: Behavior normal.         Thought Content: Thought content normal.         Judgment: Judgment normal.       Significant Labs: All pertinent labs within the past 24 hours have been reviewed.    Significant Imaging: I have reviewed all pertinent imaging results/findings within the past 24 hours.

## 2023-01-03 NOTE — PLAN OF CARE
Problem: Adult Inpatient Plan of Care  Goal: Optimal Comfort and Wellbeing  Outcome: Ongoing, Progressing  Intervention: Monitor Pain and Promote Comfort  Flowsheets (Taken 1/2/2023 6959)  Pain Management Interventions:   breathing exercises utilized   care clustered

## 2023-01-03 NOTE — PLAN OF CARE
Patient seen and examined in conjunction with resident team. I have reviewed note and pertinent laboratory and imaging studies. I agree with findings including diagnostic interpretation and medical decision making as written.     ASSESSMENT:  Acute hypoxemic respiratory failure- Persistent need for NIV post extubation 12/31(1 day duration). Dyspnea reportedly precedes hospitalization by 2-3 weeks with prior independent function.  Chest imaging notable for basilar atelectatic changes. On going antimicrobials to cover for potential PNA (Zosyn day #7 + Azith 500mg x 3days). Neurosurgery and neurology do not feel this is neuromuscular in origin. Myasthenia panel pending. Echo from 12/25 only notable for mild PAP elevation (38).. No WMA.    Weakness- Generalized. Multiple falls/balance issues prior to hospitalization- Exam non-focal. MRI/CT brain normal.   Spinal stenosis- Multilevel. 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 No neurosurgical intervention planned.   Sepsis- Persistent leukocytosis. Multiple potential sources including right hip abscess/fluid collection (+E.coli), stercoral colitis (resolved on repeat CT) + ?? PNA. ID following.   HTN- remains on cardene Gtt.   Hypothyroidism- TSH 1.39   Renal aa. Stenosis. Cr stable. Not oliguric.   Hyponatremia- Improved. 149 this AM.   Anemia- No overt blood loss. Hb stable.     PLAN:   Alternate NIV with NC- Attempt NIF today. Etiology of ongoing NIV dependency unclear despite extensive evaluation to date.    Continue zosyn. Adding Linezolid today. Repeat cultures pending. Appreciate ID input.   Initiate PO anti-HTN. Wean cardene   Ortho to re-evaluate hip given persistent leukocytosis   Appreciate neurology/Nsx input. Await myasthenia panel for completeness.   Synthroid   Hb transfusion threshold < 7   PT/OT eval.   Lovenox PPx     Updated patients daughter at bedside.       Critical Care Time: 50 minutes  Critical care was  time spent personally by me on the following activities: evaluating this patient's organ dysfunction, development of treatment plan, discussing treatment plan with patient or surrogate and bedside caregivers, discussions with consultants, evaluation of patient's response to treatment, examination of patient, ordering and performing treatments and interventions, ordering and review of laboratory studies, ordering and review of radiographic studies, re-evaluation of patient's condition. This critical care time did not overlap with that of any other provider or involve time for any procedures.

## 2023-01-03 NOTE — PT/OT/SLP PROGRESS
Physical Therapy      Patient Name:  Dominique Mcege   MRN:  [id number]    Patient not seen today secondary to  (RN hold). Will follow-up tomorrow as appropriate.

## 2023-01-04 PROBLEM — E87.0 HYPERNATREMIA: Status: ACTIVE | Noted: 2023-01-01

## 2023-01-04 NOTE — PT/OT/SLP PROGRESS
Occupational Therapy      Patient Name:  Dominique Mcgee   MRN:  [id number]    Patient not seen today secondary to nurse hold due to respiratory status. Will follow-up .    1/4/2023

## 2023-01-04 NOTE — PROGRESS NOTES
[facility]  Critical Care Medicine  Progress Note    Patient Name: Dominique Mcgee  MRN: [id number]  Admission Date: 12/23/2022  Hospital Length of Stay: 12 days  Code Status: Full Code  Attending Provider: Jeromy Dotson MD  Primary Care Provider: Briana Leblanc MD   Principal Problem: Shortness of breath    Subjective:     HPI:  Ms. Mcgee is a 71 y/o F patient with HTN, HLD, hypothyroidism, recent URI 3 weeks prior to admission, balance difficulites who presented to the ED on 12/23 for 3 weeks of weakness, LIRA, decreased appetite, and constipation with intermittent lower abdominal cramping. Per daughter, patient is typically independent, lives alone, cooks her own meals, since she has been ill she has had decreased PO intake and drinking coffee, tea, and eating cereal. She has had progressive worsening HTN in the last 3 weeks and was started on losartan-HCTZ and metoprolol. Prior to this she was only taking lisinopril per daughter. Admitted for HTN emergency and was found to have Na 120. Received IV fluids and admitted to hospital medicine. For her hypernatremia, her thiazide was held and she was placed on fluid restriction. Urine studies from 12/25 showed Uosm of 802 and HANH of 109. Her BP has been labile since admission requiring both IV antihypertensives as well as fluid boluses. The patient's hyponatremia persisted despite fluid restriction and she became increasingly lethargic and weak so CCM was consulted for further management.      Upon evaluation, patient is somnolent. Opens eyes to voice, unable to engage in full conversation. Speaks mainly single words. Denies thirst. Reports SOB and generalized abdominal pain a/w constipation. No CP, N/V/D, dysuria, edema. Most recent vitals: /60, pulse 82, R 18, SpO2 94% on RA. Na trend 119->115->117.       Hospital/ICU Course:  Once transferred to ICU patient became hypotensive requiring multiple pressors. Patient intubated for airway  protection. Art line and central line placed. Sodium improved with hypertonic saline. CT abdomen/pelvis showing proctitis and hip abscess. Went for IR aspiration of abscess. Culture showing e.coli, on zosyn. MRI brain non-concerning. MRI spine with severe cervical stenosis and cord compression, also with mild lumbar stenosis. Repeat CXR with increased LLL consolidation and pleural effusion. Extubated to bipap. Once extubated and off pressors patient began to re-experience labile blood pressures similar to her initial presentation. Started on cardene drip. Difficulty weaning off BIPAP due to inadequate volumes and persistent shortness of breath.       Interval History/Significant Events: Tachycardic episodes with baseline in the 120's on night of 1/02. Tolerated BIPAP overnight. Was put on high flow nasal cannula this morning.    Review of Systems   Unable to perform ROS: Patient nonverbal   Objective:     Vital Signs (Most Recent):  Temp: 97.3 °F (36.3 °C) (01/03/23 0700)  Pulse: 105 (01/03/23 0725)  Resp: 17 (01/03/23 0725)  BP: (!) 106/53 (01/03/23 0700)  SpO2: 100 % (01/03/23 0725)   Vital Signs (24h Range):  Temp:  [97.3 °F (36.3 °C)-98.8 °F (37.1 °C)] 97.3 °F (36.3 °C)  Pulse:  [100-150] 105  Resp:  [8-39] 17  SpO2:  [82 %-100 %] 100 %  BP: (106-174)/() 106/53  Arterial Line BP: ()/(41-85) 128/60   Weight: 59.4 kg (130 lb 15.3 oz)  Body mass index is 22.48 kg/m².      Intake/Output Summary (Last 24 hours) at 1/3/2023 0812  Last data filed at 1/3/2023 0800  Gross per 24 hour   Intake 922.33 ml   Output 1075 ml   Net -152.67 ml       Physical Exam  Vitals and nursing note reviewed.   Constitutional:       Appearance: Normal appearance.   HENT:      Head: Normocephalic and atraumatic.      Right Ear: External ear normal.      Left Ear: External ear normal.      Nose: Nose normal.      Comments: High flow NC in place     Mouth/Throat:      Mouth: Mucous membranes are dry.   Eyes:      Extraocular  Movements: Extraocular movements intact.      Conjunctiva/sclera: Conjunctivae normal.      Pupils: Pupils are equal, round, and reactive to light.   Cardiovascular:      Rate and Rhythm: Regular rhythm. Tachycardia present.      Pulses: Normal pulses.      Heart sounds: Normal heart sounds.   Pulmonary:      Effort: Pulmonary effort is normal.   Abdominal:      General: Abdomen is flat.      Palpations: Abdomen is soft.      Tenderness: There is no abdominal tenderness.   Musculoskeletal:         General: Normal range of motion.      Cervical back: Normal range of motion and neck supple.   Skin:     General: Skin is warm and dry.   Neurological:      General: No focal deficit present.      Mental Status: She is alert.   Psychiatric:         Mood and Affect: Mood normal.         Behavior: Behavior normal.       Vents:  Vent Mode: A/C (12/30/22 1528)  Ventilator Initiated: Yes (12/29/22 0144)  Set Rate: 12 BPM (12/30/22 1528)  Vt Set: 350 mL (12/30/22 0722)  Pressure Support: 14 cmH20 (12/30/22 1139)  PEEP/CPAP: 6 cmH20 (12/30/22 1528)  Oxygen Concentration (%): 55 (01/03/23 0725)  Peak Airway Pressure: 22 cmH20 (12/30/22 1528)  Plateau Pressure: 20 cmH20 (12/30/22 1528)  Total Ve: 9.73 L/m (12/30/22 1528)  Negative Inspiratory Force (cm H2O): 0 (12/30/22 1528)  F/VT Ratio<105 (RSBI): (!) 58.05 (12/30/22 1528)  Lines/Drains/Airways       Central Venous Catheter Line  Duration             Percutaneous Central Line Insertion/Assessment - Triple Lumen  12/29/22 0043 right internal jugular 5 days              Drain  Duration                  Urethral Catheter 12/27/22 1535 6 days         Rectal Tube 12/29/22 0332 rectal tube w/ balloon (indicate number of mLs) 45 Fr. 5 days              Arterial Line  Duration             Arterial Line 12/29/22 0219 Left Radial 5 days              Peripheral Intravenous Line  Duration                  Peripheral IV - Single Lumen 12/28/22 1545 18 G Left Antecubital 5 days          Peripheral IV - Single Lumen 12/28/22 1755 20 G;1 3/4 in Anterior;Right Upper Arm 5 days                  Significant Labs:    CBC/Anemia Profile:  Recent Labs   Lab 01/02/23  0338 01/03/23  0452   WBC 25.26* 26.77*   HGB 9.6* 9.4*   HCT 31.0* 31.3*    396   MCV 83 87   RDW 13.7 14.1        Chemistries:  Recent Labs   Lab 01/01/23  1658 01/02/23  0338 01/02/23  1843 01/03/23  0452    143 146*  --    K 3.5 3.4* 3.4*  --    CL 99 98 101  --    CO2 33* 33* 35*  --    BUN 9 12 16  --    CREATININE 0.6 0.6 0.6  --    CALCIUM 9.2 9.1 9.4  --    MG  --  2.2  --  2.2   PHOS  --  3.4  --  2.3*       All pertinent labs within the past 24 hours have been reviewed.    Significant Imaging:  I have reviewed all pertinent imaging results/findings within the past 24 hours.      ABG  Recent Labs   Lab 01/02/23  1208   PH 7.446   PO2 107*   PCO2 51.7*   HCO3 35.6*   BE 12     Assessment/Plan:     Neuro  Stenosis, cervical spine  Noted on MRI spine. Of note patient has been experiencing episodes of vertigo and decreased balance which could be related.   -NSGY consulted, appreciate recs.  -amb ref to NSGY outpatient  -if need for reintubation should be done with fiberoptic or glidescope    Pulmonary  * Shortness of breath  Patient presented with approx 3 weeks of worsening shortness of breath. On transfer to ICU required intubation. Was extubated to BIPAP. Have been unable to wean off bipap due to persistent shortness of breath, tachypnea, and inadequate volumes. Trial off bipap results in tachypnea, tachycardia, and hypertension. Unclear etiology at this time. Does not appear pulmonary in nature as patient is saturating well, ABGs have been wnl. MRI C-spine showed severe cervical stenosis, NSGY was consulted, unlikely that the stenosis would be causing these issues. Concern that difficulty breathing could be related to diaphragm weakness. Critical illness myopathy seems unlikely as patient was not intubated for long  duration.  -neurology consulted, appreciate recs  -trial of ativan    Acute hypoxemic respiratory failure  Patient with Hypoxic Respiratory failure which is Acute.  she is not on home oxygen. Supplemental oxygen was provided and noted- Oxygen Concentration (%):  [40-55] 55.   Signs/symptoms of respiratory failure include- tachypnea, increased work of breathing and respiratory distress. Contributing diagnoses includes - Pleural effusion and atelectasis Labs and images were reviewed. Patient Has recent ABG, which has been reviewed. Will treat underlying causes and adjust management of respiratory failure as follows- unclear etiology of pleural effusions, patient notes recent URI 3 weeks ago that has left her short of breath since then, on broad spectrum abx  Repeat CXR with increased consolidation in LLL  Extubated to Bipap    1/3:    Pt tolerated BIPAP overnight. Currently transitioned to high flow nasal cannula this morning (1/3).   CT Chest - Bibasilar atelectatic/consolidative change (left greater than right) with patchy right basilar opacities and small bilateral pleural effusions.  Correlation for underlying infectious process advised.    Continue Zoysn  Sputum cultures    Cardiac/Vascular  Right renal artery stenosis  -no acute intervention while in ICU, likely etiology for fluctuating blood pressure    Hyperlipemia  Continue home statin    Essential hypertension  Known history of uncontrolled HTN, w/ labile BP since admit.   -BP meds held in setting of septic shock  -will avoid IV meds if possible as has had hypotension following IV BP meds  -renal artery duplex showing R renal artery stenosis, L renal artery unable to be visualized  -Will monitor and add back meds as tolerated, cardene drip while unable to tolerate PO  - Will order swallow study since patient is currently on high flow NC    Dyspnea on exertion  Began prior to admission. Has been requiring 2L via NC to maintain oxygenation  -O2 via NC  PRN    Renal/  Metabolic alkalosis  Initially could have been related to diuretic use. Bicarb remains elevated after stopping HCTZ.    Endocrine  Moderate malnutrition  Nutrition consulted. Most recent weight and BMI monitored-          Malnutrition (Moderate to Severe)  Energy Intake (Malnutrition): less than 75% for greater than 7 days              Measurements:  Wt Readings from Last 1 Encounters:   12/30/22 59.4 kg (130 lb 15.3 oz)   Body mass index is 22.48 kg/m².    Recommendations: Recommendation/Intervention: 1.  Goals: Meet % EEN, EPN by RD f/u date    Patient has been screened and assessed by RD. RD will follow patient.      GI  Proctitis  CT scan with findings of stercoral proctitis.   -continue bowel regimen  -on antibiotics    Generalized abdominal pain  Patient with constipation and ileus. CT scan consistent with ileus and stercoral proctitis which is likely related to chronic constipation.    Constipation  Continue miralax BID and senna-doc daily    Orthopedic  Abscess of bursa of right hip  CT scan showing possible hematoma vs abscess around right hip. Aspiration done with IR. Culture with e.coli, susceptibilities pending.   -continue zosyn  -ID consulted, appreciate recs  -US soft tissue of lower extremities - No fluid collection or other focal sonographic abnormality.    Other  Hypothermia  Considering new onset will check BCx and start zosyn. Using bear hugger for warming.    Generalized weakness  See hypernatremia.       Critical Care Daily Checklist:    A: Awake: RASS Goal/Actual Goal:    Actual: Manning Agitation Sedation Scale (RASS): Alert and calm   B: Spontaneous Breathing Trial Performed? Spon. Breathing Trial Initiated?: Not initiated (12/29/22 0729)   C: SAT & SBT Coordinated?  na                      D: Delirium: CAM-ICU Overall CAM-ICU: Negative   E: Early Mobility Performed? No   F: Feeding Goal: Goals: Meet % EEN, EPN by RD f/u date  Status: Nutrition Goal Status: new    Current Diet Order   Procedures    Diet NPO      AS: Analgesia/Sedation na   T: Thromboembolic Prophylaxis lovenox   H: HOB > 300 No   U: Stress Ulcer Prophylaxis (if needed) Protonix   G: Glucose Control SSI   B: Bowel Function Stool Occurrence: 1   I: Indwelling Catheter (Lines & Rush) Necessity CVC - Right IJ, Rush   D: De-escalation of Antimicrobials/Pharmacotherapies na    Plan for the day/ETD 7 days     Code Status:  Family/Goals of Care: Full Code            Critical care was time spent personally by me on the following activities: development of treatment plan with patient or surrogate and bedside caregivers, discussions with consultants, evaluation of patient's response to treatment, examination of patient, ordering and performing treatments and interventions, ordering and review of laboratory studies, ordering and review of radiographic studies, pulse oximetry, re-evaluation of patient's condition. This critical care time did not overlap with that of any other provider or involve time for any procedures.     Nicole Drew MD  Critical Care Medicine  [facility] - Cardiac Medical ICU

## 2023-01-04 NOTE — ASSESSMENT & PLAN NOTE
Nutrition consulted. Most recent weight and BMI monitored-   Plan to start TPN due to inability to place NGT       Malnutrition (Moderate to Severe)  Energy Intake (Malnutrition): less than 75% for greater than 7 days              Measurements:  Wt Readings from Last 1 Encounters:   12/30/22 59.4 kg (130 lb 15.3 oz)   Body mass index is 22.48 kg/m².    Recommendations: Recommendation/Intervention: 1.  Goals: Meet % EEN, EPN by RD f/u date    Patient has been screened and assessed by RD. RD will follow patient.

## 2023-01-04 NOTE — ASSESSMENT & PLAN NOTE
CT scan showing possible hematoma vs abscess around right hip. Aspiration done with IR. Culture with e.coli pan-sensitive. Repeat CT showing fluid has reaccumulated.   -ID following  -Has been on zosyn, switch to cefepime per ID  -ortho consulted

## 2023-01-04 NOTE — PLAN OF CARE
CMICU DAILY GOALS       A: Awake    RASS: Goal -    Actual - RASS (Manning Agitation-Sedation Scale): 0-->alert and calm   Restraint necessity: Clinical Justification: Removing medical devices  B: Breathe   SBT: Not intubated   C: Coordinate A & B, analgesics/sedatives   Pain: managed    SAT: Not intubated  D: Delirium   CAM-ICU: Overall CAM-ICU: Negative  E: Early(intubated/ Progressive (non-intubated) Mobility   MOVE Screen: Fail   Activity: Activity Management: Arm raise - L1  FAS: Feeding/Nutrition   Diet order: Diet/Nutrition Received: NPO,    T: Thrombus   DVT prophylaxis: VTE Required Core Measure: Pharmacological prophylaxis initiated/maintained  H: HOB Elevation   Head of Bed (HOB) Positioning: HOB at 30-45 degrees  U: Ulcer Prophylaxis   GI: yes  G: Glucose control   managed Glycemic Management: blood glucose monitored  S: Skin   Bathing/Skin Care: bath, partial, dressed/undressed, incontinence care  Device Skin Pressure Protection: absorbent pad utilized/changed, adhesive use limited, positioning supports utilized, pressure points protected  Pressure Reduction Devices: foam padding utilized, heel offloading device utilized, positioning supports utilized, pressure-redistributing mattress utilized  Pressure Reduction Techniques: pressure points protected, heels elevated off bed, weight shift assistance provided, frequent weight shift encouraged  Skin Protection: adhesive use limited, incontinence pads utilized, silicone foam dressing in place, transparent dressing maintained  B: Bowel Function   diarrhea   I: Indwelling Catheters   Rush necessity: [REMOVED]      Urethral Catheter 12/27/22 1535-Reason for Continuing Urinary Catheterization: Critically ill in ICU and requiring hourly monitoring of intake/output       Urethral Catheter 01/04/23 0009 Non-latex 16 Fr.-Reason for Continuing Urinary Catheterization: Urinary retention   CVC necessity: Yes  D: De-escalation Antibiotics   No    Family/Goals of  care/Code Status   Code Status: Full Code    24H Vital Sign Range  Temp:  [97.4 °F (36.3 °C)-98.8 °F (37.1 °C)]   Pulse:  []   Resp:  [14-44]   BP: (107-166)/(51-93)   SpO2:  [83 %-100 %]      Shift Events   No acute events throughout shift. Failed attempt at NGT for nutrition, pt didn't tolerate being off bipap for placement. Orders for TPN to start tonight.     VS and assessment per flow sheet, patient progressing towards goals as tolerated, plan of care reviewed with family, all concerns addressed, will continue to monitor.    Nanda Armstrong

## 2023-01-04 NOTE — PT/OT/SLP PROGRESS
Physical Therapy      Patient Name:  Dominique Mcgee   MRN:  [id number]    Patient not seen today secondary to  (RN hold in AM and requested PT return later in PM. Pt still a hold in the afternoon. PT to f/u tomorrow as appropriate.).

## 2023-01-04 NOTE — ASSESSMENT & PLAN NOTE
72-year-old female with HTN, HLD, hypothyroidism, 3 falls in 2022 (1 in February, last in April) with concussion is in the ICU for hypoxic respiratory failure extubated and on BIPAP, placed on vancomycin, pip-tazo, azithromycin, and corticorsteroids.  CT C/A/P without contrast revealed bilateral small pleural effusions and atelectasis, right hip posterior abscess in the soft tissue, stercoral colitis.  MRI c-spine shows cervical stenosis.  12/29/22 IR aspiration right hip abscess yielding 20cc's clear synovial fluid, noting resolution of collection afterward.  Abscess cultures currently growing E. Coli.  Of note, patient developed leukocytosis 12/31/22-01/01/23.    Leukocytosis was possibly multifactorial; corticosteroids (now d/c'd), right hip abscess/fluid collection growing E. Coli (curiously, cell count of fluid is 0), stercoral colitis (resolved), LLL pneumonia?  UA 1/3/23 not consistent with UTI.    Recommendations:  -Continue pip-tazo, linezolid  -Would consult ortho regarding right hip abscess/fluid collection if further intervention necessary

## 2023-01-04 NOTE — ASSESSMENT & PLAN NOTE
Patient presented with approx 3 weeks of worsening shortness of breath, weakness, and falls. On transfer to ICU required intubation. Was extubated to BIPAP. Have been unable to wean off bipap due to persistent shortness of breath, tachypnea, and inadequate volumes. Trial off bipap results in tachypnea, tachycardia, and hypertension. Appears weak distally and with fine motor skills. Unclear etiology at this time. Does not appear pulmonary in nature as patient is saturating well, ABGs have been wnl. MRI C-spine showed severe cervical stenosis, NSGY was consulted, unlikely that the stenosis would be causing these issues. Concern that difficulty breathing could be related to diaphragm weakness. Critical illness myopathy seems unlikely as patient was not intubated for long duration.  -neurology consulted, myasthenia panel sent  -further labs ordered (ESR, CRP, DARLINE, myositis panel)  -continue alternating bipap and high flow as tolerated  -palliative care consulted

## 2023-01-04 NOTE — SUBJECTIVE & OBJECTIVE
Interval History: receiving chest physiotherapy to clear mucus.  Leukocytosis improved.    Review of Systems   Constitutional:  Negative for chills and fever.   HENT:  Negative for sore throat.    Respiratory:  Positive for shortness of breath.    Gastrointestinal:  Negative for diarrhea, nausea and vomiting.   Genitourinary:  Positive for pelvic pain. Negative for dysuria and flank pain.   Musculoskeletal:  Negative for arthralgias.   Skin:  Negative for wound.   Neurological:  Negative for headaches.   Objective:     Vital Signs (Most Recent):  Temp: 97.8 °F (36.6 °C) (01/04/23 0701)  Pulse: 80 (01/04/23 0800)  Resp: 18 (01/04/23 0800)  BP: 139/63 (01/04/23 0800)  SpO2: 100 % (01/04/23 0800) Vital Signs (24h Range):  Temp:  [97.4 °F (36.3 °C)-98.5 °F (36.9 °C)] 97.8 °F (36.6 °C)  Pulse:  [] 80  Resp:  [14-47] 18  SpO2:  [96 %-100 %] 100 %  BP: (107-179)/(51-91) 139/63  Arterial Line BP: (124-160)/(56-77) 129/61     Weight: 59.4 kg (130 lb 15.3 oz)  Body mass index is 22.48 kg/m².    Estimated Creatinine Clearance: 75.3 mL/min (based on SCr of 0.6 mg/dL).    Physical Exam  Vitals reviewed.   Constitutional:       General: She is not in acute distress.     Appearance: She is normal weight. She is ill-appearing. She is not toxic-appearing or diaphoretic.   HENT:      Head: Normocephalic and atraumatic.      Right Ear: External ear normal.      Left Ear: External ear normal.   Eyes:      General: No scleral icterus.        Right eye: No discharge.         Left eye: No discharge.      Extraocular Movements: Extraocular movements intact.      Conjunctiva/sclera: Conjunctivae normal.   Cardiovascular:      Rate and Rhythm: Regular rhythm. Tachycardia present.   Pulmonary:      Effort: Pulmonary effort is normal.      Comments: -BIPAP  Abdominal:      General: Abdomen is flat. There is no distension.      Palpations: Abdomen is soft.      Tenderness: There is abdominal tenderness.      Comments: -suprapubic  tenderness   Musculoskeletal:         General: No swelling, tenderness, deformity or signs of injury. Normal range of motion.      Cervical back: Normal range of motion.      Right lower leg: No edema.      Left lower leg: No edema.      Comments: Bilateral hips nontender on palpation   Skin:     General: Skin is warm and dry.      Findings: No lesion or rash.   Neurological:      Mental Status: She is alert and oriented to person, place, and time. Mental status is at baseline.   Psychiatric:         Mood and Affect: Mood normal.         Behavior: Behavior normal.         Thought Content: Thought content normal.         Judgment: Judgment normal.       Significant Labs: All pertinent labs within the past 24 hours have been reviewed.    Significant Imaging: I have reviewed all pertinent imaging results/findings within the past 24 hours.

## 2023-01-04 NOTE — ASSESSMENT & PLAN NOTE
Na 146.  Presented hyponatremic with Na of 119, received IVF with subsequent normalization of Na. Recently Na up trending and now hypernatremic.    - continue D5W at 50 cc/hr until Na normalizes  - encourage PO intake  - no nephrology intervention recommended at this time

## 2023-01-04 NOTE — ASSESSMENT & PLAN NOTE
Patient with Hypoxic Respiratory failure which is Acute.  she is not on home oxygen. Supplemental oxygen was provided and noted- Oxygen Concentration (%):  [30-60] 30.   Signs/symptoms of respiratory failure include- tachypnea, increased work of breathing and respiratory distress. Contributing diagnoses includes - Pleural effusion and atelectasis Labs and images were reviewed. Patient Has recent ABG, which has been reviewed. Will treat underlying causes and adjust management of respiratory failure as follows- unclear etiology of pleural effusions, patient notes recent URI 3 weeks ago that has left her short of breath since then, on broad spectrum abx  Repeat CXR with increased consolidation in LLL  Extubated to Bipap    1/3: CT Chest - Bibasilar atelectatic/consolidative change (left greater than right) with patchy right basilar opacities and small bilateral pleural effusions.  Correlation for underlying infectious process advised.     ID following, will start linezolid to treat for PNA to end 1/9

## 2023-01-04 NOTE — ASSESSMENT & PLAN NOTE
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 Appears to have resolved on repeat CT.  -continue bowel regimen

## 2023-01-04 NOTE — PROGRESS NOTES
[facility]  Critical Care Medicine  Progress Note    Patient Name: Dominique Mcgee  MRN: [id number]  Admission Date: 12/23/2022  Hospital Length of Stay: 12 days  Code Status: Full Code  Attending Provider: Jeromy Dotson MD  Primary Care Provider: Briana Leblanc MD   Principal Problem: Shortness of breath    Subjective:     HPI:  Ms. Mcgee is a 73 y/o F patient with HTN, HLD, hypothyroidism, recent URI 3 weeks prior to admission, balance difficulites who presented to the ED on 12/23 for 3 weeks of weakness, LIRA, decreased appetite, and constipation with intermittent lower abdominal cramping. Per daughter, patient is typically independent, lives alone, cooks her own meals, since she has been ill she has had decreased PO intake and drinking coffee, tea, and eating cereal. She has had progressive worsening HTN in the last 3 weeks and was started on losartan-HCTZ and metoprolol. Prior to this she was only taking lisinopril per daughter. Admitted for HTN emergency and was found to have Na 120. Received IV fluids and admitted to hospital medicine. For her hypernatremia, her thiazide was held and she was placed on fluid restriction. Urine studies from 12/25 showed Uosm of 802 and HANH of 109. Her BP has been labile since admission requiring both IV antihypertensives as well as fluid boluses. The patient's hyponatremia persisted despite fluid restriction and she became increasingly lethargic and weak so CCM was consulted for further management.      Upon evaluation, patient is somnolent. Opens eyes to voice, unable to engage in full conversation. Speaks mainly single words. Denies thirst. Reports SOB and generalized abdominal pain a/w constipation. No CP, N/V/D, dysuria, edema. Most recent vitals: /60, pulse 82, R 18, SpO2 94% on RA. Na trend 119->115->117.       Hospital/ICU Course:  Once transferred to ICU patient became hypotensive requiring multiple pressors. Patient intubated for airway  protection. Art line and central line placed. Sodium improved with hypertonic saline. CT abdomen/pelvis showing proctitis and hip abscess. Went for IR aspiration of abscess. Culture showing e.coli, on zosyn. MRI brain non-concerning. MRI spine with severe cervical stenosis and cord compression, also with mild lumbar stenosis. NSGY consulted with plans for outpatient follow-up. Repeat CXR with increased LLL consolidation and pleural effusion. Extubated to bipap. Once extubated and off pressors patient began to re-experience labile blood pressures similar to her initial presentation. Started on cardene drip. Difficulty weaning off BIPAP due to inadequate volumes and persistent shortness of breath. Extensive workup done regarding difficulties coming off BIPAP. Neurology consulted. ID was consulted for hip abscess- continued on zosyn. Repeat imaging concerning for pneumonia; started on linezolid. Ortho consulted for hip abscess that re-occurred on repeat imaging.      Interval History/Significant Events: Remains on BIPAP. Was unable to tolerate high flow. Discussion with patient and family about future directions of care.    Review of Systems   Unable to perform ROS: Acuity of condition   Respiratory:  Positive for shortness of breath.    Objective:     Vital Signs (Most Recent):  Temp: 98.1 °F (36.7 °C) (01/04/23 1100)  Pulse: 110 (01/04/23 1348)  Resp: (!) 44 (01/04/23 1348)  BP: (!) 158/75 (01/04/23 1200)  SpO2: (!) 83 % (01/04/23 1348)   Vital Signs (24h Range):  Temp:  [97.4 °F (36.3 °C)-98.5 °F (36.9 °C)] 98.1 °F (36.7 °C)  Pulse:  [] 110  Resp:  [14-44] 44  SpO2:  [83 %-100 %] 83 %  BP: (107-166)/(51-81) 158/75   Weight: 59.4 kg (130 lb 15.3 oz)  Body mass index is 22.48 kg/m².      Intake/Output Summary (Last 24 hours) at 1/4/2023 1404  Last data filed at 1/4/2023 1200  Gross per 24 hour   Intake 2617.33 ml   Output 410 ml   Net 2207.33 ml         Physical Exam  Vitals and nursing note reviewed.    Constitutional:       Appearance: She is ill-appearing.   HENT:      Head: Normocephalic and atraumatic.   Eyes:      Extraocular Movements: Extraocular movements intact.      Pupils: Pupils are equal, round, and reactive to light.   Cardiovascular:      Rate and Rhythm: Normal rate and regular rhythm.      Pulses: Normal pulses.      Heart sounds: Normal heart sounds. No murmur heard.  Pulmonary:      Breath sounds: Normal air entry. No wheezing or rales.   Abdominal:      General: Abdomen is flat. There is no distension.      Palpations: Abdomen is soft.      Tenderness: There is no abdominal tenderness.   Musculoskeletal:         General: No swelling.      Right lower leg: No edema.      Left lower leg: No edema.   Skin:     General: Skin is warm and dry.      Findings: No bruising or rash.   Neurological:      General: No focal deficit present.      Comments: Alert, following commands   Psychiatric:         Mood and Affect: Mood normal.         Behavior: Behavior normal.       Vents:  Vent Mode: A/C (12/30/22 1528)  Ventilator Initiated: Yes (12/29/22 0144)  Set Rate: 12 BPM (12/30/22 1528)  Vt Set: 350 mL (12/30/22 0722)  Pressure Support: 14 cmH20 (12/30/22 1139)  PEEP/CPAP: 6 cmH20 (12/30/22 1528)  Oxygen Concentration (%): 30 (01/04/23 1200)  Peak Airway Pressure: 22 cmH20 (12/30/22 1528)  Plateau Pressure: 20 cmH20 (12/30/22 1528)  Total Ve: 9.73 L/m (12/30/22 1528)  Negative Inspiratory Force (cm H2O): 0 (12/30/22 1528)  F/VT Ratio<105 (RSBI): (!) 58.05 (12/30/22 1528)  Lines/Drains/Airways       Central Venous Catheter Line  Duration             Percutaneous Central Line Insertion/Assessment - Triple Lumen  12/29/22 0043 right internal jugular 6 days              Drain  Duration                  Rectal Tube 12/29/22 0332 rectal tube w/ balloon (indicate number of mLs) 45 Fr. 6 days         Urethral Catheter 01/04/23 0009 Non-latex 16 Fr. <1 day              Peripheral Intravenous Line  Duration                   Peripheral IV - Single Lumen 01/03/23 2321 20 G Right Antecubital <1 day                  Significant Labs:    CBC/Anemia Profile:  Recent Labs   Lab 01/03/23  0452 01/04/23  0403   WBC 26.77* 15.22*   HGB 9.4* 8.1*   HCT 31.3* 27.2*    354   MCV 87 86   RDW 14.1 14.1          Chemistries:  Recent Labs   Lab 01/03/23  0452 01/03/23  1643 01/04/23  0403   * 148* 146*   K 3.7 2.9* 3.7    102 102   CO2 35* 37* 37*   BUN 13 11 13   CREATININE 0.5 0.5 0.6   CALCIUM 9.2 8.8 8.6*   MG 2.2  --  1.9   PHOS 2.3*  --  1.8*       All pertinent labs within the past 24 hours have been reviewed.    Significant Imaging:  I have reviewed all pertinent imaging results/findings within the past 24 hours.      ABG  Recent Labs   Lab 01/02/23  1208   PH 7.446   PO2 107*   PCO2 51.7*   HCO3 35.6*   BE 12     Assessment/Plan:     Neuro  Stenosis, cervical spine  Noted on MRI spine. Of note patient has been experiencing episodes of vertigo and decreased balance which could be related.   -NSGY consulted, appreciate recs.  -amb ref to NSGY outpatient  -if need for reintubation should be done with fiberoptic or glidescope    Pulmonary  * Shortness of breath  Patient presented with approx 3 weeks of worsening shortness of breath, weakness, and falls. On transfer to ICU required intubation. Was extubated to BIPAP. Have been unable to wean off bipap due to persistent shortness of breath, tachypnea, and inadequate volumes. Trial off bipap results in tachypnea, tachycardia, and hypertension. Appears weak distally and with fine motor skills. Unclear etiology at this time. Does not appear pulmonary in nature as patient is saturating well, ABGs have been wnl. MRI C-spine showed severe cervical stenosis, NSGY was consulted, unlikely that the stenosis would be causing these issues. Concern that difficulty breathing could be related to diaphragm weakness. Critical illness myopathy seems unlikely as patient was not intubated for  long duration.  -neurology consulted, myasthenia panel sent  -further labs ordered (ESR, CRP, DARLINE, myositis panel)  -continue alternating bipap and high flow as tolerated  -palliative care consulted    Acute hypoxemic respiratory failure  Patient with Hypoxic Respiratory failure which is Acute.  she is not on home oxygen. Supplemental oxygen was provided and noted- Oxygen Concentration (%):  [30-60] 30.   Signs/symptoms of respiratory failure include- tachypnea, increased work of breathing and respiratory distress. Contributing diagnoses includes - Pleural effusion and atelectasis Labs and images were reviewed. Patient Has recent ABG, which has been reviewed. Will treat underlying causes and adjust management of respiratory failure as follows- unclear etiology of pleural effusions, patient notes recent URI 3 weeks ago that has left her short of breath since then, on broad spectrum abx  Repeat CXR with increased consolidation in LLL  Extubated to Bipap    1/3: CT Chest - Bibasilar atelectatic/consolidative change (left greater than right) with patchy right basilar opacities and small bilateral pleural effusions.  Correlation for underlying infectious process advised.     ID following, will start linezolid to treat for PNA to end 1/9    Cardiac/Vascular  Right renal artery stenosis  -no acute intervention while in ICU  -see HTN    Hyperlipemia  Continue home statin    Essential hypertension  Known history of uncontrolled HTN, w/ labile BP since admit.   -BP meds held in setting of septic shock  -will avoid IV meds if possible as has had hypotension following IV BP meds  -renal artery duplex showing R renal artery stenosis, L renal artery unable to be visualized  -Will monitor and add back meds as tolerated, cardene drip while unable to tolerate PO    Dyspnea on exertion  Began prior to admission. Has been requiring 2L via NC to maintain oxygenation  -O2 via NC PRN    Renal/  Metabolic alkalosis  Initially could have  been related to diuretic use. Bicarb remains elevated after stopping HCTZ.    Endocrine  Moderate malnutrition  Nutrition consulted. Most recent weight and BMI monitored-   Plan to start TPN due to inability to place NGT       Malnutrition (Moderate to Severe)  Energy Intake (Malnutrition): less than 75% for greater than 7 days              Measurements:  Wt Readings from Last 1 Encounters:   12/30/22 59.4 kg (130 lb 15.3 oz)   Body mass index is 22.48 kg/m².    Recommendations: Recommendation/Intervention: 1.  Goals: Meet % EEN, EPN by RD f/u date    Patient has been screened and assessed by RD. RD will follow patient.      GI  Proctitis  CT scan with findings of stercoral proctitis. Appears to have resolved on repeat CT.  -continue bowel regimen    Generalized abdominal pain  Patient with constipation and ileus. CT scan consistent with ileus and stercoral proctitis which is likely related to chronic constipation.    Constipation  Continue miralax and senna-doc daily    Orthopedic  Abscess of bursa of right hip  CT scan showing possible hematoma vs abscess around right hip. Aspiration done with IR. Culture with e.coli pan-sensitive. Repeat CT showing fluid has reaccumulated.   -ID following  -Has been on zosyn, switch to cefepime per ID  -ortho consulted      Other  Hypothermia  Considering new onset will check BCx and start zosyn. Using bear hugger for warming.    Generalized weakness  See shortness of breath     Critical Care Daily Checklist:    A: Awake: RASS Goal/Actual Goal:    Actual: Manning Agitation Sedation Scale (RASS): Restless   B: Spontaneous Breathing Trial Performed? Spon. Breathing Trial Initiated?: Not initiated (12/29/22 0729)   C: SAT & SBT Coordinated?  n/a                      D: Delirium: CAM-ICU Overall CAM-ICU: Negative   E: Early Mobility Performed? No   F: Feeding Goal: Goals: Meet % EEN, EPN by RD f/u date  Status: Nutrition Goal Status: new   Current Diet Order   Procedures     Diet NPO      AS: Analgesia/Sedation precedex   T: Thromboembolic Prophylaxis yes   H: HOB > 300 Yes   U: Stress Ulcer Prophylaxis (if needed) n/a   G: Glucose Control no   B: Bowel Function Stool Occurrence: 1   I: Indwelling Catheter (Lines & Rush) Necessity Rush, central line both needed   D: De-escalation of Antimicrobials/Pharmacotherapies no    Plan for the day/ETD Break from BIPAP as tim. Start TPN    Code Status:  Family/Goals of Care: Full Code  Ongoing       Critical secondary to Patient has a condition that poses threat to life and bodily function: Severe Respiratory Distress      Critical care was time spent personally by me on the following activities: development of treatment plan with patient or surrogate and bedside caregivers, discussions with consultants, evaluation of patient's response to treatment, examination of patient, ordering and performing treatments and interventions, ordering and review of laboratory studies, ordering and review of radiographic studies, pulse oximetry, re-evaluation of patient's condition. This critical care time did not overlap with that of any other provider or involve time for any procedures.     Beto Avelar MD  Critical Care Medicine  [facility] - Cardiac Medical ICU

## 2023-01-04 NOTE — PLAN OF CARE
CMICU DAILY GOALS       A: Awake    RASS: Goal -    Actual - RASS (Manning Agitation-Sedation Scale): 0-->alert and calm   Restraint necessity: Clinical Justification: Removing medical devices  B: Breathe   SBT: Not intubated   C: Coordinate A & B, analgesics/sedatives   Pain: managed    SAT: Not intubated  D: Delirium   CAM-ICU: Overall CAM-ICU: Negative  E: Early(intubated/ Progressive (non-intubated) Mobility   MOVE Screen: Fail   Activity: Activity Management: Arm raise - L1  FAS: Feeding/Nutrition   Diet order: Diet/Nutrition Received: NPO (meds crushed in pureed),    T: Thrombus   DVT prophylaxis: VTE Required Core Measure: Pharmacological prophylaxis initiated/maintained  H: HOB Elevation   Head of Bed (HOB) Positioning: HOB at 30-45 degrees  U: Ulcer Prophylaxis   GI: yes  G: Glucose control   managed Glycemic Management: blood glucose monitored  S: Skin   Bathing/Skin Care: bath, partial, dressed/undressed, incontinence care  Device Skin Pressure Protection: absorbent pad utilized/changed, adhesive use limited, positioning supports utilized, pressure points protected, skin-to-device areas padded, skin-to-skin areas padded  Pressure Reduction Devices: pressure-redistributing mattress utilized  Pressure Reduction Techniques: heels elevated off bed, positioned off wounds  Skin Protection: adhesive use limited, transparent dressing maintained, tubing/devices free from skin contact  B: Bowel Function   no issues   I: Indwelling Catheters   Rush necessity: [REMOVED]      Urethral Catheter 12/27/22 1535-Reason for Continuing Urinary Catheterization: Critically ill in ICU and requiring hourly monitoring of intake/output   CVC necessity: No  D: De-escalation Antibiotics   No    Family/Goals of care/Code Status   Code Status: Full Code    24H Vital Sign Range  Temp:  [97.3 °F (36.3 °C)-98.8 °F (37.1 °C)]   Pulse:  []   Resp:  [8-47]   BP: (106-184)/()   SpO2:  [82 %-100 %]   Arterial Line BP:  ()/(41-78)      Shift Events   No acute events throughout shift, patient on and off BiPAP throughout the day d/t SOB; SLP evaluated patient and recommended NPO with sips of water PRN, meds crushed in puree; patient restarted on home HTN meds; cardene gtt weaned off; patient tachycardic this AM and started on metoprolol 25mg PO BID- patient responsive; galvez d/c'd per ID recs and UA sent off; a-line d/c'd; sodium of 149- D5 50mL/hr started; potassium of 2.9- 40mEqs currently infusing; waiting for MG panel to result; vitals stable throughout shift; WCTM.       VS and assessment per flow sheet, patient progressing towards goals as tolerated, plan of care reviewed with  patient and family , all concerns addressed, will continue to monitor.    Libra Guillen

## 2023-01-04 NOTE — PROGRESS NOTES
[facility] - Cardiac Medical ICU  Infectious Disease  Progress Note    Patient Name: Dominique Mcgee  MRN: [id number]  Admission Date: 12/23/2022  Length of Stay: 12 days  Attending Physician: Jeromy Dotson MD  Primary Care Provider: Briana Leblanc MD    Isolation Status: No active isolations  Assessment/Plan:      Leukocytosis  72-year-old female with HTN, HLD, hypothyroidism, 3 falls in 2022 (1 in February, last in April) with concussion is in the ICU for hypoxic respiratory failure extubated and on BIPAP, placed on vancomycin, pip-tazo, azithromycin starting 12/28/22, and corticorsteroids.  CT C/A/P without contrast revealed bilateral small pleural effusions and atelectasis, right hip posterior abscess in the soft tissue, stercoral colitis.  MRI c-spine shows cervical stenosis.  12/29/22 IR aspiration right hip abscess yielding 20cc's clear synovial fluid, noting resolution of collection afterward.  Abscess cultures currently growing E. Coli.  Of note, patient developed leukocytosis 12/31/22-01/01/23.    Leukocytosis was possibly multifactorial; corticosteroids (now d/c'd), right hip abscess/fluid collection growing E. Coli (curiously, cell count of fluid is 0), stercoral colitis (resolved), LLL pneumonia?  UA 1/3/23 not consistent with UTI.    Will recommend treatment what may be HAP.  Would recommend touching base with orthopedics to see if right hip posterior collection needs further intervention    Recommendations:  -Continue linezolid  -Change pip-tazo to cefepime  -Continue antibiotics for another 5 days ending 1/9/23  -Would consult ortho regarding right hip abscess/fluid collection if further intervention necessary          Thank you for your consult. I will sign off. Please contact us if you have any additional questions.    Jose Olsen MD  Infectious Disease  [facility] - Cardiac Medical ICU    Subjective:     Principal Problem:Shortness of breath    HPI: 72-year-old female with HTN, HLD,  hypothyroidism, 3 falls in 2022 (1 in February, last in April) with concussion presents to the ED with 3 weeks of weakness, dyspnea on exertion, anorexia.  Admitted with hypoxic respiratory failure requiring NC, subsequently intubated 12/28/22 for airway protection and hypoxia.  Vancomycin, pip-tazo, azithromycin, and corticorsteroids started.  CT C/A/P without contrast revealed bilateral small pleural effusions and atelectasis, right hip posterior abscess in the soft tissue, stercoral colitis.  MRI brain no acute intracranial abnormality, right mastoid effusion.  MRI c-spine shows cervical stenosis.  12/29/22 IR aspiration right hip abscess yielding 20cc's clear serous fluid, noting resolution of collection afterward.  Abscess cultures currently growing E. Coli.  Of note, patient developed leukocytosis 12/31/22-01/01/23, now up to 25 at time of initial ID consult.      Consult: 72 F w/ R hip abscess w/ e. coli, leukocytosis despite abx, would like Antibiotic recs. Ordered CTA Chest        Interval History: receiving chest physiotherapy to clear mucus.  Leukocytosis improved.    Review of Systems   Constitutional:  Negative for chills and fever.   HENT:  Negative for sore throat.    Respiratory:  Positive for shortness of breath.    Gastrointestinal:  Negative for diarrhea, nausea and vomiting.   Genitourinary:  Positive for pelvic pain. Negative for dysuria and flank pain.   Musculoskeletal:  Negative for arthralgias.   Skin:  Negative for wound.   Neurological:  Negative for headaches.   Objective:     Vital Signs (Most Recent):  Temp: 97.8 °F (36.6 °C) (01/04/23 0701)  Pulse: 80 (01/04/23 0800)  Resp: 18 (01/04/23 0800)  BP: 139/63 (01/04/23 0800)  SpO2: 100 % (01/04/23 0800) Vital Signs (24h Range):  Temp:  [97.4 °F (36.3 °C)-98.5 °F (36.9 °C)] 97.8 °F (36.6 °C)  Pulse:  [] 80  Resp:  [14-47] 18  SpO2:  [96 %-100 %] 100 %  BP: (107-179)/(51-91) 139/63  Arterial Line BP: (124-160)/(56-77) 129/61     Weight:  59.4 kg (130 lb 15.3 oz)  Body mass index is 22.48 kg/m².    Estimated Creatinine Clearance: 75.3 mL/min (based on SCr of 0.6 mg/dL).    Physical Exam  Vitals reviewed.   Constitutional:       General: She is not in acute distress.     Appearance: She is normal weight. She is ill-appearing. She is not toxic-appearing or diaphoretic.   HENT:      Head: Normocephalic and atraumatic.      Right Ear: External ear normal.      Left Ear: External ear normal.   Eyes:      General: No scleral icterus.        Right eye: No discharge.         Left eye: No discharge.      Extraocular Movements: Extraocular movements intact.      Conjunctiva/sclera: Conjunctivae normal.   Cardiovascular:      Rate and Rhythm: Regular rhythm. Tachycardia present.   Pulmonary:      Effort: Pulmonary effort is normal.      Comments: -BIPAP  Abdominal:      General: Abdomen is flat. There is no distension.      Palpations: Abdomen is soft.      Tenderness: There is abdominal tenderness.      Comments: -suprapubic tenderness   Musculoskeletal:         General: No swelling, tenderness, deformity or signs of injury. Normal range of motion.      Cervical back: Normal range of motion.      Right lower leg: No edema.      Left lower leg: No edema.      Comments: Bilateral hips nontender on palpation   Skin:     General: Skin is warm and dry.      Findings: No lesion or rash.   Neurological:      Mental Status: She is alert and oriented to person, place, and time. Mental status is at baseline.   Psychiatric:         Mood and Affect: Mood normal.         Behavior: Behavior normal.         Thought Content: Thought content normal.         Judgment: Judgment normal.       Significant Labs: All pertinent labs within the past 24 hours have been reviewed.    Significant Imaging: I have reviewed all pertinent imaging results/findings within the past 24 hours.

## 2023-01-04 NOTE — PT/OT/SLP PROGRESS
Speech Language Pathology      Dominique Mcgee  MRN: [id number]    Patient not seen today secondary to BIPAP, Nursing hold (Comment). Will follow-up next scheduled treatment date.

## 2023-01-04 NOTE — SUBJECTIVE & OBJECTIVE
Interval History: NAEON. Patient on BiPAP with difficulty speaking. Able to write to communicate, reports feeling much better today.    Review of patient's allergies indicates:   Allergen Reactions    Hydralazine analogues      Precipitous drop in BP with IV formulation. Avoid if possible    Shellfish containing products Swelling     Current Facility-Administered Medications   Medication Frequency    acetaminophen tablet 650 mg Q6H PRN    acetylcysteine 200 mg/ml (20%) solution 2 mL Q4H PRN    albuterol-ipratropium 2.5 mg-0.5 mg/3 mL nebulizer solution 3 mL Q4H    atorvastatin tablet 10 mg Daily    capsaicin 0.025 % cream BID    ceFEPIme (MAXIPIME) 1 g in dextrose 5 % in water (D5W) 5 % 50 mL IVPB (MB+) Q8H    dexmedetomidine (PRECEDEX) 400mcg/100mL 0.9% NaCL infusion Continuous    dextrose 10% bolus 125 mL 125 mL PRN    dextrose 10% bolus 250 mL 250 mL PRN    dextrose 5 % infusion Continuous    enoxaparin injection 40 mg Daily    fat emulsion 20% infusion 250 mL Daily    glucagon (human recombinant) injection 1 mg PRN    glucose chewable tablet 16 g PRN    glucose chewable tablet 24 g PRN    glycerin adult suppository 1 suppository Once PRN    guaiFENesin 100 mg/5 ml syrup 200 mg Q4H PRN    insulin aspart U-100 pen 0-5 Units Q6H PRN    iohexol (OMNIPAQUE) oral solution 15 mL PRN    levothyroxine tablet 50 mcg Before breakfast    linezolid 600 mg/300 mL IVPB 600 mg Q12H    losartan tablet 25 mg Daily    methocarbamoL tablet 500 mg TID PRN    metoprolol tartrate (LOPRESSOR) tablet 25 mg BID    multivitamin tablet Daily    niCARdipine 40 mg/200 mL (0.2 mg/mL) infusion Continuous    pantoprazole injection 40 mg Daily    [START ON 1/5/2023] polyethylene glycol packet 17 g Daily    senna-docusate 8.6-50 mg per tablet 1 tablet Daily    sodium chloride 0.9% flush 10 mL Q12H PRN    sodium chloride 3% nebulizer solution 4 mL Q4H    TPN ADULT CENTRAL LINE CUSTOM Continuous       Objective:     Vital Signs (Most  Recent):  Temp: 98.8 °F (37.1 °C) (01/04/23 1500)  Pulse: 87 (01/04/23 1500)  Resp: 17 (01/04/23 1500)  BP: 139/65 (01/04/23 1500)  SpO2: 100 % (01/04/23 1500) Vital Signs (24h Range):  Temp:  [97.4 °F (36.3 °C)-98.8 °F (37.1 °C)] 98.8 °F (37.1 °C)  Pulse:  [] 87  Resp:  [14-44] 17  SpO2:  [83 %-100 %] 100 %  BP: (107-166)/(51-93) 139/65     Weight: 59.4 kg (130 lb 15.3 oz) (12/30/22 1203)  Body mass index is 22.48 kg/m².  Body surface area is 1.64 meters squared.    I/O last 3 completed shifts:  In: 3041.4 [I.V.:1421.6; NG/GT:150; IV Piggyback:1469.8]  Out: 1785 [Urine:1585; Stool:200]    Physical Exam  Vitals and nursing note reviewed.   Constitutional:       General: She is not in acute distress.     Appearance: She is ill-appearing. She is not diaphoretic.      Comments: On BIPAP   HENT:      Head: Normocephalic and atraumatic.      Right Ear: External ear normal.      Left Ear: External ear normal.      Nose: Nose normal.   Eyes:      General: No scleral icterus.        Right eye: No discharge.         Left eye: No discharge.      Extraocular Movements: Extraocular movements intact.      Conjunctiva/sclera: Conjunctivae normal.   Cardiovascular:      Rate and Rhythm: Normal rate and regular rhythm.      Heart sounds: Normal heart sounds. No murmur heard.  Pulmonary:      Effort: Pulmonary effort is normal. No respiratory distress.      Breath sounds: Normal breath sounds. No wheezing or rales.      Comments: On BiPAP  Musculoskeletal:         General: No swelling or deformity. Normal range of motion.      Cervical back: Normal range of motion and neck supple.      Right lower leg: No edema.      Left lower leg: No edema.   Skin:     General: Skin is warm.      Coloration: Skin is not jaundiced.      Findings: No bruising.   Neurological:      Mental Status: She is alert and oriented to person, place, and time.   Psychiatric:         Mood and Affect: Mood normal.         Behavior: Behavior normal.          Thought Content: Thought content normal.       Significant Labs:  All labs within the past 24 hours have been reviewed.     Significant Imaging:  Labs: Reviewed

## 2023-01-04 NOTE — PLAN OF CARE
[facility] DAILY GOALS       A: Awake    RASS: Goal -    Actual - RASS (Manning Agitation-Sedation Scale): 0-->alert and calm   Restraint necessity: Clinical Justification: Removing medical devices  B: Breathe   SBT: Not intubated   C: Coordinate A & B, analgesics/sedatives   Pain: managed    SAT: Not intubated  D: Delirium   CAM-ICU: Overall CAM-ICU: Negative  E: Early(intubated/ Progressive (non-intubated) Mobility   MOVE Screen: Pass   Activity: Activity Management: Ankle pumps - L1  FAS: Feeding/Nutrition   Diet order: Diet/Nutrition Received: sips of water,    T: Thrombus   DVT prophylaxis: VTE Required Core Measure: Pharmacological prophylaxis initiated/maintained  H: HOB Elevation   Head of Bed (HOB) Positioning: HOB at 30 degrees  U: Ulcer Prophylaxis   GI: yes  G: Glucose control   managed Glycemic Management: blood glucose monitored  S: Skin   Bathing/Skin Care: bath, partial, dressed/undressed, incontinence care  Device Skin Pressure Protection: absorbent pad utilized/changed, adhesive use limited  Pressure Reduction Devices: foam padding utilized  Pressure Reduction Techniques: frequent weight shift encouraged, pressure points protected, weight shift assistance provided  Skin Protection: adhesive use limited, incontinence pads utilized  B: Bowel Function   diarrhea   I: Indwelling Catheters   Rush necessity: [REMOVED]      Urethral Catheter 12/27/22 1535-Reason for Continuing Urinary Catheterization: Critically ill in ICU and requiring hourly monitoring of intake/output       Urethral Catheter 01/04/23 0009 Non-latex 16 Fr.-Reason for Continuing Urinary Catheterization: Urinary retention   CVC necessity: Yes  D: De-escalation Antibiotics   Yes    Family/Goals of care/Code Status   Code Status: Full Code    24H Vital Sign Range  Temp:  [97.3 °F (36.3 °C)-98.5 °F (36.9 °C)]   Pulse:  []   Resp:  [14-47]   BP: (106-184)/(51-91)   SpO2:  [96 %-100 %]   Arterial Line BP: (108-170)/(48-77)      Shift  Events   Difficulty coughing up mucus. NT suction to remove excess mucus. Saline breathing treatments, cough assist, chest physiotherapy ordered to assist with ease of mucus removal. Vital sign stable. Javier malagon called to MD. New orders rec'd.    VS and assessment per flow sheet, patient progressing towards goals as tolerated, plan of care reviewed with  Ms. Everardo , all concerns addressed, will continue to monitor.    Jesika Arias

## 2023-01-04 NOTE — PROGRESS NOTES
"[facility]  Nephrology  Progress Note    Patient Name: Dominique Mcgee  MRN: [id number]  Admission Date: 12/23/2022  Hospital Length of Stay: 12 days  Attending Provider: Jeromy Dotson MD   Primary Care Physician: Briana Leblanc MD  Principal Problem:Shortness of breath    Subjective:     HPI: Dominique Mcgee is a 71 yo female with Hx of HTN, HLD, and hypothyroidism who presents to the ED for evaluation of weakness, fatigue, abdominal pain and cough with SOB that had been persisting for several weeks.  She was hypertensive in the ED with SBP > 200 and chemisitries notable for a low sodium level of 120.  According to records, her BP medications have been adjusted prior to arrival for uncontrolled hypertension and noting that was on a combination of Losartan and HCTZ.  HCTZ was discontinued and she was started on Amlodipine, Carvedilol and losartan. Urine studies were obtained on 12/25 revealing elevated UOSM of 802 and elevated Martin of 109.  She was placed on fluid restrictions, but Na levels have continued to stay low so Nephrology was consulted on 12/28 for further evaluation.  She is awake alert and oriented on examination, but endorses feeling "tired". She has had poor appetite as well since arrival to the hospital with continued complaints of abdominal pain.  Her blood pressures continue to be erratic, often times being hypertensive with episodes of hypotension on night.  She has had CTH which was negative for acute findings with CT abdomen pending.  AM cortisol level on day of consultation elevated at 37.8.  She is lethargic on exam and after discussing with the primary team this has progressively worsened since Monday.        Interval History: NAEON. Patient on BiPAP with difficulty speaking. Able to write to communicate, reports feeling much better today.    Review of patient's allergies indicates:   Allergen Reactions    Hydralazine analogues      Precipitous drop in BP with IV " formulation. Avoid if possible    Shellfish containing products Swelling     Current Facility-Administered Medications   Medication Frequency    acetaminophen tablet 650 mg Q6H PRN    acetylcysteine 200 mg/ml (20%) solution 2 mL Q4H PRN    albuterol-ipratropium 2.5 mg-0.5 mg/3 mL nebulizer solution 3 mL Q4H    atorvastatin tablet 10 mg Daily    capsaicin 0.025 % cream BID    ceFEPIme (MAXIPIME) 1 g in dextrose 5 % in water (D5W) 5 % 50 mL IVPB (MB+) Q8H    dexmedetomidine (PRECEDEX) 400mcg/100mL 0.9% NaCL infusion Continuous    dextrose 10% bolus 125 mL 125 mL PRN    dextrose 10% bolus 250 mL 250 mL PRN    dextrose 5 % infusion Continuous    enoxaparin injection 40 mg Daily    fat emulsion 20% infusion 250 mL Daily    glucagon (human recombinant) injection 1 mg PRN    glucose chewable tablet 16 g PRN    glucose chewable tablet 24 g PRN    glycerin adult suppository 1 suppository Once PRN    guaiFENesin 100 mg/5 ml syrup 200 mg Q4H PRN    insulin aspart U-100 pen 0-5 Units Q6H PRN    iohexol (OMNIPAQUE) oral solution 15 mL PRN    levothyroxine tablet 50 mcg Before breakfast    linezolid 600 mg/300 mL IVPB 600 mg Q12H    losartan tablet 25 mg Daily    methocarbamoL tablet 500 mg TID PRN    metoprolol tartrate (LOPRESSOR) tablet 25 mg BID    multivitamin tablet Daily    niCARdipine 40 mg/200 mL (0.2 mg/mL) infusion Continuous    pantoprazole injection 40 mg Daily    [START ON 1/5/2023] polyethylene glycol packet 17 g Daily    senna-docusate 8.6-50 mg per tablet 1 tablet Daily    sodium chloride 0.9% flush 10 mL Q12H PRN    sodium chloride 3% nebulizer solution 4 mL Q4H    TPN ADULT CENTRAL LINE CUSTOM Continuous       Objective:     Vital Signs (Most Recent):  Temp: 98.8 °F (37.1 °C) (01/04/23 1500)  Pulse: 87 (01/04/23 1500)  Resp: 17 (01/04/23 1500)  BP: 139/65 (01/04/23 1500)  SpO2: 100 % (01/04/23 1500) Vital Signs (24h Range):  Temp:  [97.4 °F (36.3 °C)-98.8 °F (37.1 °C)] 98.8 °F  (37.1 °C)  Pulse:  [] 87  Resp:  [14-44] 17  SpO2:  [83 %-100 %] 100 %  BP: (107-166)/(51-93) 139/65     Weight: 59.4 kg (130 lb 15.3 oz) (12/30/22 1203)  Body mass index is 22.48 kg/m².  Body surface area is 1.64 meters squared.    I/O last 3 completed shifts:  In: 3041.4 [I.V.:1421.6; NG/GT:150; IV Piggyback:1469.8]  Out: 1785 [Urine:1585; Stool:200]    Physical Exam  Vitals and nursing note reviewed.   Constitutional:       General: She is not in acute distress.     Appearance: She is ill-appearing. She is not diaphoretic.      Comments: On BIPAP   HENT:      Head: Normocephalic and atraumatic.      Right Ear: External ear normal.      Left Ear: External ear normal.      Nose: Nose normal.   Eyes:      General: No scleral icterus.        Right eye: No discharge.         Left eye: No discharge.      Extraocular Movements: Extraocular movements intact.      Conjunctiva/sclera: Conjunctivae normal.   Cardiovascular:      Rate and Rhythm: Normal rate and regular rhythm.      Heart sounds: Normal heart sounds. No murmur heard.  Pulmonary:      Effort: Pulmonary effort is normal. No respiratory distress.      Breath sounds: Normal breath sounds. No wheezing or rales.      Comments: On BiPAP  Musculoskeletal:         General: No swelling or deformity. Normal range of motion.      Cervical back: Normal range of motion and neck supple.      Right lower leg: No edema.      Left lower leg: No edema.   Skin:     General: Skin is warm.      Coloration: Skin is not jaundiced.      Findings: No bruising.   Neurological:      Mental Status: She is alert and oriented to person, place, and time.   Psychiatric:         Mood and Affect: Mood normal.         Behavior: Behavior normal.         Thought Content: Thought content normal.       Significant Labs:  All labs within the past 24 hours have been reviewed.     Significant Imaging:  Labs: Reviewed    Assessment/Plan:     Hypernatremia  Na 146.  Presented hyponatremic with Na  of 119, received IVF with subsequent normalization of Na. Recently Na up trending and now hypernatremic.    - continue D5W at 50 cc/hr until Na normalizes  - encourage PO intake  - no nephrology intervention recommended at this time          Thank you for your consult. I will sign off. Please contact us if you have any additional questions.    Melissa Carmona MD  Nephrology  [facility]

## 2023-01-04 NOTE — SUBJECTIVE & OBJECTIVE
Interval History/Significant Events: Remains on BIPAP. Was unable to tolerate high flow. Discussion with patient and family about future directions of care.    Review of Systems   Unable to perform ROS: Acuity of condition   Respiratory:  Positive for shortness of breath.    Objective:     Vital Signs (Most Recent):  Temp: 98.1 °F (36.7 °C) (01/04/23 1100)  Pulse: 110 (01/04/23 1348)  Resp: (!) 44 (01/04/23 1348)  BP: (!) 158/75 (01/04/23 1200)  SpO2: (!) 83 % (01/04/23 1348)   Vital Signs (24h Range):  Temp:  [97.4 °F (36.3 °C)-98.5 °F (36.9 °C)] 98.1 °F (36.7 °C)  Pulse:  [] 110  Resp:  [14-44] 44  SpO2:  [83 %-100 %] 83 %  BP: (107-166)/(51-81) 158/75   Weight: 59.4 kg (130 lb 15.3 oz)  Body mass index is 22.48 kg/m².      Intake/Output Summary (Last 24 hours) at 1/4/2023 1404  Last data filed at 1/4/2023 1200  Gross per 24 hour   Intake 2617.33 ml   Output 410 ml   Net 2207.33 ml         Physical Exam  Vitals and nursing note reviewed.   Constitutional:       Appearance: She is ill-appearing.   HENT:      Head: Normocephalic and atraumatic.   Eyes:      Extraocular Movements: Extraocular movements intact.      Pupils: Pupils are equal, round, and reactive to light.   Cardiovascular:      Rate and Rhythm: Normal rate and regular rhythm.      Pulses: Normal pulses.      Heart sounds: Normal heart sounds. No murmur heard.  Pulmonary:      Breath sounds: Normal air entry. No wheezing or rales.   Abdominal:      General: Abdomen is flat. There is no distension.      Palpations: Abdomen is soft.      Tenderness: There is no abdominal tenderness.   Musculoskeletal:         General: No swelling.      Right lower leg: No edema.      Left lower leg: No edema.   Skin:     General: Skin is warm and dry.      Findings: No bruising or rash.   Neurological:      General: No focal deficit present.      Comments: Alert, following commands   Psychiatric:         Mood and Affect: Mood normal.         Behavior: Behavior  normal.       Vents:  Vent Mode: A/C (12/30/22 1528)  Ventilator Initiated: Yes (12/29/22 0144)  Set Rate: 12 BPM (12/30/22 1528)  Vt Set: 350 mL (12/30/22 0722)  Pressure Support: 14 cmH20 (12/30/22 1139)  PEEP/CPAP: 6 cmH20 (12/30/22 1528)  Oxygen Concentration (%): 30 (01/04/23 1200)  Peak Airway Pressure: 22 cmH20 (12/30/22 1528)  Plateau Pressure: 20 cmH20 (12/30/22 1528)  Total Ve: 9.73 L/m (12/30/22 1528)  Negative Inspiratory Force (cm H2O): 0 (12/30/22 1528)  F/VT Ratio<105 (RSBI): (!) 58.05 (12/30/22 1528)  Lines/Drains/Airways       Central Venous Catheter Line  Duration             Percutaneous Central Line Insertion/Assessment - Triple Lumen  12/29/22 0043 right internal jugular 6 days              Drain  Duration                  Rectal Tube 12/29/22 0332 rectal tube w/ balloon (indicate number of mLs) 45 Fr. 6 days         Urethral Catheter 01/04/23 0009 Non-latex 16 Fr. <1 day              Peripheral Intravenous Line  Duration                  Peripheral IV - Single Lumen 01/03/23 2321 20 G Right Antecubital <1 day                  Significant Labs:    CBC/Anemia Profile:  Recent Labs   Lab 01/03/23  0452 01/04/23  0403   WBC 26.77* 15.22*   HGB 9.4* 8.1*   HCT 31.3* 27.2*    354   MCV 87 86   RDW 14.1 14.1          Chemistries:  Recent Labs   Lab 01/03/23  0452 01/03/23  1643 01/04/23  0403   * 148* 146*   K 3.7 2.9* 3.7    102 102   CO2 35* 37* 37*   BUN 13 11 13   CREATININE 0.5 0.5 0.6   CALCIUM 9.2 8.8 8.6*   MG 2.2  --  1.9   PHOS 2.3*  --  1.8*       All pertinent labs within the past 24 hours have been reviewed.    Significant Imaging:  I have reviewed all pertinent imaging results/findings within the past 24 hours.

## 2023-01-05 PROBLEM — Z51.5 PALLIATIVE CARE ENCOUNTER: Status: ACTIVE | Noted: 2023-01-01

## 2023-01-05 PROBLEM — K62.89 PROCTITIS: Status: RESOLVED | Noted: 2022-01-01 | Resolved: 2023-01-01

## 2023-01-05 NOTE — ASSESSMENT & PLAN NOTE
CT scan showing possible hematoma vs abscess around right hip. Aspiration done with IR. Culture with e.coli pan-sensitive. Repeat CT showing fluid has reaccumulated.   -ID following  -Has been on zosyn, switch to cefepime per ID  -ortho consulted- recs for repeating drainage w/ IR  -f/u repeat cultures and fluid analysis

## 2023-01-05 NOTE — ASSESSMENT & PLAN NOTE
Patient presented with approx 3 weeks of worsening shortness of breath, weakness, and falls. On transfer to ICU required intubation. Was extubated to BIPAP. Have been unable to wean off bipap due to persistent shortness of breath, tachypnea, and inadequate volumes. Trial off bipap results in tachypnea, tachycardia, and hypertension. Appears weak distally and with fine motor skills. Unclear etiology at this time. Does not appear pulmonary in nature as patient is saturating well, ABGs have been wnl. MRI C-spine showed severe cervical stenosis, NSGY was consulted, unlikely that the stenosis would be causing these issues. Concern that difficulty breathing could be related to diaphragm weakness. Critical illness myopathy seems unlikely as patient was not intubated for long duration.  -neurology consulted, myasthenia panel sent  -further labs ordered (ESR, CRP, DARLINE, myositis panel)  -continue bipap  -palliative care consulted for long term planning

## 2023-01-05 NOTE — PLAN OF CARE
[facility] DAILY GOALS       A: Awake    RASS: Goal -    Actual - RASS (Manning Agitation-Sedation Scale): 0-->alert and calm   Restraint necessity: Clinical Justification: Removing medical devices  B: Breathe   SBT: Not intubated   C: Coordinate A & B, analgesics/sedatives   Pain: managed    SAT: Not intubated  D: Delirium   CAM-ICU: Overall CAM-ICU: Negative  E: Early(intubated/ Progressive (non-intubated) Mobility   MOVE Screen: Fail   Activity: Activity Management: Arm raise - L1  FAS: Feeding/Nutrition   Diet order: Diet/Nutrition Received: NPO,    T: Thrombus   DVT prophylaxis: VTE Required Core Measure: Pharmacological prophylaxis initiated/maintained  H: HOB Elevation   Head of Bed (HOB) Positioning: HOB at 30-45 degrees  U: Ulcer Prophylaxis   GI: no  G: Glucose control   managed Glycemic Management: blood glucose monitored  S: Skin   Bathing/Skin Care: bath, complete, dressed/undressed, incontinence care, linen changed  Device Skin Pressure Protection: absorbent pad utilized/changed, pressure points protected, skin-to-device areas padded, skin-to-skin areas padded  Pressure Reduction Devices: foam padding utilized, positioning supports utilized  Pressure Reduction Techniques: frequent weight shift encouraged, pressure points protected, weight shift assistance provided  Skin Protection: adhesive use limited, incontinence pads utilized, tubing/devices free from skin contact, transparent dressing maintained, skin-to-skin areas padded, skin-to-device areas padded  B: Bowel Function   no issues   I: Indwelling Catheters   Rush necessity: [REMOVED]      Urethral Catheter 12/27/22 1535-Reason for Continuing Urinary Catheterization: Critically ill in ICU and requiring hourly monitoring of intake/output       Urethral Catheter 01/04/23 0009 Non-latex 16 Fr.-Reason for Continuing Urinary Catheterization: Critically ill in ICU and requiring hourly monitoring of intake/output   CVC necessity: Yes  D: De-escalation  Antibiotics   Yes    Family/Goals of care/Code Status   Code Status: Full Code    24H Vital Sign Range  Temp:  [98.3 °F (36.8 °C)-98.5 °F (36.9 °C)]   Pulse:  []   Resp:  [16-47]   BP: (134-221)/()   SpO2:  [90 %-100 %]      Shift Events   Monitoring patient hemodynamics, tried patient off the bipap which she became tachycardic and tachypnic and was placed back onto bipap, IR performed procedure at bedside for fluid collection from mass, IGG to be started this evening 1/5/22    VS and assessment per flow sheet, patient progressing towards goals as tolerated, plan of care reviewed with family, concerns addressed, will continue to monitor.    Lisa Hwang

## 2023-01-05 NOTE — SUBJECTIVE & OBJECTIVE
Past Medical History:   Diagnosis Date    Hyperlipidemia     Hypertension     Hyponatremia 12/23/2022    Hypophosphatemia 12/30/2022    Hypothyroidism     Severe sepsis 12/29/2022    Shock 12/29/2022       Past Surgical History:   Procedure Laterality Date    HYSTERECTOMY         Review of patient's allergies indicates:   Allergen Reactions    Hydralazine analogues      Precipitous drop in BP with IV formulation. Avoid if possible    Shellfish containing products Swelling       Current Facility-Administered Medications   Medication    acetaminophen tablet 650 mg    acetylcysteine 200 mg/ml (20%) solution 2 mL    albuterol-ipratropium 2.5 mg-0.5 mg/3 mL nebulizer solution 3 mL    atorvastatin tablet 10 mg    capsaicin 0.025 % cream    ceFEPIme (MAXIPIME) 1 g in dextrose 5 % in water (D5W) 5 % 50 mL IVPB (MB+)    dexmedetomidine (PRECEDEX) 400mcg/100mL 0.9% NaCL infusion    dextrose 10% bolus 125 mL 125 mL    dextrose 10% bolus 250 mL 250 mL    dextrose 5 % infusion    enoxaparin injection 40 mg    fat emulsion 20% infusion 250 mL    glucagon (human recombinant) injection 1 mg    glucose chewable tablet 16 g    glucose chewable tablet 24 g    glycerin adult suppository 1 suppository    guaiFENesin 100 mg/5 ml syrup 200 mg    insulin aspart U-100 pen 0-5 Units    iohexol (OMNIPAQUE) oral solution 15 mL    levothyroxine tablet 50 mcg    linezolid 600 mg/300 mL IVPB 600 mg    losartan tablet 25 mg    methocarbamoL tablet 500 mg    metoprolol tartrate (LOPRESSOR) tablet 25 mg    multivitamin tablet    niCARdipine 40 mg/200 mL (0.2 mg/mL) infusion    pantoprazole injection 40 mg    [START ON 1/5/2023] polyethylene glycol packet 17 g    senna-docusate 8.6-50 mg per tablet 1 tablet    sodium chloride 0.9% flush 10 mL    sodium chloride 3% nebulizer solution 4 mL    TPN ADULT CENTRAL LINE CUSTOM     Family History       Problem Relation (Age of Onset)    Breast cancer Sister (55), Sister (65)          Tobacco Use    Smoking  "status: Never    Smokeless tobacco: Not on file   Substance and Sexual Activity    Alcohol use: Not on file    Drug use: Never    Sexual activity: Not on file     ROS  Constitutional: Denies fever/chills   Neurological: Denies numbness/tingling (any exceptions noted in orthopaedic exam)    Psychiatric/Behavioral: Denies change in normal mood   Eyes: Denies change in vision   Cardiovascular: Denies chest pain   Respiratory:  Positive for shortness of breath  Hematologic/Lymphatic: Denies easy bleeding/bruising    Skin: Denies new rash or skin lesions    Gastrointestinal:  Positive for recent abdominal pain  Musculoskeletal: see HPI     Objective:     Vital Signs (Most Recent):  Temp: 98.8 °F (37.1 °C) (01/04/23 1500)  Pulse: 87 (01/04/23 1700)  Resp: (!) 22 (01/04/23 1700)  BP: (!) 147/70 (01/04/23 1700)  SpO2: 100 % (01/04/23 1700)   Vital Signs (24h Range):  Temp:  [97.4 °F (36.3 °C)-98.8 °F (37.1 °C)] 98.8 °F (37.1 °C)  Pulse:  [] 87  Resp:  [14-44] 22  SpO2:  [83 %-100 %] 100 %  BP: (107-166)/(52-93) 147/70     Weight: 59.4 kg (130 lb 15.3 oz)  Height: 5' 4" (162.6 cm)  Body mass index is 22.48 kg/m².      Intake/Output Summary (Last 24 hours) at 1/4/2023 1825  Last data filed at 1/4/2023 1700  Gross per 24 hour   Intake 2620.93 ml   Output 390 ml   Net 2230.93 ml       Ortho/SPM Exam  General: no acute distress, appears stated age     Neuro: alert and oriented x3   Psych: normal mood   Head: normocephalic, atraumatic.    Eyes: no scleral icterus   Mouth: moist mucous membranes   Cardiovascular: extremities warm and well perfused   Lungs: breathing comfortably on BiPAP, equal chest rise bilat   Skin: clean, dry, intact (any exceptions noted in below musculoskeletal exam)    Musculoskeletal:  LUE:  - Skin intact throughout, no open wounds  - No swelling  - No erythema, or signs of cellulitis  - NonTTP throughout  - AROM and PROM of the shoulder, elbow, wrist, and hand intact without pain  - " Axillary/AIN/PIN/Radial/Median/Ulnar nerves assessed in isolation and are without deficit  - SILT throughout  - Compartments soft  - 2+ radial artery pulse  - Capillary Refill < 2 sec    RUE:  - Skin intact throughout, no open wounds  - No swelling  - No erythema, or signs of cellulitis  - NonTTP throughout  - AROM and PROM of the shoulder, elbow, wrist, and hand intact without pain  - Axillary/AIN/PIN/Radial/Median/Ulnar nerves assessed in isolation and are without deficit  - SILT throughout  - Compartments soft  - 2+ radial artery pulse  - Capillary Refill < 2 sec    LLE:  - Skin intact throughout, no open wounds  - No swelling  - No erythema, or signs of cellulitis  - NonTTP throughout  - AROM and PROM of the hip, knee, ankle, and foot intact without pain  - TA/EHL/Gastroc/FHL assessed in isolation and are without deficit  - SILT throughout  - Compartments soft  - 2+ DP and PT pulses  - Capillary Refill < 2 sec  - Negative Log roll    RLE:  - Skin intact throughout, no open wounds, well healed TKA scar  - No swelling  - No erythema, or signs of cellulitis  - NonTTP throughout  - AROM and PROM of the hip, knee, ankle, and foot intact without pain  - TA/EHL/Gastroc/FHL assessed in isolation and are without deficit  - SILT throughout  - Compartments soft  - 2+ DP and PT pulses  - Capillary Refill < 2 sec  - Negative Log roll    Spine/pelvis/axial body:  No tenderness to palpation of cervical, thoracic, or lumbar spine  No pain with compression of pelvis  No decubitus ulcers    Significant Labs: All pertinent labs within the past 24 hours have been reviewed.    Significant Imaging: I have reviewed and interpreted all pertinent imaging results/findings.  X-ray right hip with degenerative changes in the right femoroacetabular joint, no acute osseous abnormalities   CT pelvis with a fluid collection posterior and inferior to the greater trochanter

## 2023-01-05 NOTE — PROGRESS NOTES
[facility] - [facility]  Neurology  Progress Note    Patient Name: Dominique Mcgee  MRN: [id number]  Admission Date: 12/23/2022  Hospital Length of Stay: 13 days  Code Status: Full Code   Attending Provider: Jeromy Dotson MD  Primary Care Physician: Briana Leblanc MD   Principal Problem:Shortness of breath    HPI:   Ms. Mcgee is a 71 y/o F female on whom general neurology is consulted for concerns for neuromuscular disease, diaphragmatic weakness or myopathy. She has a PMHx of HTN, HLD, hypothyroidism, recent URI 3 weeks prior to admission, balance difficulites who presented to the ED on 12/23 for 3 weeks of weakness, LIRA, difficulty swallowing, decreased appetite, and constipation with intermittent lower abdominal cramping. Per daughter, patient is typically independent, lives alone, cooks her own meals, since she has been ill she has had decreased PO intake and drinking coffee, tea, and eating cereal. She has had progressive worsening HTN in the last 3 weeks and was started on losartan-HCTZ and metoprolol. Prior to this she was only taking lisinopril per daughter. Admitted for HTN emergency and was found to have Na 120. Received IV fluids and admitted to hospital medicine. For her hypernatremia, her thiazide was held and she was placed on fluid restriction. Urine studies from 12/25 showed Uosm of 802 and HANH of 109. Her BP has been labile since admission requiring both IV antihypertensives as well as fluid boluses. Patient transferred to ICU for continued lethargy and weakness. She became hypotensive requiring multiple pressors. Patient intubated for airway protection on 12/28. Sodium improved with hypertonic saline, now normalized. CT abdomen/pelvis showing proctitis and hip abscess. Went for IR aspiration of abscess. Culture showing e.coli, on zosyn. MRI brain non-concerning. MRI spine with severe cervical stenosis and cord compression, also with mild lumbar stenosis. Repeat CXR with increased LLL  consolidation and pleural effusion. Extubated to bipap on 12/30. Once extubated and off pressors patient began to re-experience labile blood pressures similar to her initial presentation. Started on cardene drip. Difficulty weaning off BIPAP due to inadequate volumes and persistent shortness of breath.        Subjective:     Interval History: Patient continuing to have difficulty weaning off BiPAP. NIF of -9, FVC 0.3    Past Medical History:   Diagnosis Date    Hyperlipidemia     Hypertension     Hyponatremia 12/23/2022    Hypophosphatemia 12/30/2022    Hypothyroidism     Proctitis 12/29/2022    Severe sepsis 12/29/2022    Shock 12/29/2022       Past Surgical History:   Procedure Laterality Date    HYSTERECTOMY         Review of patient's allergies indicates:   Allergen Reactions    Hydralazine analogues      Precipitous drop in BP with IV formulation. Avoid if possible    Shellfish containing products Swelling         No current facility-administered medications on file prior to encounter.     Current Outpatient Medications on File Prior to Encounter   Medication Sig    alendronate-vitamin D3 (FOSAMAX PLUS D) 70 mg- 2,800 unit per tablet Take 1 tablet by mouth every 7 days.    hydroCHLOROthiazide (HYDRODIURIL) 25 MG tablet Take 25 mg by mouth once daily.    levothyroxine (SYNTHROID) 50 MCG tablet Take 50 mcg by mouth once daily.    losartan (COZAAR) 50 MG tablet Take 50 mg by mouth once daily.    meloxicam (MOBIC) 15 MG tablet Take 15 mg by mouth once daily.    metoprolol succinate (TOPROL-XL) 50 MG 24 hr tablet Take 50 mg by mouth once daily.    simvastatin (ZOCOR) 20 MG tablet Take 20 mg by mouth every evening.     Family History       Problem Relation (Age of Onset)    Breast cancer Sister (55), Sister (65)          Tobacco Use    Smoking status: Never    Smokeless tobacco: Not on file   Substance and Sexual Activity    Alcohol use: Not on file    Drug use: Never    Sexual activity: Not on  file     Review of Systems   Unable to perform ROS: Acuity of condition   HENT:  Positive for trouble swallowing.    Respiratory:  Positive for shortness of breath.    Objective:     Vital Signs (Most Recent):  Temp: 98.4 °F (36.9 °C) (01/05/23 0705)  Pulse: (!) 116 (01/05/23 1422)  Resp: (!) 33 (01/05/23 1422)  BP: (!) 180/94 (01/05/23 1422)  SpO2: (!) 92 % (01/05/23 1422) Vital Signs (24h Range):  Temp:  [98.3 °F (36.8 °C)-98.8 °F (37.1 °C)] 98.4 °F (36.9 °C)  Pulse:  [] 116  Resp:  [16-47] 33  SpO2:  [90 %-100 %] 92 %  BP: (134-221)/() 180/94     Weight: 59.4 kg (130 lb 15.3 oz)  Body mass index is 22.48 kg/m².    Physical Exam  Vitals and nursing note reviewed.   Constitutional:       Appearance: She is ill-appearing.   HENT:      Head: Normocephalic and atraumatic.   Cardiovascular:      Rate and Rhythm: Normal rate and regular rhythm.      Pulses: Normal pulses.      Heart sounds: Normal heart sounds. No murmur heard.  Pulmonary:      Effort: Tachypnea present.      Breath sounds: Normal air entry. No wheezing or rales.   Abdominal:      General: Abdomen is flat. There is no distension.      Palpations: Abdomen is soft.      Tenderness: There is no abdominal tenderness.   Musculoskeletal:         General: No swelling.      Right lower leg: No edema.      Left lower leg: No edema.   Skin:     General: Skin is warm and dry.      Findings: No bruising or rash.   Neurological:      General: No focal deficit present.      Comments: Alert, following commands   Psychiatric:         Mood and Affect: Mood normal.         Behavior: Behavior normal.     Neurological Exam:  MENTAL STATUS  Level of consciousness: alert  Orientation: oriented to person, place, and time  Attention normal. Concentration normal.  Speech: reduced phonation 2/2 SOB    CRANIAL NERVES  CN II: Visual fields full to confrontation  CN III, IV, VI: PERRL, EOMI  CN V: Facial sensation intact  CN VII: Facial expression symmetric and full  CN  IX, X: Symmetric palate elevation. Phonation normal  CN XII: Tongue midline with normal movements, no atrophy    MOTOR EXAM  Muscle bulk: normal  Muscle tone: normal  Pronator drift: absent    Strength - Upper Extremities: 4/5    Strength - Lower Extremities: 4/5    REFLEXES   Biceps Brachioradialis Patellar Achilles   Right +2 +2 +2 +1   Left +2 +2 +2 +1       SENSORY EXAM  Light touch: intact in all 4 extremities    No bulbar weakness found with lateral neck rotation / flexion / extension. No oral fasciculation noted below the tongue. No ocular weakness noted.         Significant Labs: Hemoglobin A1c:   Recent Labs   Lab 12/23/22  1920   HGBA1C 5.4     Blood Culture: No results for input(s): LABBLOO in the last 48 hours.  BMP:   Recent Labs   Lab 01/04/23 0403 01/04/23 1728 01/05/23  0343   * 145* 154*   * 140 138   K 3.7 3.4* 3.6    98 98   CO2 37* 36* 29   BUN 13 10 10   CREATININE 0.6 0.5 0.5   CALCIUM 8.6* 8.7 8.2*   MG 1.9  --  1.8     CBC:   Recent Labs   Lab 01/04/23  0403 01/05/23  0343   WBC 15.22* 11.76   HGB 8.1* 7.8*   HCT 27.2* 25.2*    324     CMP:   Recent Labs   Lab 01/04/23 0403 01/04/23 1728 01/05/23  0343   * 145* 154*   * 140 138   K 3.7 3.4* 3.6    98 98   CO2 37* 36* 29   BUN 13 10 10   CREATININE 0.6 0.5 0.5   CALCIUM 8.6* 8.7 8.2*   MG 1.9  --  1.8   ANIONGAP 7* 6* 11     CSF Culture: No results for input(s): CSFCULTURE in the last 48 hours.  CSF Studies: No results for input(s): ALIQUT, APPEARCSF, COLORCSF, CSFWBC, CSFRBC, GLUCCSF, LDHCSF, PROTEINCSF, VDRLCSF in the last 48 hours.  Inflammatory Markers:   Recent Labs   Lab 01/04/23  1317   SEDRATE 42*   CRP 81.4*     POCT Glucose:   Recent Labs   Lab 01/04/23  1943 01/04/23  2338 01/05/23  0355   POCTGLUCOSE 108 99 163*     Prealbumin:   Recent Labs   Lab 01/05/23  0343   PREALBUMIN 10*     Respiratory Culture: No results for input(s): GSRESP, RESPIRATORYC in the last 48 hours.  Urine  Culture: No results for input(s): LABURIN in the last 48 hours.  Urine Studies:   No results for input(s): COLORU, APPEARANCEUA, PHUR, SPECGRAV, PROTEINUA, GLUCUA, KETONESU, BILIRUBINUA, OCCULTUA, NITRITE, UROBILINOGEN, LEUKOCYTESUR, RBCUA, WBCUA, BACTERIA, SQUAMEPITHEL, HYALINECASTS in the last 48 hours.    Invalid input(s): WRIGHTSUR    All pertinent lab results from the past 24 hours have been reviewed.    Significant Imaging: I have reviewed and interpreted all pertinent imaging results/findings within the past 24 hours.    Assessment and Plan:     * Shortness of breath  71 yo F on whom general neurology is consulted for concerns for neuromuscular disease, diaphragmatic weakness or myopathy. Patient reports SOB and difficulty swallowing solids of 3 week duration. She is currently in the medical ICU for ongoing treatment of infectious processes, including recent hip abscess s/p IR aspiration, positive cultures with e Coli, on zosyn. MRI spine with severe cervical stenosis and some cord compression, without cord enhancement. She also has CXR with increased consolidation in LLL. She has had difficulty weaning off BiPAP.    Patient's SOB likely due to general critical illness. Myasthenia is unlikely given lack of bulbar symptoms on exam. Patient also has a normal diaphragmatic outline in the RLL on CXR. There is severe spinal stenosis C4-C5, and some signal abnormality at C3-C5, but not to the extent that would explain patient's SOB. We don't suspect a neurological cause for this patient's SOB. However, given that there are few options available, empiric therapy with IVIG is considered.    Possible benefit towards treatment of relatively broad potential autoimmune neurological etiologies were discussed with family. Decision was made to recommend empiric IVIG treatment.    Recommendations:  - IVIG 400 mg/kg once daily for 5 days  - incentive spirometry  - respiratory mechanics, NIF Q6H to track progress  - myasthenia  gravis panel collected, results pending  - outpatient follow up with neurology recommended         VTE Risk Mitigation (From admission, onward)         Ordered     enoxaparin injection 40 mg  Daily         12/23/22 1649     IP VTE HIGH RISK PATIENT  Once         12/23/22 1649     Place sequential compression device  Until discontinued         12/23/22 1649                Manuel Lizarraga MD  Neurology  [facility] - Cardiac Medical ICU

## 2023-01-05 NOTE — SUBJECTIVE & OBJECTIVE
Interval History/Significant Events:   Remains unable to wean from BIPAP    Review of Systems   Unable to perform ROS: Acuity of condition   Respiratory:  Positive for shortness of breath.    Objective:     Vital Signs (Most Recent):  Temp: 98.5 °F (36.9 °C) (01/05/23 0305)  Pulse: 93 (01/05/23 0800)  Resp: (!) 27 (01/05/23 0800)  BP: (!) 153/71 (01/05/23 0800)  SpO2: 99 % (01/05/23 0800)   Vital Signs (24h Range):  Temp:  [98.1 °F (36.7 °C)-98.8 °F (37.1 °C)] 98.5 °F (36.9 °C)  Pulse:  [] 93  Resp:  [16-44] 27  SpO2:  [83 %-100 %] 99 %  BP: (139-172)/() 153/71   Weight: 59.4 kg (130 lb 15.3 oz)  Body mass index is 22.48 kg/m².      Intake/Output Summary (Last 24 hours) at 1/5/2023 0909  Last data filed at 1/5/2023 0605  Gross per 24 hour   Intake 1687.19 ml   Output 820 ml   Net 867.19 ml         Physical Exam  Vitals and nursing note reviewed.   Constitutional:       General: She is not in acute distress.  HENT:      Head: Normocephalic and atraumatic.   Eyes:      Extraocular Movements: Extraocular movements intact.      Pupils: Pupils are equal, round, and reactive to light.   Cardiovascular:      Rate and Rhythm: Normal rate and regular rhythm.      Pulses: Normal pulses.      Heart sounds: Normal heart sounds. No murmur heard.  Pulmonary:      Breath sounds: Normal air entry. No wheezing or rales.   Abdominal:      General: Abdomen is flat. There is no distension.      Palpations: Abdomen is soft.      Tenderness: There is no abdominal tenderness.   Musculoskeletal:         General: No swelling.      Right hand: Decreased strength of finger abduction.      Left hand: Decreased strength of finger abduction.      Right lower leg: No edema.      Left lower leg: No edema.   Skin:     General: Skin is warm and dry.      Findings: No bruising or rash.   Neurological:      General: No focal deficit present.      Mental Status: She is alert and oriented to person, place, and time.   Psychiatric:          Mood and Affect: Mood normal.         Behavior: Behavior normal.       Vents:  Vent Mode: A/C (12/30/22 1528)  Ventilator Initiated: Yes (12/29/22 0144)  Set Rate: 12 BPM (12/30/22 1528)  Vt Set: 350 mL (12/30/22 0722)  Pressure Support: 14 cmH20 (12/30/22 1139)  PEEP/CPAP: 6 cmH20 (12/30/22 1528)  Oxygen Concentration (%): 30 (01/05/23 0800)  Peak Airway Pressure: 22 cmH20 (12/30/22 1528)  Plateau Pressure: 20 cmH20 (12/30/22 1528)  Total Ve: 9.73 L/m (12/30/22 1528)  Negative Inspiratory Force (cm H2O): 0 (12/30/22 1528)  F/VT Ratio<105 (RSBI): (!) 58.05 (12/30/22 1528)  Lines/Drains/Airways       Central Venous Catheter Line  Duration             Percutaneous Central Line Insertion/Assessment - Triple Lumen  12/29/22 0043 right internal jugular 7 days              Drain  Duration                  Rectal Tube 12/29/22 0332 rectal tube w/ balloon (indicate number of mLs) 45 Fr. 7 days         Urethral Catheter 01/04/23 0009 Non-latex 16 Fr. 1 day              Peripheral Intravenous Line  Duration                  Peripheral IV - Single Lumen 01/03/23 2321 20 G Right Antecubital 1 day                  Significant Labs:    CBC/Anemia Profile:  Recent Labs   Lab 01/04/23  0403 01/04/23  1518 01/05/23  0343   WBC 15.22*  --  11.76   HGB 8.1*  --  7.8*   HCT 27.2*  --  25.2*     --  324   MCV 86  --  84   RDW 14.1  --  14.0   [id number]  --  846  --           Chemistries:  Recent Labs   Lab 01/04/23  0403 01/04/23  1728 01/05/23  0343   * 140 138   K 3.7 3.4* 3.6    98 98   CO2 37* 36* 29   BUN 13 10 10   CREATININE 0.6 0.5 0.5   CALCIUM 8.6* 8.7 8.2*   MG 1.9  --  1.8   PHOS 1.8*  --  2.4*       All pertinent labs within the past 24 hours have been reviewed.    Significant Imaging:  I have reviewed all pertinent imaging results/findings within the past 24 hours.

## 2023-01-05 NOTE — CONSULTS
[facility]  Orthopedics  Consult Note    Patient Name: Dominique Mcgee  MRN: [id number]  Admission Date: 12/23/2022  Hospital Length of Stay: 12 days  Attending Provider: Jeromy Dotson MD  Primary Care Provider: Briana Leblanc MD    Patient information was obtained from patient, relative(s), past medical records, ER records and primary team.     Inpatient consult to Orthopedic Surgery  Consult performed by: Gautam Krueger MD  Consult ordered by: Beto Avelar MD        Subjective:     Principal Problem:Shortness of breath    Chief Complaint:   Chief Complaint   Patient presents with    Shortness of Breath     X 2 weeks. Reports cough. Denies cardiac hx.        HPI: Dominique Mcgee is a 72 y.o. female with PMHx of essential hypertension, hyperlipidemia, and hypothyroidism who presented to the ED for 3 weeks of weakness, dyspnea on exertion, decreased appetite on 12/23/22. Prior admission the patient was completely functional with no deficits, completely independent with ADLs. She became weaker over the three weeks leading up to to admission and more short of breath to the point where she got winded while walking to the bathroom and back to her bed. Her  also stated that she has been eating less and less.  She was admitted for hypertensive urgency and shortness of breath.  On 12/28/2022 she became more somnolent and had more difficulty breathing and was transferred to the ICU.  She was intubated upon arrival.  She was extubated on 12/31, but has required BiPAP since for respiratory support.    She had a CT scan of her chest, abdomen, and pelvis performed upon admission to the ICU which showed a fluid collection inferior to her right femoral neck.  This was aspirated by Interventional Radiology on 12/29.  The white blood cell count from the fluid was 0 and the fluid was clear.  On 01/02/2023, the fluid began growing E coli.  A repeat CT scan of the pelvis showed a persistent  fluid collection.  Orthopedics was then consulted for further evaluation and recommendations.  Upon my evaluation, the patient denies right hip pain.  She denies fevers and chills.  She does endorse some mild, intermittent, unchanged, and chronic pain in her right knee (status post total knee arthroplasty many years ago), but she denies pain elsewhere in her right lower extremity and all other extremities.  She denies injuries around her right hip.  Prior to admission, she states that she ambulated most of the time with no assistive devices, but used a cane for longer distances.      Past Medical History:   Diagnosis Date    Hyperlipidemia     Hypertension     Hyponatremia 12/23/2022    Hypophosphatemia 12/30/2022    Hypothyroidism     Severe sepsis 12/29/2022    Shock 12/29/2022       Past Surgical History:   Procedure Laterality Date    HYSTERECTOMY         Review of patient's allergies indicates:   Allergen Reactions    Hydralazine analogues      Precipitous drop in BP with IV formulation. Avoid if possible    Shellfish containing products Swelling       Current Facility-Administered Medications   Medication    acetaminophen tablet 650 mg    acetylcysteine 200 mg/ml (20%) solution 2 mL    albuterol-ipratropium 2.5 mg-0.5 mg/3 mL nebulizer solution 3 mL    atorvastatin tablet 10 mg    capsaicin 0.025 % cream    ceFEPIme (MAXIPIME) 1 g in dextrose 5 % in water (D5W) 5 % 50 mL IVPB (MB+)    dexmedetomidine (PRECEDEX) 400mcg/100mL 0.9% NaCL infusion    dextrose 10% bolus 125 mL 125 mL    dextrose 10% bolus 250 mL 250 mL    dextrose 5 % infusion    enoxaparin injection 40 mg    fat emulsion 20% infusion 250 mL    glucagon (human recombinant) injection 1 mg    glucose chewable tablet 16 g    glucose chewable tablet 24 g    glycerin adult suppository 1 suppository    guaiFENesin 100 mg/5 ml syrup 200 mg    insulin aspart U-100 pen 0-5 Units    iohexol (OMNIPAQUE) oral solution 15 mL     "levothyroxine tablet 50 mcg    linezolid 600 mg/300 mL IVPB 600 mg    losartan tablet 25 mg    methocarbamoL tablet 500 mg    metoprolol tartrate (LOPRESSOR) tablet 25 mg    multivitamin tablet    niCARdipine 40 mg/200 mL (0.2 mg/mL) infusion    pantoprazole injection 40 mg    [START ON 1/5/2023] polyethylene glycol packet 17 g    senna-docusate 8.6-50 mg per tablet 1 tablet    sodium chloride 0.9% flush 10 mL    sodium chloride 3% nebulizer solution 4 mL    TPN ADULT CENTRAL LINE CUSTOM     Family History       Problem Relation (Age of Onset)    Breast cancer Sister (55), Sister (65)          Tobacco Use    Smoking status: Never    Smokeless tobacco: Not on file   Substance and Sexual Activity    Alcohol use: Not on file    Drug use: Never    Sexual activity: Not on file     ROS  Constitutional: Denies fever/chills   Neurological: Denies numbness/tingling (any exceptions noted in orthopaedic exam)    Psychiatric/Behavioral: Denies change in normal mood   Eyes: Denies change in vision   Cardiovascular: Denies chest pain   Respiratory:  Positive for shortness of breath  Hematologic/Lymphatic: Denies easy bleeding/bruising    Skin: Denies new rash or skin lesions    Gastrointestinal:  Positive for recent abdominal pain  Musculoskeletal: see HPI     Objective:     Vital Signs (Most Recent):  Temp: 98.8 °F (37.1 °C) (01/04/23 1500)  Pulse: 87 (01/04/23 1700)  Resp: (!) 22 (01/04/23 1700)  BP: (!) 147/70 (01/04/23 1700)  SpO2: 100 % (01/04/23 1700)   Vital Signs (24h Range):  Temp:  [97.4 °F (36.3 °C)-98.8 °F (37.1 °C)] 98.8 °F (37.1 °C)  Pulse:  [] 87  Resp:  [14-44] 22  SpO2:  [83 %-100 %] 100 %  BP: (107-166)/(52-93) 147/70     Weight: 59.4 kg (130 lb 15.3 oz)  Height: 5' 4" (162.6 cm)  Body mass index is 22.48 kg/m².      Intake/Output Summary (Last 24 hours) at 1/4/2023 8866  Last data filed at 1/4/2023 1700  Gross per 24 hour   Intake 2620.93 ml   Output 390 ml   Net 2230.93 ml "       Ortho/SPM Exam  General: no acute distress, appears stated age     Neuro: alert and oriented x3   Psych: normal mood   Head: normocephalic, atraumatic.    Eyes: no scleral icterus   Mouth: moist mucous membranes   Cardiovascular: extremities warm and well perfused   Lungs: breathing comfortably on BiPAP, equal chest rise bilat   Skin: clean, dry, intact (any exceptions noted in below musculoskeletal exam)    Musculoskeletal:  LUE:  - Skin intact throughout, no open wounds  - No swelling  - No erythema, or signs of cellulitis  - NonTTP throughout  - AROM and PROM of the shoulder, elbow, wrist, and hand intact without pain  - Axillary/AIN/PIN/Radial/Median/Ulnar nerves assessed in isolation and are without deficit  - SILT throughout  - Compartments soft  - 2+ radial artery pulse  - Capillary Refill < 2 sec    RUE:  - Skin intact throughout, no open wounds  - No swelling  - No erythema, or signs of cellulitis  - NonTTP throughout  - AROM and PROM of the shoulder, elbow, wrist, and hand intact without pain  - Axillary/AIN/PIN/Radial/Median/Ulnar nerves assessed in isolation and are without deficit  - SILT throughout  - Compartments soft  - 2+ radial artery pulse  - Capillary Refill < 2 sec    LLE:  - Skin intact throughout, no open wounds  - No swelling  - No erythema, or signs of cellulitis  - NonTTP throughout  - AROM and PROM of the hip, knee, ankle, and foot intact without pain  - TA/EHL/Gastroc/FHL assessed in isolation and are without deficit  - SILT throughout  - Compartments soft  - 2+ DP and PT pulses  - Capillary Refill < 2 sec  - Negative Log roll    RLE:  - Skin intact throughout, no open wounds, well healed TKA scar  - No swelling  - No erythema, or signs of cellulitis  - NonTTP throughout  - AROM and PROM of the hip, knee, ankle, and foot intact without pain  - TA/EHL/Gastroc/FHL assessed in isolation and are without deficit  - SILT throughout  - Compartments soft  - 2+ DP and PT pulses  - Capillary  Refill < 2 sec  - Negative Log roll    Spine/pelvis/axial body:  No tenderness to palpation of cervical, thoracic, or lumbar spine  No pain with compression of pelvis  No decubitus ulcers    Significant Labs: All pertinent labs within the past 24 hours have been reviewed.    Significant Imaging: I have reviewed and interpreted all pertinent imaging results/findings.  X-ray right hip with degenerative changes in the right femoroacetabular joint, no acute osseous abnormalities   CT pelvis with a fluid collection posterior and inferior to the greater trochanter    Assessment/Plan:     Abscess of bursa of right hip  Dominique Mcgee is a 72 y.o. female admitted for respiratory failure incidentally found to have a fluid collection around her right greater trochanter.  This was aspirated on December 29th and results at that time showed a white blood cell count of 0.  The fluid was clear.  On January 2nd, the fluid began growing E coli.  A repeat CT scan of the pelvis showed a persistent fluid collection.  Currently the patient is on BiPAP, but is otherwise hemodynamically stable.  She is afebrile.  She does have a leukocytosis with a white blood cell count of 15.22, but this has been improving over the past few days.  Her inflammatory markers are elevated with an ESR 42 and a CRP of 81.4. Blood cultures throughout her hospitalization have not grown any bacteria.  She has no right hip pain on exam.    Due to the sterile appearing description of the fluid (WBC 0 and clear fluid) along with no obvious source of infection for the patient, the E coli that is going may be a contaminant.  Recommend repeat aspiration for confirmation.  Also recommend drain placement by IR for continued decompression of the fluid.    Plan:  No surgical intervention by Orthopedics at this time  Recommend repeat aspiration of the right hip fluid collection by Interventional Radiology and recommend drain placement at that time for continued  decompression  Orthopedics will follow up the results of the aspiration        Gautam Krueger MD  Orthopedics  [facility] - Cardiac Medical ICU

## 2023-01-05 NOTE — ASSESSMENT & PLAN NOTE
Nutrition consulted. Most recent weight and BMI monitored-   On TPN    Malnutrition (Moderate to Severe)  Energy Intake (Malnutrition): less than 75% for greater than 7 days    Measurements:  Wt Readings from Last 1 Encounters:   01/05/23 59.4 kg (130 lb 15.3 oz)   Body mass index is 22.48 kg/m².    Recommendations: Recommendation/Intervention: 1.  Goals: Meet % EEN, EPN by RD f/u date    Patient has been screened and assessed by RD. RD will follow patient.

## 2023-01-05 NOTE — PROGRESS NOTES
[facility]  Critical Care Medicine  Progress Note    Patient Name: Dominique Mcgee  MRN: [id number]  Admission Date: 12/23/2022  Hospital Length of Stay: 13 days  Code Status: Full Code  Attending Provider: Jeromy Dotson MD  Primary Care Provider: Briana Leblanc MD   Principal Problem: Shortness of breath    Subjective:     HPI:  Ms. Mcgee is a 73 y/o F patient with HTN, HLD, hypothyroidism, recent URI 3 weeks prior to admission, balance difficulites who presented to the ED on 12/23 for 3 weeks of weakness, LIRA, decreased appetite, and constipation with intermittent lower abdominal cramping. Per daughter, patient is typically independent, lives alone, cooks her own meals, since she has been ill she has had decreased PO intake and drinking coffee, tea, and eating cereal. She has had progressive worsening HTN in the last 3 weeks and was started on losartan-HCTZ and metoprolol. Prior to this she was only taking lisinopril per daughter. Admitted for HTN emergency and was found to have Na 120. Received IV fluids and admitted to hospital medicine. For her hypernatremia, her thiazide was held and she was placed on fluid restriction. Urine studies from 12/25 showed Uosm of 802 and HANH of 109. Her BP has been labile since admission requiring both IV antihypertensives as well as fluid boluses. The patient's hyponatremia persisted despite fluid restriction and she became increasingly lethargic and weak so CCM was consulted for further management.      Upon evaluation, patient is somnolent. Opens eyes to voice, unable to engage in full conversation. Speaks mainly single words. Denies thirst. Reports SOB and generalized abdominal pain a/w constipation. No CP, N/V/D, dysuria, edema. Most recent vitals: /60, pulse 82, R 18, SpO2 94% on RA. Na trend 119->115->117.       Hospital/ICU Course:  Once transferred to ICU patient became hypotensive requiring multiple pressors. Patient intubated for airway  protection. Art line and central line placed. Sodium improved with hypertonic saline. CT abdomen/pelvis showing proctitis and hip abscess. Went for IR aspiration of abscess. Culture showing e.coli, on zosyn. MRI brain non-concerning. MRI spine with severe cervical stenosis and cord compression, also with mild lumbar stenosis. NSGY consulted with plans for outpatient follow-up. Repeat CXR with increased LLL consolidation and pleural effusion. Extubated to bipap. Once extubated and off pressors patient began to re-experience labile blood pressures similar to her initial presentation. Started on cardene drip. Difficulty weaning off BIPAP due to inadequate volumes and persistent shortness of breath. Extensive workup done regarding difficulties coming off BIPAP. Neurology consulted. ID was consulted for hip abscess- continued on zosyn. Repeat imaging concerning for pneumonia; started on linezolid. Ortho consulted for hip abscess that re-occurred on repeat imaging. Requested IR re-drain abscess and re-culture.      Interval History/Significant Events:   Remains unable to wean from BIPAP    Review of Systems   Unable to perform ROS: Acuity of condition   Respiratory:  Positive for shortness of breath.    Objective:     Vital Signs (Most Recent):  Temp: 98.5 °F (36.9 °C) (01/05/23 0305)  Pulse: 93 (01/05/23 0800)  Resp: (!) 27 (01/05/23 0800)  BP: (!) 153/71 (01/05/23 0800)  SpO2: 99 % (01/05/23 0800)   Vital Signs (24h Range):  Temp:  [98.1 °F (36.7 °C)-98.8 °F (37.1 °C)] 98.5 °F (36.9 °C)  Pulse:  [] 93  Resp:  [16-44] 27  SpO2:  [83 %-100 %] 99 %  BP: (139-172)/() 153/71   Weight: 59.4 kg (130 lb 15.3 oz)  Body mass index is 22.48 kg/m².      Intake/Output Summary (Last 24 hours) at 1/5/2023 0909  Last data filed at 1/5/2023 0605  Gross per 24 hour   Intake 1687.19 ml   Output 820 ml   Net 867.19 ml         Physical Exam  Vitals and nursing note reviewed.   Constitutional:       General: She is not in acute  distress.  HENT:      Head: Normocephalic and atraumatic.   Eyes:      Extraocular Movements: Extraocular movements intact.      Pupils: Pupils are equal, round, and reactive to light.   Cardiovascular:      Rate and Rhythm: Normal rate and regular rhythm.      Pulses: Normal pulses.      Heart sounds: Normal heart sounds. No murmur heard.  Pulmonary:      Breath sounds: Normal air entry. No wheezing or rales.   Abdominal:      General: Abdomen is flat. There is no distension.      Palpations: Abdomen is soft.      Tenderness: There is no abdominal tenderness.   Musculoskeletal:         General: No swelling.      Right hand: Decreased strength of finger abduction.      Left hand: Decreased strength of finger abduction.      Right lower leg: No edema.      Left lower leg: No edema.   Skin:     General: Skin is warm and dry.      Findings: No bruising or rash.   Neurological:      General: No focal deficit present.      Mental Status: She is alert and oriented to person, place, and time.   Psychiatric:         Mood and Affect: Mood normal.         Behavior: Behavior normal.       Vents:  Vent Mode: A/C (12/30/22 1528)  Ventilator Initiated: Yes (12/29/22 0144)  Set Rate: 12 BPM (12/30/22 1528)  Vt Set: 350 mL (12/30/22 0722)  Pressure Support: 14 cmH20 (12/30/22 1139)  PEEP/CPAP: 6 cmH20 (12/30/22 1528)  Oxygen Concentration (%): 30 (01/05/23 0800)  Peak Airway Pressure: 22 cmH20 (12/30/22 1528)  Plateau Pressure: 20 cmH20 (12/30/22 1528)  Total Ve: 9.73 L/m (12/30/22 1528)  Negative Inspiratory Force (cm H2O): 0 (12/30/22 1528)  F/VT Ratio<105 (RSBI): (!) 58.05 (12/30/22 1528)  Lines/Drains/Airways       Central Venous Catheter Line  Duration             Percutaneous Central Line Insertion/Assessment - Triple Lumen  12/29/22 0043 right internal jugular 7 days              Drain  Duration                  Rectal Tube 12/29/22 0332 rectal tube w/ balloon (indicate number of mLs) 45 Fr. 7 days         Urethral Catheter  01/04/23 0009 Non-latex 16 Fr. 1 day              Peripheral Intravenous Line  Duration                  Peripheral IV - Single Lumen 01/03/23 2321 20 G Right Antecubital 1 day                  Significant Labs:    CBC/Anemia Profile:  Recent Labs   Lab 01/04/23  0403 01/04/23  1518 01/05/23  0343   WBC 15.22*  --  11.76   HGB 8.1*  --  7.8*   HCT 27.2*  --  25.2*     --  324   MCV 86  --  84   RDW 14.1  --  14.0   [id number]  --  846  --           Chemistries:  Recent Labs   Lab 01/04/23  0403 01/04/23  1728 01/05/23  0343   * 140 138   K 3.7 3.4* 3.6    98 98   CO2 37* 36* 29   BUN 13 10 10   CREATININE 0.6 0.5 0.5   CALCIUM 8.6* 8.7 8.2*   MG 1.9  --  1.8   PHOS 1.8*  --  2.4*       All pertinent labs within the past 24 hours have been reviewed.    Significant Imaging:  I have reviewed all pertinent imaging results/findings within the past 24 hours.      ABG  Recent Labs   Lab 01/05/23  1132   PH 7.389   PO2 41   PCO2 63.5*   HCO3 38.4*   BE 13     Assessment/Plan:     Neuro  Stenosis, cervical spine  Noted on MRI spine. Of note patient has been experiencing episodes of vertigo and decreased balance which could be related.   -NSGY consulted, appreciate recs.  -amb ref to NSGY outpatient  -if need for reintubation should be done with fiberoptic or glidescope    Pulmonary  * Shortness of breath  Patient presented with approx 3 weeks of worsening shortness of breath, weakness, and falls. On transfer to ICU required intubation. Was extubated to BIPAP. Have been unable to wean off bipap due to persistent shortness of breath, tachypnea, and inadequate volumes. Trial off bipap results in tachypnea, tachycardia, and hypertension. Appears weak distally and with fine motor skills. Unclear etiology at this time. Does not appear pulmonary in nature as patient is saturating well, ABGs have been wnl. MRI C-spine showed severe cervical stenosis, NSGY was consulted, unlikely that the stenosis would be causing  these issues. Concern that difficulty breathing could be related to diaphragm weakness. Critical illness myopathy seems unlikely as patient was not intubated for long duration.  -neurology consulted, myasthenia panel sent  -further labs ordered (ESR, CRP, DARLINE, myositis panel)  -continue bipap  -palliative care consulted for long term planning    Acute hypoxemic respiratory failure  Patient with Hypoxic Respiratory failure which is Acute.  she is not on home oxygen. Supplemental oxygen was provided and noted- Oxygen Concentration (%):  [30-60] 30.   Signs/symptoms of respiratory failure include- tachypnea, increased work of breathing and respiratory distress. Contributing diagnoses includes - Pleural effusion and atelectasis Labs and images were reviewed. Patient Has recent ABG, which has been reviewed. Will treat underlying causes and adjust management of respiratory failure as follows- unclear etiology of pleural effusions, patient notes recent URI 3 weeks ago that has left her short of breath since then, on broad spectrum abx  Repeat CXR with increased consolidation in LLL  Extubated to Bipap    1/3: CT Chest - Bibasilar atelectatic/consolidative change (left greater than right) with patchy right basilar opacities and small bilateral pleural effusions.  Correlation for underlying infectious process advised.     ID following, will start linezolid to treat for PNA to end 1/9    Cardiac/Vascular  Right renal artery stenosis  -no acute intervention while in ICU  -see HTN    Hyperlipemia  Continue home statin    Essential hypertension  Known history of uncontrolled HTN, w/ labile BP since admit.   -BP meds held in setting of septic shock  -will avoid IV meds if possible as has had hypotension following IV BP meds  -renal artery duplex showing R renal artery stenosis, L renal artery unable to be visualized  -Will monitor and add back meds as tolerated, IV anti-hypertensives when NPO    Dyspnea on exertion  Began prior  to admission. Has been requiring 2L via NC to maintain oxygenation  -O2 via NC PRN    Renal/  Metabolic alkalosis  Initially could have been related to diuretic use. Bicarb remains elevated after stopping HCTZ.    Endocrine  Moderate malnutrition  Nutrition consulted. Most recent weight and BMI monitored-   On TPN    Malnutrition (Moderate to Severe)  Energy Intake (Malnutrition): less than 75% for greater than 7 days    Measurements:  Wt Readings from Last 1 Encounters:   01/05/23 59.4 kg (130 lb 15.3 oz)   Body mass index is 22.48 kg/m².    Recommendations: Recommendation/Intervention: 1.  Goals: Meet % EEN, EPN by RD f/u date    Patient has been screened and assessed by RD. RD will follow patient.      GI  Generalized abdominal pain  Patient with constipation and ileus. CT scan consistent with ileus and stercoral proctitis which is likely related to chronic constipation.    Constipation  Continue miralax and senna-doc daily    Orthopedic  Abscess of bursa of right hip  CT scan showing possible hematoma vs abscess around right hip. Aspiration done with IR. Culture with e.coli pan-sensitive. Repeat CT showing fluid has reaccumulated.   -ID following  -Has been on zosyn, switch to cefepime per ID  -ortho consulted- recs for repeating drainage w/ IR  -f/u repeat cultures and fluid analysis      Other  Hypothermia  Considering new onset will check BCx and start zosyn. Using bear hugger for warming.    Generalized weakness  See shortness of breath       Critical Care Daily Checklist:    A: Awake: RASS Goal/Actual Goal:    Actual: Manning Agitation Sedation Scale (RASS): Alert and calm   B: Spontaneous Breathing Trial Performed? Spon. Breathing Trial Initiated?: Not initiated (12/29/22 0729)   C: SAT & SBT Coordinated?  n/a                      D: Delirium: CAM-ICU Overall CAM-ICU: Negative   E: Early Mobility Performed? No   F: Feeding Goal: Goals: Meet % EEN, EPN by RD f/u date  Status: Nutrition Goal  Status: goal met   Current Diet Order   Procedures    Diet NPO      AS: Analgesia/Sedation none   T: Thromboembolic Prophylaxis yes   H: HOB > 300 Yes   U: Stress Ulcer Prophylaxis (if needed) Not needed   G: Glucose Control n/a   B: Bowel Function Stool Occurrence: 1   I: Indwelling Catheter (Lines & Rush) Necessity Rush, PIV   D: De-escalation of Antimicrobials/Pharmacotherapies no    Plan for the day/ETD Trial HFNC, re-discuss w/ neuro    Code Status:  Family/Goals of Care: Full Code  On-going       Critical secondary to Patient has a condition that poses threat to life and bodily function: Severe Respiratory Distress      Critical care was time spent personally by me on the following activities: development of treatment plan with patient or surrogate and bedside caregivers, discussions with consultants, evaluation of patient's response to treatment, examination of patient, ordering and performing treatments and interventions, ordering and review of laboratory studies, ordering and review of radiographic studies, pulse oximetry, re-evaluation of patient's condition. This critical care time did not overlap with that of any other provider or involve time for any procedures.     Beto Avelar MD  Critical Care Medicine  [facility] - Cardiac Medical ICU

## 2023-01-05 NOTE — PT/OT/SLP PROGRESS
Speech Language Pathology  Patient Not Seen      Dominique Mcgee  MRN: [id number]    Patient not seen today secondary to BIPAP, Nursing hold (Comment).

## 2023-01-05 NOTE — BRIEF OP NOTE
Radiology Post-Procedure Note    Pre Op Diagnosis: fluid collection    Post Op Diagnosis: same    Procedure: fluid collection aspiration    Procedure performed by: Marilyn England MD    Written Informed Consent Obtained: Yes    Specimen Removed: YES 10 mL    Estimated Blood Loss: Minimal    Findings:   Using US  guidance a a 5 Fr one step was advanced into the posterior right hip fluid collection and 10 mL red fluid was removed.     Patient tolerated procedure well.    Marilyn England MD  Interventional Radiology

## 2023-01-05 NOTE — ASSESSMENT & PLAN NOTE
Presented hyponatremic with Na of 119, received hypertonic saline with subsequent normalization of Na. Recently, Na up trending and became hypernatremic to 149. Patient then received continuous D5W with subsequent normalization of sodium.     - discontinue D5W as Na is now 138  - encourage PO intake  - no nephrology intervention recommended at this time    Resolved

## 2023-01-05 NOTE — PROGRESS NOTES
[facility] - [facility]  Adult Nutrition  Progress Note    SUMMARY       Recommendations    Modify TPN regimen to 160g D, 80g AA + IV Lipids = 1364 kcals & 80g of protein (GIR: 1.87).  If/when able to place NGT, initiate TFs. Rec'd Impact Peptide @ 35 mL/hr = 1260 kcals, 79 g of protein, 647 mL fluid.  Diet advancement per SLP/MD.  RD to monitor & follow-up.    Goals: Meet % EEN, EPN by RD f/u date  Nutrition Goal Status: goal met  Communication of RD Recs: reviewed with RN    Assessment and Plan    Moderate malnutrition    Nutrition Problem:  Moderate Protein-Calorie Malnutrition  Malnutrition in the context of Acute Illness/Injury    Related to (etiology):  Inability to consume sufficient energy     Signs and Symptoms (as evidenced by):  Energy Intake: <75% of estimated energy requirement for > 1 week   Body Fat Depletion: mild and moderate depletion of orbitals   Muscle Mass Depletion: mild and moderate depletion of temples and clavicle region     Interventions(treatment strategy):  Collaboration of nutrition care w/ other providers  EN/PN    Nutrition Diagnosis Status:  Continues    Malnutrition Assessment    Energy Intake (Malnutrition): less than 75% for greater than 7 days   Orbital Region (Subcutaneous Fat Loss): mild depletion   Mandaen Region (Muscle Loss): moderate depletion  Clavicle Bone Region (Muscle Loss): mild depletion     Reason for Assessment    Reason For Assessment: RD follow-up  Diagnosis: other (see comments) (Hyponatremia)  Relevant Medical History: HTN, HLD  Interdisciplinary Rounds: attended    General Information Comments: Extubated 12/31, remains NPO - TPN initiated 2/2 inability to place NGT while on BiPAP. Per H & P, pt w/ decreased appetite PTA x 3 weeks; unsure of UBW (most recent was 180# x 2018). NFPE complete 12/30 - mild-moderate wasting noted. RD feels pt meets criteria for moderate malnutrition. Please see PES statement for details.  Nutrition Discharge Planning:  "Pending clinical course    Nutrition/Diet History    Spiritual, Cultural Beliefs, Buddhism Practices, Values that Affect Care: no  Food Allergies: shellfish  Factors Affecting Nutritional Intake: NPO    Anthropometrics    Temp: 98.4 °F (36.9 °C)  Height: 5' 4" (162.6 cm)  Height (inches): 64 in  Weight Method: Bed Scale  Weight: 59.4 kg (130 lb 15.3 oz)  Weight (lb): 130.95 lb  Ideal Body Weight (IBW), Female: 120 lb  % Ideal Body Weight, Female (lb): 109.13 %  BMI (Calculated): 22.5  BMI Grade: 18.5-24.9 - normal    Lab/Procedures/Meds    Pertinent Labs Reviewed: reviewed  Pertinent Labs Comments:   Pertinent Medications Reviewed: reviewed  Pertinent Medications Comments: Precedex, Nicardipine    Estimated/Assessed Needs    Weight Used For Calorie Calculations: 59.4 kg (130 lb 15.3 oz)    Energy Calorie Requirements (kcal): 1375 kcal/d  Energy Need Method: Alger-St Jeor (1.25 PAL)    Protein Requirements: 71-89 g/d (1.2-1.5 g/kg)  Weight Used For Protein Calculations: 59.4 kg (130 lb 15.3 oz)    Estimated Fluid Requirement Method: other (see comments) (Per MD or 1 mL/kcal)  RDA Method (mL): 1375    Nutrition Prescription Ordered    Current Diet Order: NPO  Current Nutrition Support Formula Ordered: Other (Comment) (TPN: 100g D, 80g AA @ 40 mL/hr + IV Lipids)    Evaluation of Received Nutrient/Fluid Intake    Parenteral Calories (kcal): 1160  Parenteral Protein (gm): 80  Parenteral Fluid (mL): 960  Lipid Calories (kcals): 500 kcals  GIR (Glucose Infusion Rate) (mg/kg/min): 1.17 mg/kg/min    % Kcal Needs: 84%  % Protein Needs: 100%    I/O: +6.1L admit    Energy Calories Required: meeting needs  Protein Required: meeting needs  Fluid Required: other (see comments) (Per MD)    Comments: LBM: 1/5    Tolerance: tolerating    Nutrition Risk    Level of Risk/Frequency of Follow-up: (1x/week)     Monitor and Evaluation    Food and Nutrient Intake: energy intake, food and beverage intake, enteral nutrition intake, " parenteral nutrition intake  Food and Nutrient Adminstration: diet order, enteral and parenteral nutrition administration  Physical Activity and Function: nutrition-related ADLs and IADLs  Anthropometric Measurements: weight, weight change  Biochemical Data, Medical Tests and Procedures: glucose/endocrine profile, lipid profile, inflammatory profile, gastrointestinal profile, electrolyte and renal panel  Nutrition-Focused Physical Findings: overall appearance     Nutrition Follow-Up    RD Follow-up?: Yes

## 2023-01-05 NOTE — ASSESSMENT & PLAN NOTE
Dominique Mcgee is a 72 y.o. female admitted for respiratory failure incidentally found to have a fluid collection around her right greater trochanter.  This was aspirated on December 29th and results at that time showed a white blood cell count of 0.  The fluid was clear.  On January 2nd, the fluid began growing E coli.  A repeat CT scan of the pelvis showed a persistent fluid collection.  Currently the patient is on BiPAP, but is otherwise hemodynamically stable.  She is afebrile.  She does have a leukocytosis with a white blood cell count of 15.22, but this has been improving over the past few days.  Her inflammatory markers are elevated with an ESR 42 and a CRP of 81.4. Blood cultures throughout her hospitalization have not grown any bacteria.  She has no right hip pain on exam.    Due to the sterile appearing description of the fluid (WBC 0 and clear fluid) along with no obvious source of infection for the patient, the E coli that is going may be a contaminant.  Recommend repeat aspiration for confirmation.  Also recommend drain placement by IR for continued decompression of the fluid.    Plan:  No surgical intervention by Orthopedics at this time  Recommend repeat aspiration of the right hip fluid collection by Interventional Radiology and recommend drain placement at that time for continued decompression  Orthopedics will follow up the results of the aspiration

## 2023-01-05 NOTE — ASSESSMENT & PLAN NOTE
71 yo F on whom general neurology is consulted for concerns for neuromuscular disease, diaphragmatic weakness or myopathy. Patient reports SOB and difficulty swallowing solids of 3 week duration. She is currently in the medical ICU for ongoing treatment of infectious processes, including recent hip abscess s/p IR aspiration, positive cultures with e Coli, on zosyn. MRI spine with severe cervical stenosis and some cord compression, without cord enhancement. She also has CXR with increased consolidation in LLL. She has had difficulty weaning off BiPAP.    Patient's SOB likely due to general critical illness. Myasthenia is unlikely given lack of bulbar symptoms on exam. Patient also has a normal diaphragmatic outline in the RLL on CXR. There is severe spinal stenosis C4-C5, and some signal abnormality at C3-C5, but not to the extent that would explain patient's SOB. We don't suspect a neurological cause for this patient's SOB. However, given that there are few options available, empiric therapy with IVIG is considered.    Possible benefit towards treatment of relatively broad potential autoimmune neurological etiologies were discussed with family. Decision was made to recommend empiric IVIG treatment.    Recommendations:  - IVIG 400 mg/kg once daily for 5 days  - incentive spirometry  - respiratory mechanics, NIF Q6H to track progress  - myasthenia gravis panel collected, results pending  - outpatient follow up with neurology recommended

## 2023-01-05 NOTE — SUBJECTIVE & OBJECTIVE
Past Medical History:   Diagnosis Date    Hyperlipidemia     Hypertension     Hyponatremia 12/23/2022    Hypophosphatemia 12/30/2022    Hypothyroidism     Severe sepsis 12/29/2022    Shock 12/29/2022       Past Surgical History:   Procedure Laterality Date    HYSTERECTOMY         Review of patient's allergies indicates:   Allergen Reactions    Hydralazine analogues      Precipitous drop in BP with IV formulation. Avoid if possible    Shellfish containing products Swelling       Medications:  Continuous Infusions:   nicardipine Stopped (01/03/23 0515)    TPN ADULT CENTRAL LINE CUSTOM 40 mL/hr at 01/05/23 1225    TPN ADULT CENTRAL LINE CUSTOM       Scheduled Meds:   albuterol-ipratropium  3 mL Nebulization Q4H    atorvastatin  10 mg Oral Daily    capsaicin   Topical (Top) BID    ceFEPime (MAXIPIME) IVPB  1 g Intravenous Q8H    enoxaparin  40 mg Subcutaneous Daily    fat emulsion 20%  250 mL Intravenous Daily    levothyroxine  50 mcg Oral Before breakfast    linezolid  600 mg Intravenous Q12H    losartan  25 mg Oral Daily    metoprolol tartrate  25 mg Oral BID    multivitamin  1 tablet Oral Daily    pantoprazole  40 mg Intravenous Daily    polyethylene glycol  17 g Oral Daily    potassium phosphate IVPB  15 mmol Intravenous Once    senna-docusate 8.6-50 mg  1 tablet Oral Daily    sodium chloride 3%  4 mL Nebulization Q4H     PRN Meds:acetaminophen, acetylcysteine 200 mg/ml (20%), dextrose 10%, dextrose 10%, glucagon (human recombinant), glucose, glucose, glycerin adult, guaiFENesin 100 mg/5 ml, insulin aspart U-100, iohexol, methocarbamoL, sodium chloride 0.9%    Family History       Problem Relation (Age of Onset)    Breast cancer Sister (55), Sister (65)          Tobacco Use    Smoking status: Never    Smokeless tobacco: Not on file   Substance and Sexual Activity    Alcohol use: Not on file    Drug use: Never    Sexual activity: Not on file       Review of Systems   Constitutional:  Positive for fatigue.   HENT:  Negative.     Eyes: Negative.    Respiratory:  Positive for shortness of breath.    Gastrointestinal: Negative.    Endocrine: Negative.    Musculoskeletal: Negative.    Skin: Negative.    Neurological:  Positive for weakness.   Psychiatric/Behavioral:  The patient is nervous/anxious.    Objective:     Vital Signs (Most Recent):  Temp: 98.4 °F (36.9 °C) (01/05/23 0705)  Pulse: (!) 117 (01/05/23 1200)  Resp: (!) 28 (01/05/23 1200)  BP: (!) 170/77 (01/05/23 1200)  SpO2: 99 % (01/05/23 1200)   Vital Signs (24h Range):  Temp:  [98.3 °F (36.8 °C)-98.8 °F (37.1 °C)] 98.4 °F (36.9 °C)  Pulse:  [] 117  Resp:  [16-44] 28  SpO2:  [83 %-100 %] 99 %  BP: (134-172)/() 170/77     Weight: 59.4 kg (130 lb 15.3 oz)  Body mass index is 22.48 kg/m².    Physical Exam  Vitals and nursing note reviewed.   Constitutional:       General: She is not in acute distress.  HENT:      Head: Normocephalic.      Right Ear: External ear normal.      Left Ear: External ear normal.      Nose: Nose normal.      Mouth/Throat:      Mouth: Mucous membranes are dry.   Eyes:      Pupils: Pupils are equal, round, and reactive to light.   Cardiovascular:      Rate and Rhythm: Tachycardia present.   Pulmonary:      Effort: Pulmonary effort is normal. No respiratory distress.      Comments: On continuous bipap  Abdominal:      General: There is no distension.   Musculoskeletal:      Cervical back: Normal range of motion.   Neurological:      Mental Status: She is alert and oriented to person, place, and time.      Motor: Weakness present.   Psychiatric:         Mood and Affect: Mood normal.       Review of Symptoms      Symptom Assessment (ESAS 0-10 Scale)  Pain:  0  Dyspnea:  0  Anxiety:  3  Nausea:  0  Depression:  0  Anorexia:  0  Fatigue:  7  Insomnia:  0  Restlessness:  0  Agitation:  0     CAM / Delirium:  Negative  Constipation:  Negative  Diarrhea:  Negative    Anxiety:  Is nervous/anxious    Bowel Management Plan (BMP):  Yes      Modified  Shar Scale:  0.5    Performance Status:  30 (100 prior to admission)    Living Arrangements:  Lives alone    Psychosocial/Cultural:   See Palliative Psychosocial Note: No  Patient is . She has two daughters, one local and one out of state. She has sisters. Completely independent prior to hospitalization     Social Issues Identified: New Diagnosis/Trauma  Bereavement Risk: No  Caregiver Needs Discussed. Caregiver Distress: No:     **Primary  to Follow**  Palliative Care  Consult: No    Spiritual:  F - [place] and Belief:  Yes  I - Importance:  Very  A - Address in Care:  Yes      Advance Care Planning   Advance Directives:   Living Will: No    LaPOST: No    Do Not Resuscitate Status: No    Medical Power of : No      Decision Making:  Patient answered questions  Goals of Care: The patient endorses that what is most important right now is to focus on curative/life-prolongation  Accordingly, we have decided that the best plan to meet the patient's goals includes continuing with treatment       Significant Labs: All pertinent labs within the past 24 hours have been reviewed.  CBC:   Recent Labs   Lab 01/05/23 0343   WBC 11.76   HGB 7.8*   HCT 25.2*   MCV 84        BMP:  Recent Labs   Lab 01/05/23 0343   *      K 3.6   CL 98   CO2 29   BUN 10   CREATININE 0.5   CALCIUM 8.2*   MG 1.8     LFT:  Lab Results   Component Value Date    AST 22 12/27/2022    ALKPHOS 70 12/27/2022    BILITOT 0.9 12/27/2022     Albumin:   Albumin   Date Value Ref Range Status   12/27/2022 3.4 (L) 3.5 - 5.2 g/dL Final     Protein:   Total Protein   Date Value Ref Range Status   12/27/2022 6.6 6.0 - 8.4 g/dL Final     Lactic acid:   Lab Results   Component Value Date    LACTATE 0.6 12/29/2022    LACTATE 1.0 12/28/2022       Significant Imaging: I have reviewed all pertinent imaging results/findings within the past 24 hours.

## 2023-01-05 NOTE — ASSESSMENT & PLAN NOTE
72 year old female with onset of acute hypoxemic respiratory failure and profound weakness over the past few weeks. Patient is dependent on Bipap. Palliative consulted for assistance with GOC    Code status: Full, not addressed     Surrogate decision maker: 2 daughters are legal NOK     GOC/ACP  -Met with patient at bedside. Later spoke with local daughter on the phone. Introduced palliative medicine and our role in patient's care. Discussed complexity of her medical condition and that the etiology remains unclear. Despite best supportive care she is not improving and we worry about whether we are going to be able to fix the underlying issue. Explained that if she is not improving, we are going to need to make some difficult decisions. Explained that she cannot remain on the bipap mask indefinitely. The team is doing everything they can to try to get her off but if she remains dependent on significant respiratory support the next step to prolong life would likely be trach/vent. Patient shook her head no. She remembers everything about her recent intubation and would not want to be dependent on a vent long term. This would not be an acceptable quality of life. Provided support     -Agree with re-consulting neurology. Will continue to follow along and have GOC conversations as appropriate. Patient has said she would not want to be vent dependent.    Discussed with primary team

## 2023-01-05 NOTE — HPI
"Per critical care H&P   "71 y/o F patient with HTN, HLD, hypothyroidism, recent URI 3 weeks prior to admission, balance difficulites who presented to the ED on 12/23 for 3 weeks of weakness, LIRA, decreased appetite, and constipation with intermittent lower abdominal cramping. Per daughter, patient is typically independent, lives alone, cooks her own meals, since she has been ill she has had decreased PO intake and drinking coffee, tea, and eating cereal. She has had progressive worsening HTN in the last 3 weeks and was started on losartan-HCTZ and metoprolol. Prior to this she was only taking lisinopril per daughter. Admitted for HTN emergency and was found to have Na 120. Received IV fluids and admitted to hospital medicine. For her hypernatremia, her thiazide was held and she was placed on fluid restriction. Urine studies from 12/25 showed Uosm of 802 and HANH of 109. Her BP has been labile since admission requiring both IV antihypertensives as well as fluid boluses. The patient's hyponatremia persisted despite fluid restriction and she became increasingly lethargic and weak so CCM was consulted for further management.      Upon evaluation, patient is somnolent. Opens eyes to voice, unable to engage in full conversation. Speaks mainly single words. Denies thirst. Reports SOB and generalized abdominal pain a/w constipation. No CP, N/V/D, dysuria, edema. Most recent vitals: /60, pulse 82, R 18, SpO2 94% on RA. Na trend 119->115->117. "    Patient is being treated for acute hypoxemic respiratory failure and profound weakness of unclear origin and sepsis. She is dependent on continuous Bipap s/p extubation. She has been started on TPN. Palliative consulted to assist with GOC      "

## 2023-01-05 NOTE — CONSULTS
See full H&P.    Plan aspiration of R thigh fluid collection - do not plan to leave drain due to proximity to hip joint and small size of collection.

## 2023-01-05 NOTE — PT/OT/SLP PROGRESS
Occupational Therapy      Patient Name:  Dominique Mcgee   MRN:  [id number]    Patient not seen today secondary to pt remains a hold 2' tenious respiratory status. OT to discontinue current orders. Please re-consult when pt is medically stable to resume therapy services.     1/5/2023

## 2023-01-05 NOTE — PLAN OF CARE
CMICU DAILY GOALS       A: Awake    RASS: Goal -    Actual - RASS (Manning Agitation-Sedation Scale): 0-->alert and calm   Restraint necessity: Clinical Justification: Removing medical devices  B: Breathe   SBT: Not intubated   C: Coordinate A & B, analgesics/sedatives   Pain: managed    SAT: Not intubated  D: Delirium   CAM-ICU: Overall CAM-ICU: Negative  E: Early(intubated/ Progressive (non-intubated) Mobility   MOVE Screen: Fail   Activity: Activity Management: Arm raise - L1, Rolling - L1  FAS: Feeding/Nutrition   Diet order: Diet/Nutrition Received: NPO,    T: Thrombus   DVT prophylaxis: VTE Required Core Measure: Pharmacological prophylaxis initiated/maintained  H: HOB Elevation   Head of Bed (HOB) Positioning: HOB at 30-45 degrees  U: Ulcer Prophylaxis   GI: yes  G: Glucose control   managed Glycemic Management: blood glucose monitored  S: Skin   Bathing/Skin Care: bath, complete, dressed/undressed, incontinence care, linen changed  Device Skin Pressure Protection: absorbent pad utilized/changed, adhesive use limited, positioning supports utilized  Pressure Reduction Devices: foam padding utilized, positioning supports utilized  Pressure Reduction Techniques: frequent weight shift encouraged, positioned off wounds, pressure points protected, weight shift assistance provided  Skin Protection: adhesive use limited, incontinence pads utilized, transparent dressing maintained, skin-to-skin areas padded, skin-to-device areas padded, tubing/devices free from skin contact  B: Bowel Function   constipation   I: Indwelling Catheters   Rush necessity: [REMOVED]      Urethral Catheter 12/27/22 1535-Reason for Continuing Urinary Catheterization: Critically ill in ICU and requiring hourly monitoring of intake/output       Urethral Catheter 01/04/23 0009 Non-latex 16 Fr.-Reason for Continuing Urinary Catheterization: Urinary retention, Critically ill in ICU and requiring hourly monitoring of intake/output   CVC  necessity: Yes  D: De-escalation Antibiotics   No    Family/Goals of care/Code Status   Code Status: Full Code    24H Vital Sign Range  Temp:  [97.8 °F (36.6 °C)-98.8 °F (37.1 °C)]   Pulse:  []   Resp:  [16-44]   BP: (139-172)/()   SpO2:  [83 %-100 %]      Shift Events   TPN & lipids initiated overnight.No acute events throughout shift    VS and assessment per flow sheet, patient progressing towards goals as tolerated, plan of care reviewed with  Dominique Mcgee , all concerns addressed, will continue to monitor.    Ximena Sauceda

## 2023-01-05 NOTE — PT/OT/SLP PROGRESS
Physical Therapy      Patient Name:  Dominique Mcgee   MRN:  [id number]    Patient not seen today secondary to  (pt remains a hold 2' tenious respiratory status x3 consecutive days. PT to DC orders. Please re-consult when pt medically stable for progressive mobility.).

## 2023-01-05 NOTE — PROGRESS NOTES
"[facility]  Nephrology  Progress Note    Patient Name: Dominique Mcgee  MRN: [id number]  Admission Date: 12/23/2022  Hospital Length of Stay: 13 days  Attending Provider: Jeromy Dotson MD   Primary Care Physician: Briana Leblanc MD  Principal Problem:Shortness of breath    Subjective:     HPI: Dominique Mcgee is a 71 yo female with Hx of HTN, HLD, and hypothyroidism who presents to the ED for evaluation of weakness, fatigue, abdominal pain and cough with SOB that had been persisting for several weeks.  She was hypertensive in the ED with SBP > 200 and chemisitries notable for a low sodium level of 120.  According to records, her BP medications have been adjusted prior to arrival for uncontrolled hypertension and noting that was on a combination of Losartan and HCTZ.  HCTZ was discontinued and she was started on Amlodipine, Carvedilol and losartan. Urine studies were obtained on 12/25 revealing elevated UOSM of 802 and elevated Martin of 109.  She was placed on fluid restrictions, but Na levels have continued to stay low so Nephrology was consulted on 12/28 for further evaluation.  She is awake alert and oriented on examination, but endorses feeling "tired". She has had poor appetite as well since arrival to the hospital with continued complaints of abdominal pain.  Her blood pressures continue to be erratic, often times being hypertensive with episodes of hypotension on night.  She has had CTH which was negative for acute findings with CT abdomen pending.  AM cortisol level on day of consultation elevated at 37.8.  She is lethargic on exam and after discussing with the primary team this has progressively worsened since Monday.        Interval History: NAEON. Patient on BiPAP with difficulty speaking. Able to write to communicate, reports feeling well today.    Review of patient's allergies indicates:   Allergen Reactions    Hydralazine analogues      Precipitous drop in BP with IV formulation. " Avoid if possible    Shellfish containing products Swelling     Current Facility-Administered Medications   Medication Frequency    acetaminophen tablet 650 mg Q6H PRN    acetylcysteine 200 mg/ml (20%) solution 2 mL Q4H PRN    albuterol-ipratropium 2.5 mg-0.5 mg/3 mL nebulizer solution 3 mL Q4H    atorvastatin tablet 10 mg Daily    capsaicin 0.025 % cream BID    ceFEPIme (MAXIPIME) 1 g in dextrose 5 % in water (D5W) 5 % 50 mL IVPB (MB+) Q8H    dextrose 10% bolus 125 mL 125 mL PRN    dextrose 10% bolus 250 mL 250 mL PRN    enoxaparin injection 40 mg Daily    fat emulsion 20% infusion 250 mL Daily    glucagon (human recombinant) injection 1 mg PRN    glucose chewable tablet 16 g PRN    glucose chewable tablet 24 g PRN    glycerin adult suppository 1 suppository Once PRN    guaiFENesin 100 mg/5 ml syrup 200 mg Q4H PRN    insulin aspart U-100 pen 0-5 Units Q6H PRN    iohexol (OMNIPAQUE) oral solution 15 mL PRN    levothyroxine tablet 50 mcg Before breakfast    linezolid 600 mg/300 mL IVPB 600 mg Q12H    losartan tablet 25 mg Daily    magnesium sulfate 2g in water 50mL IVPB (premix) Once    methocarbamoL tablet 500 mg TID PRN    metoprolol tartrate (LOPRESSOR) tablet 25 mg BID    multivitamin tablet Daily    niCARdipine 40 mg/200 mL (0.2 mg/mL) infusion Continuous    pantoprazole injection 40 mg Daily    polyethylene glycol packet 17 g Daily    potassium phosphate 15 mmol in dextrose 5 % 250 mL infusion Once    senna-docusate 8.6-50 mg per tablet 1 tablet Daily    sodium chloride 0.9% flush 10 mL Q12H PRN    sodium chloride 3% nebulizer solution 4 mL Q4H    TPN ADULT CENTRAL LINE CUSTOM Continuous    TPN ADULT CENTRAL LINE CUSTOM Continuous       Objective:     Vital Signs (Most Recent):  Temp: 98.4 °F (36.9 °C) (01/05/23 0705)  Pulse: 93 (01/05/23 0900)  Resp: (!) 27 (01/05/23 0900)  BP: (!) 157/75 (01/05/23 0900)  SpO2: 99 % (01/05/23 0900) Vital Signs (24h Range):  Temp:  [98.1 °F  (36.7 °C)-98.8 °F (37.1 °C)] 98.4 °F (36.9 °C)  Pulse:  [] 93  Resp:  [16-44] 27  SpO2:  [83 %-100 %] 99 %  BP: (139-172)/() 157/75     Weight: 59.4 kg (130 lb 15.3 oz) (12/30/22 1203)  Body mass index is 22.48 kg/m².  Body surface area is 1.64 meters squared.    I/O last 3 completed shifts:  In: 3273.3 [I.V.:1547.7; NG/GT:150; IV Piggyback:1094.9]  Out: 1140 [Urine:1140]    Physical Exam  Vitals and nursing note reviewed.   Constitutional:       General: She is not in acute distress.     Appearance: She is ill-appearing. She is not diaphoretic.      Comments: On BIPAP   HENT:      Head: Normocephalic and atraumatic.      Right Ear: External ear normal.      Left Ear: External ear normal.      Nose: Nose normal.   Eyes:      General: No scleral icterus.        Right eye: No discharge.         Left eye: No discharge.      Extraocular Movements: Extraocular movements intact.      Conjunctiva/sclera: Conjunctivae normal.   Cardiovascular:      Rate and Rhythm: Normal rate and regular rhythm.      Heart sounds: Normal heart sounds. No murmur heard.  Pulmonary:      Effort: Pulmonary effort is normal. No respiratory distress.      Breath sounds: Normal breath sounds. No wheezing or rales.      Comments: On BiPAP  Musculoskeletal:         General: No swelling or deformity. Normal range of motion.      Cervical back: Normal range of motion and neck supple.      Right lower leg: No edema.      Left lower leg: No edema.   Skin:     General: Skin is warm.      Coloration: Skin is not jaundiced.      Findings: No bruising.   Neurological:      Mental Status: She is alert and oriented to person, place, and time.   Psychiatric:         Mood and Affect: Mood normal.         Behavior: Behavior normal.         Thought Content: Thought content normal.       Significant Labs:  All labs within the past 24 hours have been reviewed.     Significant Imaging:  Labs: Reviewed    Assessment/Plan:     Hypernatremia  Presented  hyponatremic with Na of 119, received hypertonic saline with subsequent normalization of Na. Recently, Na up trending and became hypernatremic to 149. Patient then received continuous D5W with subsequent normalization of sodium.     - discontinue D5W as Na is now 138  - encourage PO intake  - no nephrology intervention recommended at this time    Resolved        Thank you for your consult. I will follow-up with patient. Please contact us if you have any additional questions.    Melissa Carmona MD  Nephrology  [facility]

## 2023-01-05 NOTE — NURSING
1737 Spoke with [facility] #[id number] about premedication for patient before giving IGG and was told there is no indication for her to be given anything and to monitor vitals per orders.

## 2023-01-05 NOTE — H&P
Inpatient Radiology Pre-procedure Note    History of Present Illness:  Dominique Mcgee is a 72 y.o. female who presents for R hip aspiration - previously had R hip aspirated 12/29/22 but ORTHO feels cultures are likely representative of contaminate.    Admission H&P reviewed.  Past Medical History:   Diagnosis Date    Hyperlipidemia     Hypertension     Hyponatremia 12/23/2022    Hypophosphatemia 12/30/2022    Hypothyroidism     Severe sepsis 12/29/2022    Shock 12/29/2022     Past Surgical History:   Procedure Laterality Date    HYSTERECTOMY         Review of Systems:   As documented in primary team H&P    Home Meds:   Prior to Admission medications    Medication Sig Start Date End Date Taking? Authorizing Provider   alendronate-vitamin D3 (FOSAMAX PLUS D) 70 mg- 2,800 unit per tablet Take 1 tablet by mouth every 7 days.   Yes Historical Provider   hydroCHLOROthiazide (HYDRODIURIL) 25 MG tablet Take 25 mg by mouth once daily.   Yes Historical Provider   levothyroxine (SYNTHROID) 50 MCG tablet Take 50 mcg by mouth once daily.   Yes Historical Provider   losartan (COZAAR) 50 MG tablet Take 50 mg by mouth once daily.   Yes Historical Provider   meloxicam (MOBIC) 15 MG tablet Take 15 mg by mouth once daily.   Yes Historical Provider   metoprolol succinate (TOPROL-XL) 50 MG 24 hr tablet Take 50 mg by mouth once daily.   Yes Historical Provider   simvastatin (ZOCOR) 20 MG tablet Take 20 mg by mouth every evening.   Yes Historical Provider     Scheduled Meds:    albuterol-ipratropium  3 mL Nebulization Q4H    atorvastatin  10 mg Oral Daily    capsaicin   Topical (Top) BID    ceFEPime (MAXIPIME) IVPB  1 g Intravenous Q8H    enoxaparin  40 mg Subcutaneous Daily    fat emulsion 20%  250 mL Intravenous Daily    fat emulsion 20%  250 mL Intravenous Daily    levothyroxine  50 mcg Oral Before breakfast    linezolid  600 mg Intravenous Q12H    losartan  25 mg Oral Daily    magnesium sulfate IVPB  2 g Intravenous Once     metoprolol tartrate  25 mg Oral BID    multivitamin  1 tablet Oral Daily    pantoprazole  40 mg Intravenous Daily    polyethylene glycol  17 g Oral Daily    potassium phosphate IVPB  15 mmol Intravenous Once    senna-docusate 8.6-50 mg  1 tablet Oral Daily    sodium chloride 3%  4 mL Nebulization Q4H     Continuous Infusions:    dexmedetomidine (PRECEDEX) infusion 0.5 mcg/kg/hr (01/05/23 0605)    nicardipine Stopped (01/03/23 0515)    TPN ADULT CENTRAL LINE CUSTOM 40 mL/hr at 01/05/23 0605    TPN ADULT CENTRAL LINE CUSTOM       PRN Meds:acetaminophen, acetylcysteine 200 mg/ml (20%), dextrose 10%, dextrose 10%, glucagon (human recombinant), glucose, glucose, glycerin adult, guaiFENesin 100 mg/5 ml, insulin aspart U-100, iohexol, methocarbamoL, sodium chloride 0.9%  Anticoagulants/Antiplatelets: no anticoagulation    Allergies:   Review of patient's allergies indicates:   Allergen Reactions    Hydralazine analogues      Precipitous drop in BP with IV formulation. Avoid if possible    Shellfish containing products Swelling     Sedation Hx: have not been any systemic reactions    Labs:  Recent Labs   Lab 12/29/22  1327   INR 1.0       Recent Labs   Lab 01/05/23  0343   WBC 11.76   HGB 7.8*   HCT 25.2*   MCV 84         Recent Labs   Lab 01/05/23  0343   *      K 3.6   CL 98   CO2 29   BUN 10   CREATININE 0.5   CALCIUM 8.2*   MG 1.8         Vitals:  Temp: 98.5 °F (36.9 °C) (01/05/23 0305)  Pulse: 93 (01/05/23 0900)  Resp: (!) 27 (01/05/23 0900)  BP: (!) 157/75 (01/05/23 0900)  SpO2: 99 % (01/05/23 0900)     Physical Exam:  ASA: 3  Mallampati: 2    General: no acute distress  Mental Status: alert and oriented to person, place and time  HEENT: normocephalic, atraumatic  Chest: on BiPAP  Heart: regular heart rate  Abdomen: nondistended  Extremity: moves all extremities    Plan: aspiration of R thigh fluid collection - do not plan to leave drain due to proximity to hip joint and small size of  collection.    Sedation Plan: local anesthesia/Fentanyl    Benjie Campo MD  PGY-4  RADIOLOGY

## 2023-01-05 NOTE — SUBJECTIVE & OBJECTIVE
Subjective:     Interval History: Patient continuing to have difficulty weaning off BiPAP. NIF of -9, FVC 0.3    Past Medical History:   Diagnosis Date    Hyperlipidemia     Hypertension     Hyponatremia 12/23/2022    Hypophosphatemia 12/30/2022    Hypothyroidism     Proctitis 12/29/2022    Severe sepsis 12/29/2022    Shock 12/29/2022       Past Surgical History:   Procedure Laterality Date    HYSTERECTOMY         Review of patient's allergies indicates:   Allergen Reactions    Hydralazine analogues      Precipitous drop in BP with IV formulation. Avoid if possible    Shellfish containing products Swelling         No current facility-administered medications on file prior to encounter.     Current Outpatient Medications on File Prior to Encounter   Medication Sig    alendronate-vitamin D3 (FOSAMAX PLUS D) 70 mg- 2,800 unit per tablet Take 1 tablet by mouth every 7 days.    hydroCHLOROthiazide (HYDRODIURIL) 25 MG tablet Take 25 mg by mouth once daily.    levothyroxine (SYNTHROID) 50 MCG tablet Take 50 mcg by mouth once daily.    losartan (COZAAR) 50 MG tablet Take 50 mg by mouth once daily.    meloxicam (MOBIC) 15 MG tablet Take 15 mg by mouth once daily.    metoprolol succinate (TOPROL-XL) 50 MG 24 hr tablet Take 50 mg by mouth once daily.    simvastatin (ZOCOR) 20 MG tablet Take 20 mg by mouth every evening.     Family History       Problem Relation (Age of Onset)    Breast cancer Sister (55), Sister (65)          Tobacco Use    Smoking status: Never    Smokeless tobacco: Not on file   Substance and Sexual Activity    Alcohol use: Not on file    Drug use: Never    Sexual activity: Not on file     Review of Systems   Unable to perform ROS: Acuity of condition   HENT:  Positive for trouble swallowing.    Respiratory:  Positive for shortness of breath.    Objective:     Vital Signs (Most Recent):  Temp: 98.4 °F (36.9 °C) (01/05/23 0705)  Pulse: (!) 116 (01/05/23 1422)  Resp: (!) 33 (01/05/23 1422)  BP: (!) 180/94  (01/05/23 1422)  SpO2: (!) 92 % (01/05/23 1422) Vital Signs (24h Range):  Temp:  [98.3 °F (36.8 °C)-98.8 °F (37.1 °C)] 98.4 °F (36.9 °C)  Pulse:  [] 116  Resp:  [16-47] 33  SpO2:  [90 %-100 %] 92 %  BP: (134-221)/() 180/94     Weight: 59.4 kg (130 lb 15.3 oz)  Body mass index is 22.48 kg/m².    Physical Exam  Vitals and nursing note reviewed.   Constitutional:       Appearance: She is ill-appearing.   HENT:      Head: Normocephalic and atraumatic.   Cardiovascular:      Rate and Rhythm: Normal rate and regular rhythm.      Pulses: Normal pulses.      Heart sounds: Normal heart sounds. No murmur heard.  Pulmonary:      Effort: Tachypnea present.      Breath sounds: Normal air entry. No wheezing or rales.   Abdominal:      General: Abdomen is flat. There is no distension.      Palpations: Abdomen is soft.      Tenderness: There is no abdominal tenderness.   Musculoskeletal:         General: No swelling.      Right lower leg: No edema.      Left lower leg: No edema.   Skin:     General: Skin is warm and dry.      Findings: No bruising or rash.   Neurological:      General: No focal deficit present.      Comments: Alert, following commands   Psychiatric:         Mood and Affect: Mood normal.         Behavior: Behavior normal.     Neurological Exam:  MENTAL STATUS  Level of consciousness: alert  Orientation: oriented to person, place, and time  Attention normal. Concentration normal.  Speech: reduced phonation 2/2 SOB    CRANIAL NERVES  CN II: Visual fields full to confrontation  CN III, IV, VI: PERRL, EOMI  CN V: Facial sensation intact  CN VII: Facial expression symmetric and full  CN IX, X: Symmetric palate elevation. Phonation normal  CN XII: Tongue midline with normal movements, no atrophy    MOTOR EXAM  Muscle bulk: normal  Muscle tone: normal  Pronator drift: absent    Strength - Upper Extremities: 4/5    Strength - Lower Extremities: 4/5    REFLEXES   Biceps Brachioradialis Patellar Achilles   Right  +2 +2 +2 +1   Left +2 +2 +2 +1       SENSORY EXAM  Light touch: intact in all 4 extremities    No bulbar weakness found with lateral neck rotation / flexion / extension. No oral fasciculation noted below the tongue. No ocular weakness noted.         Significant Labs: Hemoglobin A1c:   Recent Labs   Lab 12/23/22  1920   HGBA1C 5.4     Blood Culture: No results for input(s): LABBLOO in the last 48 hours.  BMP:   Recent Labs   Lab 01/04/23 0403 01/04/23 1728 01/05/23  0343   * 145* 154*   * 140 138   K 3.7 3.4* 3.6    98 98   CO2 37* 36* 29   BUN 13 10 10   CREATININE 0.6 0.5 0.5   CALCIUM 8.6* 8.7 8.2*   MG 1.9  --  1.8     CBC:   Recent Labs   Lab 01/04/23 0403 01/05/23 0343   WBC 15.22* 11.76   HGB 8.1* 7.8*   HCT 27.2* 25.2*    324     CMP:   Recent Labs   Lab 01/04/23 0403 01/04/23  1728 01/05/23  0343   * 145* 154*   * 140 138   K 3.7 3.4* 3.6    98 98   CO2 37* 36* 29   BUN 13 10 10   CREATININE 0.6 0.5 0.5   CALCIUM 8.6* 8.7 8.2*   MG 1.9  --  1.8   ANIONGAP 7* 6* 11     CSF Culture: No results for input(s): CSFCULTURE in the last 48 hours.  CSF Studies: No results for input(s): ALIQUT, APPEARCSF, COLORCSF, CSFWBC, CSFRBC, GLUCCSF, LDHCSF, PROTEINCSF, VDRLCSF in the last 48 hours.  Inflammatory Markers:   Recent Labs   Lab 01/04/23  1317   SEDRATE 42*   CRP 81.4*     POCT Glucose:   Recent Labs   Lab 01/04/23  1943 01/04/23  2338 01/05/23  0355   POCTGLUCOSE 108 99 163*     Prealbumin:   Recent Labs   Lab 01/05/23  0343   PREALBUMIN 10*     Respiratory Culture: No results for input(s): GSRESP, RESPIRATORYC in the last 48 hours.  Urine Culture: No results for input(s): LABURIN in the last 48 hours.  Urine Studies:   No results for input(s): COLORU, APPEARANCEUA, PHUR, SPECGRAV, PROTEINUA, GLUCUA, KETONESU, BILIRUBINUA, OCCULTUA, NITRITE, UROBILINOGEN, LEUKOCYTESUR, RBCUA, WBCUA, BACTERIA, SQUAMEPITHEL, HYALINECASTS in the last 48 hours.    Invalid input(s):  FABIAN    All pertinent lab results from the past 24 hours have been reviewed.    Significant Imaging: I have reviewed and interpreted all pertinent imaging results/findings within the past 24 hours.

## 2023-01-05 NOTE — PLAN OF CARE
Recommendations     Modify TPN regimen to 160g D, 80g AA + IV Lipids = 1364 kcals & 80g of protein (GIR: 1.87).  If/when able to place NGT, initiate TFs. Rec'd Impact Peptide @ 35 mL/hr = 1260 kcals, 79 g of protein, 647 mL fluid.  Diet advancement per SLP/MD.  RD to monitor & follow-up.     Goals: Meet % EEN, EPN by RD f/u date  Nutrition Goal Status: goal met  Communication of RD Recs: reviewed with RN

## 2023-01-05 NOTE — HPI
Dominique Mcgee is a 72 y.o. female with PMHx of essential hypertension, hyperlipidemia, and hypothyroidism who presented to the ED for 3 weeks of weakness, dyspnea on exertion, decreased appetite on 12/23/22. Prior admission the patient was completely functional with no deficits, completely independent with ADLs. She became weaker over the three weeks leading up to to admission and more short of breath to the point where she got winded while walking to the bathroom and back to her bed. Her  also stated that she has been eating less and less.  She was admitted for hypertensive urgency and shortness of breath.  On 12/28/2022 she became more somnolent and had more difficulty breathing and was transferred to the ICU.  She was intubated upon arrival.  She was extubated on 12/31, but has required BiPAP since for respiratory support.    She had a CT scan of her chest, abdomen, and pelvis performed upon admission to the ICU which showed a fluid collection inferior to her right femoral neck.  This was aspirated by Interventional Radiology on 12/29.  The white blood cell count from the fluid was 0 and the fluid was clear.  On 01/02/2023, the fluid began growing E coli.  A repeat CT scan of the pelvis showed a persistent fluid collection.  Orthopedics was then consulted for further evaluation and recommendations.  Upon my evaluation, the patient denies right hip pain.  She denies fevers and chills.  She does endorse some mild, intermittent, unchanged, and chronic pain in her right knee (status post total knee arthroplasty many years ago), but she denies pain elsewhere in her right lower extremity and all other extremities.  She denies injuries around her right hip.  Prior to admission, she states that she ambulated most of the time with no assistive devices, but used a cane for longer distances.

## 2023-01-05 NOTE — ASSESSMENT & PLAN NOTE
Known history of uncontrolled HTN, w/ labile BP since admit.   -BP meds held in setting of septic shock  -will avoid IV meds if possible as has had hypotension following IV BP meds  -renal artery duplex showing R renal artery stenosis, L renal artery unable to be visualized  -Will monitor and add back meds as tolerated, IV anti-hypertensives when NPO

## 2023-01-05 NOTE — CONSULTS
[facility] - [facility]  Palliative Medicine  Consult Note    Patient Name: Dominique Mcgee  MRN: [id number]  Admission Date: 12/23/2022  Hospital Length of Stay: 13 days  Code Status: Full Code   Attending Provider: Jeromy Dotson MD  Consulting Provider: Alexandra Ag MD  Primary Care Physician: Briana Leblanc MD  Principal Problem:Shortness of breath    Patient information was obtained from patient, relative(s) and primary team.      Inpatient consult to Palliative Care  Consult performed by: Alexandra Ag MD  Consult ordered by: Beto Avelar MD        Assessment/Plan:     Palliative care encounter  72 year old female with onset of acute hypoxemic respiratory failure and profound weakness over the past few weeks. Patient is dependent on Bipap. Palliative consulted for assistance with GOC    Code status: Full, not addressed     Surrogate decision maker: 2 daughters are legal NOK     GOC/ACP  -Met with patient at bedside. Later spoke with local daughter on the phone. Introduced palliative medicine and our role in patient's care. Discussed complexity of her medical condition and that the etiology remains unclear. Despite best supportive care she is not improving and we worry about whether we are going to be able to fix the underlying issue. Explained that if she is not improving, we are going to need to make some difficult decisions. Explained that she cannot remain on the bipap mask indefinitely. The team is doing everything they can to try to get her off but if she remains dependent on significant respiratory support the next step to prolong life would likely be trach/vent. Patient shook her head no. She remembers everything about her recent intubation and would not want to be dependent on a vent long term. This would not be an acceptable quality of life. Provided support     -Agree with re-consulting neurology. Will continue to follow along and have GOC conversations as appropriate.  "Patient has said she would not want to be vent dependent.    Discussed with primary team         Thank you for your consult. I will follow-up with patient. Please contact us if you have any additional questions.    Subjective:     HPI:   Per critical care H&P   "73 y/o F patient with HTN, HLD, hypothyroidism, recent URI 3 weeks prior to admission, balance difficulites who presented to the ED on 12/23 for 3 weeks of weakness, LIRA, decreased appetite, and constipation with intermittent lower abdominal cramping. Per daughter, patient is typically independent, lives alone, cooks her own meals, since she has been ill she has had decreased PO intake and drinking coffee, tea, and eating cereal. She has had progressive worsening HTN in the last 3 weeks and was started on losartan-HCTZ and metoprolol. Prior to this she was only taking lisinopril per daughter. Admitted for HTN emergency and was found to have Na 120. Received IV fluids and admitted to hospital medicine. For her hypernatremia, her thiazide was held and she was placed on fluid restriction. Urine studies from 12/25 showed Uosm of 802 and HANH of 109. Her BP has been labile since admission requiring both IV antihypertensives as well as fluid boluses. The patient's hyponatremia persisted despite fluid restriction and she became increasingly lethargic and weak so CCM was consulted for further management.      Upon evaluation, patient is somnolent. Opens eyes to voice, unable to engage in full conversation. Speaks mainly single words. Denies thirst. Reports SOB and generalized abdominal pain a/w constipation. No CP, N/V/D, dysuria, edema. Most recent vitals: /60, pulse 82, R 18, SpO2 94% on RA. Na trend 119->115->117. "    Patient is being treated for acute hypoxemic respiratory failure and profound weakness of unclear origin and sepsis. She is dependent on continuous Bipap s/p extubation. She has been started on TPN. Palliative consulted to assist with GOC    "       Hospital Course:  No notes on file        Past Medical History:   Diagnosis Date    Hyperlipidemia     Hypertension     Hyponatremia 12/23/2022    Hypophosphatemia 12/30/2022    Hypothyroidism     Severe sepsis 12/29/2022    Shock 12/29/2022       Past Surgical History:   Procedure Laterality Date    HYSTERECTOMY         Review of patient's allergies indicates:   Allergen Reactions    Hydralazine analogues      Precipitous drop in BP with IV formulation. Avoid if possible    Shellfish containing products Swelling       Medications:  Continuous Infusions:   nicardipine Stopped (01/03/23 0515)    TPN ADULT CENTRAL LINE CUSTOM 40 mL/hr at 01/05/23 1225    TPN ADULT CENTRAL LINE CUSTOM       Scheduled Meds:   albuterol-ipratropium  3 mL Nebulization Q4H    atorvastatin  10 mg Oral Daily    capsaicin   Topical (Top) BID    ceFEPime (MAXIPIME) IVPB  1 g Intravenous Q8H    enoxaparin  40 mg Subcutaneous Daily    fat emulsion 20%  250 mL Intravenous Daily    levothyroxine  50 mcg Oral Before breakfast    linezolid  600 mg Intravenous Q12H    losartan  25 mg Oral Daily    metoprolol tartrate  25 mg Oral BID    multivitamin  1 tablet Oral Daily    pantoprazole  40 mg Intravenous Daily    polyethylene glycol  17 g Oral Daily    potassium phosphate IVPB  15 mmol Intravenous Once    senna-docusate 8.6-50 mg  1 tablet Oral Daily    sodium chloride 3%  4 mL Nebulization Q4H     PRN Meds:acetaminophen, acetylcysteine 200 mg/ml (20%), dextrose 10%, dextrose 10%, glucagon (human recombinant), glucose, glucose, glycerin adult, guaiFENesin 100 mg/5 ml, insulin aspart U-100, iohexol, methocarbamoL, sodium chloride 0.9%    Family History       Problem Relation (Age of Onset)    Breast cancer Sister (55), Sister (65)          Tobacco Use    Smoking status: Never    Smokeless tobacco: Not on file   Substance and Sexual Activity    Alcohol use: Not on file    Drug use: Never    Sexual activity: Not on  file       Review of Systems   Constitutional:  Positive for fatigue.   HENT: Negative.     Eyes: Negative.    Respiratory:  Positive for shortness of breath.    Gastrointestinal: Negative.    Endocrine: Negative.    Musculoskeletal: Negative.    Skin: Negative.    Neurological:  Positive for weakness.   Psychiatric/Behavioral:  The patient is nervous/anxious.    Objective:     Vital Signs (Most Recent):  Temp: 98.4 °F (36.9 °C) (01/05/23 0705)  Pulse: (!) 117 (01/05/23 1200)  Resp: (!) 28 (01/05/23 1200)  BP: (!) 170/77 (01/05/23 1200)  SpO2: 99 % (01/05/23 1200)   Vital Signs (24h Range):  Temp:  [98.3 °F (36.8 °C)-98.8 °F (37.1 °C)] 98.4 °F (36.9 °C)  Pulse:  [] 117  Resp:  [16-44] 28  SpO2:  [83 %-100 %] 99 %  BP: (134-172)/() 170/77     Weight: 59.4 kg (130 lb 15.3 oz)  Body mass index is 22.48 kg/m².    Physical Exam  Vitals and nursing note reviewed.   Constitutional:       General: She is not in acute distress.  HENT:      Head: Normocephalic.      Right Ear: External ear normal.      Left Ear: External ear normal.      Nose: Nose normal.      Mouth/Throat:      Mouth: Mucous membranes are dry.   Eyes:      Pupils: Pupils are equal, round, and reactive to light.   Cardiovascular:      Rate and Rhythm: Tachycardia present.   Pulmonary:      Effort: Pulmonary effort is normal. No respiratory distress.      Comments: On continuous bipap  Abdominal:      General: There is no distension.   Musculoskeletal:      Cervical back: Normal range of motion.   Neurological:      Mental Status: She is alert and oriented to person, place, and time.      Motor: Weakness present.   Psychiatric:         Mood and Affect: Mood normal.       Review of Symptoms      Symptom Assessment (ESAS 0-10 Scale)  Pain:  0  Dyspnea:  0  Anxiety:  3  Nausea:  0  Depression:  0  Anorexia:  0  Fatigue:  7  Insomnia:  0  Restlessness:  0  Agitation:  0     CAM / Delirium:  Negative  Constipation:  Negative  Diarrhea:   Negative    Anxiety:  Is nervous/anxious    Bowel Management Plan (BMP):  Yes      Modified Shar Scale:  0.5    Performance Status:  30 (100 prior to admission)    Living Arrangements:  Lives alone    Psychosocial/Cultural:   See Palliative Psychosocial Note: No  Patient is . She has two daughters, one local and one out of state. She has sisters. Completely independent prior to hospitalization     Social Issues Identified: New Diagnosis/Trauma  Bereavement Risk: No  Caregiver Needs Discussed. Caregiver Distress: No:     **Primary  to Follow**  Palliative Care  Consult: No    Spiritual:  F - Chinyere and Belief:  Yes  I - Importance:  Very  A - Address in Care:  Yes      Advance Care Planning   Advance Directives:   Living Will: No    LaPOST: No    Do Not Resuscitate Status: No    Medical Power of : No      Decision Making:  Patient answered questions  Goals of Care: The patient endorses that what is most important right now is to focus on curative/life-prolongation  Accordingly, we have decided that the best plan to meet the patient's goals includes continuing with treatment       Significant Labs: All pertinent labs within the past 24 hours have been reviewed.  CBC:   Recent Labs   Lab 01/05/23 0343   WBC 11.76   HGB 7.8*   HCT 25.2*   MCV 84        BMP:  Recent Labs   Lab 01/05/23 0343   *      K 3.6   CL 98   CO2 29   BUN 10   CREATININE 0.5   CALCIUM 8.2*   MG 1.8     LFT:  Lab Results   Component Value Date    AST 22 12/27/2022    ALKPHOS 70 12/27/2022    BILITOT 0.9 12/27/2022     Albumin:   Albumin   Date Value Ref Range Status   12/27/2022 3.4 (L) 3.5 - 5.2 g/dL Final     Protein:   Total Protein   Date Value Ref Range Status   12/27/2022 6.6 6.0 - 8.4 g/dL Final     Lactic acid:   Lab Results   Component Value Date    LACTATE 0.6 12/29/2022    LACTATE 1.0 12/28/2022       Significant Imaging: I have reviewed all pertinent imaging results/findings  within the past 24 hours.        > 50% of 75 min visit spent in chart review, face to face discussion of goals of care,  symptom assessment, coordination of care and emotional support.    Alexandra Ag MD  Palliative Medicine  [facility] - Cardiac Medical ICU

## 2023-01-05 NOTE — SUBJECTIVE & OBJECTIVE
Interval History: NAEON. Patient on BiPAP with difficulty speaking. Able to write to communicate, reports feeling well today.    Review of patient's allergies indicates:   Allergen Reactions    Hydralazine analogues      Precipitous drop in BP with IV formulation. Avoid if possible    Shellfish containing products Swelling     Current Facility-Administered Medications   Medication Frequency    acetaminophen tablet 650 mg Q6H PRN    acetylcysteine 200 mg/ml (20%) solution 2 mL Q4H PRN    albuterol-ipratropium 2.5 mg-0.5 mg/3 mL nebulizer solution 3 mL Q4H    atorvastatin tablet 10 mg Daily    capsaicin 0.025 % cream BID    ceFEPIme (MAXIPIME) 1 g in dextrose 5 % in water (D5W) 5 % 50 mL IVPB (MB+) Q8H    dextrose 10% bolus 125 mL 125 mL PRN    dextrose 10% bolus 250 mL 250 mL PRN    enoxaparin injection 40 mg Daily    fat emulsion 20% infusion 250 mL Daily    glucagon (human recombinant) injection 1 mg PRN    glucose chewable tablet 16 g PRN    glucose chewable tablet 24 g PRN    glycerin adult suppository 1 suppository Once PRN    guaiFENesin 100 mg/5 ml syrup 200 mg Q4H PRN    insulin aspart U-100 pen 0-5 Units Q6H PRN    iohexol (OMNIPAQUE) oral solution 15 mL PRN    levothyroxine tablet 50 mcg Before breakfast    linezolid 600 mg/300 mL IVPB 600 mg Q12H    losartan tablet 25 mg Daily    magnesium sulfate 2g in water 50mL IVPB (premix) Once    methocarbamoL tablet 500 mg TID PRN    metoprolol tartrate (LOPRESSOR) tablet 25 mg BID    multivitamin tablet Daily    niCARdipine 40 mg/200 mL (0.2 mg/mL) infusion Continuous    pantoprazole injection 40 mg Daily    polyethylene glycol packet 17 g Daily    potassium phosphate 15 mmol in dextrose 5 % 250 mL infusion Once    senna-docusate 8.6-50 mg per tablet 1 tablet Daily    sodium chloride 0.9% flush 10 mL Q12H PRN    sodium chloride 3% nebulizer solution 4 mL Q4H    TPN ADULT CENTRAL LINE CUSTOM Continuous    TPN ADULT CENTRAL LINE CUSTOM Continuous       Objective:      Vital Signs (Most Recent):  Temp: 98.4 °F (36.9 °C) (01/05/23 0705)  Pulse: 93 (01/05/23 0900)  Resp: (!) 27 (01/05/23 0900)  BP: (!) 157/75 (01/05/23 0900)  SpO2: 99 % (01/05/23 0900) Vital Signs (24h Range):  Temp:  [98.1 °F (36.7 °C)-98.8 °F (37.1 °C)] 98.4 °F (36.9 °C)  Pulse:  [] 93  Resp:  [16-44] 27  SpO2:  [83 %-100 %] 99 %  BP: (139-172)/() 157/75     Weight: 59.4 kg (130 lb 15.3 oz) (12/30/22 1203)  Body mass index is 22.48 kg/m².  Body surface area is 1.64 meters squared.    I/O last 3 completed shifts:  In: 3273.3 [I.V.:1547.7; NG/GT:150; IV Piggyback:1094.9]  Out: 1140 [Urine:1140]    Physical Exam  Vitals and nursing note reviewed.   Constitutional:       General: She is not in acute distress.     Appearance: She is ill-appearing. She is not diaphoretic.      Comments: On BIPAP   HENT:      Head: Normocephalic and atraumatic.      Right Ear: External ear normal.      Left Ear: External ear normal.      Nose: Nose normal.   Eyes:      General: No scleral icterus.        Right eye: No discharge.         Left eye: No discharge.      Extraocular Movements: Extraocular movements intact.      Conjunctiva/sclera: Conjunctivae normal.   Cardiovascular:      Rate and Rhythm: Normal rate and regular rhythm.      Heart sounds: Normal heart sounds. No murmur heard.  Pulmonary:      Effort: Pulmonary effort is normal. No respiratory distress.      Breath sounds: Normal breath sounds. No wheezing or rales.      Comments: On BiPAP  Musculoskeletal:         General: No swelling or deformity. Normal range of motion.      Cervical back: Normal range of motion and neck supple.      Right lower leg: No edema.      Left lower leg: No edema.   Skin:     General: Skin is warm.      Coloration: Skin is not jaundiced.      Findings: No bruising.   Neurological:      Mental Status: She is alert and oriented to person, place, and time.   Psychiatric:         Mood and Affect: Mood normal.         Behavior:  Behavior normal.         Thought Content: Thought content normal.       Significant Labs:  All labs within the past 24 hours have been reviewed.     Significant Imaging:  Labs: Reviewed

## 2023-01-06 PROBLEM — K59.00 CONSTIPATION: Status: RESOLVED | Noted: 2022-01-01 | Resolved: 2023-01-01

## 2023-01-06 PROBLEM — R13.10 DYSPHAGIA: Status: ACTIVE | Noted: 2023-01-01

## 2023-01-06 PROBLEM — R06.03 RESPIRATORY DISTRESS: Status: ACTIVE | Noted: 2023-01-01

## 2023-01-06 PROBLEM — R10.84 GENERALIZED ABDOMINAL PAIN: Status: RESOLVED | Noted: 2022-01-01 | Resolved: 2023-01-01

## 2023-01-06 NOTE — ASSESSMENT & PLAN NOTE
Patient with Hypoxic Respiratory failure which is Acute.  she is not on home oxygen. Supplemental oxygen was provided and noted- Oxygen Concentration (%):  [] 30.   Signs/symptoms of respiratory failure include- tachypnea, increased work of breathing and respiratory distress. Contributing diagnoses includes - Pleural effusion and atelectasis Labs and images were reviewed. Patient Has recent ABG, which has been reviewed. Will treat underlying causes and adjust management of respiratory failure as follows- unclear etiology of pleural effusions, patient notes recent URI 3 weeks ago that has left her short of breath since then, on broad spectrum abx  Repeat CXR with increased consolidation in LLL  Extubated to Bipap  1/3: CT Chest - Bibasilar atelectatic/consolidative change (left greater than right) with patchy right basilar opacities and small bilateral pleural effusions.  Correlation for underlying infectious process advised.  ID following, linezolid to treat for PNA to end 1/9  See shortness of breath

## 2023-01-06 NOTE — PLAN OF CARE
CMICU DAILY GOALS       A: Awake    RASS: Goal -    Actual - RASS ([place] Agitation-Sedation Scale): 0-->alert and calm   Restraint necessity: Clinical Justification: Removing medical devices  B: Breathe   SBT: Not intubated   C: Coordinate A & B, analgesics/sedatives   Pain: managed    SAT: Not intubated  D: Delirium   CAM-ICU: Overall CAM-ICU: Negative  E: Early(intubated/ Progressive (non-intubated) Mobility   MOVE Screen: Fail   Activity: Activity Management: Arm raise - L1  FAS: Feeding/Nutrition   Diet order: Diet/Nutrition Received: NPO,    T: Thrombus   DVT prophylaxis: VTE Required Core Measure: Pharmacological prophylaxis initiated/maintained  H: HOB Elevation   Head of Bed (HOB) Positioning: HOB at 30-45 degrees  U: Ulcer Prophylaxis   GI: no  G: Glucose control   managed Glycemic Management: blood glucose monitored  S: Skin   Bathing/Skin Care: bath, complete, dressed/undressed, incontinence care, linen changed  Device Skin Pressure Protection: absorbent pad utilized/changed, adhesive use limited, skin-to-skin areas padded, skin-to-device areas padded, pressure points protected, positioning supports utilized  Pressure Reduction Devices: foam padding utilized, positioning supports utilized, specialty bed utilized  Pressure Reduction Techniques: frequent weight shift encouraged, pressure points protected, weight shift assistance provided  Skin Protection: incontinence pads utilized, tubing/devices free from skin contact, transparent dressing maintained, skin-to-skin areas padded, skin-to-device areas padded  B: Bowel Function   diarrhea   I: Indwelling Catheters   Rush necessity: [REMOVED]      Urethral Catheter 12/27/22 1535-Reason for Continuing Urinary Catheterization: Critically ill in ICU and requiring hourly monitoring of intake/output       Urethral Catheter 01/04/23 0009 Non-latex 16 Fr.-Reason for Continuing Urinary Catheterization: Urinary retention, Critically ill in ICU and requiring hourly  monitoring of intake/output   CVC necessity: Yes  D: De-escalation Antibiotics   No    Family/Goals of care/Code Status   Code Status: Full Code    24H Vital Sign Range  Temp:  [98.3 °F (36.8 °C)-99.2 °F (37.3 °C)]   Pulse:  []   Resp:  [16-47]   BP: (132-221)/()   SpO2:  [90 %-100 %]      Shift Events   First dose of IgG administered overnight; pt tolerated well. Pt tachycardic and hypertensive throughout shift; MD aware. No acute events throughout shift.    VS and assessment per flow sheet, patient progressing towards goals as tolerated, plan of care reviewed with  Dominique Mcgee , all concerns addressed, will continue to monitor.    Ximena Sauceda

## 2023-01-06 NOTE — CHAPLAIN
Palliative Care     Patient: Dominique Mcgee       MRN: [id number]  : 1950  Age: 72 y.o.  Hospital Length of Stay: 14 days  Code Status: Full Code   Attending Provider: Jeromy Dotson MD  Principal Problem: Shortness of breath    Present during Interview:  Pall Med Dr asked me to visit; met with pt, daughter, son-in-law and daughter's best friend.    Non-clinical observations:  Pt was awake, alert, sitting up, on bi-pap and communicating by talking (hard to hear) and writing on notepad.     Facts (Spiritual Perception of Illness):   Pt and family just had team of doctors visit; pt dependent on bi-pap, I'm told.    Feelings (Emotions/Fears/Experiences/Coping):      Did not access as much this visit.    Family/Friends/Chinyere (Support, Community, Culture):     Pt and family are Christians. Pt asked me to pray for her and she signaled all family to beside as we all held hands in a Pl[employer] and I prayed, they participated as well. Pt very appreciative. Gave daughter my card.     Future (Spiritual Care Plan of Action):  Palliative  will continue to follow and will de-brief with [facility]. Lord, in your mercy.    In Peace,  Rev. Nehal Lopez MBA, MDiv   [phone]

## 2023-01-06 NOTE — PROGRESS NOTES
[facility]  Palliative Medicine  Progress Note    Patient Name: Dominique Mcgee  MRN: [id number]  Admission Date: 12/23/2022  Hospital Length of Stay: 14 days  Code Status: Full Code   Attending Provider: Jeromy Dotson MD  Consulting Provider: Alexandra Ag MD  Primary Care Physician: Briana Leblanc MD  Principal Problem:Shortness of breath    Patient information was obtained from patient, relative(s), caregiver / friend and primary team.      Assessment/Plan:     Palliative care encounter  72 year old female with onset of acute hypoxemic respiratory failure and profound weakness over the past few weeks. Patient is dependent on Bipap. Palliative consulted for assistance with GOC    Code status: Full. Patient and family discussing potential code status change to DNR/DNI    Surrogate decision maker: 2 daughters are legal NOK     GOC/ACP  1/6   Met with patient in am and then patient, Dr. Dotson, patient's two daughters (one in person and one non speaker), and patient's friend. Discussed where things stand now and potential outcomes. Discussed needing to have a plan for if patient decompensates. Patient has voiced several times that she would not want to be re-intubated, trached, or back on vent. Extensively discussed what this would look like to patient and family- it would buy more time but does not fix the underlying problem. Discussed what quality of life would look like if trached and vent dependent. Provided support   Additional 60 minutes spent discussing ACP     -Patient and family discussing with each other. Plan to follow-up 1/7 to follow-up on code status decision       1/5  -Met with patient at bedside. Later spoke with local daughter on the phone. Introduced palliative medicine and our role in patient's care. Discussed complexity of her medical condition and that the etiology remains unclear. Despite best supportive care she is not improving and we worry about whether we are  "going to be able to fix the underlying issue. Explained that if she is not improving, we are going to need to make some difficult decisions. Explained that she cannot remain on the bipap mask indefinitely. The team is doing everything they can to try to get her off but if she remains dependent on significant respiratory support the next step to prolong life would likely be trach/vent. Patient shook her head no. She remembers everything about her recent intubation and would not want to be dependent on a vent long term. This would not be an acceptable quality of life. Provided support           Discussed with Dr. Dotson several times throughout the day both pre and post family meeting         I will follow-up with patient. Please contact us if you have any additional questions.    Subjective:     Chief Complaint:   Chief Complaint   Patient presents with    Shortness of Breath     X 2 weeks. Reports cough. Denies cardiac hx.       HPI:   Per critical care H&P   "71 y/o F patient with HTN, HLD, hypothyroidism, recent URI 3 weeks prior to admission, balance difficulites who presented to the ED on 12/23 for 3 weeks of weakness, LIRA, decreased appetite, and constipation with intermittent lower abdominal cramping. Per daughter, patient is typically independent, lives alone, cooks her own meals, since she has been ill she has had decreased PO intake and drinking coffee, tea, and eating cereal. She has had progressive worsening HTN in the last 3 weeks and was started on losartan-HCTZ and metoprolol. Prior to this she was only taking lisinopril per daughter. Admitted for HTN emergency and was found to have Na 120. Received IV fluids and admitted to hospital medicine. For her hypernatremia, her thiazide was held and she was placed on fluid restriction. Urine studies from 12/25 showed Uosm of 802 and HANH of 109. Her BP has been labile since admission requiring both IV antihypertensives as well as fluid boluses. The " "patient's hyponatremia persisted despite fluid restriction and she became increasingly lethargic and weak so [facility] was consulted for further management.      Upon evaluation, patient is somnolent. Opens eyes to voice, unable to engage in full conversation. Speaks mainly single words. Denies thirst. Reports SOB and generalized abdominal pain a/w constipation. No CP, N/V/D, dysuria, edema. Most recent vitals: /60, pulse 82, R 18, SpO2 94% on RA. Na trend 119->115->117. "    Patient is being treated for acute hypoxemic respiratory failure and profound weakness of unclear origin and sepsis. She is dependent on continuous Bipap s/p extubation. She has been started on TPN. Palliative consulted to assist with C          Hospital Course:  No notes on file    Interval Hx: Patient remains dependent on Bipap. Heart rate and BP increased and pulse ox dropped when switched to comfort flow this am. Patient became very anxious as a result. Denies shortness of breath once back on bipap. IVIG started. Daughter and friend at bedside. [place] daughter on speaker phone     Past Medical History:   Diagnosis Date    Hyperlipidemia     Hypertension     Hyponatremia 12/23/2022    Hypophosphatemia 12/30/2022    Hypothyroidism     Proctitis 12/29/2022    Severe sepsis 12/29/2022    Shock 12/29/2022       Past Surgical History:   Procedure Laterality Date    HYSTERECTOMY         Review of patient's allergies indicates:   Allergen Reactions    Hydralazine analogues      Precipitous drop in BP with IV formulation. Avoid if possible    Shellfish containing products Swelling       Medications:  Continuous Infusions:   dextrose 10 % in water (D10W)      TPN ADULT CENTRAL LINE CUSTOM Stopped (01/05/23 6556)    TPN ADULT CENTRAL LINE CUSTOM       Scheduled Meds:   albuterol-ipratropium  3 mL Nebulization Q4H    capsaicin   Topical (Top) BID    ceFEPime (MAXIPIME) IVPB  1 g Intravenous Q8H    enoxaparin  40 mg Subcutaneous Daily "    fat emulsion 20%  250 mL Intravenous Daily    Immune Globulin G (IGG)-PRO-IGA 10 % injection (Privigen)  0.4 g/kg Intravenous Q24H    [START ON 1/7/2023] levothyroxine  25 mcg Intravenous Daily    linezolid  600 mg Intravenous Q12H    metoprolol  5 mg Intravenous Q6H    pantoprazole  40 mg Intravenous Daily    potassium phosphate IVPB  15 mmol Intravenous Once    sodium chloride 3%  4 mL Nebulization Q4H     PRN Meds:acetaminophen, acetylcysteine 200 mg/ml (20%), dextrose 10 % in water (D10W), dextrose 10%, dextrose 10%, glucagon (human recombinant), glycerin adult, guaiFENesin 100 mg/5 ml, insulin aspart U-100, iohexol, labetalol, sodium chloride 0.9%    Family History       Problem Relation (Age of Onset)    Breast cancer Sister (55), Sister (65)          Tobacco Use    Smoking status: Never    Smokeless tobacco: Not on file   Substance and Sexual Activity    Alcohol use: Not on file    Drug use: Never    Sexual activity: Not on file       Review of Systems   Constitutional:  Positive for fatigue.   HENT: Negative.     Eyes: Negative.    Respiratory:  Positive for shortness of breath.    Gastrointestinal: Negative.    Endocrine: Negative.    Musculoskeletal: Negative.    Skin: Negative.    Neurological:  Positive for weakness.   Psychiatric/Behavioral:  The patient is nervous/anxious.    Objective:     Vital Signs (Most Recent):  Temp: 98.9 °F (37.2 °C) (01/06/23 1100)  Pulse: 105 (01/06/23 1400)  Resp: (!) 28 (01/06/23 1400)  BP: (!) 161/72 (01/06/23 1400)  SpO2: 100 % (01/06/23 1400)   Vital Signs (24h Range):  Temp:  [98.3 °F (36.8 °C)-99.2 °F (37.3 °C)] 98.9 °F (37.2 °C)  Pulse:  [103-138] 105  Resp:  [23-42] 28  SpO2:  [94 %-100 %] 100 %  BP: (132-194)/() 161/72     Weight: 59.4 kg (130 lb 15.3 oz)  Body mass index is 22.48 kg/m².    Physical Exam  Vitals and nursing note reviewed.   Constitutional:       General: She is not in acute distress.  HENT:      Head: Normocephalic.      Right  Ear: External ear normal.      Left Ear: External ear normal.      Nose: Nose normal.      Mouth/Throat:      Mouth: Mucous membranes are dry.   Eyes:      Pupils: Pupils are equal, round, and reactive to light.   Cardiovascular:      Rate and Rhythm: Tachycardia present.   Pulmonary:      Effort: Pulmonary effort is normal. No respiratory distress.      Comments: On continuous bipap  Abdominal:      General: There is no distension.   Musculoskeletal:      Cervical back: Normal range of motion.   Neurological:      Mental Status: She is alert and oriented to person, place, and time.      Motor: Weakness present.   Psychiatric:         Mood and Affect: Mood normal.       Review of Symptoms      Symptom Assessment (ESAS 0-10 Scale)  Pain:  0  Dyspnea:  4  Anxiety:  5  Nausea:  0  Depression:  0  Anorexia:  6  Fatigue:  8  Insomnia:  0  Restlessness:  0  Agitation:  0     CAM / Delirium:  Negative  Constipation:  Negative  Diarrhea:  Negative    Anxiety:  Is nervous/anxious    Bowel Management Plan (BMP):  Yes      Modified Shar Scale:  1    Performance Status:  30 (100 prior to admission)    Living Arrangements:  Lives alone    Psychosocial/Cultural:   See Palliative Psychosocial Note: No  Patient is . She has two daughters, one local and one out of state. She has sisters. Completely independent prior to hospitalization     Social Issues Identified: New Diagnosis/Trauma  Bereavement Risk: No  Caregiver Needs Discussed. Caregiver Distress: No:     **Primary  to Follow**  Palliative Care  Consult: No    Spiritual:  F - [place] and Belief:  Yes  I - Importance:  Very  A - Address in Care:  Yes      Advance Care Planning   Advance Directives:   Living Will: No    LaPOST: No    Do Not Resuscitate Status: No    Medical Power of : No      Decision Making:  Patient answered questions  Goals of Care: What is most important right now is to focus on curative/life-prolongation. Accordingly,  we have decided that the best plan to meet the patient's goals includes continuing with treatment.       Significant Labs: All pertinent labs within the past 24 hours have been reviewed.  CBC:   Recent Labs   Lab 01/06/23  0335   WBC 10.52   HGB 7.4*   HCT 23.5*   MCV 82          BMP:  Recent Labs   Lab 01/06/23 0335   *      K 3.4*   CL 99   CO2 30*   BUN 14   CREATININE 0.5   CALCIUM 7.9*   MG 2.2       LFT:  Lab Results   Component Value Date    AST 22 12/27/2022    ALKPHOS 70 12/27/2022    BILITOT 0.9 12/27/2022     Albumin:   Albumin   Date Value Ref Range Status   12/27/2022 3.4 (L) 3.5 - 5.2 g/dL Final     Protein:   Total Protein   Date Value Ref Range Status   12/27/2022 6.6 6.0 - 8.4 g/dL Final     Lactic acid:   Lab Results   Component Value Date    LACTATE 0.6 12/29/2022    LACTATE 1.0 12/28/2022       Significant Imaging: I have reviewed all pertinent imaging results/findings within the past 24 hours.        Alexandra Ag MD  Palliative Medicine  [facility] - Cardiac Medical ICU      > 50% of 40  min visit spent in chart review, face to face discussion of goals of care,  symptom assessment, coordination of care and emotional support

## 2023-01-06 NOTE — PROGRESS NOTES
[facility]  Neurology  Progress Note    Patient Name: Dominique Mcgee  MRN: [id number]  Admission Date: 12/23/2022  Hospital Length of Stay: 14 days  Code Status: Full Code   Attending Provider: Jeromy Dotson MD  Primary Care Physician: Briana Leblanc MD   Principal Problem:Shortness of breath    HPI:   Ms. Mcgee is a 73 y/o F female on whom general neurology is consulted for concerns for neuromuscular disease, diaphragmatic weakness or myopathy. She has a PMHx of HTN, HLD, hypothyroidism, recent URI 3 weeks prior to admission, balance difficulites who presented to the ED on 12/23 for 3 weeks of weakness, LIRA, difficulty swallowing, decreased appetite, and constipation with intermittent lower abdominal cramping. Per daughter, patient is typically independent, lives alone, cooks her own meals, since she has been ill she has had decreased PO intake and drinking coffee, tea, and eating cereal. She has had progressive worsening HTN in the last 3 weeks and was started on losartan-HCTZ and metoprolol. Prior to this she was only taking lisinopril per daughter. Admitted for HTN emergency and was found to have Na 120. Received IV fluids and admitted to hospital medicine. For her hypernatremia, her thiazide was held and she was placed on fluid restriction. Urine studies from 12/25 showed Uosm of 802 and HANH of 109. Her BP has been labile since admission requiring both IV antihypertensives as well as fluid boluses. Patient transferred to ICU for continued lethargy and weakness. She became hypotensive requiring multiple pressors. Patient intubated for airway protection on 12/28. Sodium improved with hypertonic saline, now normalized. CT abdomen/pelvis showing proctitis and hip abscess. Went for IR aspiration of abscess. Culture showing e.coli, on zosyn. MRI brain non-concerning. MRI spine with severe cervical stenosis and cord compression, also with mild lumbar stenosis. Repeat CXR with increased LLL  consolidation and pleural effusion. Extubated to bipap on 12/30. Once extubated and off pressors patient began to re-experience labile blood pressures similar to her initial presentation. Started on cardene drip. Difficulty weaning off BIPAP due to inadequate volumes and persistent shortness of breath.      Overview/Hospital Course:  No notes on file        Subjective:     Interval History: Patient continuing to have difficulty weaning off BiPAP. Case discussed with patient, family and team.    Past Medical History:   Diagnosis Date    Hyperlipidemia     Hypertension     Hyponatremia 12/23/2022    Hypophosphatemia 12/30/2022    Hypothyroidism     Proctitis 12/29/2022    Severe sepsis 12/29/2022    Shock 12/29/2022       Past Surgical History:   Procedure Laterality Date    HYSTERECTOMY         Review of patient's allergies indicates:   Allergen Reactions    Hydralazine analogues      Precipitous drop in BP with IV formulation. Avoid if possible    Shellfish containing products Swelling         No current facility-administered medications on file prior to encounter.     Current Outpatient Medications on File Prior to Encounter   Medication Sig    alendronate-vitamin D3 (FOSAMAX PLUS D) 70 mg- 2,800 unit per tablet Take 1 tablet by mouth every 7 days.    hydroCHLOROthiazide (HYDRODIURIL) 25 MG tablet Take 25 mg by mouth once daily.    levothyroxine (SYNTHROID) 50 MCG tablet Take 50 mcg by mouth once daily.    losartan (COZAAR) 50 MG tablet Take 50 mg by mouth once daily.    meloxicam (MOBIC) 15 MG tablet Take 15 mg by mouth once daily.    metoprolol succinate (TOPROL-XL) 50 MG 24 hr tablet Take 50 mg by mouth once daily.    simvastatin (ZOCOR) 20 MG tablet Take 20 mg by mouth every evening.     Family History       Problem Relation (Age of Onset)    Breast cancer Sister (55), Sister (65)          Tobacco Use    Smoking status: Never    Smokeless tobacco: Not on file   Substance and Sexual Activity     Alcohol use: Not on file    Drug use: Never    Sexual activity: Not on file     Review of Systems   Unable to perform ROS: Acuity of condition   HENT:  Positive for trouble swallowing.    Respiratory:  Positive for shortness of breath.    Objective:     Vital Signs (Most Recent):  Temp: 98.9 °F (37.2 °C) (01/06/23 1100)  Pulse: 105 (01/06/23 1400)  Resp: (!) 28 (01/06/23 1400)  BP: (!) 161/72 (01/06/23 1400)  SpO2: 100 % (01/06/23 1400) Vital Signs (24h Range):  Temp:  [98.3 °F (36.8 °C)-99.2 °F (37.3 °C)] 98.9 °F (37.2 °C)  Pulse:  [103-138] 105  Resp:  [23-42] 28  SpO2:  [96 %-100 %] 100 %  BP: (132-190)/(59-96) 161/72     Weight: 59.4 kg (130 lb 15.3 oz)  Body mass index is 22.48 kg/m².    Physical Exam  Vitals and nursing note reviewed.   Constitutional:       Appearance: She is ill-appearing.   HENT:      Head: Normocephalic and atraumatic.   Cardiovascular:      Rate and Rhythm: Normal rate and regular rhythm.      Pulses: Normal pulses.      Heart sounds: Normal heart sounds. No murmur heard.  Pulmonary:      Effort: Tachypnea present.      Breath sounds: Normal air entry. No wheezing or rales.   Abdominal:      General: Abdomen is flat. There is no distension.      Palpations: Abdomen is soft.      Tenderness: There is no abdominal tenderness.   Musculoskeletal:         General: No swelling.      Right lower leg: No edema.      Left lower leg: No edema.   Skin:     General: Skin is warm and dry.      Findings: No bruising or rash.   Neurological:      General: No focal deficit present.      Comments: Alert, following commands   Psychiatric:         Mood and Affect: Mood normal.         Behavior: Behavior normal.     Neurological Exam:  MENTAL STATUS  Level of consciousness: alert  Orientation: oriented to person, place, and time  Attention normal. Concentration normal.  Speech: reduced phonation 2/2 SOB    CRANIAL NERVES  CN II: Visual fields full to confrontation  CN III, IV, VI: PERRL, EOMI  CN V:  Facial sensation intact  CN VII: Facial expression symmetric and full  CN IX, X: Symmetric palate elevation. Phonation normal  CN XII: Tongue midline with normal movements, no atrophy    MOTOR EXAM  Muscle bulk: normal  Muscle tone: normal  Pronator drift: absent    Strength - Upper Extremities: 4/5    Strength - Lower Extremities: 4/5    REFLEXES   Biceps Brachioradialis Patellar Achilles   Right +2 +2 +2 +1   Left +2 +2 +2 +1       SENSORY EXAM  Light touch: intact in all 4 extremities    No bulbar weakness found with lateral neck rotation / flexion / extension. No oral fasciculation noted below the tongue. No ocular weakness noted.         Significant Labs: Hemoglobin A1c:   Recent Labs   Lab 12/23/22  1920   HGBA1C 5.4     Blood Culture: No results for input(s): LABBLOO in the last 48 hours.  BMP:   Recent Labs   Lab 01/05/23 0343 01/05/23  1804 01/06/23  0335   * 107 148*    138 136   K 3.6 3.9 3.4*   CL 98 99 99   CO2 29 34* 30*   BUN 10 11 14   CREATININE 0.5 0.6 0.5   CALCIUM 8.2* 8.3* 7.9*   MG 1.8  --  2.2     CBC:   Recent Labs   Lab 01/05/23 0343 01/06/23  0335   WBC 11.76 10.52   HGB 7.8* 7.4*   HCT 25.2* 23.5*    272     CMP:   Recent Labs   Lab 01/05/23 0343 01/05/23  1804 01/06/23  0335   * 107 148*    138 136   K 3.6 3.9 3.4*   CL 98 99 99   CO2 29 34* 30*   BUN 10 11 14   CREATININE 0.5 0.6 0.5   CALCIUM 8.2* 8.3* 7.9*   MG 1.8  --  2.2   ANIONGAP 11 5* 7*     CSF Culture: No results for input(s): CSFCULTURE in the last 48 hours.  CSF Studies: No results for input(s): ALIQUT, APPEARCSF, COLORCSF, CSFWBC, CSFRBC, GLUCCSF, LDHCSF, PROTEINCSF, VDRLCSF in the last 48 hours.  Inflammatory Markers:   No results for input(s): SEDRATE, CRP, PROCAL in the last 48 hours.    POCT Glucose:   Recent Labs   Lab 01/05/23  2349 01/06/23  0340 01/06/23  0807   POCTGLUCOSE 103 157* 110     Prealbumin:   Recent Labs   Lab 01/05/23  0343   PREALBUMIN 10*     Respiratory Culture: No  results for input(s): GSRESP, RESPIRATORYC in the last 48 hours.  Urine Culture: No results for input(s): LABURIN in the last 48 hours.  Urine Studies:   No results for input(s): COLORU, APPEARANCEUA, PHUR, SPECGRAV, PROTEINUA, GLUCUA, KETONESU, BILIRUBINUA, OCCULTUA, NITRITE, UROBILINOGEN, LEUKOCYTESUR, RBCUA, WBCUA, BACTERIA, SQUAMEPITHEL, HYALINECASTS in the last 48 hours.    Invalid input(s): WRIGHTSUR    All pertinent lab results from the past 24 hours have been reviewed.    Significant Imaging: I have reviewed and interpreted all pertinent imaging results/findings within the past 24 hours.    Assessment and Plan:     * Shortness of breath  73 yo F on whom general neurology is consulted for concerns for neuromuscular disease, diaphragmatic weakness or myopathy. Patient reports SOB and difficulty swallowing solids of 3 week duration. She is currently in the medical ICU for ongoing treatment of infectious processes, including recent hip abscess s/p IR aspiration, positive cultures with e Coli, on zosyn. MRI spine with severe cervical stenosis and some cord compression, without cord enhancement. She also has CXR with increased consolidation in LLL. She has had difficulty weaning off BiPAP.    Patient's SOB likely due to general critical illness. Myasthenia is unlikely given lack of bulbar symptoms on exam. Patient also has a normal diaphragmatic outline in the RLL on CXR. We don't suspect a neurological cause for this patient's SOB. However, given that there are few options available, empiric therapy with IVIG is considered. There is severe spinal stenosis C4-C5, and some signal abnormality at C3-C5, which may localize to patient's symptoms. Ongoing discussions with various teams to try and find a more targeted intervention.    Recommendations:  - Continue IVIG 400 mg/kg for the present time until above discussion yields alternate therapy  - incentive spirometry  - myasthenia gravis panel collected, results  pending, low suspicion  - outpatient follow up with neurology recommended at discharge        VTE Risk Mitigation (From admission, onward)         Ordered     enoxaparin injection 40 mg  Daily         12/23/22 1649     IP VTE HIGH RISK PATIENT  Once         12/23/22 1649     Place sequential compression device  Until discontinued         12/23/22 1649                Manuel Lizarraga MD  Neurology  [facility] - Cardiac Medical ICU

## 2023-01-06 NOTE — ASSESSMENT & PLAN NOTE
Nutrition consulted. Most recent weight and BMI monitored-   On TPN; replete lytes and monitor for re-feeding syndrome    Malnutrition (Moderate to Severe)  Energy Intake (Malnutrition): less than 75% for greater than 7 days    Measurements:  Wt Readings from Last 1 Encounters:   01/05/23 59.4 kg (130 lb 15.3 oz)   Body mass index is 22.48 kg/m².    Recommendations: Recommendation/Intervention: 1.  Goals: Meet % EEN, EPN by RD f/u date    Patient has been screened and assessed by RD. RD will follow patient.

## 2023-01-06 NOTE — PROGRESS NOTES
[facility]  Critical Care Medicine  Progress Note    Patient Name: Dominique Mcgee  MRN: [id number]  Admission Date: 12/23/2022  Hospital Length of Stay: 14 days  Code Status: Full Code  Attending Provider: Jeromy Dotson MD  Primary Care Provider: Briana Leblanc MD   Principal Problem: Shortness of breath    Subjective:     HPI:  Ms. Mcgee is a 71 y/o F patient with HTN, HLD, hypothyroidism, recent URI 3 weeks prior to admission, balance difficulites who presented to the ED on 12/23 for 3 weeks of weakness, LIRA, decreased appetite, and constipation with intermittent lower abdominal cramping. Per daughter, patient is typically independent, lives alone, cooks her own meals, since she has been ill she has had decreased PO intake and drinking coffee, tea, and eating cereal. She has had progressive worsening HTN in the last 3 weeks and was started on losartan-HCTZ and metoprolol. Prior to this she was only taking lisinopril per daughter. Admitted for HTN emergency and was found to have Na 120. Received IV fluids and admitted to hospital medicine. For her hypernatremia, her thiazide was held and she was placed on fluid restriction. Urine studies from 12/25 showed Uosm of 802 and HANH of 109. Her BP has been labile since admission requiring both IV antihypertensives as well as fluid boluses. The patient's hyponatremia persisted despite fluid restriction and she became increasingly lethargic and weak so CCM was consulted for further management.      Upon evaluation, patient is somnolent. Opens eyes to voice, unable to engage in full conversation. Speaks mainly single words. Denies thirst. Reports SOB and generalized abdominal pain a/w constipation. No CP, N/V/D, dysuria, edema. Most recent vitals: /60, pulse 82, R 18, SpO2 94% on RA. Na trend 119->115->117.       Hospital/ICU Course:  Once transferred to ICU patient became hypotensive requiring multiple pressors. Patient intubated for airway  protection. Art line and central line placed. Sodium improved with hypertonic saline. CT abdomen/pelvis showing proctitis and hip abscess. Went for IR aspiration of abscess. Culture showing e.coli, on zosyn. MRI brain non-concerning. MRI spine with severe cervical stenosis and cord compression, also with mild lumbar stenosis. NSGY consulted with plans for outpatient follow-up. Repeat CXR with increased LLL consolidation and pleural effusion. Extubated to bipap. Once extubated and off pressors patient began to re-experience labile blood pressures similar to her initial presentation. Started on cardene drip. Difficulty weaning off BIPAP due to inadequate volumes and persistent shortness of breath. Extensive workup done regarding difficulties coming off BIPAP. Neurology consulted. ID was consulted for hip abscess- continued on zosyn. Repeat imaging concerning for pneumonia; started on linezolid. Ortho consulted for hip abscess that re-occurred on repeat imaging. Requested IR re-drain abscess and re-culture. Started on IVIG.      Interval History/Significant Events:   Not able to tolerate breaks from BIPAP. Ongoing discussions about possible next steps. Started on IVIG.    Review of Systems   Unable to perform ROS: Acuity of condition   Respiratory:  Positive for shortness of breath.    Objective:     Vital Signs (Most Recent):  Temp: 99 °F (37.2 °C) (01/06/23 0305)  Pulse: (!) 122 (01/06/23 0808)  Resp: (!) 42 (01/06/23 0808)  BP: (!) 182/79 (01/06/23 0801)  SpO2: 99 % (01/06/23 0808)   Vital Signs (24h Range):  Temp:  [98.3 °F (36.8 °C)-99.2 °F (37.3 °C)] 99 °F (37.2 °C)  Pulse:  [112-146] 122  Resp:  [24-47] 42  SpO2:  [90 %-100 %] 99 %  BP: (132-221)/() 182/79   Weight: 59.4 kg (130 lb 15.3 oz)  Body mass index is 22.48 kg/m².      Intake/Output Summary (Last 24 hours) at 1/6/2023 1149  Last data filed at 1/6/2023 0605  Gross per 24 hour   Intake 1684.19 ml   Output 2650 ml   Net -965.81 ml         Physical  Exam  Vitals and nursing note reviewed.   Constitutional:       General: She is not in acute distress.  HENT:      Head: Normocephalic and atraumatic.   Eyes:      Extraocular Movements: Extraocular movements intact.      Pupils: Pupils are equal, round, and reactive to light.   Cardiovascular:      Rate and Rhythm: Normal rate and regular rhythm.      Pulses: Normal pulses.      Heart sounds: Normal heart sounds. No murmur heard.  Pulmonary:      Breath sounds: Normal air entry. No wheezing or rales.   Abdominal:      General: Abdomen is flat. There is no distension.      Palpations: Abdomen is soft.      Tenderness: There is no abdominal tenderness.   Musculoskeletal:         General: No swelling.      Right hand: Decreased strength of finger abduction.      Left hand: Decreased strength of finger abduction.      Right lower leg: No edema.      Left lower leg: No edema.   Skin:     General: Skin is warm and dry.      Findings: No bruising or rash.   Neurological:      General: No focal deficit present.      Mental Status: She is alert and oriented to person, place, and time.   Psychiatric:         Mood and Affect: Mood normal.         Behavior: Behavior normal.       Vents:  Vent Mode: A/C (12/30/22 1528)  Ventilator Initiated: Yes (12/29/22 0144)  Set Rate: 12 BPM (12/30/22 1528)  Vt Set: 350 mL (12/30/22 0722)  Pressure Support: 14 cmH20 (12/30/22 1139)  PEEP/CPAP: 6 cmH20 (12/30/22 1528)  Oxygen Concentration (%): 30 (01/06/23 0808)  Peak Airway Pressure: 22 cmH20 (12/30/22 1528)  Plateau Pressure: 20 cmH20 (12/30/22 1528)  Total Ve: 9.73 L/m (12/30/22 1528)  Negative Inspiratory Force (cm H2O): 0 (12/30/22 1528)  F/VT Ratio<105 (RSBI): (!) 58.05 (12/30/22 1528)  Lines/Drains/Airways       Central Venous Catheter Line  Duration             Percutaneous Central Line Insertion/Assessment - Triple Lumen  12/29/22 0043 right internal jugular 8 days              Drain  Duration                  Urethral Catheter  01/04/23 0009 Non-latex 16 Fr. 2 days         Rectal Tube 01/05/23 2130 rectal tube w/ balloon (indicate number of mLs) <1 day              Peripheral Intravenous Line  Duration                  Peripheral IV - Single Lumen 01/03/23 2321 20 G Right Antecubital 2 days                  Significant Labs:    CBC/Anemia Profile:  Recent Labs   Lab 01/04/23  1518 01/05/23  0343 01/06/23  0335   WBC  --  11.76 10.52   HGB  --  7.8* 7.4*   HCT  --  25.2* 23.5*   PLT  --  324 272   MCV  --  84 82   RDW  --  14.0 13.9   [id number]  --   --           Chemistries:  Recent Labs   Lab 01/05/23  0343 01/05/23  1804 01/06/23  0335    138 136   K 3.6 3.9 3.4*   CL 98 99 99   CO2 29 34* 30*   BUN 10 11 14   CREATININE 0.5 0.6 0.5   CALCIUM 8.2* 8.3* 7.9*   MG 1.8  --  2.2   PHOS 2.4*  --  2.6*       All pertinent labs within the past 24 hours have been reviewed.    Significant Imaging:  I have reviewed all pertinent imaging results/findings within the past 24 hours.      ABG  Recent Labs   Lab 01/05/23  1132   PH 7.389   PO2 41   PCO2 63.5*   HCO3 38.4*   BE 13     Assessment/Plan:     Neuro  Stenosis, cervical spine  Noted on MRI spine. Of note patient has been experiencing episodes of vertigo and decreased balance which could be related.   -NSGY consulted, appreciate recs.  -amb ref to NSGY outpatient  -if need for reintubation should be done with fiberoptic or glidescope    Pulmonary  * Shortness of breath  Patient presented with approx 3 weeks of worsening shortness of breath, weakness, and falls. On transfer to ICU required intubation. Was extubated to BIPAP. Have been unable to wean off bipap due to persistent shortness of breath, tachypnea, and inadequate volumes. Trial off bipap results in tachypnea, tachycardia, and hypertension. Appears weak distally and with fine motor skills. Unclear etiology at this time. Does not appear pulmonary in nature as patient is saturating well, ABGs have been wnl. MRI C-spine showed  severe cervical stenosis, NSGY was consulted, unlikely that the stenosis would be causing these issues. Concern that difficulty breathing could be related to diaphragm weakness. Critical illness myopathy seems unlikely as patient was not intubated for long duration.  -neurology consulted, myasthenia panel sent  -further labs ordered (ESR, CRP, DARLINE, myositis panel)  -continue bipap  -palliative care consulted for long term planning  -started IVIG    Acute hypoxemic respiratory failure  Patient with Hypoxic Respiratory failure which is Acute.  she is not on home oxygen. Supplemental oxygen was provided and noted- Oxygen Concentration (%):  [] 30.   Signs/symptoms of respiratory failure include- tachypnea, increased work of breathing and respiratory distress. Contributing diagnoses includes - Pleural effusion and atelectasis Labs and images were reviewed. Patient Has recent ABG, which has been reviewed. Will treat underlying causes and adjust management of respiratory failure as follows- unclear etiology of pleural effusions, patient notes recent URI 3 weeks ago that has left her short of breath since then, on broad spectrum abx  Repeat CXR with increased consolidation in LLL  Extubated to Bipap  1/3: CT Chest - Bibasilar atelectatic/consolidative change (left greater than right) with patchy right basilar opacities and small bilateral pleural effusions.  Correlation for underlying infectious process advised.  ID following, linezolid to treat for PNA to end 1/9  See shortness of breath    Cardiac/Vascular  Right renal artery stenosis  -no acute intervention while in ICU  -see HTN    Hyperlipemia  Continue home statin    Essential hypertension  Known history of uncontrolled HTN, w/ labile BP since admit.   -BP meds held in setting of septic shock  -will avoid IV meds if possible as has had hypotension following IV BP meds  -renal artery duplex showing R renal artery stenosis, L renal artery unable to be  visualized  -Will monitor and add back meds as tolerated, IV metoprolol when unable to tolerate PO    Dyspnea on exertion  Began prior to admission. Has been requiring 2L via NC to maintain oxygenation  -O2 via NC PRN    Renal/  Metabolic alkalosis  Initially could have been related to diuretic use. Bicarb remains elevated after stopping HCTZ.    Endocrine  Moderate malnutrition  Nutrition consulted. Most recent weight and BMI monitored-   On TPN; replete lytes and monitor for re-feeding syndrome    Malnutrition (Moderate to Severe)  Energy Intake (Malnutrition): less than 75% for greater than 7 days    Measurements:  Wt Readings from Last 1 Encounters:   01/05/23 59.4 kg (130 lb 15.3 oz)   Body mass index is 22.48 kg/m².    Recommendations: Recommendation/Intervention: 1.  Goals: Meet % EEN, EPN by RD f/u date    Patient has been screened and assessed by RD. RD will follow patient.      Orthopedic  Abscess of bursa of right hip  CT scan showing possible hematoma vs abscess around right hip. Aspiration done with IR. Culture with e.coli pan-sensitive. Repeat CT showing fluid has reaccumulated.   -ID following  -Has been on zosyn, switch to cefepime per ID  -ortho consulted- recs for repeating drainage w/ IR  -f/u repeat cultures and fluid analysis      Other  Hypothermia  Considering new onset will check BCx and start zosyn. Using bear hugger for warming.    Generalized weakness  See shortness of breath       Critical Care Daily Checklist:    A: Awake: RASS Goal/Actual Goal:    Actual: Manning Agitation Sedation Scale (RASS): Alert and calm   B: Spontaneous Breathing Trial Performed? Spon. Breathing Trial Initiated?: Not initiated (12/29/22 0529)   C: SAT & SBT Coordinated?  n/a                      D: Delirium: CAM-ICU Overall CAM-ICU: Negative   E: Early Mobility Performed? No   F: Feeding Goal: Goals: Meet % EEN, EPN by RD f/u date  Status: Nutrition Goal Status: goal met   Current Diet Order    Procedures    Diet NPO      AS: Analgesia/Sedation none   T: Thromboembolic Prophylaxis yes   H: HOB > 300 Yes   U: Stress Ulcer Prophylaxis (if needed) protonix   G: Glucose Control yes   B: Bowel Function Stool Occurrence: 1   I: Indwelling Catheter (Lines & Rush) Necessity Rush, yes   D: De-escalation of Antimicrobials/Pharmacotherapies no    Plan for the day/ETD Bipap, GOC discussion    Code Status:  Family/Goals of Care: Full Code  Ongoing       Critical secondary to Patient has a condition that poses threat to life and bodily function: Severe Respiratory Distress      Critical care was time spent personally by me on the following activities: development of treatment plan with patient or surrogate and bedside caregivers, discussions with consultants, evaluation of patient's response to treatment, examination of patient, ordering and performing treatments and interventions, ordering and review of laboratory studies, ordering and review of radiographic studies, pulse oximetry, re-evaluation of patient's condition. This critical care time did not overlap with that of any other provider or involve time for any procedures.     Beto Avelar MD  Critical Care Medicine  [facility] - Cardiac Medical ICU

## 2023-01-06 NOTE — ASSESSMENT & PLAN NOTE
Known history of uncontrolled HTN, w/ labile BP since admit.   -BP meds held in setting of septic shock  -will avoid IV meds if possible as has had hypotension following IV BP meds  -renal artery duplex showing R renal artery stenosis, L renal artery unable to be visualized  -Will monitor and add back meds as tolerated, IV metoprolol when unable to tolerate PO

## 2023-01-06 NOTE — SUBJECTIVE & OBJECTIVE
Interval History/Significant Events:   Not able to tolerate breaks from BIPAP. Ongoing discussions about possible next steps. Started on IVIG.    Review of Systems   Unable to perform ROS: Acuity of condition   Respiratory:  Positive for shortness of breath.    Objective:     Vital Signs (Most Recent):  Temp: 99 °F (37.2 °C) (01/06/23 0305)  Pulse: (!) 122 (01/06/23 0808)  Resp: (!) 42 (01/06/23 0808)  BP: (!) 182/79 (01/06/23 0801)  SpO2: 99 % (01/06/23 0808)   Vital Signs (24h Range):  Temp:  [98.3 °F (36.8 °C)-99.2 °F (37.3 °C)] 99 °F (37.2 °C)  Pulse:  [112-146] 122  Resp:  [24-47] 42  SpO2:  [90 %-100 %] 99 %  BP: (132-221)/() 182/79   Weight: 59.4 kg (130 lb 15.3 oz)  Body mass index is 22.48 kg/m².      Intake/Output Summary (Last 24 hours) at 1/6/2023 1149  Last data filed at 1/6/2023 0605  Gross per 24 hour   Intake 1684.19 ml   Output 2650 ml   Net -965.81 ml         Physical Exam  Vitals and nursing note reviewed.   Constitutional:       General: She is not in acute distress.  HENT:      Head: Normocephalic and atraumatic.   Eyes:      Extraocular Movements: Extraocular movements intact.      Pupils: Pupils are equal, round, and reactive to light.   Cardiovascular:      Rate and Rhythm: Normal rate and regular rhythm.      Pulses: Normal pulses.      Heart sounds: Normal heart sounds. No murmur heard.  Pulmonary:      Breath sounds: Normal air entry. No wheezing or rales.   Abdominal:      General: Abdomen is flat. There is no distension.      Palpations: Abdomen is soft.      Tenderness: There is no abdominal tenderness.   Musculoskeletal:         General: No swelling.      Right hand: Decreased strength of finger abduction.      Left hand: Decreased strength of finger abduction.      Right lower leg: No edema.      Left lower leg: No edema.   Skin:     General: Skin is warm and dry.      Findings: No bruising or rash.   Neurological:      General: No focal deficit present.      Mental Status: She  is alert and oriented to person, place, and time.   Psychiatric:         Mood and Affect: Mood normal.         Behavior: Behavior normal.       Vents:  Vent Mode: A/C (12/30/22 1528)  Ventilator Initiated: Yes (12/29/22 0144)  Set Rate: 12 BPM (12/30/22 1528)  Vt Set: 350 mL (12/30/22 0722)  Pressure Support: 14 cmH20 (12/30/22 1139)  PEEP/CPAP: 6 cmH20 (12/30/22 1528)  Oxygen Concentration (%): 30 (01/06/23 0808)  Peak Airway Pressure: 22 cmH20 (12/30/22 1528)  Plateau Pressure: 20 cmH20 (12/30/22 1528)  Total Ve: 9.73 L/m (12/30/22 1528)  Negative Inspiratory Force (cm H2O): 0 (12/30/22 1528)  F/VT Ratio<105 (RSBI): (!) 58.05 (12/30/22 1528)  Lines/Drains/Airways       Central Venous Catheter Line  Duration             Percutaneous Central Line Insertion/Assessment - Triple Lumen  12/29/22 0043 right internal jugular 8 days              Drain  Duration                  Urethral Catheter 01/04/23 0009 Non-latex 16 Fr. 2 days         Rectal Tube 01/05/23 2130 rectal tube w/ balloon (indicate number of mLs) <1 day              Peripheral Intravenous Line  Duration                  Peripheral IV - Single Lumen 01/03/23 2321 20 G Right Antecubital 2 days                  Significant Labs:    CBC/Anemia Profile:  Recent Labs   Lab 01/04/23  1518 01/05/23  0343 01/06/23  0335   WBC  --  11.76 10.52   HGB  --  7.8* 7.4*   HCT  --  25.2* 23.5*   PLT  --  324 272   MCV  --  84 82   RDW  --  14.0 13.9   [id number] 846  --   --           Chemistries:  Recent Labs   Lab 01/05/23  0343 01/05/23  1804 01/06/23  0335    138 136   K 3.6 3.9 3.4*   CL 98 99 99   CO2 29 34* 30*   BUN 10 11 14   CREATININE 0.5 0.6 0.5   CALCIUM 8.2* 8.3* 7.9*   MG 1.8  --  2.2   PHOS 2.4*  --  2.6*       All pertinent labs within the past 24 hours have been reviewed.    Significant Imaging:  I have reviewed all pertinent imaging results/findings within the past 24 hours.

## 2023-01-06 NOTE — SUBJECTIVE & OBJECTIVE
Subjective:     Interval History: Patient continuing to have difficulty weaning off BiPAP. Case discussed with patient, family and team.    Past Medical History:   Diagnosis Date    Hyperlipidemia     Hypertension     Hyponatremia 12/23/2022    Hypophosphatemia 12/30/2022    Hypothyroidism     Proctitis 12/29/2022    Severe sepsis 12/29/2022    Shock 12/29/2022       Past Surgical History:   Procedure Laterality Date    HYSTERECTOMY         Review of patient's allergies indicates:   Allergen Reactions    Hydralazine analogues      Precipitous drop in BP with IV formulation. Avoid if possible    Shellfish containing products Swelling         No current facility-administered medications on file prior to encounter.     Current Outpatient Medications on File Prior to Encounter   Medication Sig    alendronate-vitamin D3 (FOSAMAX PLUS D) 70 mg- 2,800 unit per tablet Take 1 tablet by mouth every 7 days.    hydroCHLOROthiazide (HYDRODIURIL) 25 MG tablet Take 25 mg by mouth once daily.    levothyroxine (SYNTHROID) 50 MCG tablet Take 50 mcg by mouth once daily.    losartan (COZAAR) 50 MG tablet Take 50 mg by mouth once daily.    meloxicam (MOBIC) 15 MG tablet Take 15 mg by mouth once daily.    metoprolol succinate (TOPROL-XL) 50 MG 24 hr tablet Take 50 mg by mouth once daily.    simvastatin (ZOCOR) 20 MG tablet Take 20 mg by mouth every evening.     Family History       Problem Relation (Age of Onset)    Breast cancer Sister (55), Sister (65)          Tobacco Use    Smoking status: Never    Smokeless tobacco: Not on file   Substance and Sexual Activity    Alcohol use: Not on file    Drug use: Never    Sexual activity: Not on file     Review of Systems   Unable to perform ROS: Acuity of condition   HENT:  Positive for trouble swallowing.    Respiratory:  Positive for shortness of breath.    Objective:     Vital Signs (Most Recent):  Temp: 98.9 °F (37.2 °C) (01/06/23 1100)  Pulse: 105 (01/06/23 1400)  Resp: (!) 28 (01/06/23  1400)  BP: (!) 161/72 (01/06/23 1400)  SpO2: 100 % (01/06/23 1400) Vital Signs (24h Range):  Temp:  [98.3 °F (36.8 °C)-99.2 °F (37.3 °C)] 98.9 °F (37.2 °C)  Pulse:  [103-138] 105  Resp:  [23-42] 28  SpO2:  [96 %-100 %] 100 %  BP: (132-190)/(59-96) 161/72     Weight: 59.4 kg (130 lb 15.3 oz)  Body mass index is 22.48 kg/m².    Physical Exam  Vitals and nursing note reviewed.   Constitutional:       Appearance: She is ill-appearing.   HENT:      Head: Normocephalic and atraumatic.   Cardiovascular:      Rate and Rhythm: Normal rate and regular rhythm.      Pulses: Normal pulses.      Heart sounds: Normal heart sounds. No murmur heard.  Pulmonary:      Effort: Tachypnea present.      Breath sounds: Normal air entry. No wheezing or rales.   Abdominal:      General: Abdomen is flat. There is no distension.      Palpations: Abdomen is soft.      Tenderness: There is no abdominal tenderness.   Musculoskeletal:         General: No swelling.      Right lower leg: No edema.      Left lower leg: No edema.   Skin:     General: Skin is warm and dry.      Findings: No bruising or rash.   Neurological:      General: No focal deficit present.      Comments: Alert, following commands   Psychiatric:         Mood and Affect: Mood normal.         Behavior: Behavior normal.     Neurological Exam:  MENTAL STATUS  Level of consciousness: alert  Orientation: oriented to person, place, and time  Attention normal. Concentration normal.  Speech: reduced phonation 2/2 SOB    CRANIAL NERVES  CN II: Visual fields full to confrontation  CN III, IV, VI: PERRL, EOMI  CN V: Facial sensation intact  CN VII: Facial expression symmetric and full  CN IX, X: Symmetric palate elevation. Phonation normal  CN XII: Tongue midline with normal movements, no atrophy    MOTOR EXAM  Muscle bulk: normal  Muscle tone: normal  Pronator drift: absent    Strength - Upper Extremities: 4/5    Strength - Lower Extremities: 4/5    REFLEXES   Biceps Brachioradialis  Patellar Achilles   Right +2 +2 +2 +1   Left +2 +2 +2 +1       SENSORY EXAM  Light touch: intact in all 4 extremities    No bulbar weakness found with lateral neck rotation / flexion / extension. No oral fasciculation noted below the tongue. No ocular weakness noted.         Significant Labs: Hemoglobin A1c:   Recent Labs   Lab 12/23/22  1920   HGBA1C 5.4     Blood Culture: No results for input(s): LABBLOO in the last 48 hours.  BMP:   Recent Labs   Lab 01/05/23  0343 01/05/23  1804 01/06/23  0335   * 107 148*    138 136   K 3.6 3.9 3.4*   CL 98 99 99   CO2 29 34* 30*   BUN 10 11 14   CREATININE 0.5 0.6 0.5   CALCIUM 8.2* 8.3* 7.9*   MG 1.8  --  2.2     CBC:   Recent Labs   Lab 01/05/23 0343 01/06/23  0335   WBC 11.76 10.52   HGB 7.8* 7.4*   HCT 25.2* 23.5*    272     CMP:   Recent Labs   Lab 01/05/23  0343 01/05/23  1804 01/06/23  0335   * 107 148*    138 136   K 3.6 3.9 3.4*   CL 98 99 99   CO2 29 34* 30*   BUN 10 11 14   CREATININE 0.5 0.6 0.5   CALCIUM 8.2* 8.3* 7.9*   MG 1.8  --  2.2   ANIONGAP 11 5* 7*     CSF Culture: No results for input(s): CSFCULTURE in the last 48 hours.  CSF Studies: No results for input(s): ALIQUT, APPEARCSF, COLORCSF, CSFWBC, CSFRBC, GLUCCSF, LDHCSF, PROTEINCSF, VDRLCSF in the last 48 hours.  Inflammatory Markers:   No results for input(s): SEDRATE, CRP, PROCAL in the last 48 hours.    POCT Glucose:   Recent Labs   Lab 01/05/23  2349 01/06/23  0340 01/06/23  0807   POCTGLUCOSE 103 157* 110     Prealbumin:   Recent Labs   Lab 01/05/23 0343   PREALBUMIN 10*     Respiratory Culture: No results for input(s): GSRESP, RESPIRATORYC in the last 48 hours.  Urine Culture: No results for input(s): LABURIN in the last 48 hours.  Urine Studies:   No results for input(s): COLORU, APPEARANCEUA, PHUR, SPECGRAV, PROTEINUA, GLUCUA, KETONESU, BILIRUBINUA, OCCULTUA, NITRITE, UROBILINOGEN, LEUKOCYTESUR, RBCUA, WBCUA, BACTERIA, SQUAMEPITHEL, HYALINECASTS in the last 48  hours.    Invalid input(s): FABIAN    All pertinent lab results from the past 24 hours have been reviewed.    Significant Imaging: I have reviewed and interpreted all pertinent imaging results/findings within the past 24 hours.

## 2023-01-06 NOTE — ASSESSMENT & PLAN NOTE
Patient presented with approx 3 weeks of worsening shortness of breath, weakness, and falls. On transfer to ICU required intubation. Was extubated to BIPAP. Have been unable to wean off bipap due to persistent shortness of breath, tachypnea, and inadequate volumes. Trial off bipap results in tachypnea, tachycardia, and hypertension. Appears weak distally and with fine motor skills. Unclear etiology at this time. Does not appear pulmonary in nature as patient is saturating well, ABGs have been wnl. MRI C-spine showed severe cervical stenosis, NSGY was consulted, unlikely that the stenosis would be causing these issues. Concern that difficulty breathing could be related to diaphragm weakness. Critical illness myopathy seems unlikely as patient was not intubated for long duration.  -neurology consulted, myasthenia panel sent  -further labs ordered (ESR, CRP, DARLINE, myositis panel)  -continue bipap  -palliative care consulted for long term planning  -started IVIG

## 2023-01-06 NOTE — ASSESSMENT & PLAN NOTE
71 yo F on whom general neurology is consulted for concerns for neuromuscular disease, diaphragmatic weakness or myopathy. Patient reports SOB and difficulty swallowing solids of 3 week duration. She is currently in the medical ICU for ongoing treatment of infectious processes, including recent hip abscess s/p IR aspiration, positive cultures with e Coli, on zosyn. MRI spine with severe cervical stenosis and some cord compression, without cord enhancement. She also has CXR with increased consolidation in LLL. She has had difficulty weaning off BiPAP.    Patient's SOB likely due to general critical illness. Myasthenia is unlikely given lack of bulbar symptoms on exam. Patient also has a normal diaphragmatic outline in the RLL on CXR. We don't suspect a neurological cause for this patient's SOB. However, given that there are few options available, empiric therapy with IVIG is considered. There is severe spinal stenosis C4-C5, and some signal abnormality at C3-C5, which may localize to patient's symptoms. Ongoing discussions with various teams to try and find a more targeted intervention.    Recommendations:  - Continue IVIG 400 mg/kg for the present time until above discussion yields alternate therapy  - incentive spirometry  - myasthenia gravis panel collected, results pending, low suspicion  - outpatient follow up with neurology recommended at discharge

## 2023-01-06 NOTE — PT/OT/SLP PROGRESS
Speech Language Pathology  Discharge    Dominique Mcgee  MRN: [id number]    Patient not seen today secondary to MD discontinued orders x2. Please re-consult if further ST warranted.   1/6/2023

## 2023-01-06 NOTE — SUBJECTIVE & OBJECTIVE
Interval Hx: Patient remains dependent on Bipap. Heart rate and BP increased and pulse ox dropped when switched to comfort flow this am. Patient became very anxious as a result. Denies shortness of breath once back on bipap. IVIG started. Daughter and friend at bedside. [place] daughter on speaker phone     Past Medical History:   Diagnosis Date    Hyperlipidemia     Hypertension     Hyponatremia 12/23/2022    Hypophosphatemia 12/30/2022    Hypothyroidism     Proctitis 12/29/2022    Severe sepsis 12/29/2022    Shock 12/29/2022       Past Surgical History:   Procedure Laterality Date    HYSTERECTOMY         Review of patient's allergies indicates:   Allergen Reactions    Hydralazine analogues      Precipitous drop in BP with IV formulation. Avoid if possible    Shellfish containing products Swelling       Medications:  Continuous Infusions:   dextrose 10 % in water (D10W)      TPN ADULT CENTRAL LINE CUSTOM Stopped (01/05/23 1836)    TPN ADULT CENTRAL LINE CUSTOM       Scheduled Meds:   albuterol-ipratropium  3 mL Nebulization Q4H    capsaicin   Topical (Top) BID    ceFEPime (MAXIPIME) IVPB  1 g Intravenous Q8H    enoxaparin  40 mg Subcutaneous Daily    fat emulsion 20%  250 mL Intravenous Daily    Immune Globulin G (IGG)-PRO-IGA 10 % injection (Privigen)  0.4 g/kg Intravenous Q24H    [START ON 1/7/2023] levothyroxine  25 mcg Intravenous Daily    linezolid  600 mg Intravenous Q12H    metoprolol  5 mg Intravenous Q6H    pantoprazole  40 mg Intravenous Daily    potassium phosphate IVPB  15 mmol Intravenous Once    sodium chloride 3%  4 mL Nebulization Q4H     PRN Meds:acetaminophen, acetylcysteine 200 mg/ml (20%), dextrose 10 % in water (D10W), dextrose 10%, dextrose 10%, glucagon (human recombinant), glycerin adult, guaiFENesin 100 mg/5 ml, insulin aspart U-100, iohexol, labetalol, sodium chloride 0.9%    Family History       Problem Relation (Age of Onset)    Breast cancer Sister (55), Sister (65)          Tobacco Use     Smoking status: Never    Smokeless tobacco: Not on file   Substance and Sexual Activity    Alcohol use: Not on file    Drug use: Never    Sexual activity: Not on file       Review of Systems   Constitutional:  Positive for fatigue.   HENT: Negative.     Eyes: Negative.    Respiratory:  Positive for shortness of breath.    Gastrointestinal: Negative.    Endocrine: Negative.    Musculoskeletal: Negative.    Skin: Negative.    Neurological:  Positive for weakness.   Psychiatric/Behavioral:  The patient is nervous/anxious.    Objective:     Vital Signs (Most Recent):  Temp: 98.9 °F (37.2 °C) (01/06/23 1100)  Pulse: 105 (01/06/23 1400)  Resp: (!) 28 (01/06/23 1400)  BP: (!) 161/72 (01/06/23 1400)  SpO2: 100 % (01/06/23 1400)   Vital Signs (24h Range):  Temp:  [98.3 °F (36.8 °C)-99.2 °F (37.3 °C)] 98.9 °F (37.2 °C)  Pulse:  [103-138] 105  Resp:  [23-42] 28  SpO2:  [94 %-100 %] 100 %  BP: (132-194)/() 161/72     Weight: 59.4 kg (130 lb 15.3 oz)  Body mass index is 22.48 kg/m².    Physical Exam  Vitals and nursing note reviewed.   Constitutional:       General: She is not in acute distress.  HENT:      Head: Normocephalic.      Right Ear: External ear normal.      Left Ear: External ear normal.      Nose: Nose normal.      Mouth/Throat:      Mouth: Mucous membranes are dry.   Eyes:      Pupils: Pupils are equal, round, and reactive to light.   Cardiovascular:      Rate and Rhythm: Tachycardia present.   Pulmonary:      Effort: Pulmonary effort is normal. No respiratory distress.      Comments: On continuous bipap  Abdominal:      General: There is no distension.   Musculoskeletal:      Cervical back: Normal range of motion.   Neurological:      Mental Status: She is alert and oriented to person, place, and time.      Motor: Weakness present.   Psychiatric:         Mood and Affect: Mood normal.       Review of Symptoms      Symptom Assessment (ESAS 0-10 Scale)  Pain:  0  Dyspnea:  4  Anxiety:  5  Nausea:   0  Depression:  0  Anorexia:  6  Fatigue:  8  Insomnia:  0  Restlessness:  0  Agitation:  0     CAM / Delirium:  Negative  Constipation:  Negative  Diarrhea:  Negative    Anxiety:  Is nervous/anxious    Bowel Management Plan (BMP):  Yes      Modified Shar Scale:  1    Performance Status:  30 (100 prior to admission)    Living Arrangements:  Lives alone    Psychosocial/Cultural:   See Palliative Psychosocial Note: No  Patient is . She has two daughters, one local and one out of state. She has sisters. Completely independent prior to hospitalization     Social Issues Identified: New Diagnosis/Trauma  Bereavement Risk: No  Caregiver Needs Discussed. Caregiver Distress: No:     **Primary  to Follow**  Palliative Care  Consult: No    Spiritual:  F - [place] and Belief:  Yes  I - Importance:  Very  A - Address in Care:  Yes      Advance Care Planning   Advance Directives:   Living Will: No    LaPOST: No    Do Not Resuscitate Status: No    Medical Power of : No      Decision Making:  Patient answered questions  Goals of Care: What is most important right now is to focus on curative/life-prolongation. Accordingly, we have decided that the best plan to meet the patient's goals includes continuing with treatment.       Significant Labs: All pertinent labs within the past 24 hours have been reviewed.  CBC:   Recent Labs   Lab 01/06/23  0335   WBC 10.52   HGB 7.4*   HCT 23.5*   MCV 82          BMP:  Recent Labs   Lab 01/06/23  0335   *      K 3.4*   CL 99   CO2 30*   BUN 14   CREATININE 0.5   CALCIUM 7.9*   MG 2.2       LFT:  Lab Results   Component Value Date    AST 22 12/27/2022    ALKPHOS 70 12/27/2022    BILITOT 0.9 12/27/2022     Albumin:   Albumin   Date Value Ref Range Status   12/27/2022 3.4 (L) 3.5 - 5.2 g/dL Final     Protein:   Total Protein   Date Value Ref Range Status   12/27/2022 6.6 6.0 - 8.4 g/dL Final     Lactic acid:   Lab Results   Component Value  Date    LACTATE 0.6 12/29/2022    LACTATE 1.0 12/28/2022       Significant Imaging: I have reviewed all pertinent imaging results/findings within the past 24 hours.

## 2023-01-06 NOTE — ASSESSMENT & PLAN NOTE
72 year old female with onset of acute hypoxemic respiratory failure and profound weakness over the past few weeks. Patient is dependent on Bipap. Palliative consulted for assistance with GOC    Code status: Full. Patient and family discussing potential code status change to DNR/DNI    Surrogate decision maker: 2 daughters are legal NOK     GOC/ACP  1/6   Met with patient in am and then patient, Dr. Dotson, patient's two daughters (one in person and one non speaker), and patient's friend. Discussed where things stand now and potential outcomes. Discussed needing to have a plan for if patient decompensates. Patient has voiced several times that she would not want to be re-intubated, trached, or back on vent. Extensively discussed what this would look like to patient and family- it would buy more time but does not fix the underlying problem. Discussed what quality of life would look like if trached and vent dependent. Provided support   Additional 60 minutes spent discussing ACP     -Patient and family discussing with each other. Plan to follow-up 1/7 to follow-up on code status decision       1/5  -Met with patient at bedside. Later spoke with local daughter on the phone. Introduced palliative medicine and our role in patient's care. Discussed complexity of her medical condition and that the etiology remains unclear. Despite best supportive care she is not improving and we worry about whether we are going to be able to fix the underlying issue. Explained that if she is not improving, we are going to need to make some difficult decisions. Explained that she cannot remain on the bipap mask indefinitely. The team is doing everything they can to try to get her off but if she remains dependent on significant respiratory support the next step to prolong life would likely be trach/vent. Patient shook her head no. She remembers everything about her recent intubation and would not want to be dependent on a vent long term.  This would not be an acceptable quality of life. Provided support           Discussed with Dr. Dotson several times throughout the day both pre and post family meeting

## 2023-01-07 NOTE — PROGRESS NOTES
[facility] - Cardiac Medical ICU  Critical Care Medicine  Progress Note    Patient Name: Dominique Mcgee  MRN: [id number]  Admission Date: 12/23/2022  Hospital Length of Stay: 15 days  Code Status: Full Code  Attending Provider: Luis Enrique Bishop MD  Primary Care Provider: Briana Leblanc MD   Principal Problem: Shortness of breath    Subjective:     HPI:  Ms. Mcgee is a 73 y/o F patient with HTN, HLD, hypothyroidism, recent URI 3 weeks prior to admission, balance difficulites who presented to the ED on 12/23 for 3 weeks of weakness, LIRA, decreased appetite, and constipation with intermittent lower abdominal cramping. Per daughter, patient is typically independent, lives alone, cooks her own meals, since she has been ill she has had decreased PO intake and drinking coffee, tea, and eating cereal. She has had progressive worsening HTN in the last 3 weeks and was started on losartan-HCTZ and metoprolol. Prior to this she was only taking lisinopril per daughter. Admitted for HTN emergency and was found to have Na 120. Received IV fluids and admitted to hospital medicine. For her hypernatremia, her thiazide was held and she was placed on fluid restriction. Urine studies from 12/25 showed Uosm of 802 and HANH of 109. Her BP has been labile since admission requiring both IV antihypertensives as well as fluid boluses. The patient's hyponatremia persisted despite fluid restriction and she became increasingly lethargic and weak so CCM was consulted for further management.      Upon evaluation, patient is somnolent. Opens eyes to voice, unable to engage in full conversation. Speaks mainly single words. Denies thirst. Reports SOB and generalized abdominal pain a/w constipation. No CP, N/V/D, dysuria, edema. Most recent vitals: /60, pulse 82, R 18, SpO2 94% on RA. Na trend 119->115->117.       Hospital/ICU Course:  Once transferred to ICU patient became hypotensive requiring multiple pressors. Patient intubated for airway protection.  Art line and central line placed. Sodium improved with hypertonic saline. CT abdomen/pelvis showing proctitis and hip abscess. Went for IR aspiration of abscess. Culture showing e.coli, on zosyn. MRI brain non-concerning. MRI spine with severe cervical stenosis and cord compression, also with mild lumbar stenosis. NSGY consulted with plans for outpatient follow-up. Repeat CXR with increased LLL consolidation and pleural effusion. Extubated to bipap. Once extubated and off pressors patient began to re-experience labile blood pressures similar to her initial presentation. Started on cardene drip. Difficulty weaning off BIPAP due to inadequate volumes and persistent shortness of breath. Extensive workup done regarding difficulties coming off BIPAP. Neurology consulted. ID was consulted for hip abscess- continued on zosyn. Repeat imaging concerning for pneumonia; started on linezolid. Ortho consulted for hip abscess that re-occurred on repeat imaging. Requested IR re-drain abscess and re-culture. Started on IVIG. Ongoing discussions with neurology and neurosurgery regarding cord injury as possible etiology of respiratory muscle weakness.      Interval History/Significant Events:   Feeling well this morning. Alternating bipap and HFNC. Discussing with neurology and NSGY.    Review of Systems   Unable to perform ROS: Acuity of condition   Respiratory:  Positive for shortness of breath.    Objective:     Vital Signs (Most Recent):  Temp: 97.8 °F (36.6 °C) (01/07/23 0700)  Pulse: (!) 116 (01/07/23 1154)  Resp: (!) 27 (01/07/23 1154)  BP: (!) 152/71 (01/07/23 1100)  SpO2: 99 % (01/07/23 1154)   Vital Signs (24h Range):  Temp:  [97.8 °F (36.6 °C)-98.5 °F (36.9 °C)] 97.8 °F (36.6 °C)  Pulse:  [] 116  Resp:  [22-60] 27  SpO2:  [98 %-100 %] 99 %  BP: (131-190)/() 152/71   Weight: 59.4 kg (130 lb 15.3 oz)  Body mass index is 22.48 kg/m².      Intake/Output Summary (Last 24 hours) at 1/7/2023 1234  Last data filed at  1/7/2023 1100  Gross per 24 hour   Intake 1794.48 ml   Output 1560 ml   Net 234.48 ml         Physical Exam  Vitals and nursing note reviewed.   Constitutional:       General: She is not in acute distress.  HENT:      Head: Normocephalic and atraumatic.   Eyes:      Extraocular Movements: Extraocular movements intact.      Pupils: Pupils are equal, round, and reactive to light.   Cardiovascular:      Rate and Rhythm: Normal rate and regular rhythm.      Pulses: Normal pulses.      Heart sounds: Normal heart sounds. No murmur heard.  Pulmonary:      Breath sounds: Normal air entry. No wheezing or rales.   Abdominal:      General: Abdomen is flat. There is no distension.      Palpations: Abdomen is soft.      Tenderness: There is no abdominal tenderness.   Musculoskeletal:         General: No swelling.      Right lower leg: No edema.      Left lower leg: No edema.   Skin:     General: Skin is warm and dry.      Findings: No bruising or rash.   Neurological:      General: No focal deficit present.      Mental Status: She is alert and oriented to person, place, and time.   Psychiatric:         Mood and Affect: Mood normal.         Behavior: Behavior normal.       Vents:  Vent Mode: A/C (12/30/22 1528)  Ventilator Initiated: Yes (12/29/22 0144)  Set Rate: 12 BPM (12/30/22 1528)  Vt Set: 350 mL (12/30/22 0722)  Pressure Support: 14 cmH20 (12/30/22 1139)  PEEP/CPAP: 6 cmH20 (12/30/22 1528)  Oxygen Concentration (%): 30 (01/07/23 1100)  Peak Airway Pressure: 22 cmH20 (12/30/22 1528)  Plateau Pressure: 20 cmH20 (12/30/22 1528)  Total Ve: 9.73 L/m (12/30/22 1528)  Negative Inspiratory Force (cm H2O): 0 (12/30/22 1528)  F/VT Ratio<105 (RSBI): (!) 58.05 (12/30/22 1528)  Lines/Drains/Airways       Central Venous Catheter Line  Duration             Percutaneous Central Line Insertion/Assessment - Triple Lumen  12/29/22 0043 right internal jugular 9 days              Drain  Duration                  Urethral Catheter 01/04/23 0009  Non-latex 16 Fr. 3 days         Rectal Tube 01/05/23 2130 rectal tube w/ balloon (indicate number of mLs) 1 day                  Significant Labs:    CBC/Anemia Profile:  Recent Labs   Lab 01/06/23  0335 01/07/23  0342   WBC 10.52 8.96   HGB 7.4* 7.7*   HCT 23.5* 24.9*    256   MCV 82 83   RDW 13.9 14.1          Chemistries:  Recent Labs   Lab 01/06/23  0335 01/06/23  1724 01/07/23  0342    138 136   K 3.4* 4.5 4.6   CL 99 99 101   CO2 30* 32* 27   BUN 14 12 15   CREATININE 0.5 0.5 0.4*   CALCIUM 7.9* 8.1* 8.0*   MG 2.2  --  2.3   PHOS 2.6*  --  2.6*       All pertinent labs within the past 24 hours have been reviewed.    Significant Imaging:  I have reviewed all pertinent imaging results/findings within the past 24 hours.      ABG  Recent Labs   Lab 01/05/23  1132   PH 7.389   PO2 41   PCO2 63.5*   HCO3 38.4*   BE 13     Assessment/Plan:     Neuro  Stenosis, cervical spine  Noted on MRI spine. Of note patient has been experiencing episodes of vertigo and decreased balance which could be related.   -NSGY consulted, appreciate recs  -if need for reintubation should be done with fiberoptic or glidescope  -concern that this could be contributing to her current respiratory muscle weakness  -appreciate neurology and nsgy involvement    Pulmonary  * Shortness of breath  Patient presented with approx 3 weeks of worsening shortness of breath, weakness, and falls. On transfer to ICU required intubation. Was extubated to BIPAP. Have been unable to wean off bipap due to persistent shortness of breath, tachypnea, and inadequate volumes. Trial off bipap results in tachypnea, tachycardia, and hypertension. Appears weak distally and with fine motor skills. Unclear etiology at this time. Does not appear pulmonary in nature as patient is saturating well, ABGs have been wnl. MRI C-spine showed severe cervical stenosis, NSGY was consulted, unlikely that the stenosis would be causing these issues. Concern that difficulty  breathing could be related to diaphragm weakness. Critical illness myopathy seems unlikely as patient was not intubated for long duration.  -neurology consulted, myasthenia panel sent  -further labs ordered (ESR, CRP, DARLINE, myositis panel)  -continue bipap  -palliative care consulted for long term planning  -started IVIG  -discussing with neurology and nsgy about possible relation to cervical stenosis    Acute hypoxemic respiratory failure  Patient with Hypoxic Respiratory failure which is Acute.  she is not on home oxygen. Supplemental oxygen was provided and noted- Oxygen Concentration (%):  [] 30.   Signs/symptoms of respiratory failure include- tachypnea, increased work of breathing and respiratory distress. Contributing diagnoses includes - Pleural effusion and atelectasis Labs and images were reviewed. Patient Has recent ABG, which has been reviewed. Will treat underlying causes and adjust management of respiratory failure as follows- unclear etiology of pleural effusions, patient notes recent URI 3 weeks ago that has left her short of breath since then, on broad spectrum abx  Repeat CXR with increased consolidation in LLL  Extubated to Bipap  1/3: CT Chest - Bibasilar atelectatic/consolidative change (left greater than right) with patchy right basilar opacities and small bilateral pleural effusions.  Correlation for underlying infectious process advised.  ID following, linezolid to treat for PNA to end 1/9  See shortness of breath    Cardiac/Vascular  Right renal artery stenosis  -no acute intervention while in ICU  -see HTN    Hyperlipemia  Continue home statin    Essential hypertension  Known history of uncontrolled HTN, w/ labile BP since admit.   -BP meds held in setting of septic shock  -will avoid IV meds if possible as has had hypotension following IV BP meds  -renal artery duplex showing R renal artery stenosis, L renal artery unable to be visualized  -Will monitor and add back meds as tolerated,  IV metoprolol when unable to tolerate PO    Dyspnea on exertion  Began prior to admission. Has been requiring 2L via NC to maintain oxygenation  -O2 via NC PRN    Renal/  Metabolic alkalosis  Initially could have been related to diuretic use. Bicarb remains elevated after stopping HCTZ.    Endocrine  Moderate malnutrition  Nutrition consulted. Most recent weight and BMI monitored-   On TPN; replete lytes and monitor for re-feeding syndrome    Malnutrition (Moderate to Severe)  Energy Intake (Malnutrition): less than 75% for greater than 7 days    Measurements:  Wt Readings from Last 1 Encounters:   01/05/23 59.4 kg (130 lb 15.3 oz)   Body mass index is 22.48 kg/m².    Recommendations: Recommendation/Intervention: 1.  Goals: Meet % EEN, EPN by RD f/u date    Patient has been screened and assessed by RD. RD will follow patient.      Orthopedic  Abscess of bursa of right hip  CT scan showing possible hematoma vs abscess around right hip. Aspiration done with IR. Culture with e.coli pan-sensitive. Repeat CT showing fluid has reaccumulated.   -ID following  -Has been on zosyn, switch to cefepime per ID  -ortho consulted- recs for repeating drainage w/ IR  -f/u repeat cultures and fluid analysis      Other  Hypothermia  Considering new onset will check BCx and start zosyn. Using bear hugger for warming.    Generalized weakness  See shortness of breath       Critical Care Daily Checklist:    A: Awake: RASS Goal/Actual Goal:    Actual: Manning Agitation Sedation Scale (RASS): Alert and calm   B: Spontaneous Breathing Trial Performed? Spon. Breathing Trial Initiated?: Not initiated (12/29/22 4329)   C: SAT & SBT Coordinated?  n/a                      D: Delirium: CAM-ICU Overall CAM-ICU: Negative   E: Early Mobility Performed? No   F: Feeding Goal: Goals: Meet % EEN, EPN by RD f/u date  Status: Nutrition Goal Status: goal met   Current Diet Order   Procedures    Diet NPO      AS: Analgesia/Sedation n/a   T:  Thromboembolic Prophylaxis yes   H: HOB > 300 Yes   U: Stress Ulcer Prophylaxis (if needed) yes   G: Glucose Control yes   B: Bowel Function Stool Occurrence: 1   I: Indwelling Catheter (Lines & Rush) Necessity yes   D: De-escalation of Antimicrobials/Pharmacotherapies no    Plan for the day/ETD hfnc trial    Code Status:  Family/Goals of Care: Full Code         Critical secondary to Patient has a condition that poses threat to life and bodily function: Severe Respiratory Distress      Critical care was time spent personally by me on the following activities: development of treatment plan with patient or surrogate and bedside caregivers, discussions with consultants, evaluation of patient's response to treatment, examination of patient, ordering and performing treatments and interventions, ordering and review of laboratory studies, ordering and review of radiographic studies, pulse oximetry, re-evaluation of patient's condition. This critical care time did not overlap with that of any other provider or involve time for any procedures.     Beto Avelar MD  Critical Care Medicine  [facility] - Cardiac Medical ICU

## 2023-01-07 NOTE — ASSESSMENT & PLAN NOTE
Noted on MRI spine. Of note patient has been experiencing episodes of vertigo and decreased balance which could be related.   -NSGY consulted, appreciate recs  -if need for reintubation should be done with fiberoptic or glidescope  -concern that this could be contributing to her current respiratory muscle weakness  -appreciate neurology and nsgy involvement

## 2023-01-07 NOTE — PLAN OF CARE
[facility] DAILY GOALS       A: Awake    RASS: Goal -    Actual - RASS (Manning Agitation-Sedation Scale): 0-->alert and calm   Restraint necessity: Clinical Justification: Removing medical devices  B: Breathe   SBT: Not intubated   C: Coordinate A & B, analgesics/sedatives   Pain: managed    SAT: Not intubated  D: Delirium   CAM-ICU: Overall CAM-ICU: Negative  E: Early(intubated/ Progressive (non-intubated) Mobility   MOVE Screen: Pass   Activity: Activity Management: Rolling - L1, Arm raise - L1, Ankle pumps - L1  FAS: Feeding/Nutrition   Diet order: Diet/Nutrition Received: NPO,    T: Thrombus   DVT prophylaxis: VTE Required Core Measure: Pharmacological prophylaxis initiated/maintained  H: HOB Elevation   Head of Bed (HOB) Positioning: HOB at 45 degrees  U: Ulcer Prophylaxis   GI: yes  G: Glucose control   managed Glycemic Management: blood glucose monitored  S: Skin   Bathing/Skin Care: bath, partial, incontinence care  Device Skin Pressure Protection: absorbent pad utilized/changed, adhesive use limited, skin-to-device areas padded, skin-to-skin areas padded  Pressure Reduction Devices: pressure-redistributing mattress utilized, specialty bed utilized  Pressure Reduction Techniques: weight shift assistance provided, pressure points protected, positioned off wounds, heels elevated off bed  Skin Protection: adhesive use limited, incontinence pads utilized, tubing/devices free from skin contact  B: Bowel Function   diarrhea   I: Indwelling Catheters   Rush necessity: [REMOVED]      Urethral Catheter 12/27/22 1535-Reason for Continuing Urinary Catheterization: Critically ill in ICU and requiring hourly monitoring of intake/output       Urethral Catheter 01/04/23 0009 Non-latex 16 Fr.-Reason for Continuing Urinary Catheterization: Critically ill in ICU and requiring hourly monitoring of intake/output   CVC necessity: Yes  D: De-escalation Antibiotics   Yes    Family/Goals of care/Code Status   Code Status: Full  Code    24H Vital Sign Range  Temp:  [98.3 °F (36.8 °C)-99.2 °F (37.3 °C)]   Pulse:  [103-138]   Resp:  [23-42]   BP: (132-190)/()   SpO2:  [96 %-100 %]      Shift Events   Pt continues to require continuous bipap throughout shift. Goals of care discussed at length with CC MD and palliative care. Second round of IVIG started at 1730.     VS and assessment per flow sheet, patient progressing towards goals as tolerated, plan of care reviewed with  patient and family , all concerns addressed, will continue to monitor.    Aletha Martínez

## 2023-01-07 NOTE — SUBJECTIVE & OBJECTIVE
Interval History/Significant Events:   Feeling well this morning. Alternating bipap and HFNC. Discussing with neurology and NSGY.    Review of Systems   Unable to perform ROS: Acuity of condition   Respiratory:  Positive for shortness of breath.    Objective:     Vital Signs (Most Recent):  Temp: 97.8 °F (36.6 °C) (01/07/23 0700)  Pulse: (!) 116 (01/07/23 1154)  Resp: (!) 27 (01/07/23 1154)  BP: (!) 152/71 (01/07/23 1100)  SpO2: 99 % (01/07/23 1154)   Vital Signs (24h Range):  Temp:  [97.8 °F (36.6 °C)-98.5 °F (36.9 °C)] 97.8 °F (36.6 °C)  Pulse:  [] 116  Resp:  [22-60] 27  SpO2:  [98 %-100 %] 99 %  BP: (131-190)/() 152/71   Weight: 59.4 kg (130 lb 15.3 oz)  Body mass index is 22.48 kg/m².      Intake/Output Summary (Last 24 hours) at 1/7/2023 1234  Last data filed at 1/7/2023 1100  Gross per 24 hour   Intake 1794.48 ml   Output 1560 ml   Net 234.48 ml         Physical Exam  Vitals and nursing note reviewed.   Constitutional:       General: She is not in acute distress.  HENT:      Head: Normocephalic and atraumatic.   Eyes:      Extraocular Movements: Extraocular movements intact.      Pupils: Pupils are equal, round, and reactive to light.   Cardiovascular:      Rate and Rhythm: Normal rate and regular rhythm.      Pulses: Normal pulses.      Heart sounds: Normal heart sounds. No murmur heard.  Pulmonary:      Breath sounds: Normal air entry. No wheezing or rales.   Abdominal:      General: Abdomen is flat. There is no distension.      Palpations: Abdomen is soft.      Tenderness: There is no abdominal tenderness.   Musculoskeletal:         General: No swelling.      Right lower leg: No edema.      Left lower leg: No edema.   Skin:     General: Skin is warm and dry.      Findings: No bruising or rash.   Neurological:      General: No focal deficit present.      Mental Status: She is alert and oriented to person, place, and time.   Psychiatric:         Mood and Affect: Mood normal.         Behavior:  Behavior normal.       Vents:  Vent Mode: A/C (12/30/22 1528)  Ventilator Initiated: Yes (12/29/22 0144)  Set Rate: 12 BPM (12/30/22 1528)  Vt Set: 350 mL (12/30/22 0722)  Pressure Support: 14 cmH20 (12/30/22 1139)  PEEP/CPAP: 6 cmH20 (12/30/22 1528)  Oxygen Concentration (%): 30 (01/07/23 1100)  Peak Airway Pressure: 22 cmH20 (12/30/22 1528)  Plateau Pressure: 20 cmH20 (12/30/22 1528)  Total Ve: 9.73 L/m (12/30/22 1528)  Negative Inspiratory Force (cm H2O): 0 (12/30/22 1528)  F/VT Ratio<105 (RSBI): (!) 58.05 (12/30/22 1528)  Lines/Drains/Airways       Central Venous Catheter Line  Duration             Percutaneous Central Line Insertion/Assessment - Triple Lumen  12/29/22 0043 right internal jugular 9 days              Drain  Duration                  Urethral Catheter 01/04/23 0009 Non-latex 16 Fr. 3 days         Rectal Tube 01/05/23 2130 rectal tube w/ balloon (indicate number of mLs) 1 day                  Significant Labs:    CBC/Anemia Profile:  Recent Labs   Lab 01/06/23  0335 01/07/23  0342   WBC 10.52 8.96   HGB 7.4* 7.7*   HCT 23.5* 24.9*    256   MCV 82 83   RDW 13.9 14.1          Chemistries:  Recent Labs   Lab 01/06/23  0335 01/06/23  1724 01/07/23  0342    138 136   K 3.4* 4.5 4.6   CL 99 99 101   CO2 30* 32* 27   BUN 14 12 15   CREATININE 0.5 0.5 0.4*   CALCIUM 7.9* 8.1* 8.0*   MG 2.2  --  2.3   PHOS 2.6*  --  2.6*       All pertinent labs within the past 24 hours have been reviewed.    Significant Imaging:  I have reviewed all pertinent imaging results/findings within the past 24 hours.

## 2023-01-07 NOTE — SUBJECTIVE & OBJECTIVE
Subjective:     Interval History: Patient continuing to have difficulty weaning off BiPAP. Case discussed with patient, family and team.    Past Medical History:   Diagnosis Date    Hyperlipidemia     Hypertension     Hyponatremia 12/23/2022    Hypophosphatemia 12/30/2022    Hypothyroidism     Proctitis 12/29/2022    Severe sepsis 12/29/2022    Shock 12/29/2022       Past Surgical History:   Procedure Laterality Date    HYSTERECTOMY         Review of patient's allergies indicates:   Allergen Reactions    Hydralazine analogues      Precipitous drop in BP with IV formulation. Avoid if possible    Shellfish containing products Swelling         No current facility-administered medications on file prior to encounter.     Current Outpatient Medications on File Prior to Encounter   Medication Sig    alendronate-vitamin D3 (FOSAMAX PLUS D) 70 mg- 2,800 unit per tablet Take 1 tablet by mouth every 7 days.    hydroCHLOROthiazide (HYDRODIURIL) 25 MG tablet Take 25 mg by mouth once daily.    levothyroxine (SYNTHROID) 50 MCG tablet Take 50 mcg by mouth once daily.    losartan (COZAAR) 50 MG tablet Take 50 mg by mouth once daily.    meloxicam (MOBIC) 15 MG tablet Take 15 mg by mouth once daily.    metoprolol succinate (TOPROL-XL) 50 MG 24 hr tablet Take 50 mg by mouth once daily.    simvastatin (ZOCOR) 20 MG tablet Take 20 mg by mouth every evening.     Family History       Problem Relation (Age of Onset)    Breast cancer Sister (55), Sister (65)          Tobacco Use    Smoking status: Never    Smokeless tobacco: Not on file   Substance and Sexual Activity    Alcohol use: Not on file    Drug use: Never    Sexual activity: Not on file     Review of Systems   Unable to perform ROS: Acuity of condition   HENT:  Positive for trouble swallowing.    Respiratory:  Positive for shortness of breath.    Objective:     Vital Signs (Most Recent):  Temp: 97.8 °F (36.6 °C) (01/07/23 0700)  Pulse: (!) 118 (01/07/23 1536)  Resp: (!) 36  (01/07/23 1536)  BP: (!) 180/86 (01/07/23 1237)  SpO2: 100 % (01/07/23 1536) Vital Signs (24h Range):  Temp:  [97.8 °F (36.6 °C)-98.5 °F (36.9 °C)] 97.8 °F (36.6 °C)  Pulse:  [] 118  Resp:  [22-60] 36  SpO2:  [98 %-100 %] 100 %  BP: (131-188)/(63-97) 180/86     Weight: 59.4 kg (130 lb 15.3 oz)  Body mass index is 22.48 kg/m².    Physical Exam  Vitals and nursing note reviewed.   Constitutional:       Appearance: She is ill-appearing.   HENT:      Head: Normocephalic and atraumatic.   Cardiovascular:      Rate and Rhythm: Normal rate and regular rhythm.      Pulses: Normal pulses.      Heart sounds: Normal heart sounds. No murmur heard.  Pulmonary:      Effort: Tachypnea present.      Breath sounds: Normal air entry. No wheezing or rales.   Abdominal:      General: Abdomen is flat. There is no distension.      Palpations: Abdomen is soft.      Tenderness: There is no abdominal tenderness.   Musculoskeletal:         General: No swelling.      Right lower leg: No edema.      Left lower leg: No edema.   Skin:     General: Skin is warm and dry.      Findings: No bruising or rash.   Neurological:      General: No focal deficit present.      Comments: Alert, following commands   Psychiatric:         Mood and Affect: Mood normal.         Behavior: Behavior normal.     Neurological Exam:  MENTAL STATUS  Level of consciousness: alert  Orientation: oriented to person, place, and time  Attention normal. Concentration normal.  Speech: reduced phonation 2/2 SOB    CRANIAL NERVES  CN II: Visual fields full to confrontation  CN III, IV, VI: PERRL, EOMI  CN V: Facial sensation intact  CN VII: Facial expression symmetric and full  CN IX, X: Symmetric palate elevation. Phonation normal  CN XII: Tongue midline with normal movements, no atrophy    MOTOR EXAM  Muscle bulk: normal  Muscle tone: normal  Pronator drift: absent    Strength - Upper Extremities: 4/5    Strength - Lower Extremities: 4/5    REFLEXES   Biceps  Brachioradialis Patellar Achilles   Right +2 +2 +2 +1   Left +2 +2 +2 +1       SENSORY EXAM  Light touch: intact in all 4 extremities    No bulbar weakness found with lateral neck rotation / flexion / extension. No oral fasciculation noted below the tongue. No ocular weakness noted.         Significant Labs: Hemoglobin A1c:   Recent Labs   Lab 12/23/22  1920   HGBA1C 5.4     Blood Culture: No results for input(s): LABBLOO in the last 48 hours.  BMP:   Recent Labs   Lab 01/06/23  0335 01/06/23  1724 01/07/23  0342   * 99 107    138 136   K 3.4* 4.5 4.6   CL 99 99 101   CO2 30* 32* 27   BUN 14 12 15   CREATININE 0.5 0.5 0.4*   CALCIUM 7.9* 8.1* 8.0*   MG 2.2  --  2.3     CBC:   Recent Labs   Lab 01/06/23  0335 01/07/23  0342   WBC 10.52 8.96   HGB 7.4* 7.7*   HCT 23.5* 24.9*    256     CMP:   Recent Labs   Lab 01/06/23  0335 01/06/23  1724 01/07/23  0342   * 99 107    138 136   K 3.4* 4.5 4.6   CL 99 99 101   CO2 30* 32* 27   BUN 14 12 15   CREATININE 0.5 0.5 0.4*   CALCIUM 7.9* 8.1* 8.0*   MG 2.2  --  2.3   ANIONGAP 7* 7* 8     CSF Culture: No results for input(s): CSFCULTURE in the last 48 hours.  CSF Studies: No results for input(s): ALIQUT, APPEARCSF, COLORCSF, CSFWBC, CSFRBC, GLUCCSF, LDHCSF, PROTEINCSF, VDRLCSF in the last 48 hours.  Inflammatory Markers:   No results for input(s): SEDRATE, CRP, PROCAL in the last 48 hours.    POCT Glucose:   Recent Labs   Lab 01/06/23  2337 01/07/23  0342 01/07/23  0806   POCTGLUCOSE 111* 113* 112*     Prealbumin:   No results for input(s): PREALBUMIN in the last 48 hours.    Respiratory Culture: No results for input(s): GSRESP, RESPIRATORYC in the last 48 hours.  Urine Culture: No results for input(s): LABURIN in the last 48 hours.  Urine Studies:   No results for input(s): COLORU, APPEARANCEUA, PHUR, SPECGRAV, PROTEINUA, GLUCUA, KETONESU, BILIRUBINUA, OCCULTUA, NITRITE, UROBILINOGEN, LEUKOCYTESUR, RBCUA, WBCUA, BACTERIA, SQUAMEPITHEL,  HYALINECASTS in the last 48 hours.    Invalid input(s): FABIAN    All pertinent lab results from the past 24 hours have been reviewed.    Significant Imaging: I have reviewed and interpreted all pertinent imaging results/findings within the past 24 hours.

## 2023-01-07 NOTE — SUBJECTIVE & OBJECTIVE
Interval Hx: Patient remains dependent on Bipap. IVIG day 3. Patient reports feeling better. Reports feeling stronger than yesterday. Wants to be able to eat.  No family at bedside     Past Medical History:   Diagnosis Date    Hyperlipidemia     Hypertension     Hyponatremia 12/23/2022    Hypophosphatemia 12/30/2022    Hypothyroidism     Proctitis 12/29/2022    Severe sepsis 12/29/2022    Shock 12/29/2022       Past Surgical History:   Procedure Laterality Date    HYSTERECTOMY         Review of patient's allergies indicates:   Allergen Reactions    Hydralazine analogues      Precipitous drop in BP with IV formulation. Avoid if possible    Shellfish containing products Swelling       Medications:  Continuous Infusions:   dextrose 10 % in water (D10W)      TPN ADULT CENTRAL LINE CUSTOM 40 mL/hr at 01/07/23 1100    TPN ADULT CENTRAL LINE CUSTOM       Scheduled Meds:   albuterol-ipratropium  3 mL Nebulization Q4H    capsaicin   Topical (Top) BID    ceFEPime (MAXIPIME) IVPB  1 g Intravenous Q8H    enoxaparin  40 mg Subcutaneous Daily    fat emulsion 20%  250 mL Intravenous Daily    Immune Globulin G (IGG)-PRO-IGA 10 % injection (Privigen)  0.4 g/kg Intravenous Q24H    levothyroxine  25 mcg Intravenous Daily    linezolid  600 mg Intravenous Q12H    metoprolol  5 mg Intravenous Q6H    pantoprazole  40 mg Intravenous Daily    sodium chloride 3%  4 mL Nebulization Q4H     PRN Meds:acetaminophen, acetylcysteine 200 mg/ml (20%), dextrose 10 % in water (D10W), dextrose 10%, dextrose 10%, glucagon (human recombinant), glycerin adult, guaiFENesin 100 mg/5 ml, insulin aspart U-100, iohexol, labetalol, sodium chloride 0.9%    Family History       Problem Relation (Age of Onset)    Breast cancer Sister (55), Sister (65)          Tobacco Use    Smoking status: Never    Smokeless tobacco: Not on file   Substance and Sexual Activity    Alcohol use: Not on file    Drug use: Never    Sexual activity: Not on file       Review of Systems    Constitutional:  Positive for fatigue.   HENT: Negative.     Eyes: Negative.    Respiratory:  Positive for shortness of breath.    Gastrointestinal: Negative.    Endocrine: Negative.    Musculoskeletal: Negative.    Skin: Negative.    Neurological:  Positive for weakness.   Psychiatric/Behavioral:  The patient is nervous/anxious.    Objective:     Vital Signs (Most Recent):  Temp: 97.8 °F (36.6 °C) (01/07/23 0700)  Pulse: (!) 116 (01/07/23 1154)  Resp: (!) 27 (01/07/23 1154)  BP: (!) 180/86 (01/07/23 1237)  SpO2: 99 % (01/07/23 1154)   Vital Signs (24h Range):  Temp:  [97.8 °F (36.6 °C)-98.5 °F (36.9 °C)] 97.8 °F (36.6 °C)  Pulse:  [] 116  Resp:  [22-60] 27  SpO2:  [98 %-100 %] 99 %  BP: (131-188)/() 180/86     Weight: 59.4 kg (130 lb 15.3 oz)  Body mass index is 22.48 kg/m².    Physical Exam  Vitals and nursing note reviewed.   Constitutional:       General: She is not in acute distress.  HENT:      Head: Normocephalic.      Right Ear: External ear normal.      Left Ear: External ear normal.      Nose: Nose normal.      Mouth/Throat:      Mouth: Mucous membranes are dry.   Eyes:      Pupils: Pupils are equal, round, and reactive to light.   Cardiovascular:      Rate and Rhythm: Tachycardia present.   Pulmonary:      Effort: Pulmonary effort is normal. No respiratory distress.      Comments: On continuous bipap  Abdominal:      General: There is no distension.   Musculoskeletal:      Cervical back: Normal range of motion.   Neurological:      Mental Status: She is alert and oriented to person, place, and time.      Motor: Weakness present.   Psychiatric:         Mood and Affect: Mood normal.       Review of Symptoms      Symptom Assessment (ESAS 0-10 Scale)  Pain:  0  Dyspnea:  0  Anxiety:  4  Nausea:  0  Depression:  0  Anorexia:  0  Fatigue:  3  Insomnia:  0  Restlessness:  0  Agitation:  0     CAM / Delirium:  Negative  Constipation:  Negative  Diarrhea:  Negative    Anxiety:  Is  nervous/anxious    Bowel Management Plan (BMP):  Yes      Modified Shar Scale:  1    Performance Status:  30 (100 prior to admission)    Living Arrangements:  Lives alone    Psychosocial/Cultural:   See Palliative Psychosocial Note: No  Patient is . She has two daughters, one local and one out of state. She has sisters. Completely independent prior to hospitalization     Social Issues Identified: New Diagnosis/Trauma  Bereavement Risk: No  Caregiver Needs Discussed. Caregiver Distress: No:     **Primary  to Follow**  Palliative Care  Consult: No    Spiritual:  F - [place] and Belief:  Yes  I - Importance:  Very  A - Address in Care:  Yes      Advance Care Planning   Advance Directives:   Living Will: No    LaPOST: No    Do Not Resuscitate Status: No    Medical Power of : No      Decision Making:  Patient answered questions  Goals of Care: What is most important right now is to focus on curative/life-prolongation (regardless of treatment burdens). Accordingly, we have decided that the best plan to meet the patient's goals includes continuing with treatment.       Significant Labs: All pertinent labs within the past 24 hours have been reviewed.  CBC:   Recent Labs   Lab 01/07/23 0342   WBC 8.96   HGB 7.7*   HCT 24.9*   MCV 83          BMP:  Recent Labs   Lab 01/07/23 0342         K 4.6      CO2 27   BUN 15   CREATININE 0.4*   CALCIUM 8.0*   MG 2.3       LFT:  Lab Results   Component Value Date    AST 22 12/27/2022    ALKPHOS 70 12/27/2022    BILITOT 0.9 12/27/2022     Albumin:   Albumin   Date Value Ref Range Status   12/27/2022 3.4 (L) 3.5 - 5.2 g/dL Final     Protein:   Total Protein   Date Value Ref Range Status   12/27/2022 6.6 6.0 - 8.4 g/dL Final     Lactic acid:   Lab Results   Component Value Date    LACTATE 0.6 12/29/2022    LACTATE 1.0 12/28/2022       Significant Imaging: I have reviewed all pertinent imaging results/findings within the past  24 hours.

## 2023-01-07 NOTE — ASSESSMENT & PLAN NOTE
73 yo F on whom general neurology is consulted for concerns for neuromuscular disease, diaphragmatic weakness or myopathy. Patient reports SOB and difficulty swallowing solids of 3 week duration. She is currently in the medical ICU for ongoing treatment of infectious processes, including recent hip abscess s/p IR aspiration, positive cultures with e Coli, on zosyn. MRI spine with severe cervical stenosis and some cord compression, without cord enhancement. She also has CXR with increased consolidation in LLL. She has had difficulty weaning off BiPAP.    Patient's SOB likely due to general critical illness. Myasthenia is unlikely given lack of bulbar symptoms on exam. Patient also has a normal diaphragmatic outline in the RLL on CXR. We don't suspect a neurological cause for this patient's SOB. However, given that there are few options available, empiric therapy with IVIG is considered. There is severe spinal stenosis C4-C5, and some signal abnormality at C3-C5, which may localize to patient's symptoms.     Case discussed with neurosurgery, ongoing discussions with various teams to try and find a more targeted intervention.    Recommendations:  - Continue IVIG 400 mg/kg for the present time until above discussion yields alternate therapy  - incentive spirometry  - myasthenia gravis panel collected, results pending, low suspicion  - outpatient follow up with neurology recommended at discharge

## 2023-01-07 NOTE — PROGRESS NOTES
[facility]  Palliative Medicine  Progress Note    Patient Name: Dominique Mcgee  MRN: [id number]  Admission Date: 12/23/2022  Hospital Length of Stay: 15 days  Code Status: Full Code   Attending Provider: Luis Enrique Bishop MD  Consulting Provider: Alexandra Ag MD  Primary Care Physician: Briana Leblanc MD  Principal Problem:Shortness of breath    Patient information was obtained from patient and primary team.      Assessment/Plan:     Palliative care encounter  72 year old female with onset of acute hypoxemic respiratory failure and profound weakness over the past few weeks. Patient is dependent on Bipap. Palliative consulted for assistance with GOC    Code status: Full. Patient would want time trial on vent if necessary     Surrogate decision maker: 2 daughters are legal NOK     GOC/ACP  1/7 Followed up with patient regarding code status. Patient feeling a bit better and more hopeful. She voiced that she does not think it will come to it but would want a time trial on the vent if necessary. Would not want to be dependent on a vent indefinitely. Wants chest compressions and shocks in the event of a cardiac arrest. Provided support    1/6   Met with patient in am and then patient, Dr. Dotson, patient's two daughters (one in person and one non speaker), and patient's friend. Discussed where things stand now and potential outcomes. Discussed needing to have a plan for if patient decompensates. Patient has voiced several times that she would not want to be re-intubated, trached, or back on vent. Extensively discussed what this would look like to patient and family- it would buy more time but does not fix the underlying problem. Discussed what quality of life would look like if trached and vent dependent. Provided support   Additional 60 minutes spent discussing ACP           1/5  -Met with patient at bedside. Later spoke with local daughter on the phone. Introduced palliative medicine and our role in  "patient's care. Discussed complexity of her medical condition and that the etiology remains unclear. Despite best supportive care she is not improving and we worry about whether we are going to be able to fix the underlying issue. Explained that if she is not improving, we are going to need to make some difficult decisions. Explained that she cannot remain on the bipap mask indefinitely. The team is doing everything they can to try to get her off but if she remains dependent on significant respiratory support the next step to prolong life would likely be trach/vent. Patient shook her head no. She remembers everything about her recent intubation and would not want to be dependent on a vent long term. This would not be an acceptable quality of life. Provided support         Discussed with Dr. Bishop        I will follow-up with patient. Please contact us if you have any additional questions.    Subjective:     Chief Complaint:   Chief Complaint   Patient presents with    Shortness of Breath     X 2 weeks. Reports cough. Denies cardiac hx.       HPI:   Per critical care H&P   "73 y/o F patient with HTN, HLD, hypothyroidism, recent URI 3 weeks prior to admission, balance difficulites who presented to the ED on 12/23 for 3 weeks of weakness, LIRA, decreased appetite, and constipation with intermittent lower abdominal cramping. Per daughter, patient is typically independent, lives alone, cooks her own meals, since she has been ill she has had decreased PO intake and drinking coffee, tea, and eating cereal. She has had progressive worsening HTN in the last 3 weeks and was started on losartan-HCTZ and metoprolol. Prior to this she was only taking lisinopril per daughter. Admitted for HTN emergency and was found to have Na 120. Received IV fluids and admitted to hospital medicine. For her hypernatremia, her thiazide was held and she was placed on fluid restriction. Urine studies from 12/25 showed Uosm of 802 and HANH of 109. Her " "BP has been labile since admission requiring both IV antihypertensives as well as fluid boluses. The patient's hyponatremia persisted despite fluid restriction and she became increasingly lethargic and weak so [facility] was consulted for further management.      Upon evaluation, patient is somnolent. Opens eyes to voice, unable to engage in full conversation. Speaks mainly single words. Denies thirst. Reports SOB and generalized abdominal pain a/w constipation. No CP, N/V/D, dysuria, edema. Most recent vitals: /60, pulse 82, R 18, SpO2 94% on RA. Na trend 119->115->117. "    Patient is being treated for acute hypoxemic respiratory failure and profound weakness of unclear origin and sepsis. She is dependent on continuous Bipap s/p extubation. She has been started on TPN. Palliative consulted to assist with C          Hospital Course:  No notes on file    Interval Hx: Patient remains dependent on Bipap. IVIG day 3. Patient reports feeling better. Reports feeling stronger than yesterday. Wants to be able to eat.  No family at bedside     Past Medical History:   Diagnosis Date    Hyperlipidemia     Hypertension     Hyponatremia 12/23/2022    Hypophosphatemia 12/30/2022    Hypothyroidism     Proctitis 12/29/2022    Severe sepsis 12/29/2022    Shock 12/29/2022       Past Surgical History:   Procedure Laterality Date    HYSTERECTOMY         Review of patient's allergies indicates:   Allergen Reactions    Hydralazine analogues      Precipitous drop in BP with IV formulation. Avoid if possible    Shellfish containing products Swelling       Medications:  Continuous Infusions:   dextrose 10 % in water (D10W)      TPN ADULT CENTRAL LINE CUSTOM 40 mL/hr at 01/07/23 1100    TPN ADULT CENTRAL LINE CUSTOM       Scheduled Meds:   albuterol-ipratropium  3 mL Nebulization Q4H    capsaicin   Topical (Top) BID    ceFEPime (MAXIPIME) IVPB  1 g Intravenous Q8H    enoxaparin  40 mg Subcutaneous Daily    fat emulsion " 20%  250 mL Intravenous Daily    Immune Globulin G (IGG)-PRO-IGA 10 % injection (Privigen)  0.4 g/kg Intravenous Q24H    levothyroxine  25 mcg Intravenous Daily    linezolid  600 mg Intravenous Q12H    metoprolol  5 mg Intravenous Q6H    pantoprazole  40 mg Intravenous Daily    sodium chloride 3%  4 mL Nebulization Q4H     PRN Meds:acetaminophen, acetylcysteine 200 mg/ml (20%), dextrose 10 % in water (D10W), dextrose 10%, dextrose 10%, glucagon (human recombinant), glycerin adult, guaiFENesin 100 mg/5 ml, insulin aspart U-100, iohexol, labetalol, sodium chloride 0.9%    Family History       Problem Relation (Age of Onset)    Breast cancer Sister (55), Sister (65)          Tobacco Use    Smoking status: Never    Smokeless tobacco: Not on file   Substance and Sexual Activity    Alcohol use: Not on file    Drug use: Never    Sexual activity: Not on file       Review of Systems   Constitutional:  Positive for fatigue.   HENT: Negative.     Eyes: Negative.    Respiratory:  Positive for shortness of breath.    Gastrointestinal: Negative.    Endocrine: Negative.    Musculoskeletal: Negative.    Skin: Negative.    Neurological:  Positive for weakness.   Psychiatric/Behavioral:  The patient is nervous/anxious.    Objective:     Vital Signs (Most Recent):  Temp: 97.8 °F (36.6 °C) (01/07/23 0700)  Pulse: (!) 116 (01/07/23 1154)  Resp: (!) 27 (01/07/23 1154)  BP: (!) 180/86 (01/07/23 1237)  SpO2: 99 % (01/07/23 1154)   Vital Signs (24h Range):  Temp:  [97.8 °F (36.6 °C)-98.5 °F (36.9 °C)] 97.8 °F (36.6 °C)  Pulse:  [] 116  Resp:  [22-60] 27  SpO2:  [98 %-100 %] 99 %  BP: (131-188)/() 180/86     Weight: 59.4 kg (130 lb 15.3 oz)  Body mass index is 22.48 kg/m².    Physical Exam  Vitals and nursing note reviewed.   Constitutional:       General: She is not in acute distress.  HENT:      Head: Normocephalic.      Right Ear: External ear normal.      Left Ear: External ear normal.      Nose: Nose normal.       Mouth/Throat:      Mouth: Mucous membranes are dry.   Eyes:      Pupils: Pupils are equal, round, and reactive to light.   Cardiovascular:      Rate and Rhythm: Tachycardia present.   Pulmonary:      Effort: Pulmonary effort is normal. No respiratory distress.      Comments: On continuous bipap  Abdominal:      General: There is no distension.   Musculoskeletal:      Cervical back: Normal range of motion.   Neurological:      Mental Status: She is alert and oriented to person, place, and time.      Motor: Weakness present.   Psychiatric:         Mood and Affect: Mood normal.       Review of Symptoms      Symptom Assessment (ESAS 0-10 Scale)  Pain:  0  Dyspnea:  0  Anxiety:  4  Nausea:  0  Depression:  0  Anorexia:  0  Fatigue:  3  Insomnia:  0  Restlessness:  0  Agitation:  0     CAM / Delirium:  Negative  Constipation:  Negative  Diarrhea:  Negative    Anxiety:  Is nervous/anxious    Bowel Management Plan (BMP):  Yes      Modified Shar Scale:  1    Performance Status:  30 (100 prior to admission)    Living Arrangements:  Lives alone    Psychosocial/Cultural:   See Palliative Psychosocial Note: No  Patient is . She has two daughters, one local and one out of state. She has sisters. Completely independent prior to hospitalization     Social Issues Identified: New Diagnosis/Trauma  Bereavement Risk: No  Caregiver Needs Discussed. Caregiver Distress: No:     **Primary  to Follow**  Palliative Care  Consult: No    Spiritual:  F - [place] and Belief:  Yes  I - Importance:  Very  A - Address in Care:  Yes      Advance Care Planning   Advance Directives:   Living Will: No    LaPOST: No    Do Not Resuscitate Status: No    Medical Power of : No      Decision Making:  Patient answered questions  Goals of Care: What is most important right now is to focus on curative/life-prolongation (regardless of treatment burdens). Accordingly, we have decided that the best plan to meet the patient's  goals includes continuing with treatment.       Significant Labs: All pertinent labs within the past 24 hours have been reviewed.  CBC:   Recent Labs   Lab 01/07/23 0342   WBC 8.96   HGB 7.7*   HCT 24.9*   MCV 83          BMP:  Recent Labs   Lab 01/07/23 0342         K 4.6      CO2 27   BUN 15   CREATININE 0.4*   CALCIUM 8.0*   MG 2.3       LFT:  Lab Results   Component Value Date    AST 22 12/27/2022    ALKPHOS 70 12/27/2022    BILITOT 0.9 12/27/2022     Albumin:   Albumin   Date Value Ref Range Status   12/27/2022 3.4 (L) 3.5 - 5.2 g/dL Final     Protein:   Total Protein   Date Value Ref Range Status   12/27/2022 6.6 6.0 - 8.4 g/dL Final     Lactic acid:   Lab Results   Component Value Date    LACTATE 0.6 12/29/2022    LACTATE 1.0 12/28/2022       Significant Imaging: I have reviewed all pertinent imaging results/findings within the past 24 hours.        Alexandra Ag MD  Palliative Medicine  [facility] Cardiac Medical ICU      > 50% of 40  min visit spent in chart review, face to face discussion of goals of care,  symptom assessment, coordination of care and emotional support

## 2023-01-07 NOTE — ASSESSMENT & PLAN NOTE
Patient presented with approx 3 weeks of worsening shortness of breath, weakness, and falls. On transfer to ICU required intubation. Was extubated to BIPAP. Have been unable to wean off bipap due to persistent shortness of breath, tachypnea, and inadequate volumes. Trial off bipap results in tachypnea, tachycardia, and hypertension. Appears weak distally and with fine motor skills. Unclear etiology at this time. Does not appear pulmonary in nature as patient is saturating well, ABGs have been wnl. MRI C-spine showed severe cervical stenosis, NSGY was consulted, unlikely that the stenosis would be causing these issues. Concern that difficulty breathing could be related to diaphragm weakness. Critical illness myopathy seems unlikely as patient was not intubated for long duration.  -neurology consulted, myasthenia panel sent  -further labs ordered (ESR, CRP, DARLINE, myositis panel)  -continue bipap  -palliative care consulted for long term planning  -started IVIG  -discussing with neurology and nsgy about possible relation to cervical stenosis

## 2023-01-07 NOTE — PLAN OF CARE
CMICU DAILY GOALS       A: Awake    RASS: Goal -    Actual - RASS ([place] Agitation-Sedation Scale): 0-->alert and calm   Restraint necessity: Clinical Justification: Removing medical devices  B: Breathe   SBT: Not intubated   C: Coordinate A & B, analgesics/sedatives   Pain: managed    SAT: Not intubated  D: Delirium   CAM-ICU: Overall CAM-ICU: Negative  E: Early(intubated/ Progressive (non-intubated) Mobility   MOVE Screen: Fail   Activity: Activity Management: Arm raise - L1, Rolling - L1  FAS: Feeding/Nutrition   Diet order: Diet/Nutrition Received: NPO,    T: Thrombus   DVT prophylaxis: VTE Required Core Measure: Pharmacological prophylaxis initiated/maintained  H: HOB Elevation   Head of Bed (HOB) Positioning: HOB at 30-45 degrees  U: Ulcer Prophylaxis   GI: no  G: Glucose control   managed Glycemic Management: blood glucose monitored  S: Skin   Bathing/Skin Care: bath, complete, dressed/undressed, linen changed, incontinence care  Device Skin Pressure Protection: absorbent pad utilized/changed, adhesive use limited, skin-to-device areas padded, pressure points protected, skin-to-skin areas padded  Pressure Reduction Devices: positioning supports utilized, specialty bed utilized  Pressure Reduction Techniques: pressure points protected, weight shift assistance provided, frequent weight shift encouraged  Skin Protection: adhesive use limited, incontinence pads utilized, tubing/devices free from skin contact, transparent dressing maintained, skin-to-skin areas padded, skin-to-device areas padded  B: Bowel Function   no issues   I: Indwelling Catheters   Rush necessity: [REMOVED]      Urethral Catheter 12/27/22 1535-Reason for Continuing Urinary Catheterization: Critically ill in ICU and requiring hourly monitoring of intake/output       Urethral Catheter 01/04/23 0009 Non-latex 16 Fr.-Reason for Continuing Urinary Catheterization: Urinary retention, Critically ill in ICU and requiring hourly monitoring of  intake/output   CVC necessity: Yes  D: De-escalation Antibiotics   No    Family/Goals of care/Code Status   Code Status: Full Code    24H Vital Sign Range  Temp:  [98 °F (36.7 °C)-98.9 °F (37.2 °C)]   Pulse:  []   Resp:  [23-42]   BP: (131-190)/()   SpO2:  [98 %-100 %]      Shift Events   No acute events throughout shift    VS and assessment per flow sheet, patient progressing towards goals as tolerated, plan of care reviewed with  Dominique Mcgee , all concerns addressed, will continue to monitor.    Ximena Sauceda

## 2023-01-07 NOTE — PROGRESS NOTES
[facility]  Neurology  Progress Note    Patient Name: Dominique Mcgee  MRN: [id number]  Admission Date: 12/23/2022  Hospital Length of Stay: 15 days  Code Status: Full Code   Attending Provider: Luis Enriqeu Bishop MD  Primary Care Physician: Briana Leblanc MD   Principal Problem:Shortness of breath    HPI:   Ms. Mcgee is a 71 y/o F female on whom general neurology is consulted for concerns for neuromuscular disease, diaphragmatic weakness or myopathy. She has a PMHx of HTN, HLD, hypothyroidism, recent URI 3 weeks prior to admission, balance difficulites who presented to the ED on 12/23 for 3 weeks of weakness, LIRA, difficulty swallowing, decreased appetite, and constipation with intermittent lower abdominal cramping. Per daughter, patient is typically independent, lives alone, cooks her own meals, since she has been ill she has had decreased PO intake and drinking coffee, tea, and eating cereal. She has had progressive worsening HTN in the last 3 weeks and was started on losartan-HCTZ and metoprolol. Prior to this she was only taking lisinopril per daughter. Admitted for HTN emergency and was found to have Na 120. Received IV fluids and admitted to hospital medicine. For her hypernatremia, her thiazide was held and she was placed on fluid restriction. Urine studies from 12/25 showed Uosm of 802 and HANH of 109. Her BP has been labile since admission requiring both IV antihypertensives as well as fluid boluses. Patient transferred to ICU for continued lethargy and weakness. She became hypotensive requiring multiple pressors. Patient intubated for airway protection on 12/28. Sodium improved with hypertonic saline, now normalized. CT abdomen/pelvis showing proctitis and hip abscess. Went for IR aspiration of abscess. Culture showing e.coli, on zosyn. MRI brain non-concerning. MRI spine with severe cervical stenosis and cord compression, also with mild lumbar stenosis. Repeat CXR with increased LLL  consolidation and pleural effusion. Extubated to bipap on 12/30. Once extubated and off pressors patient began to re-experience labile blood pressures similar to her initial presentation. Started on cardene drip. Difficulty weaning off BIPAP due to inadequate volumes and persistent shortness of breath.      Subjective:     Interval History: Patient continuing to have difficulty weaning off BiPAP. Case discussed with patient, family and team.    Past Medical History:   Diagnosis Date    Hyperlipidemia     Hypertension     Hyponatremia 12/23/2022    Hypophosphatemia 12/30/2022    Hypothyroidism     Proctitis 12/29/2022    Severe sepsis 12/29/2022    Shock 12/29/2022       Past Surgical History:   Procedure Laterality Date    HYSTERECTOMY         Review of patient's allergies indicates:   Allergen Reactions    Hydralazine analogues      Precipitous drop in BP with IV formulation. Avoid if possible    Shellfish containing products Swelling         No current facility-administered medications on file prior to encounter.     Current Outpatient Medications on File Prior to Encounter   Medication Sig    alendronate-vitamin D3 (FOSAMAX PLUS D) 70 mg- 2,800 unit per tablet Take 1 tablet by mouth every 7 days.    hydroCHLOROthiazide (HYDRODIURIL) 25 MG tablet Take 25 mg by mouth once daily.    levothyroxine (SYNTHROID) 50 MCG tablet Take 50 mcg by mouth once daily.    losartan (COZAAR) 50 MG tablet Take 50 mg by mouth once daily.    meloxicam (MOBIC) 15 MG tablet Take 15 mg by mouth once daily.    metoprolol succinate (TOPROL-XL) 50 MG 24 hr tablet Take 50 mg by mouth once daily.    simvastatin (ZOCOR) 20 MG tablet Take 20 mg by mouth every evening.     Family History       Problem Relation (Age of Onset)    Breast cancer Sister (55), Sister (65)          Tobacco Use    Smoking status: Never    Smokeless tobacco: Not on file   Substance and Sexual Activity    Alcohol use: Not on file    Drug use: Never     Sexual activity: Not on file     Review of Systems   Unable to perform ROS: Acuity of condition   HENT:  Positive for trouble swallowing.    Respiratory:  Positive for shortness of breath.    Objective:     Vital Signs (Most Recent):  Temp: 97.8 °F (36.6 °C) (01/07/23 0700)  Pulse: (!) 118 (01/07/23 1536)  Resp: (!) 36 (01/07/23 1536)  BP: (!) 180/86 (01/07/23 1237)  SpO2: 100 % (01/07/23 1536) Vital Signs (24h Range):  Temp:  [97.8 °F (36.6 °C)-98.5 °F (36.9 °C)] 97.8 °F (36.6 °C)  Pulse:  [] 118  Resp:  [22-60] 36  SpO2:  [98 %-100 %] 100 %  BP: (131-188)/(63-97) 180/86     Weight: 59.4 kg (130 lb 15.3 oz)  Body mass index is 22.48 kg/m².    Physical Exam  Vitals and nursing note reviewed.   Constitutional:       Appearance: She is ill-appearing.   HENT:      Head: Normocephalic and atraumatic.   Cardiovascular:      Rate and Rhythm: Normal rate and regular rhythm.      Pulses: Normal pulses.      Heart sounds: Normal heart sounds. No murmur heard.  Pulmonary:      Effort: Tachypnea present.      Breath sounds: Normal air entry. No wheezing or rales.   Abdominal:      General: Abdomen is flat. There is no distension.      Palpations: Abdomen is soft.      Tenderness: There is no abdominal tenderness.   Musculoskeletal:         General: No swelling.      Right lower leg: No edema.      Left lower leg: No edema.   Skin:     General: Skin is warm and dry.      Findings: No bruising or rash.   Neurological:      General: No focal deficit present.      Comments: Alert, following commands   Psychiatric:         Mood and Affect: Mood normal.         Behavior: Behavior normal.     Neurological Exam:  MENTAL STATUS  Level of consciousness: alert  Orientation: oriented to person, place, and time  Attention normal. Concentration normal.  Speech: reduced phonation 2/2 SOB    CRANIAL NERVES  CN II: Visual fields full to confrontation  CN III, IV, VI: PERRL, EOMI  CN V: Facial sensation intact  CN VII: Facial  expression symmetric and full  CN IX, X: Symmetric palate elevation. Phonation normal  CN XII: Tongue midline with normal movements, no atrophy    MOTOR EXAM  Muscle bulk: normal  Muscle tone: normal  Pronator drift: absent    Strength - Upper Extremities: 4/5    Strength - Lower Extremities: 4/5    REFLEXES   Biceps Brachioradialis Patellar Achilles   Right +2 +2 +2 +1   Left +2 +2 +2 +1       SENSORY EXAM  Light touch: intact in all 4 extremities    No bulbar weakness found with lateral neck rotation / flexion / extension. No oral fasciculation noted below the tongue. No ocular weakness noted.         Significant Labs: Hemoglobin A1c:   Recent Labs   Lab 12/23/22  1920   HGBA1C 5.4     Blood Culture: No results for input(s): LABBLOO in the last 48 hours.  BMP:   Recent Labs   Lab 01/06/23 0335 01/06/23  1724 01/07/23  0342   * 99 107    138 136   K 3.4* 4.5 4.6   CL 99 99 101   CO2 30* 32* 27   BUN 14 12 15   CREATININE 0.5 0.5 0.4*   CALCIUM 7.9* 8.1* 8.0*   MG 2.2  --  2.3     CBC:   Recent Labs   Lab 01/06/23  0335 01/07/23  0342   WBC 10.52 8.96   HGB 7.4* 7.7*   HCT 23.5* 24.9*    256     CMP:   Recent Labs   Lab 01/06/23  0335 01/06/23  1724 01/07/23  0342   * 99 107    138 136   K 3.4* 4.5 4.6   CL 99 99 101   CO2 30* 32* 27   BUN 14 12 15   CREATININE 0.5 0.5 0.4*   CALCIUM 7.9* 8.1* 8.0*   MG 2.2  --  2.3   ANIONGAP 7* 7* 8     CSF Culture: No results for input(s): CSFCULTURE in the last 48 hours.  CSF Studies: No results for input(s): ALIQUT, APPEARCSF, COLORCSF, CSFWBC, CSFRBC, GLUCCSF, LDHCSF, PROTEINCSF, VDRLCSF in the last 48 hours.  Inflammatory Markers:   No results for input(s): SEDRATE, CRP, PROCAL in the last 48 hours.    POCT Glucose:   Recent Labs   Lab 01/06/23  2337 01/07/23  0342 01/07/23  0806   POCTGLUCOSE 111* 113* 112*     Prealbumin:   No results for input(s): PREALBUMIN in the last 48 hours.    Respiratory Culture: No results for input(s): GSRESP,  RESPIRATORYC in the last 48 hours.  Urine Culture: No results for input(s): LABURIN in the last 48 hours.  Urine Studies:   No results for input(s): COLORU, APPEARANCEUA, PHUR, SPECGRAV, PROTEINUA, GLUCUA, KETONESU, BILIRUBINUA, OCCULTUA, NITRITE, UROBILINOGEN, LEUKOCYTESUR, RBCUA, WBCUA, BACTERIA, SQUAMEPITHEL, HYALINECASTS in the last 48 hours.    Invalid input(s): WRIGHTSUR    All pertinent lab results from the past 24 hours have been reviewed.    Significant Imaging: I have reviewed and interpreted all pertinent imaging results/findings within the past 24 hours.    Assessment and Plan:     * Shortness of breath  73 yo F on whom general neurology is consulted for concerns for neuromuscular disease, diaphragmatic weakness or myopathy. Patient reports SOB and difficulty swallowing solids of 3 week duration. She is currently in the medical ICU for ongoing treatment of infectious processes, including recent hip abscess s/p IR aspiration, positive cultures with e Coli, on zosyn. MRI spine with severe cervical stenosis and some cord compression, without cord enhancement. She also has CXR with increased consolidation in LLL. She has had difficulty weaning off BiPAP.    Patient's SOB likely due to general critical illness. Myasthenia is unlikely given lack of bulbar symptoms on exam. Patient also has a normal diaphragmatic outline in the RLL on CXR. We don't suspect a neurological cause for this patient's SOB. However, given that there are few options available, empiric therapy with IVIG is considered. There is severe spinal stenosis C4-C5, and some signal abnormality at C3-C5, which may localize to patient's symptoms.     Case discussed with neurosurgery, ongoing discussions with various teams to try and find a more targeted intervention.    Recommendations:  - Continue IVIG 400 mg/kg for the present time until above discussion yields alternate therapy  - incentive spirometry  - myasthenia gravis panel collected,  results pending, low suspicion  - outpatient follow up with neurology recommended at discharge        VTE Risk Mitigation (From admission, onward)         Ordered     enoxaparin injection 40 mg  Daily         12/23/22 1649     IP VTE HIGH RISK PATIENT  Once         12/23/22 1649     Place sequential compression device  Until discontinued         12/23/22 1649                Manuel Lizarraga MD  Neurology  [facility] - Cardiac Medical ICU

## 2023-01-07 NOTE — ASSESSMENT & PLAN NOTE
72 year old female with onset of acute hypoxemic respiratory failure and profound weakness over the past few weeks. Patient is dependent on Bipap. Palliative consulted for assistance with GOC    Code status: Full. Patient would want time trial on vent if necessary     Surrogate decision maker: 2 daughters are legal NOK     GOC/ACP  1/7 Followed up with patient regarding code status. Patient feeling a bit better and more hopeful. She voiced that she does not think it will come to it but would want a time trial on the vent if necessary. Would not want to be dependent on a vent indefinitely. Wants chest compressions and shocks in the event of a cardiac arrest. Provided support    1/6   Met with patient in am and then patient, Dr. Dotson, patient's two daughters (one in person and one non speaker), and patient's friend. Discussed where things stand now and potential outcomes. Discussed needing to have a plan for if patient decompensates. Patient has voiced several times that she would not want to be re-intubated, trached, or back on vent. Extensively discussed what this would look like to patient and family- it would buy more time but does not fix the underlying problem. Discussed what quality of life would look like if trached and vent dependent. Provided support   Additional 60 minutes spent discussing ACP           1/5  -Met with patient at bedside. Later spoke with local daughter on the phone. Introduced palliative medicine and our role in patient's care. Discussed complexity of her medical condition and that the etiology remains unclear. Despite best supportive care she is not improving and we worry about whether we are going to be able to fix the underlying issue. Explained that if she is not improving, we are going to need to make some difficult decisions. Explained that she cannot remain on the bipap mask indefinitely. The team is doing everything they can to try to get her off but if she remains dependent  on significant respiratory support the next step to prolong life would likely be trach/vent. Patient shook her head no. She remembers everything about her recent intubation and would not want to be dependent on a vent long term. This would not be an acceptable quality of life. Provided support         Discussed with Dr. Bishop

## 2023-01-08 NOTE — ASSESSMENT & PLAN NOTE
CT scan showing possible hematoma vs abscess around right hip. Aspiration done with IR. Culture with e.coli pan-sensitive. Repeat CT showing fluid has reaccumulated.   -ID following  -Has been on zosyn, switch to cefepime per ID  -ortho consulted- recs for repeating drainage w/ IR  -f/u repeat cultures and fluid analysis with NGTD

## 2023-01-08 NOTE — ASSESSMENT & PLAN NOTE
Noted on MRI spine. Of note patient has been experiencing episodes of vertigo and decreased balance which could be related.   -NSGY consulted, appreciate recs  -if need for reintubation should be done with fiberoptic or glidescope  -concern that this could be contributing to her current respiratory muscle weakness  -appreciate neurology and nsgy involvement  -per NSGY, if laminectomy to be done, would prefer patient to have tracheostomy prior to their intervention  -will continue to monitor for improvement and re-discuss with NSGY 1/10-1/11

## 2023-01-08 NOTE — PROGRESS NOTES
NEUROSURGICAL PROGRESS NOTE    DATE OF SERVICE:  01/08/2023    ATTENDING PHYSICIAN:  Joselo Coleman MD    SUBJECTIVE:    INTERIM HISTORY:    This is a very pleasant 72 y.o. female, uncontrolled hypertension who was admitted on 12/23 initially for SOB. She got intubated on 12/29 for septic shock, hyponatremia. A right hip collection was aspirated by IR and cultures grew E coli. Repeat aspiration did not grew anything. Orthopaedic did not proceed with further surgery.   Was seen by neurology and neurosurgery due to respiratory weakness and was found to have presence of severe cervical stenosis on MRI.  Patient was extubated and placed on Bipap but still experiences difficulty with breathing. She received IVIG. Unclear if her condition improved following the IVIG. She was found to have pulmonary consolidation/ atelectasis left greater than right on CT.      She reports chronic gait imbalance and several falls in the last few months. She denies dropping things or having significant hand numbness.             PAST MEDICAL HISTORY:  Active Ambulatory Problems     Diagnosis Date Noted    Dyspnea on exertion 12/23/2022    Hyperlipemia 12/23/2022    Hypokalemia 12/23/2022    Back pain 12/23/2022    Metabolic alkalosis 12/23/2022    Hypothermia 12/28/2022    Fluid collection (edema) in the arms, legs, hands and feet 12/29/2022    Hypernatremia 01/04/2023     Resolved Ambulatory Problems     Diagnosis Date Noted    No Resolved Ambulatory Problems     Past Medical History:   Diagnosis Date    Hyperlipidemia     Hypertension     Hyponatremia 12/23/2022    Hypophosphatemia 12/30/2022    Hypothyroidism     Proctitis 12/29/2022    Severe sepsis 12/29/2022    Shock 12/29/2022       PAST SURGICAL HISTORY:  Past Surgical History:   Procedure Laterality Date    HYSTERECTOMY         SOCIAL HISTORY:   Social History     Socioeconomic History    Marital status:    Tobacco Use    Smoking status: Never   Substance and Sexual  Activity    Drug use: Never       FAMILY HISTORY:  Family History   Problem Relation Age of Onset    Breast cancer Sister 55    Breast cancer Sister 65       CURRENTS MEDICATIONS:  No current facility-administered medications on file prior to encounter.     Current Outpatient Medications on File Prior to Encounter   Medication Sig Dispense Refill    alendronate-vitamin D3 (FOSAMAX PLUS D) 70 mg- 2,800 unit per tablet Take 1 tablet by mouth every 7 days.      hydroCHLOROthiazide (HYDRODIURIL) 25 MG tablet Take 25 mg by mouth once daily.      levothyroxine (SYNTHROID) 50 MCG tablet Take 50 mcg by mouth once daily.      losartan (COZAAR) 50 MG tablet Take 50 mg by mouth once daily.      meloxicam (MOBIC) 15 MG tablet Take 15 mg by mouth once daily.      metoprolol succinate (TOPROL-XL) 50 MG 24 hr tablet Take 50 mg by mouth once daily.      simvastatin (ZOCOR) 20 MG tablet Take 20 mg by mouth every evening.         ALLERGIES:  Review of patient's allergies indicates:   Allergen Reactions    Hydralazine analogues      Precipitous drop in BP with IV formulation. Avoid if possible    Shellfish containing products Swelling       REVIEW OF SYSTEMS:  Review of Systems   Constitutional:  Negative for diaphoresis, fever and weight loss.   Respiratory:  Positive for shortness of breath.    Cardiovascular:  Negative for chest pain.   Gastrointestinal:  Negative for blood in stool.   Genitourinary:  Negative for hematuria.   Endo/Heme/Allergies:  Does not bruise/bleed easily.   All other systems reviewed and are negative.      OBJECTIVE:    PHYSICAL EXAMINATION:   Vitals:    01/08/23 0800   BP: (!) 164/74   Pulse: 101   Resp: (!) 24   Temp:        Physical Exam:  Vitals reviewed.    Constitutional: She appears well-developed and well-nourished.     Eyes: Pupils are equal, round, and reactive to light. Conjunctivae and EOM are normal.     Cardiovascular: Normal distal pulses and no edema.     Abdominal: Soft.     Skin: Skin  displays no rash on trunk and no rash on extremities. Skin displays no lesions on trunk and no lesions on extremities.     Psych/Behavior: She is alert. She is oriented to person, place, and time. She has a normal mood and affect.     Musculoskeletal:        Neck: Range of motion is limited.     Neurological:        DTRs: Tricep reflexes are 0 on the right side and 0 on the left side. Bicep reflexes are 0 on the right side and 0 on the left side. Brachioradialis reflexes are 0 on the right side and 0 on the left side. Patellar reflexes are 3+ on the right side and 3+ on the left side. Achilles reflexes are 0 on the right side and 0 on the left side.     Back Exam     Muscle Strength   Right Quadriceps:  5/5   Left Quadriceps:  5/5   Right Hamstrings:  5/5   Left Hamstrings:  5/5               Neurologic Exam     Mental Status   Oriented to person, place, and time.   Speech: speech is normal   Level of consciousness: alert    Cranial Nerves   Cranial nerves II through XII intact.     CN III, IV, VI   Pupils are equal, round, and reactive to light.  Extraocular motions are normal.     Motor Exam   Muscle bulk: normal  Overall muscle tone: normal    Strength   Right deltoid: 4/5  Left deltoid: 4/5  Right biceps: 4/5  Left biceps: 4/5  Right triceps: 4/5  Left triceps: 4/5  Right wrist flexion: 4/5  Left wrist flexion: 4/5  Right wrist extension: 4/5  Left wrist extension: 4/5  Right interossei: 3/5  Left interossei: 3/5  Right iliopsoas: 5/5  Left iliopsoas: 5/5  Right quadriceps: 5/5  Left quadriceps: 5/5  Right hamstrin/5  Left hamstrin/5  Right anterior tibial: 4/5  Left anterior tibial: 4/5  Right posterior tibial: 5/5  Left posterior tibial: 5/5  Right peroneal: 4/5  Left peroneal: 4/5  Right gastroc: 5/5  Left gastroc: 5/5    Sensory Exam   Light touch normal.   Pinprick normal.     Gait, Coordination, and Reflexes     Coordination   Finger to nose coordination: normal    Reflexes   Right brachioradialis:  0  Left brachioradialis: 0  Right biceps: 0  Left biceps: 0  Right triceps: 0  Left triceps: 0  Right patellar: 3+  Left patellar: 3+  Right achilles: 0  Left achilles: 0  Right plantar: normal  Left plantar: normal  Right Barajas: absent  Left Barajas: absent  Right ankle clonus: absent  Left ankle clonus: absent    Lungs:  Uses her accessory muscles to breath    DIAGNOSTIC DATA:  I personally interpreted the following imaging:   Cervical spine MRI shows C3-4, C4-5 severe spinal stenosis with intramedullary signal change, C5-6 moderate to severe stenosis.  C3-4 and C4-5 partial opll     ASSESMENT:  This is a 72 y.o. female with     Problem List Items Addressed This Visit          Pulmonary    Acute hypoxemic respiratory failure    Respiratory distress       Cardiac/Vascular    Dyspnea on exertion    Relevant Orders    Echo (Completed)    RESOLVED: Hypertensive urgency    Relevant Orders    EKG 12-lead (Completed)       Renal/    Hypokalemia    RESOLVED: Hyponatremia - Primary       GI    Dysphagia       Orthopedic    Back pain    Relevant Orders    Ambulatory referral/consult to [facility]       Other    Fluid collection (edema) in the arms, legs, hands and feet     Other Visit Diagnoses       SOB (shortness of breath)        Relevant Orders    EKG 12-lead (Completed)    Elevated blood pressure reading        Hypochloremia        Chest pain        Relevant Orders    EKG 12-lead (Completed)    Hypertension        Relevant Orders    CV US Renal Artery Stenosis Hypertension Complete (Completed)    Tachycardia        Relevant Orders    EKG 12-lead (Completed)    Septic shock        Relevant Orders    Insert arterial line (Completed)    Weakness              Cervical spondylosis with myelopathy and radiculopathy in the context of ossification of posterior longitudinal ligament (OPLL). Labored breathing is possible in the context of high cervical myelopathy.      PLAN:  She could benefit from having cervical  laminectomy and fusion. However the patient appears weak and her underlying difficulty breathing increase her risk of post-operative complication. The labor breathing might not improve significantly after decompression or could get worse. I think it would be favorable to proceed with a tracheostomy first unless her breathing improves significantly. A tracheostomy would decrease the risks of aspiration, breathing difficulty following the surgery or complication related to prolonged ventilation. She would also benefit from optimization of nutritional status prior to surgery.           Joselo Coleman MD  Cell:[phone]

## 2023-01-08 NOTE — PROGRESS NOTES
01/08/23 1710   Vital Signs   Pulse 102   Heart Rate Source Monitor   Resp (!) 33   SpO2 100 %   Pulse Oximetry Type Continuous   Oximetry Probe Site Assessed   BP (!) 165/76   MAP (mmHg) 109   BP Location Left arm   BP Method Automatic   Patient Position Lying     Hemodynamics post Labetalol IV administration.

## 2023-01-08 NOTE — ASSESSMENT & PLAN NOTE
Known history of uncontrolled HTN, w/ labile BP since admit.   -BP meds held in setting of septic shock  -will avoid IV meds if possible as has had hypotension following IV BP meds  -renal artery duplex showing R renal artery stenosis, L renal artery unable to be visualized  -Will monitor and add back meds as tolerated, IV metoprolol when unable to tolerate PO  -of note plasma metanephrines came back elevated in both 24 hour urine collections; discussed with radiology and they do not see any major abnormalities around adrenals; have consulted endocrine; likelihood of extra-adrenal pheo is low, will check repeat urine metanephrines per endocrine.  -while tolerating PO will do losartan and coreg; labetalol IV for SBP >180

## 2023-01-08 NOTE — SUBJECTIVE & OBJECTIVE
Interval History/Significant Events:   NAEO. Continuing to alternative bipap and hi-flow; has been able to tolerate more time on HFNC. Feeling OK.    Review of Systems   Unable to perform ROS: Acuity of condition   Respiratory:  Positive for shortness of breath.    Objective:     Vital Signs (Most Recent):  Temp: 99.7 °F (37.6 °C) (01/08/23 0400)  Pulse: 101 (01/08/23 0800)  Resp: (!) 24 (01/08/23 0800)  BP: (!) 164/74 (01/08/23 0800)  SpO2: 99 % (01/08/23 0800)   Vital Signs (24h Range):  Temp:  [98 °F (36.7 °C)-99.8 °F (37.7 °C)] 99.7 °F (37.6 °C)  Pulse:  [] 101  Resp:  [20-44] 24  SpO2:  [99 %-100 %] 99 %  BP: (140-189)/(66-95) 164/74   Weight: 59.4 kg (130 lb 15.3 oz)  Body mass index is 22.48 kg/m².      Intake/Output Summary (Last 24 hours) at 1/8/2023 1150  Last data filed at 1/8/2023 0625  Gross per 24 hour   Intake 1142.15 ml   Output 1645 ml   Net -502.85 ml         Physical Exam  Vitals and nursing note reviewed.   Constitutional:       General: She is not in acute distress.  HENT:      Head: Normocephalic and atraumatic.   Eyes:      Extraocular Movements: Extraocular movements intact.      Pupils: Pupils are equal, round, and reactive to light.   Cardiovascular:      Rate and Rhythm: Normal rate and regular rhythm.      Pulses: Normal pulses.      Heart sounds: Normal heart sounds. No murmur heard.  Pulmonary:      Breath sounds: Normal air entry. No wheezing or rales.   Abdominal:      General: Abdomen is flat. There is no distension.      Palpations: Abdomen is soft.      Tenderness: There is no abdominal tenderness.   Musculoskeletal:         General: No swelling.      Right lower leg: No edema.      Left lower leg: No edema.   Skin:     General: Skin is warm and dry.      Findings: No bruising or rash.   Neurological:      General: No focal deficit present.      Mental Status: She is alert and oriented to person, place, and time.   Psychiatric:         Mood and Affect: Mood normal.          Behavior: Behavior normal.       Vents:  Vent Mode: A/C (12/30/22 1528)  Ventilator Initiated: Yes (12/29/22 0144)  Set Rate: 12 BPM (12/30/22 1528)  Vt Set: 350 mL (12/30/22 0722)  Pressure Support: 14 cmH20 (12/30/22 1139)  PEEP/CPAP: 6 cmH20 (12/30/22 1528)  Oxygen Concentration (%): 30 (01/08/23 0800)  Peak Airway Pressure: 22 cmH20 (12/30/22 1528)  Plateau Pressure: 20 cmH20 (12/30/22 1528)  Total Ve: 9.73 L/m (12/30/22 1528)  Negative Inspiratory Force (cm H2O): 0 (12/30/22 1528)  F/VT Ratio<105 (RSBI): (!) 58.05 (12/30/22 1528)  Lines/Drains/Airways       Central Venous Catheter Line  Duration             Percutaneous Central Line Insertion/Assessment - Triple Lumen  12/29/22 0043 right internal jugular 10 days              Drain  Duration                  Urethral Catheter 01/04/23 0009 Non-latex 16 Fr. 4 days         Rectal Tube 01/05/23 2130 rectal tube w/ balloon (indicate number of mLs) 2 days                  Significant Labs:    CBC/Anemia Profile:  Recent Labs   Lab 01/07/23  0342 01/08/23  0312   WBC 8.96 9.45   HGB 7.7* 7.6*   HCT 24.9* 24.7*    266   MCV 83 85   RDW 14.1 14.0          Chemistries:  Recent Labs   Lab 01/06/23  1724 01/07/23  0342 01/08/23  0312    136 133*   K 4.5 4.6 4.3   CL 99 101 102   CO2 32* 27 27   BUN 12 15 15   CREATININE 0.5 0.4* 0.5   CALCIUM 8.1* 8.0* 8.0*   MG  --  2.3 2.1   PHOS  --  2.6* 2.8       All pertinent labs within the past 24 hours have been reviewed.    Significant Imaging:  I have reviewed all pertinent imaging results/findings within the past 24 hours.

## 2023-01-08 NOTE — SUBJECTIVE & OBJECTIVE
Subjective:     Interval History: Some improvement today, patient tolerated high flow off BiPAP. Still awaiting formal swallow evaluation. Case discussed with patient, family and team.    Past Medical History:   Diagnosis Date    Hyperlipidemia     Hypertension     Hyponatremia 12/23/2022    Hypophosphatemia 12/30/2022    Hypothyroidism     Proctitis 12/29/2022    Severe sepsis 12/29/2022    Shock 12/29/2022       Past Surgical History:   Procedure Laterality Date    HYSTERECTOMY         Review of patient's allergies indicates:   Allergen Reactions    Hydralazine analogues      Precipitous drop in BP with IV formulation. Avoid if possible    Shellfish containing products Swelling         No current facility-administered medications on file prior to encounter.     Current Outpatient Medications on File Prior to Encounter   Medication Sig    alendronate-vitamin D3 (FOSAMAX PLUS D) 70 mg- 2,800 unit per tablet Take 1 tablet by mouth every 7 days.    hydroCHLOROthiazide (HYDRODIURIL) 25 MG tablet Take 25 mg by mouth once daily.    levothyroxine (SYNTHROID) 50 MCG tablet Take 50 mcg by mouth once daily.    losartan (COZAAR) 50 MG tablet Take 50 mg by mouth once daily.    meloxicam (MOBIC) 15 MG tablet Take 15 mg by mouth once daily.    metoprolol succinate (TOPROL-XL) 50 MG 24 hr tablet Take 50 mg by mouth once daily.    simvastatin (ZOCOR) 20 MG tablet Take 20 mg by mouth every evening.     Family History       Problem Relation (Age of Onset)    Breast cancer Sister (55), Sister (65)          Tobacco Use    Smoking status: Never    Smokeless tobacco: Not on file   Substance and Sexual Activity    Alcohol use: Not on file    Drug use: Never    Sexual activity: Not on file     Review of Systems   Unable to perform ROS: Acuity of condition   HENT:  Positive for trouble swallowing.    Respiratory:  Positive for shortness of breath.    Objective:     Vital Signs (Most Recent):  Temp: 99.1 °F (37.3 °C) (01/08/23 1100)  Pulse:  102 (01/08/23 1720)  Resp: (!) 36 (01/08/23 1720)  BP: (!) 172/88 (01/08/23 1720)  SpO2: 100 % (01/08/23 1720) Vital Signs (24h Range):  Temp:  [99.1 °F (37.3 °C)-99.8 °F (37.7 °C)] 99.1 °F (37.3 °C)  Pulse:  [] 102  Resp:  [20-44] 36  SpO2:  [99 %-100 %] 100 %  BP: (140-211)/() 172/88     Weight: 59.4 kg (130 lb 15.3 oz)  Body mass index is 22.48 kg/m².    Physical Exam  Vitals and nursing note reviewed.   Constitutional:       Appearance: She is ill-appearing.   HENT:      Head: Normocephalic and atraumatic.   Cardiovascular:      Rate and Rhythm: Normal rate and regular rhythm.      Pulses: Normal pulses.      Heart sounds: Normal heart sounds. No murmur heard.  Pulmonary:      Effort: Tachypnea present.      Breath sounds: Normal air entry. No wheezing or rales.   Abdominal:      General: Abdomen is flat. There is no distension.      Palpations: Abdomen is soft.      Tenderness: There is no abdominal tenderness.   Musculoskeletal:         General: No swelling.      Right lower leg: No edema.      Left lower leg: No edema.   Skin:     General: Skin is warm and dry.      Findings: No bruising or rash.   Neurological:      General: No focal deficit present.      Comments: Alert, following commands   Psychiatric:         Mood and Affect: Mood normal.         Behavior: Behavior normal.     Neurological Exam:  MENTAL STATUS  Level of consciousness: alert  Orientation: oriented to person, place, and time  Attention normal. Concentration normal.  Speech: reduced phonation 2/2 SOB    CRANIAL NERVES  CN II: Visual fields full to confrontation  CN III, IV, VI: PERRL, EOMI  CN V: Facial sensation intact  CN VII: Facial expression symmetric and full  CN IX, X: Symmetric palate elevation. Phonation normal  CN XII: Tongue midline with normal movements, no atrophy    MOTOR EXAM  Muscle bulk: normal  Muscle tone: normal  Pronator drift: absent    Strength - Upper Extremities: 4/5    Strength - Lower Extremities:  4/5    REFLEXES   Biceps Brachioradialis Patellar Achilles   Right +2 +2 +2 +1   Left +2 +2 +2 +1       SENSORY EXAM  Light touch: intact in all 4 extremities    No bulbar weakness found with lateral neck rotation / flexion / extension. No oral fasciculation noted below the tongue. No ocular weakness noted.         Significant Labs: Hemoglobin A1c:   Recent Labs   Lab 12/23/22  1920   HGBA1C 5.4     Blood Culture: No results for input(s): LABBLOO in the last 48 hours.  BMP:   Recent Labs   Lab 01/07/23 0342 01/08/23 0312    105    133*   K 4.6 4.3    102   CO2 27 27   BUN 15 15   CREATININE 0.4* 0.5   CALCIUM 8.0* 8.0*   MG 2.3 2.1     CBC:   Recent Labs   Lab 01/07/23 0342 01/08/23 0312   WBC 8.96 9.45   HGB 7.7* 7.6*   HCT 24.9* 24.7*    266     CMP:   Recent Labs   Lab 01/07/23 0342 01/08/23 0312    105    133*   K 4.6 4.3    102   CO2 27 27   BUN 15 15   CREATININE 0.4* 0.5   CALCIUM 8.0* 8.0*   MG 2.3 2.1   ANIONGAP 8 4*     CSF Culture: No results for input(s): CSFCULTURE in the last 48 hours.  CSF Studies: No results for input(s): ALIQUT, APPEARCSF, COLORCSF, CSFWBC, CSFRBC, GLUCCSF, LDHCSF, PROTEINCSF, VDRLCSF in the last 48 hours.  Inflammatory Markers:   No results for input(s): SEDRATE, CRP, PROCAL in the last 48 hours.    POCT Glucose:   Recent Labs   Lab 01/08/23  0309 01/08/23  0932 01/08/23  1518   POCTGLUCOSE 106 120* 129*     Prealbumin:   No results for input(s): PREALBUMIN in the last 48 hours.    Respiratory Culture: No results for input(s): GSRESP, RESPIRATORYC in the last 48 hours.  Urine Culture: No results for input(s): LABURIN in the last 48 hours.  Urine Studies:   No results for input(s): COLORU, APPEARANCEUA, PHUR, SPECGRAV, PROTEINUA, GLUCUA, KETONESU, BILIRUBINUA, OCCULTUA, NITRITE, UROBILINOGEN, LEUKOCYTESUR, RBCUA, WBCUA, BACTERIA, SQUAMEPITHEL, HYALINECASTS in the last 48 hours.    Invalid input(s): FABIAN    All pertinent lab  results from the past 24 hours have been reviewed.    Significant Imaging: I have reviewed and interpreted all pertinent imaging results/findings within the past 24 hours.

## 2023-01-08 NOTE — PROGRESS NOTES
01/08/23 1700   Vital Signs   Pulse (!) 119   Heart Rate Source Monitor   Resp (!) 29   SpO2 100 %   Pulse Oximetry Type Continuous   Oximetry Probe Site Assessed   BP (!) 209/95   MAP (mmHg) 136   BP Location Left arm   BP Method Automatic   Patient Position Lying     Team aware of patients BP and ordered 10 mg of IV Labetalol to be given.

## 2023-01-08 NOTE — ASSESSMENT & PLAN NOTE
Patient presented with approx 3 weeks of worsening shortness of breath, weakness, and falls. On transfer to ICU required intubation. Was extubated to BIPAP. Have been unable to wean off bipap due to persistent shortness of breath, tachypnea, and inadequate volumes. Trial off bipap results in tachypnea, tachycardia, and hypertension. Appears weak distally and with fine motor skills. Unclear etiology at this time. Does not appear pulmonary in nature as patient is saturating well, ABGs have been wnl. MRI C-spine showed severe cervical stenosis, NSGY was consulted, unlikely that the stenosis would be causing these issues. Concern that difficulty breathing could be related to diaphragm weakness. Critical illness myopathy seems unlikely as patient was not intubated for long duration.  -neurology consulted, myasthenia panel sent  -further labs ordered (ESR, CRP, DARLINE, myositis panel)  -continue bipap, alternate with HFNC as tolerated  -palliative care consulted for long term planning  -started IVIG on 1/5 for 5 day course  -discussing with neurology and nsgy about possible relation to cervical stenosis  -NSGY considering intervention, prefer if tracheostomy is done prior to any surgery; will continue to discuss

## 2023-01-08 NOTE — PROGRESS NOTES
[facility]  Critical Care Medicine  Progress Note    Patient Name: Dominique Mcgee  MRN: [id number]  Admission Date: 12/23/2022  Hospital Length of Stay: 16 days  Code Status: Full Code  Attending Provider: Luis Enrique Bishop MD  Primary Care Provider: Briana Leblanc MD   Principal Problem: Shortness of breath    Subjective:     HPI:  Ms. Mcgee is a 73 y/o F patient with HTN, HLD, hypothyroidism, recent URI 3 weeks prior to admission, balance difficulites who presented to the ED on 12/23 for 3 weeks of weakness, LIRA, decreased appetite, and constipation with intermittent lower abdominal cramping. Per daughter, patient is typically independent, lives alone, cooks her own meals, since she has been ill she has had decreased PO intake and drinking coffee, tea, and eating cereal. She has had progressive worsening HTN in the last 3 weeks and was started on losartan-HCTZ and metoprolol. Prior to this she was only taking lisinopril per daughter. Admitted for HTN emergency and was found to have Na 120. Received IV fluids and admitted to hospital medicine. For her hypernatremia, her thiazide was held and she was placed on fluid restriction. Urine studies from 12/25 showed Uosm of 802 and HANH of 109. Her BP has been labile since admission requiring both IV antihypertensives as well as fluid boluses. The patient's hyponatremia persisted despite fluid restriction and she became increasingly lethargic and weak so CCM was consulted for further management.      Upon evaluation, patient is somnolent. Opens eyes to voice, unable to engage in full conversation. Speaks mainly single words. Denies thirst. Reports SOB and generalized abdominal pain a/w constipation. No CP, N/V/D, dysuria, edema. Most recent vitals: /60, pulse 82, R 18, SpO2 94% on RA. Na trend 119->115->117.       Hospital/ICU Course:  Once transferred to ICU patient became hypotensive requiring multiple pressors. Patient intubated for airway protection.  Art line and central line placed. Sodium improved with hypertonic saline. CT abdomen/pelvis showing proctitis and hip abscess. Went for IR aspiration of abscess. Culture showing e.coli, on zosyn. MRI brain non-concerning. MRI spine with severe cervical stenosis and cord compression, also with mild lumbar stenosis. NSGY consulted with plans for outpatient follow-up. Repeat CXR with increased LLL consolidation and pleural effusion. Extubated to bipap. Once extubated and off pressors patient began to re-experience labile blood pressures similar to her initial presentation. Started on cardene drip. Difficulty weaning off BIPAP due to inadequate volumes and persistent shortness of breath. Extensive workup done regarding difficulties coming off BIPAP. Neurology consulted. ID was consulted for hip abscess- continued on zosyn. Repeat imaging concerning for pneumonia; started on linezolid. Ortho consulted for hip abscess that re-occurred on repeat imaging. Requested IR re-drain abscess and re-culture. Started on IVIG. Ongoing discussions with neurology and neurosurgery regarding cervical stenosis/cord injury as possible etiology of respiratory muscle weakness. Neurosurgery considering intervention but would prefer if patient received tracheostomy prior to laminectomy.      Interval History/Significant Events:   NAEO. Continuing to alternative bipap and hi-flow; has been able to tolerate more time on HFNC. Feeling OK.    Review of Systems   Unable to perform ROS: Acuity of condition   Respiratory:  Positive for shortness of breath.    Objective:     Vital Signs (Most Recent):  Temp: 99.7 °F (37.6 °C) (01/08/23 0400)  Pulse: 101 (01/08/23 0800)  Resp: (!) 24 (01/08/23 0800)  BP: (!) 164/74 (01/08/23 0800)  SpO2: 99 % (01/08/23 0800)   Vital Signs (24h Range):  Temp:  [98 °F (36.7 °C)-99.8 °F (37.7 °C)] 99.7 °F (37.6 °C)  Pulse:  [] 101  Resp:  [20-44] 24  SpO2:  [99 %-100 %] 99 %  BP: (140-189)/(66-95) 164/74   Weight:  59.4 kg (130 lb 15.3 oz)  Body mass index is 22.48 kg/m².      Intake/Output Summary (Last 24 hours) at 1/8/2023 1150  Last data filed at 1/8/2023 0625  Gross per 24 hour   Intake 1142.15 ml   Output 1645 ml   Net -502.85 ml         Physical Exam  Vitals and nursing note reviewed.   Constitutional:       General: She is not in acute distress.  HENT:      Head: Normocephalic and atraumatic.   Eyes:      Extraocular Movements: Extraocular movements intact.      Pupils: Pupils are equal, round, and reactive to light.   Cardiovascular:      Rate and Rhythm: Normal rate and regular rhythm.      Pulses: Normal pulses.      Heart sounds: Normal heart sounds. No murmur heard.  Pulmonary:      Breath sounds: Normal air entry. No wheezing or rales.   Abdominal:      General: Abdomen is flat. There is no distension.      Palpations: Abdomen is soft.      Tenderness: There is no abdominal tenderness.   Musculoskeletal:         General: No swelling.      Right lower leg: No edema.      Left lower leg: No edema.   Skin:     General: Skin is warm and dry.      Findings: No bruising or rash.   Neurological:      General: No focal deficit present.      Mental Status: She is alert and oriented to person, place, and time.   Psychiatric:         Mood and Affect: Mood normal.         Behavior: Behavior normal.       Vents:  Vent Mode: A/C (12/30/22 1528)  Ventilator Initiated: Yes (12/29/22 0144)  Set Rate: 12 BPM (12/30/22 1528)  Vt Set: 350 mL (12/30/22 0722)  Pressure Support: 14 cmH20 (12/30/22 1139)  PEEP/CPAP: 6 cmH20 (12/30/22 1528)  Oxygen Concentration (%): 30 (01/08/23 0800)  Peak Airway Pressure: 22 cmH20 (12/30/22 1528)  Plateau Pressure: 20 cmH20 (12/30/22 1528)  Total Ve: 9.73 L/m (12/30/22 1528)  Negative Inspiratory Force (cm H2O): 0 (12/30/22 1528)  F/VT Ratio<105 (RSBI): (!) 58.05 (12/30/22 1528)  Lines/Drains/Airways       Central Venous Catheter Line  Duration             Percutaneous Central Line  Insertion/Assessment - Triple Lumen  12/29/22 0043 right internal jugular 10 days              Drain  Duration                  Urethral Catheter 01/04/23 0009 Non-latex 16 Fr. 4 days         Rectal Tube 01/05/23 2130 rectal tube w/ balloon (indicate number of mLs) 2 days                  Significant Labs:    CBC/Anemia Profile:  Recent Labs   Lab 01/07/23  0342 01/08/23  0312   WBC 8.96 9.45   HGB 7.7* 7.6*   HCT 24.9* 24.7*    266   MCV 83 85   RDW 14.1 14.0          Chemistries:  Recent Labs   Lab 01/06/23  1724 01/07/23  0342 01/08/23  0312    136 133*   K 4.5 4.6 4.3   CL 99 101 102   CO2 32* 27 27   BUN 12 15 15   CREATININE 0.5 0.4* 0.5   CALCIUM 8.1* 8.0* 8.0*   MG  --  2.3 2.1   PHOS  --  2.6* 2.8       All pertinent labs within the past 24 hours have been reviewed.    Significant Imaging:  I have reviewed all pertinent imaging results/findings within the past 24 hours.      ABG  Recent Labs   Lab 01/05/23  1132   PH 7.389   PO2 41   PCO2 63.5*   HCO3 38.4*   BE 13     Assessment/Plan:     Neuro  Stenosis, cervical spine  Noted on MRI spine. Of note patient has been experiencing episodes of vertigo and decreased balance which could be related.   -NSGY consulted, appreciate recs  -if need for reintubation should be done with fiberoptic or glidescope  -concern that this could be contributing to her current respiratory muscle weakness  -appreciate neurology and nsgy involvement  -per NSGY, if laminectomy to be done, would prefer patient to have tracheostomy prior to their intervention  -will continue to monitor for improvement and re-discuss with NSGY 1/10-1/11    Pulmonary  * Shortness of breath  Patient presented with approx 3 weeks of worsening shortness of breath, weakness, and falls. On transfer to ICU required intubation. Was extubated to BIPAP. Have been unable to wean off bipap due to persistent shortness of breath, tachypnea, and inadequate volumes. Trial off bipap results in tachypnea,  tachycardia, and hypertension. Appears weak distally and with fine motor skills. Unclear etiology at this time. Does not appear pulmonary in nature as patient is saturating well, ABGs have been wnl. MRI C-spine showed severe cervical stenosis, NSGY was consulted, unlikely that the stenosis would be causing these issues. Concern that difficulty breathing could be related to diaphragm weakness. Critical illness myopathy seems unlikely as patient was not intubated for long duration.  -neurology consulted, myasthenia panel sent  -further labs ordered (ESR, CRP, DARLINE, myositis panel)  -continue bipap, alternate with HFNC as tolerated  -palliative care consulted for long term planning  -started IVIG on 1/5 for 5 day course  -discussing with neurology and nsgy about possible relation to cervical stenosis  -NSGY considering intervention, prefer if tracheostomy is done prior to any surgery; will continue to discuss    Respiratory distress  See shortness of breath    Acute hypoxemic respiratory failure  Patient with Hypoxic Respiratory failure which is Acute.  she is not on home oxygen. Supplemental oxygen was provided and noted- Oxygen Concentration (%):  [] 30.   Signs/symptoms of respiratory failure include- tachypnea, increased work of breathing and respiratory distress. Contributing diagnoses includes - Pleural effusion and atelectasis Labs and images were reviewed. Patient Has recent ABG, which has been reviewed. Will treat underlying causes and adjust management of respiratory failure as follows- unclear etiology of pleural effusions, patient notes recent URI 3 weeks ago that has left her short of breath since then, on broad spectrum abx  Repeat CXR with increased consolidation in LLL  Extubated to Bipap  1/3: CT Chest - Bibasilar atelectatic/consolidative change (left greater than right) with patchy right basilar opacities and small bilateral pleural effusions.  Correlation for underlying infectious process  advised.  ID following, linezolid to treat for PNA to end 1/9  See shortness of breath    Cardiac/Vascular  Right renal artery stenosis  -no acute intervention while in ICU  -see HTN    Hyperlipemia  Continue home statin    Essential hypertension  Known history of uncontrolled HTN, w/ labile BP since admit.   -BP meds held in setting of septic shock  -will avoid IV meds if possible as has had hypotension following IV BP meds  -renal artery duplex showing R renal artery stenosis, L renal artery unable to be visualized  -Will monitor and add back meds as tolerated, IV metoprolol when unable to tolerate PO    Dyspnea on exertion  Began prior to admission. Has been requiring 2L via NC to maintain oxygenation  -O2 via NC PRN    Renal/  Metabolic alkalosis  Initially could have been related to diuretic use. Bicarb remains elevated after stopping HCTZ.    Endocrine  Moderate malnutrition  Nutrition consulted. Most recent weight and BMI monitored-   On TPN; replete lytes and monitor for re-feeding syndrome  Malnutrition (Moderate to Severe)  Energy Intake (Malnutrition): less than 75% for greater than 7 days  Measurements:  Wt Readings from Last 1 Encounters:   01/05/23 59.4 kg (130 lb 15.3 oz)   Body mass index is 22.48 kg/m².  Recommendations: Recommendation/Intervention: 1.  Goals: Meet % EEN, EPN by RD f/u date  Patient has been screened and assessed by RD. RD will follow patient.      GI  Dysphagia  SLP eval if able to tolerate off bipap long enough    Orthopedic  Abscess of bursa of right hip  CT scan showing possible hematoma vs abscess around right hip. Aspiration done with IR. Culture with e.coli pan-sensitive. Repeat CT showing fluid has reaccumulated.   -ID following  -Has been on zosyn, switch to cefepime per ID  -ortho consulted- recs for repeating drainage w/ IR  -f/u repeat cultures and fluid analysis with NGTD      Other  Hypothermia  Considering new onset will check BCx and start zosyn. Using bear  kaelyn for warming.    Generalized weakness  See shortness of breath       Critical Care Daily Checklist:    A: Awake: RASS Goal/Actual Goal:    Actual: Manning Agitation Sedation Scale (RASS): Alert and calm   B: Spontaneous Breathing Trial Performed? Spon. Breathing Trial Initiated?: Not initiated (12/29/22 0729)   C: SAT & SBT Coordinated?  n/a                      D: Delirium: CAM-ICU Overall CAM-ICU: Negative   E: Early Mobility Performed? No   F: Feeding Goal: Goals: Meet % EEN, EPN by RD f/u date  Status: Nutrition Goal Status: goal met   Current Diet Order   Procedures    Diet NPO      AS: Analgesia/Sedation none   T: Thromboembolic Prophylaxis yes   H: HOB > 300 Yes   U: Stress Ulcer Prophylaxis (if needed) yes   G: Glucose Control yes   B: Bowel Function Stool Occurrence: 1   I: Indwelling Catheter (Lines & Rush) Necessity yes   D: De-escalation of Antimicrobials/Pharmacotherapies no    Plan for the day/ETD Alternate HFNC    Code Status:  Family/Goals of Care: Full Code         Critical secondary to Patient has a condition that poses threat to life and bodily function: Severe Respiratory Distress      Critical care was time spent personally by me on the following activities: development of treatment plan with patient or surrogate and bedside caregivers, discussions with consultants, evaluation of patient's response to treatment, examination of patient, ordering and performing treatments and interventions, ordering and review of laboratory studies, ordering and review of radiographic studies, pulse oximetry, re-evaluation of patient's condition. This critical care time did not overlap with that of any other provider or involve time for any procedures.     Beto Avelar MD  Critical Care Medicine  [facility] Cardiac Medical ICU

## 2023-01-08 NOTE — PLAN OF CARE
Plan of care note, full consultation note to follow tomorrow.    72 year old female with hypertension, hyperlipidemia, hypothyroidism, balance difficulites who presents with hypertensive emergency and hyponatremia of 120. Urine studies indicated SIADH and volume restriction was instituted with subsequent correction but hyponatremia persisted and she became increasingly lethargic and weak with hypotension requiring ICU transfer. She required multiple pressors and was briefly intubated for airway protection. Sodium improved with hypertonic saline. Once extubated and off pressors patient began to re-experience labile blood pressures similar to her initial presentation. Started on cardene drip. Difficulty weaning off BIPAP due to inadequate volumes and persistent shortness of breath.  - She is reportedly independent, lives alone, cooks her own meals, since she has been ill she has had decreased PO intake and drinking coffee, tea, and eating cereal. She has had progressive worsening HTN in the last 3 weeks and was started on losartan-HCTZ and metoprolol. Prior to this she was only taking lisinopril per daughter     - CT abdomen/pelvis showing proctitis and hip abscess. Went for IR aspiration of abscess. Culture showing e.coli, on zosyn  - MRI brain non-concerning. MRI spine with severe cervical stenosis and cord compression, also with mild lumbar stenosis.    - Endocrinology consulted for elevated metanephrines on urine collection and evaluation for potential pheochromocytoma versus paraganglioma    Regarding Hypertension:    Lab Results   Component Value Date    LABCORT 37.80 (H) 12/28/2022     Lab Results   Component Value Date    ACTH 7 01/02/2023     Lab Results   Component Value Date    ALDOSTERONE 10.8 12/28/2022    ALDOSTERONE 7.0 12/23/2022    ALDOSTERONE 7.4 12/23/2022    LABRENI 1.2 12/23/2022     Lab Results   Component Value Date    NORMETANEPHR 1907 (H) 01/01/2023    NORMETANEPHR 994 (H) 12/28/2022     TOTMETAURINE 2511 (H) 01/01/2023    TOTMETAURINE 1366 (H) 12/28/2022       Recommendations  - Elevated urinary metanephrines with recurring severe hypertension  - Adrenal imaging negative for adenoma    - Unclear if true elevation given proximity to pressor support that patient required prior to urine sample being drawn  - Recommend obtaining serum metanephrines for further clarification  - If serum metanephrines positive and highly suggestive of pheochromocytoma/paraganglioma; with negative prior adrenal imaging, patient may require DOTATATE scan for further localization and alpha blockade with doxazosin for blood pressure control  - Continued management of hypertension per primary    Enrike Power DO  [facility], [address]th Floor  [address] [place], [place], [address]    Office: [phone]  Fax: [phone]    Disclaimer: This note has been generated using voice-recognition software. There may be typographical errors that have been missed during proof-reading.    The above history labs imaging impression and plan were discussed with attending physician who is in agreement and also took part in this patient's care.  I personally reviewed all of the patients available medications, labs, imaging, vitals, allergies, medical history.

## 2023-01-08 NOTE — PLAN OF CARE
CMICU DAILY GOALS       A: Awake    RASS: Goal -    Actual - RASS (Manning Agitation-Sedation Scale): 0-->alert and calm   Restraint necessity: Clinical Justification: Removing medical devices  B: Breathe   SBT: Not intubated   C: Coordinate A & B, analgesics/sedatives   Pain: managed    SAT: Not intubated  D: Delirium   CAM-ICU: Overall CAM-ICU: Negative  E: Early(intubated/ Progressive (non-intubated) Mobility   MOVE Screen: Pass   Activity: Activity Management: Rolling - L1  FAS: Feeding/Nutrition   Diet order: Diet/Nutrition Received: NPO,    T: Thrombus   DVT prophylaxis: VTE Required Core Measure: Pharmacological prophylaxis initiated/maintained  H: HOB Elevation   Head of Bed (HOB) Positioning: HOB at 30 degrees  U: Ulcer Prophylaxis   GI: yes  G: Glucose control   managed Glycemic Management: blood glucose monitored  S: Skin   Bathing/Skin Care: bath, complete, dressed/undressed, linen changed, incontinence care  Device Skin Pressure Protection: absorbent pad utilized/changed, adhesive use limited, pressure points protected  Pressure Reduction Devices: foam padding utilized, heel offloading device utilized, specialty bed utilized  Pressure Reduction Techniques: weight shift assistance provided  Skin Protection: adhesive use limited, incontinence pads utilized, transparent dressing maintained, tubing/devices free from skin contact  B: Bowel Function   diarrhea   I: Indwelling Catheters   Rush necessity: [REMOVED]      Urethral Catheter 12/27/22 1535-Reason for Continuing Urinary Catheterization: Critically ill in ICU and requiring hourly monitoring of intake/output       Urethral Catheter 01/04/23 0009 Non-latex 16 Fr.-Reason for Continuing Urinary Catheterization: Critically ill in ICU and requiring hourly monitoring of intake/output   CVC necessity: yes    Family/Goals of care/Code Status   Code Status: Full Code    24H Vital Sign Range  Temp:  [97.8 °F (36.6 °C)-99.8 °F (37.7 °C)]   Pulse:  []    Resp:  [20-60]   BP: (140-189)/(66-97)   SpO2:  [98 %-100 %]      Shift Events   No acute events throughout shift    VS and assessment per flow sheet, patient progressing towards goals as tolerated, plan of care reviewed with family, all concerns addressed, will continue to monitor.    Donte Lagunas

## 2023-01-09 PROBLEM — E03.9 HYPOTHYROIDISM (ACQUIRED): Status: ACTIVE | Noted: 2023-01-01

## 2023-01-09 NOTE — ASSESSMENT & PLAN NOTE
Patient with Hypoxic Respiratory failure which is Acute.  she is not on home oxygen. Supplemental oxygen was provided and noted- Oxygen Concentration (%):  [30-40] 30.   Signs/symptoms of respiratory failure include- tachypnea, increased work of breathing and respiratory distress. Contributing diagnoses includes - Pleural effusion and atelectasis Labs and images were reviewed. Patient Has recent ABG, which has been reviewed. Will treat underlying causes and adjust management of respiratory failure as follows- unclear etiology of pleural effusions, patient notes recent URI 3 weeks ago that has left her short of breath since then, on broad spectrum abx  Repeat CXR with increased consolidation in LLL  Extubated to Bipap  1/3: CT Chest - Bibasilar atelectatic/consolidative change (left greater than right) with patchy right basilar opacities and small bilateral pleural effusions.  Correlation for underlying infectious process advised.  ID following, linezolid to treat for HAP to end 1/9  See shortness of breath

## 2023-01-09 NOTE — PROGRESS NOTES
[facility]  Critical Care Medicine  Progress Note    Patient Name: Dominique Mcgee  MRN: [id number]  Admission Date: 12/23/2022  Hospital Length of Stay: 17 days  Code Status: Full Code  Attending Provider: Delfino Chen MD  Primary Care Provider: Briana Leblanc MD   Principal Problem: Shortness of breath    Subjective:     HPI:  Ms. Mcgee is a 71 y/o F patient with HTN, HLD, hypothyroidism, recent URI 3 weeks prior to admission, balance difficulites who presented to the ED on 12/23 for 3 weeks of weakness, LIRA, decreased appetite, and constipation with intermittent lower abdominal cramping. Per daughter, patient is typically independent, lives alone, cooks her own meals, since she has been ill she has had decreased PO intake and drinking coffee, tea, and eating cereal. She has had progressive worsening HTN in the last 3 weeks and was started on losartan-HCTZ and metoprolol. Prior to this she was only taking lisinopril per daughter. Admitted for HTN emergency and was found to have Na 120. Received IV fluids and admitted to hospital medicine. For her hypernatremia, her thiazide was held and she was placed on fluid restriction. Urine studies from 12/25 showed Uosm of 802 and HANH of 109. Her BP has been labile since admission requiring both IV antihypertensives as well as fluid boluses. The patient's hyponatremia persisted despite fluid restriction and she became increasingly lethargic and weak so CCM was consulted for further management.      Upon evaluation, patient is somnolent. Opens eyes to voice, unable to engage in full conversation. Speaks mainly single words. Denies thirst. Reports SOB and generalized abdominal pain a/w constipation. No CP, N/V/D, dysuria, edema. Most recent vitals: /60, pulse 82, R 18, SpO2 94% on RA. Na trend 119->115->117.       Hospital/ICU Course:  Once transferred to ICU patient became hypotensive requiring multiple pressors. Patient intubated for airway  protection. Art line and central line placed. Sodium improved with hypertonic saline. CT abdomen/pelvis showing proctitis and hip abscess. Went for IR aspiration of abscess. Culture showing e.coli, on zosyn. MRI brain non-concerning. MRI spine with severe cervical stenosis and cord compression, also with mild lumbar stenosis. NSGY consulted with plans for outpatient follow-up. Repeat CXR with increased LLL consolidation and pleural effusion. Extubated to bipap. Once extubated and off pressors patient began to re-experience labile blood pressures similar to her initial presentation. Started on cardene drip. Difficulty weaning off BIPAP due to inadequate volumes and persistent shortness of breath. Extensive workup done regarding difficulties coming off BIPAP. Neurology consulted. ID was consulted for hip abscess- continued on zosyn. Repeat imaging concerning for pneumonia; started on linezolid. Ortho consulted for hip abscess that re-occurred on repeat imaging. Requested IR re-drain abscess and re-culture. Started on IVIG. Ongoing discussions with neurology and neurosurgery regarding cervical stenosis/cord injury as possible etiology of respiratory muscle weakness. Neurosurgery considering intervention but would prefer if patient received tracheostomy prior to laminectomy. Urine metanephrines came back elevated on two collections. Endocrinology consulted. Repeating urine metanephrines as first sets were collected during use of vasopressors.      Interval History/Significant Events:   Feeling well this morning. Continuing to alternate bipap and HFNC.    Review of Systems   Unable to perform ROS: Acuity of condition   Respiratory:  Positive for shortness of breath.    Objective:     Vital Signs (Most Recent):  Temp: 98.6 °F (37 °C) (01/09/23 0701)  Pulse: 100 (01/09/23 0800)  Resp: (!) 23 (01/09/23 0800)  BP: (!) 149/67 (01/09/23 0800)  SpO2: 100 % (01/09/23 0800)   Vital Signs (24h Range):  Temp:  [98.3 °F (36.8  °C)-99.4 °F (37.4 °C)] 98.6 °F (37 °C)  Pulse:  [] 100  Resp:  [12-42] 23  SpO2:  [96 %-100 %] 100 %  BP: (107-211)/() 149/67   Weight: 59.4 kg (130 lb 15.3 oz)  Body mass index is 22.48 kg/m².      Intake/Output Summary (Last 24 hours) at 1/9/2023 0925  Last data filed at 1/9/2023 0900  Gross per 24 hour   Intake 1855.22 ml   Output 2000 ml   Net -144.78 ml         Physical Exam  Vitals and nursing note reviewed.   Constitutional:       General: She is not in acute distress.  HENT:      Head: Normocephalic and atraumatic.   Eyes:      Extraocular Movements: Extraocular movements intact.      Pupils: Pupils are equal, round, and reactive to light.   Cardiovascular:      Rate and Rhythm: Regular rhythm. Tachycardia present.      Pulses: Normal pulses.      Heart sounds: Normal heart sounds. No murmur heard.  Pulmonary:      Effort: Accessory muscle usage present.      Breath sounds: Decreased air movement present. No wheezing or rales.   Abdominal:      General: Abdomen is flat. There is no distension.      Palpations: Abdomen is soft.      Tenderness: There is no abdominal tenderness.   Musculoskeletal:         General: No swelling.      Right lower leg: No edema.      Left lower leg: No edema.   Skin:     General: Skin is warm and dry.      Findings: No bruising or rash.   Neurological:      General: No focal deficit present.      Mental Status: She is alert and oriented to person, place, and time.   Psychiatric:         Mood and Affect: Mood normal.         Behavior: Behavior normal.       Vents:  Vent Mode: A/C (12/30/22 1528)  Ventilator Initiated: Yes (12/29/22 0144)  Set Rate: 12 BPM (12/30/22 1528)  Vt Set: 350 mL (12/30/22 0722)  Pressure Support: 14 cmH20 (12/30/22 1139)  PEEP/CPAP: 6 cmH20 (12/30/22 1528)  Oxygen Concentration (%): 30 (01/09/23 0800)  Peak Airway Pressure: 22 cmH20 (12/30/22 1528)  Plateau Pressure: 20 cmH20 (12/30/22 1528)  Total Ve: 9.73 L/m (12/30/22 1528)  Negative  Inspiratory Force (cm H2O): 0 (12/30/22 1528)  F/VT Ratio<105 (RSBI): (!) 58.05 (12/30/22 1528)  Lines/Drains/Airways       Central Venous Catheter Line  Duration             Percutaneous Central Line Insertion/Assessment - Triple Lumen  12/29/22 0043 right internal jugular 11 days              Drain  Duration                  Urethral Catheter 01/04/23 0009 Non-latex 16 Fr. 5 days         Rectal Tube 01/05/23 2130 rectal tube w/ balloon (indicate number of mLs) 3 days                  Significant Labs:    CBC/Anemia Profile:  Recent Labs   Lab 01/08/23  0312 01/09/23  0212   WBC 9.45 8.75   HGB 7.6* 7.0*   HCT 24.7* 22.7*    251   MCV 85 84   RDW 14.0 14.1          Chemistries:  Recent Labs   Lab 01/08/23 0312 01/09/23  0212   * 135*   K 4.3 4.0    102   CO2 27 26   BUN 15 15   CREATININE 0.5 0.5   CALCIUM 8.0* 8.1*   MG 2.1 2.2   PHOS 2.8 3.0       All pertinent labs within the past 24 hours have been reviewed.    Significant Imaging:  I have reviewed all pertinent imaging results/findings within the past 24 hours.      ABG  Recent Labs   Lab 01/05/23  1132   PH 7.389   PO2 41   PCO2 63.5*   HCO3 38.4*   BE 13     Assessment/Plan:     Neuro  Stenosis, cervical spine  Noted on MRI spine. Of note patient has been experiencing episodes of vertigo and decreased balance which could be related.   -NSGY consulted, appreciate recs  -if need for reintubation should be done with fiberoptic or glidescope  -concern that this could be contributing to her current respiratory muscle weakness  -appreciate neurology and nsgy involvement  -per NSGY, if laminectomy to be done, would prefer patient to have tracheostomy prior to their intervention  -will continue to monitor for improvement and re-discuss with NSGY 1/10-1/11    Pulmonary  * Shortness of breath  Patient presented with approx 3 weeks of worsening shortness of breath, weakness, and falls. On transfer to ICU required intubation. Was extubated to BIPAP.  Have been unable to wean off bipap due to persistent shortness of breath, tachypnea, and inadequate volumes. Trial off bipap results in tachypnea, tachycardia, and hypertension. Appears weak distally and with fine motor skills. Unclear etiology at this time. Does not appear pulmonary in nature as patient is saturating well, ABGs have been wnl. MRI C-spine showed severe cervical stenosis, NSGY was consulted, unlikely that the stenosis would be causing these issues. Concern that difficulty breathing could be related to diaphragm weakness. Critical illness myopathy seems unlikely as patient was not intubated for long duration.  -neurology consulted, myasthenia panel sent  -further labs ordered (ESR, CRP, DARLINE, myositis panel)  -continue bipap, alternate with HFNC as tolerated  -palliative care consulted for long term planning  -started IVIG on 1/5 for 5 day course  -discussing with neurology and nsgy about possible relation to cervical stenosis  -NSGY considering intervention, prefer if tracheostomy is done prior to any surgery; will continue to discuss    Respiratory distress  See shortness of breath    Acute hypoxemic respiratory failure  Patient with Hypoxic Respiratory failure which is Acute.  she is not on home oxygen. Supplemental oxygen was provided and noted- Oxygen Concentration (%):  [30-40] 30.   Signs/symptoms of respiratory failure include- tachypnea, increased work of breathing and respiratory distress. Contributing diagnoses includes - Pleural effusion and atelectasis Labs and images were reviewed. Patient Has recent ABG, which has been reviewed. Will treat underlying causes and adjust management of respiratory failure as follows- unclear etiology of pleural effusions, patient notes recent URI 3 weeks ago that has left her short of breath since then, on broad spectrum abx  Repeat CXR with increased consolidation in LLL  Extubated to Bipap  1/3: CT Chest - Bibasilar atelectatic/consolidative change (left  greater than right) with patchy right basilar opacities and small bilateral pleural effusions.  Correlation for underlying infectious process advised.  ID following, linezolid to treat for HAP to end 1/9  See shortness of breath    Cardiac/Vascular  Right renal artery stenosis  -no acute intervention while in ICU  -see HTN    Hyperlipemia  Continue home statin    Essential hypertension  Known history of uncontrolled HTN, w/ labile BP since admit.   -BP meds held in setting of septic shock  -will avoid IV meds if possible as has had hypotension following IV BP meds  -renal artery duplex showing R renal artery stenosis, L renal artery unable to be visualized  -Will monitor and add back meds as tolerated, IV metoprolol when unable to tolerate PO  -of note plasma metanephrines came back elevated in both 24 hour urine collections; discussed with radiology and they do not see any major abnormalities around adrenals; have consulted endocrine; likelihood of extra-adrenal pheo is low, will check repeat urine metanephrines per endocrine.  -while tolerating PO will do losartan and coreg; labetalol IV for SBP >180    Dyspnea on exertion  Began prior to admission. Has been requiring 2L via NC to maintain oxygenation  -O2 via NC PRN    Renal/  Metabolic alkalosis  Initially could have been related to diuretic use. Bicarb remains elevated after stopping HCTZ.    Hyponatremia  Patient started having weakness around 3 weeks ago after possible viral illness, also has had decreased appetite and more recently experiencing shortness of breath.  Sodium of 120 on admit, has been fluctuating since, now around 116-117. UOSM 802, HANH 109  Has become increasingly lethargic since admission, concern due to hyponatremia  Likely hypovolemic hyponatremia in setting of diuretic use, decreased intake and possible GI loses    -nephrology consulted, appreciate recs  -RESOLVED  -BMP q12    Endocrine  Hypothyroidism (acquired)  -continue  levothyroxine    Moderate malnutrition  Nutrition consulted. Most recent weight and BMI monitored-   On TPN; replete lytes and monitor for re-feeding syndrome  Malnutrition (Moderate to Severe)  Energy Intake (Malnutrition): less than 75% for greater than 7 days  Measurements:  Wt Readings from Last 1 Encounters:   01/05/23 59.4 kg (130 lb 15.3 oz)   Body mass index is 22.48 kg/m².  Recommendations: Recommendation/Intervention: 1.  Goals: Meet % EEN, EPN by RD f/u date  Patient has been screened and assessed by RD. RD will follow patient.      GI  Dysphagia  SLP eval if able to tolerate off bipap long enough    Orthopedic  Abscess of bursa of right hip  CT scan showing possible hematoma vs abscess around right hip. Aspiration done with IR. Culture with e.coli pan-sensitive. Repeat CT showing fluid has reaccumulated.   -ID following  -Has been on zosyn, switch to cefepime per ID  -ortho consulted- recs for repeating drainage w/ IR  -f/u repeat cultures and fluid analysis with NGTD  -abx to end 1/9      Other  Hypothermia  Considering new onset will check BCx and start zosyn. Using bear hugger for warming.    Generalized weakness  See shortness of breath       Critical Care Daily Checklist:    A: Awake: RASS Goal/Actual Goal:    Actual: Manning Agitation Sedation Scale (RASS): Alert and calm   B: Spontaneous Breathing Trial Performed? Spon. Breathing Trial Initiated?: Not initiated (12/29/22 0729)   C: SAT & SBT Coordinated?  n/a                      D: Delirium: CAM-ICU Overall CAM-ICU: Negative   E: Early Mobility Performed? Yes   F: Feeding Goal: Goals: Meet % EEN, EPN by RD f/u date  Status: Nutrition Goal Status: goal met   Current Diet Order   Procedures    Diet full liquid      AS: Analgesia/Sedation none   T: Thromboembolic Prophylaxis yes   H: HOB > 300 Yes   U: Stress Ulcer Prophylaxis (if needed) no   G: Glucose Control yes   B: Bowel Function Stool Occurrence: 1   I: Indwelling Catheter  (Lines & Rush) Necessity yes   D: De-escalation of Antimicrobials/Pharmacotherapies yes    Plan for the day/ETD PT/OT/SLP wean bipap as tolerated    Code Status:  Family/Goals of Care: Full Code         Critical secondary to Patient has a condition that poses threat to life and bodily function: Severe Respiratory Distress      Critical care was time spent personally by me on the following activities: development of treatment plan with patient or surrogate and bedside caregivers, discussions with consultants, evaluation of patient's response to treatment, examination of patient, ordering and performing treatments and interventions, ordering and review of laboratory studies, ordering and review of radiographic studies, pulse oximetry, re-evaluation of patient's condition. This critical care time did not overlap with that of any other provider or involve time for any procedures.     Beto Avelar MD  Critical Care Medicine  [facility] - Cardiac Medical ICU

## 2023-01-09 NOTE — SUBJECTIVE & OBJECTIVE
Interval HPI:   Overnight events:  No acute events reported overnight    PMH, PSH, FH, SH updated and reviewed     ROS:  Unable to obtain due to: Continuous BiPAP    Labs Reviewed and Include:  BASELINE Creatinine:   [unfilled]  [unfilled]  [unfilled]  Recent Labs   Lab 01/09/23  0212      CALCIUM 8.1*   *   K 4.0   CO2 26      BUN 15   CREATININE 0.5     Lab Results   Component Value Date    HGBA1C 5.4 12/23/2022       Nutritional status:   Body mass index is 22.48 kg/m².  Lab Results   Component Value Date    ALBUMIN 3.4 (L) 12/27/2022    ALBUMIN 3.2 (L) 12/26/2022    ALBUMIN 3.1 (L) 12/25/2022     Lab Results   Component Value Date    PREALBUMIN 10 (L) 01/05/2023       Estimated Creatinine Clearance: 90.4 mL/min (based on SCr of 0.5 mg/dL).    POCT Glucose results:    Current Insulin Regimen:       PHYSICAL EXAMINATION:  Vitals:    01/09/23 0701   BP: (!) 167/76   Pulse: 105   Resp: (!) 22   Temp: 98.6 °F (37 °C)     Body mass index is 22.48 kg/m².    Physical Exam  Constitutional:       Appearance: She is ill-appearing.   Cardiovascular:      Pulses: Normal pulses.   Pulmonary:      Comments: Tachypneic  Abdominal:      General: Abdomen is flat.   Neurological:      General: No focal deficit present.      Mental Status: She is alert.   Psychiatric:         Mood and Affect: Mood normal.         Behavior: Behavior normal.

## 2023-01-09 NOTE — PT/OT/SLP EVAL
Speech Language Pathology Evaluation  Bedside Swallow    Patient Name:  Dominique Mcgee   MRN:  [id number]  Admitting Diagnosis: Shortness of breath    Recommendations:                 General Recommendations:  Dysphagia therapy  Diet recommendations:  Full liquids    Aspiration Precautions:   1 bite/sip at a time  Assistance with meals  HOB to 90 degrees  Meds crushed in puree  Small bites/sips  Strict aspiration precautions   General Precautions: Standard,    Communication strategies:  none    History:     Past Medical History:   Diagnosis Date    Hyperlipidemia     Hypertension     Hyponatremia 12/23/2022    Hypophosphatemia 12/30/2022    Hypothyroidism     Proctitis 12/29/2022    Severe sepsis 12/29/2022    Shock 12/29/2022       Past Surgical History:   Procedure Laterality Date    HYSTERECTOMY         Subjective     Patient awake and alert. Just completed PO/OT session.     Pain/Comfort:       Respiratory Status: High flow, flow 40 L/min, concentration 30%    Objective:     Oral Musculature Evaluation  Oral Musculature: general weakness  Dentition: present and adequate  Secretion Management: adequate  Mucosal Quality: good  Mandibular Strength and Mobility: WFL  Oral Labial Strength and Mobility: WFL  Buccal Strength and Mobility: WFL  Voice Prior to PO Intake: hoarse    Bedside Swallow Eval:   Consistencies Assessed:  Thin liquids via tsp cup and straw over 4oz  Puree x4      Oral Phase:   WFL    Pharyngeal Phase:   no overt clinical signs/symptoms of aspiration  no overt clinical signs/symptoms of pharyngeal dysphagia    Compensatory Strategies  None    Treatment: patients biggest limiting factor remains level of O2 needs.  Discussed strict aspiration precautions and aspiration risk.  Skilled education was provided to patient re: diet recs, standard aspiration precautions of which to follow, and ongoing ST plan of care.      Assessment:     Dominique Mcgee is a 72 y.o. female with an SLP diagnosis of  Dysphagia.      Goals:   Multidisciplinary Problems       SLP Goals          Problem: SLP    Goal Priority Disciplines Outcome   SLP Goal     SLP Ongoing, Progressing   Description: Speech Language Pathology Goals  Goals expected to be met by 1/10    1. Patient will tolerate a full liquid with no overt signs of airway compromise.                                Plan:     Patient to be seen:  4 x/week   Plan of Care expires:     Plan of Care reviewed with:  patient   SLP Follow-Up:  Yes       Discharge recommendations:  home health speech therapy   Barriers to Discharge:  None    Time Tracking:     SLP Treatment Date:   01/09/23  Speech Start Time:  0924  Speech Stop Time:  0935     Speech Total Time (min):  11 min    Billable Minutes: Eval Swallow and Oral Function 11    01/09/2023

## 2023-01-09 NOTE — PLAN OF CARE
Problem: SLP  Goal: SLP Goal  Description: Speech Language Pathology Goals  Goals expected to be met by 1/10    1. Patient will tolerate a full liquid with no overt signs of airway compromise.     Outcome: Ongoing, Progressing

## 2023-01-09 NOTE — PLAN OF CARE
CMICU DAILY GOALS       A: Awake    RASS: Goal -    Actual - RASS (Manning Agitation-Sedation Scale): 0-->alert and calm   Restraint necessity: Clinical Justification: Removing medical devices  B: Breathe   SBT: Not intubated   C: Coordinate A & B, analgesics/sedatives   Pain: managed    SAT: Not intubated  D: Delirium   CAM-ICU: Overall CAM-ICU: Negative  E: Early(intubated/ Progressive (non-intubated) Mobility   MOVE Screen: Pass   Activity: Activity Management: Rolling - L1  FAS: Feeding/Nutrition   Diet order: Diet/Nutrition Received: NPO,    T: Thrombus   DVT prophylaxis: VTE Required Core Measure: Pharmacological prophylaxis initiated/maintained  H: HOB Elevation   Head of Bed (HOB) Positioning: HOB at 30-45 degrees  U: Ulcer Prophylaxis   GI: yes  G: Glucose control   managed Glycemic Management: blood glucose monitored  S: Skin   Bathing/Skin Care: dressed/undressed, incontinence care, linen changed  Device Skin Pressure Protection: absorbent pad utilized/changed, adhesive use limited, skin-to-device areas padded, pressure points protected  Pressure Reduction Devices: pressure-redistributing mattress utilized  Pressure Reduction Techniques: weight shift assistance provided  Skin Protection: incontinence pads utilized, adhesive use limited, transparent dressing maintained, tubing/devices free from skin contact  B: Bowel Function   no issues   I: Indwelling Catheters   Rush necessity: [REMOVED]      Urethral Catheter 12/27/22 1535-Reason for Continuing Urinary Catheterization: Critically ill in ICU and requiring hourly monitoring of intake/output       Urethral Catheter 01/04/23 0009 Non-latex 16 Fr.-Reason for Continuing Urinary Catheterization: Critically ill in ICU and requiring hourly monitoring of intake/output   CVC necessity: Yes  D: De-escalation Antibiotics   No    Family/Goals of care/Code Status   Code Status: Full Code    24H Vital Sign Range  Temp:  [98.3 °F (36.8 °C)-99.1 °F (37.3 °C)]   Pulse:   []   Resp:  [12-42]   BP: (107-211)/()   SpO2:  [99 %-100 %]      Shift Events   No acute events throughout shift    VS and assessment per flow sheet, patient progressing towards goals as tolerated, plan of care reviewed with family, all concerns addressed, will continue to monitor.    Donte Lagunas

## 2023-01-09 NOTE — PT/OT/SLP RE-EVAL
Occupational Therapy  Co Re-evaluation    Name: Dominique Mcgee  MRN: [id number]  Admitting Diagnosis:  Shortness of breath  Pt was d/c from OT 1/5/23 due to medical instability.  New orders received.   Pt with severe cervical stenosis which may be etiology of SOB. Pt may undergo laminectomy but may need trach first. Medical team still making plan for upcoming procedures.     Recommendations:     Discharge Recommendations: home health OT  Currently HH rec; however, this may change pending medical and surgical interventions.     Assessment:     Dominique Mcgee is a 72 y.o. female with a medical diagnosis of Shortness of breath.    Performance deficits affecting function are impaired endurance, impaired self care skills, impaired functional mobility, gait instability, impaired balance, decreased upper extremity function, decreased lower extremity function, decreased coordination.    Pt tolerated session fairly well. Oxygenation remained stable; however persistent dizziness with EOB sitting with elevated BP noted.   Pt to benefit from cont OT to address stated goals.     Rehab Prognosis:  good ; patient would benefit from acute skilled OT services to address these deficits and reach maximum level of function.       Plan:     Patient to be seen 3 x/week to address the above listed problems via self-care/home management, therapeutic activities, therapeutic exercises  Plan of Care Expires: 01/23/23  Plan of Care Reviewed with: patient    Subjective     Pt agreeable to therapy.   Pt denies pain but does reports persistent dizziness EOB which resolved with return to supine.     Communicated with: giuliano prior to session.  Pain/Comfort:  Pain Rating 1: 0/10    Objective:     Communicated with: nsg prior to session.  Patient found supine in bed with comfort flow 40 L and 36%, galvez, BMS, tele, pulse ox and BP cuff.   Coreassessment completed this date to optimize functional performance and safety.     General Precautions:  Standard, fall    Occupational Performance:    Bed Mobility:    Supine>sit MIN A   Sit>supine with MIN A  Rolling right<>left with MIN A       Activities of Daily Living:  Self feeding and g/h skills in supported sitting with set-up  TOTAL A for UE/LE dressing.   TOTAL A for toileting as pt with galvez and BMS.     Unable to tolerated engagement with self care skills seated EOB due to dizziness and elevated BP.     Cognitive/Visual Perceptual  Pt awake, alert and following commands. Pt with pleasant mood/affect.     Physical Exam:  Pt demo shoulder strength 2/5, elbow 3/5 and Fair  strength.   Pt with intact coordination and sensation.     [facility] 6 Click:  [facility] Total Score: 11    Treatment & Education:  Pt tolerated sitting EOB approx 3 min with SBA for postural control. Oxygenation remained stable without SOB noted.   However, pt did report dizziness and BP elevated to 202/100. Pt with c/o of dizziness.   Pt returned supine with nsg notified of BP reading. Dizziness resolved. Rolling right<>left completed with MIN to adjust and place new linens.     Education provided re: OT POC and safety with functional mobility/ADl skills.       Patient left HOB elevated with all lines intact, call button in reach, and nsg notified    GOALS:   Multidisciplinary Problems       Occupational Therapy Goals          Problem: Occupational Therapy    Goal Priority Disciplines Outcome Interventions   Occupational Therapy Goal     OT, PT/OT Ongoing, Progressing    Description: Goals to be met by: 1/23/23    Patient will increase functional independence with ADLs by performing:    UE Dressing with MIN A   Grooming while seated with set-up  Toileting from [facility] with CGA  for hygiene and clothing management.   BSC transfer to toilet with CGA    Pt to tolerated OOB to chair x 2-3 hours daily to increase endurance for functional activity.                          History:     Past Medical History:   Diagnosis Date    Hyperlipidemia      Hypertension     Hyponatremia 12/23/2022    Hypophosphatemia 12/30/2022    Hypothyroidism     Proctitis 12/29/2022    Severe sepsis 12/29/2022    Shock 12/29/2022         Past Surgical History:   Procedure Laterality Date    HYSTERECTOMY         Time Tracking:     OT Date of Treatment: 01/09/23  OT Start Time: 0910  OT Stop Time: 0927  OT Total Time (min): 17 min    Billable Minutes:Re-eval 9  Therapeutic Activity 8    1/9/2023

## 2023-01-09 NOTE — ASSESSMENT & PLAN NOTE
71 yo F on whom general neurology is consulted for concerns for neuromuscular disease, diaphragmatic weakness or myopathy. Patient reports SOB and difficulty swallowing solids of 3 week duration. She is currently in the medical ICU for ongoing treatment of infectious processes, including recent hip abscess s/p IR aspiration, positive cultures with e Coli, on zosyn. MRI spine with severe cervical stenosis and some cord compression, without cord enhancement. She also has CXR with increased consolidation in LLL. She has had difficulty weaning off BiPAP.    Patient's SOB likely due to general critical illness. Myasthenia is unlikely given lack of bulbar symptoms on exam. Patient also has a normal diaphragmatic outline in the RLL on CXR. We don't suspect a neurological cause for this patient's SOB. However, given that there are few options available, empiric therapy with IVIG is considered. There is severe spinal stenosis C4-C5, and some signal abnormality at C3-C5, which may localize to patient's symptoms.     Case discussed with neurosurgery, who plan to follow patient with reevaluation mid-week to assess whether she can undergo surgical decompression for above.    Recommendations:  - Continue IVIG 400 mg/kg to completion  - recommending providing patient with incentive spirometry device to rehabilitate respiratory strength  - myasthenia gravis panel collected, results pending, low suspicion  - outpatient follow up with neurology recommended at discharge    Neurology will sign off, please call with questions or concerns

## 2023-01-09 NOTE — ASSESSMENT & PLAN NOTE
- Recent TFTs within normal limits but with TSH low-normal range in this 72-year-old patient  - On levothyroxine 50 mcg at home    - Agree with continuing home levothyroxine 25 mcg daily and rechecking TFTs in 6 weeks

## 2023-01-09 NOTE — PROGRESS NOTES
[facility] - Cardiac Medical ICU  Neurology  Progress Note    Patient Name: Dominique Mcgee  MRN: [id number]  Admission Date: 12/23/2022  Hospital Length of Stay: 16 days  Code Status: Full Code   Attending Provider: Luis Enrique Bishop MD  Primary Care Physician: Briana Leblanc MD   Principal Problem:Shortness of breath    HPI:   Ms. Mcgee is a 71 y/o F female on whom general neurology is consulted for concerns for neuromuscular disease, diaphragmatic weakness or myopathy. She has a PMHx of HTN, HLD, hypothyroidism, recent URI 3 weeks prior to admission, balance difficulites who presented to the ED on 12/23 for 3 weeks of weakness, LIRA, difficulty swallowing, decreased appetite, and constipation with intermittent lower abdominal cramping. Per daughter, patient is typically independent, lives alone, cooks her own meals, since she has been ill she has had decreased PO intake and drinking coffee, tea, and eating cereal. She has had progressive worsening HTN in the last 3 weeks and was started on losartan-HCTZ and metoprolol. Prior to this she was only taking lisinopril per daughter. Admitted for HTN emergency and was found to have Na 120. Received IV fluids and admitted to hospital medicine. For her hypernatremia, her thiazide was held and she was placed on fluid restriction. Urine studies from 12/25 showed Uosm of 802 and HANH of 109. Her BP has been labile since admission requiring both IV antihypertensives as well as fluid boluses. Patient transferred to ICU for continued lethargy and weakness. She became hypotensive requiring multiple pressors. Patient intubated for airway protection on 12/28. Sodium improved with hypertonic saline, now normalized. CT abdomen/pelvis showing proctitis and hip abscess. Went for IR aspiration of abscess. Culture showing e.coli, on zosyn. MRI brain non-concerning. MRI spine with severe cervical stenosis and cord compression, also with mild lumbar stenosis. Repeat CXR with increased LLL  consolidation and pleural effusion. Extubated to bipap on 12/30. Once extubated and off pressors patient began to re-experience labile blood pressures similar to her initial presentation. Started on cardene drip. Difficulty weaning off BIPAP due to inadequate volumes and persistent shortness of breath.      Overview/Hospital Course:  No notes on file      Subjective:     Interval History: Some improvement today, patient tolerated high flow off BiPAP. Still awaiting formal swallow evaluation. Case discussed with patient, family and team.    Past Medical History:   Diagnosis Date    Hyperlipidemia     Hypertension     Hyponatremia 12/23/2022    Hypophosphatemia 12/30/2022    Hypothyroidism     Proctitis 12/29/2022    Severe sepsis 12/29/2022    Shock 12/29/2022       Past Surgical History:   Procedure Laterality Date    HYSTERECTOMY         Review of patient's allergies indicates:   Allergen Reactions    Hydralazine analogues      Precipitous drop in BP with IV formulation. Avoid if possible    Shellfish containing products Swelling         No current facility-administered medications on file prior to encounter.     Current Outpatient Medications on File Prior to Encounter   Medication Sig    alendronate-vitamin D3 (FOSAMAX PLUS D) 70 mg- 2,800 unit per tablet Take 1 tablet by mouth every 7 days.    hydroCHLOROthiazide (HYDRODIURIL) 25 MG tablet Take 25 mg by mouth once daily.    levothyroxine (SYNTHROID) 50 MCG tablet Take 50 mcg by mouth once daily.    losartan (COZAAR) 50 MG tablet Take 50 mg by mouth once daily.    meloxicam (MOBIC) 15 MG tablet Take 15 mg by mouth once daily.    metoprolol succinate (TOPROL-XL) 50 MG 24 hr tablet Take 50 mg by mouth once daily.    simvastatin (ZOCOR) 20 MG tablet Take 20 mg by mouth every evening.     Family History       Problem Relation (Age of Onset)    Breast cancer Sister (55), Sister (65)          Tobacco Use    Smoking status: Never    Smokeless  tobacco: Not on file   Substance and Sexual Activity    Alcohol use: Not on file    Drug use: Never    Sexual activity: Not on file     Review of Systems   Unable to perform ROS: Acuity of condition   HENT:  Positive for trouble swallowing.    Respiratory:  Positive for shortness of breath.    Objective:     Vital Signs (Most Recent):  Temp: 99.1 °F (37.3 °C) (01/08/23 1100)  Pulse: 102 (01/08/23 1720)  Resp: (!) 36 (01/08/23 1720)  BP: (!) 172/88 (01/08/23 1720)  SpO2: 100 % (01/08/23 1720) Vital Signs (24h Range):  Temp:  [99.1 °F (37.3 °C)-99.8 °F (37.7 °C)] 99.1 °F (37.3 °C)  Pulse:  [] 102  Resp:  [20-44] 36  SpO2:  [99 %-100 %] 100 %  BP: (140-211)/() 172/88     Weight: 59.4 kg (130 lb 15.3 oz)  Body mass index is 22.48 kg/m².    Physical Exam  Vitals and nursing note reviewed.   Constitutional:       Appearance: She is ill-appearing.   HENT:      Head: Normocephalic and atraumatic.   Cardiovascular:      Rate and Rhythm: Normal rate and regular rhythm.      Pulses: Normal pulses.      Heart sounds: Normal heart sounds. No murmur heard.  Pulmonary:      Effort: Tachypnea present.      Breath sounds: Normal air entry. No wheezing or rales.   Abdominal:      General: Abdomen is flat. There is no distension.      Palpations: Abdomen is soft.      Tenderness: There is no abdominal tenderness.   Musculoskeletal:         General: No swelling.      Right lower leg: No edema.      Left lower leg: No edema.   Skin:     General: Skin is warm and dry.      Findings: No bruising or rash.   Neurological:      General: No focal deficit present.      Comments: Alert, following commands   Psychiatric:         Mood and Affect: Mood normal.         Behavior: Behavior normal.     Neurological Exam:  MENTAL STATUS  Level of consciousness: alert  Orientation: oriented to person, place, and time  Attention normal. Concentration normal.  Speech: reduced phonation 2/2 SOB    CRANIAL NERVES  CN II: Visual fields full to  confrontation  CN III, IV, VI: PERRL, EOMI  CN V: Facial sensation intact  CN VII: Facial expression symmetric and full  CN IX, X: Symmetric palate elevation. Phonation normal  CN XII: Tongue midline with normal movements, no atrophy    MOTOR EXAM  Muscle bulk: normal  Muscle tone: normal  Pronator drift: absent    Strength - Upper Extremities: 4/5    Strength - Lower Extremities: 4/5    REFLEXES   Biceps Brachioradialis Patellar Achilles   Right +2 +2 +2 +1   Left +2 +2 +2 +1       SENSORY EXAM  Light touch: intact in all 4 extremities    No bulbar weakness found with lateral neck rotation / flexion / extension. No oral fasciculation noted below the tongue. No ocular weakness noted.         Significant Labs: Hemoglobin A1c:   Recent Labs   Lab 12/23/22  1920   HGBA1C 5.4     Blood Culture: No results for input(s): LABBLOO in the last 48 hours.  BMP:   Recent Labs   Lab 01/07/23  0342 01/08/23  0312    105    133*   K 4.6 4.3    102   CO2 27 27   BUN 15 15   CREATININE 0.4* 0.5   CALCIUM 8.0* 8.0*   MG 2.3 2.1     CBC:   Recent Labs   Lab 01/07/23  0342 01/08/23  0312   WBC 8.96 9.45   HGB 7.7* 7.6*   HCT 24.9* 24.7*    266     CMP:   Recent Labs   Lab 01/07/23  0342 01/08/23  0312    105    133*   K 4.6 4.3    102   CO2 27 27   BUN 15 15   CREATININE 0.4* 0.5   CALCIUM 8.0* 8.0*   MG 2.3 2.1   ANIONGAP 8 4*     CSF Culture: No results for input(s): CSFCULTURE in the last 48 hours.  CSF Studies: No results for input(s): ALIQUT, APPEARCSF, COLORCSF, CSFWBC, CSFRBC, GLUCCSF, LDHCSF, PROTEINCSF, VDRLCSF in the last 48 hours.  Inflammatory Markers:   No results for input(s): SEDRATE, CRP, PROCAL in the last 48 hours.    POCT Glucose:   Recent Labs   Lab 01/08/23  0309 01/08/23  0932 01/08/23  1518   POCTGLUCOSE 106 120* 129*     Prealbumin:   No results for input(s): PREALBUMIN in the last 48 hours.    Respiratory Culture: No results for input(s): GSRESP, RESPIRATORYC in the  last 48 hours.  Urine Culture: No results for input(s): LABURIN in the last 48 hours.  Urine Studies:   No results for input(s): COLORU, APPEARANCEUA, PHUR, SPECGRAV, PROTEINUA, GLUCUA, KETONESU, BILIRUBINUA, OCCULTUA, NITRITE, UROBILINOGEN, LEUKOCYTESUR, RBCUA, WBCUA, BACTERIA, SQUAMEPITHEL, HYALINECASTS in the last 48 hours.    Invalid input(s): WRIGHTSUR    All pertinent lab results from the past 24 hours have been reviewed.    Significant Imaging: I have reviewed and interpreted all pertinent imaging results/findings within the past 24 hours.    Assessment and Plan:     * Shortness of breath  73 yo F on whom general neurology is consulted for concerns for neuromuscular disease, diaphragmatic weakness or myopathy. Patient reports SOB and difficulty swallowing solids of 3 week duration. She is currently in the medical ICU for ongoing treatment of infectious processes, including recent hip abscess s/p IR aspiration, positive cultures with e Coli, on zosyn. MRI spine with severe cervical stenosis and some cord compression, without cord enhancement. She also has CXR with increased consolidation in LLL. She has had difficulty weaning off BiPAP.    Patient's SOB likely due to general critical illness. Myasthenia is unlikely given lack of bulbar symptoms on exam. Patient also has a normal diaphragmatic outline in the RLL on CXR. We don't suspect a neurological cause for this patient's SOB. However, given that there are few options available, empiric therapy with IVIG is considered. There is severe spinal stenosis C4-C5, and some signal abnormality at C3-C5, which may localize to patient's symptoms.     Case discussed with neurosurgery, who plan to follow patient with reevaluation mid-week to assess whether she can undergo surgical decompression for above.    Recommendations:  - Continue IVIG 400 mg/kg to completion  - recommending providing patient with incentive spirometry device to rehabilitate respiratory strength  -  myasthenia gravis panel collected, results pending, low suspicion  - outpatient follow up with neurology recommended at discharge    Neurology will sign off, please call with questions or concerns          VTE Risk Mitigation (From admission, onward)         Ordered     enoxaparin injection 40 mg  Daily         12/23/22 1649     IP VTE HIGH RISK PATIENT  Once         12/23/22 1649     Place sequential compression device  Until discontinued         12/23/22 1649                Manuel Lizarraga MD  Neurology  [facility] - Cardiac Medical ICU   General Sunscreen Counseling: I recommended a broad spectrum sunscreen with a SPF of 15 or higher.  I explained that SPF 30 sunscreens block approximately 97 percent of the sun's harmful rays.  Sunscreens should be applied at least 15 minutes prior to expected sun exposure and then every 2 hours after that as long as sun exposure continues. If swimming or exercising sunscreen should be reapplied every 45 minutes to an hour after getting wet or sweating.  One ounce, or the equivalent of a shot glass full of sunscreen, is adequate to protect the skin not covered by a bathing suit. I also recommended a lip balm with a sunscreen as well. Sun protective clothing can be used in lieu of sunscreen but must be worn the entire time you are exposed to the sun's rays. Detail Level: Simple

## 2023-01-09 NOTE — ASSESSMENT & PLAN NOTE
- Profound hypertensive episodes with occasional hypotensive episodes requiring pressor support in the past; concern for pheochromocytoma/paraganglioma  - Urine metanephrines were drawn x2 and did return elevated though this may have been influenced by recent pressors  - Subsequent CT imaging of the adrenal glands did not show any adenoma or other structural abnormality  - Serum metanephrines drawn 01/08/2023 canceled by lab interface    - Will draw 24 hour urine metanephrines now for comparison to prior urine samples; patient off pressors several days  - If urine metanephrines return positive, patient may require DOTATATE scan to assess for potential paraganglioma as adrenal imaging has been negative  - As phenoxybenzamine and doxazosin are both oral medications which patient can not tolerate due to continuous BiPAP, phentolamine for alpha blockade and blood pressure control may be used; advise against solo beta-blockade as unopposed alpha adrenergic activity can cause hypertensive crisis if pheochromocytoma/paraganglioma

## 2023-01-09 NOTE — MEDICAL/APP STUDENT
Teaching Service Information    - Pheochromocytomas (pheos) and paragangliomas are catecholamine-secreting tumors from chromaffin cells of the adrenal medulla or the sympathetic ganglia  - These are rare neoplasms, occurring in less than 0.2% of patients with hypertension. Though pheos may occur at any age, they are most common in the 4th-5th decade and are equally common in males and females   - Most are sporadic   - However, approximately 40% are familial (which are more likely to be bilateral adrenal pheos or paragangliomas)  - Typically present at a younger age than sporadic neoplasms. There are several familial disorders associated with adrenal pheo, all of which have autosomal dominant inheritance: von Hippel-Lindau (VHL) syndrome (10-20%), multiple endocrine neoplasia type 2 (MEN2) (50%), and less commonly, neurofibromatosis type 1 (NF1) (2-3%)    Clinical Presentation  - Pheo is suggested by history/symptoms, a lipid-poor incidentaloma, or family history  - Symptoms present in 50% of patients and are typically paroxysmal  - Classic triad is episodic headache, sweating, and tachycardia. About 50% have paroxysmal HTN; the rest have either primary HTN or normal pressure (10%)  - Most patients with pheo do not have the three classic symptoms, and primary hypertension may have paroxysmal symptoms also  - Headache (90%) and generalized sweating (70%) are common  - On rare occasions, patients present with pheochromocytoma crisis with either hypertension or hypotension, hyperthermia (temperature >40°C), mental status changes, and other organ dysfunction  - Other signs and symptoms include orthostatic hypotension (which may reflect low plasma volume), visual blurring, papilledema, weight loss, polyuria, polydipsia, constipation, increased erythrocyte sedimentation rate, insulin resistance, hyperglycemia, leukocytosis, psychiatric disorders, and, rarely, secondary erythrocytosis due to overproduction of erythropoietin  "    - Rarely, pheo is associated with cardiomyopathy from catecholamine excess similar to stress-induced (takotsubo) cardiomyopathy. Patients may have pulmonary edema and may deteriorate with initiation of beta-blockade. Global or focal wall motion abnormalities may improve with surgical or medical treatment of the pheo    - Paroxysmal hypertension during procedures (eg, colonoscopy), anesthesia, surgery, with tyramine-containing foods, or with drugs (beta-blockers, TCAs, corticosteroids, metoclopramide or MAOIs) should undergo formal evaluation for pheo    - The abnormalities in carbohydrate metabolism that occur (insulin resistance, impaired fasting glucose, apparent type 2 diabetes mellitus) are directly related to the increase in catecholamine production. These resolve after surgery    - Symptoms are caused by hypersecretion of one or combinations of catecholamines: norepinephrine, epinephrine, and dopamine    - With widespread use of CT and genetic testing, many pheos are diagnosed in the presymptomatic stage    - When pheo is associated with MEN2, symptoms are present in only 50% of patients, and only 33% have hypertension    Tumor Characteristics  - 95% of catecholamine-secreting tumors are in the abdomen, 85-90% of which are intraadrenal (pheo), and 5-10% are multiple    - 10% of all catecholamine-secreting tumors are metastatic. Malignant pheos are histologically and biochemically similar to benign pheos; the only reliable clue to malignant pheo is local invasion into surrounding tissues and organs (eg, kidney, liver) or distant metastases, which may occur as long as 53 years after resection  - Thus, even when pheos or paragangliomas are considered "benign" on pathology, long-term follow-up is indicated in all patients  - Patients with the succinate dehydrogenase (SDH) subunit B mutations are more likely to develop metastatic disease    Initial Evaluation  - Diagnosis is made with biochemical confirmation " "of catecholamine hypersecretion, followed by imaging  - However, labs can be normal in an asymptomatic patient with a lipid-poor adrenal incidentaloma as these may be discovered in their "prebiochemical" phase. In these cases, imaging phenotype guides management   - Even if pheo is suspected, the diagnosis is rarely confirmed    - Indications for testing: The classic triad of headache, sweating, and tachycardia (whether or not they have hypertension), hyperadrenergic spells (self-limited episodes of nonexertional palpitations, diaphoresis, headache, tremor, or pallor, hypertension at a young age (eg, <20 years), resistant hypertension, or hypertension with new-onset or atypical diabetes mellitus (eg, new onset of apparent type 2 diabetes in a slender person), a familial syndrome that predisposes to catecholamine-secreting tumors (eg, multiple endocrine neoplasia type 2 [MEN2], neurofibromatosis type 1 [NF1], or von Hippel-Lindau [VHL]), family history of pheochromocytoma, lipid-poor (unenhanced CT attenuation >10 HU) adrenal incidentaloma with or without hypertension, pressor response during anesthesia, surgery, or angiography, idiopathic dilated cardiomyopathy, or history of gastric stromal tumor or pulmonary chondromas (the Rodriguez triad)    - Although it is preferred that patients not receive any medication during the diagnostics, treatment with all antihypertensive medications may be continued  - Tricyclic antidepressants interfere most frequently with plasma fractionated metanephrines and 24-hour urinary catecholamines and metabolites and should be tapered and discontinued at least two weeks prior to labs    - Catecholamine secretion may be appropriately increased with physical stress or illness (eg, hospitalization, stroke, MI, CHF, MARV). Therefore, circumstances under which catecholamines and metanephrines are measured must be assessed in each case    - Levodopa is the most common and only pharmacotherapeutic " agent that causes markedly abnormal levels of dopamine    Diagnosis (algorithm 1)    - If low index of suspicion, obtain 24-hour urinary fractionated catecholamines and metanephrines with urinary creatinine  - The most reliable method (sensitivity = 98%, specificity = 98%)    - If high index of suspicion, obtain plasma fractionated metanephrines  - Useful to rule out pheo, but a positive test (ie, plasma normetanephrine above the upper limit of the reference range) only slightly increases suspicion of disease when screening for sporadic pheo  - Although plasma metanephrines have sensitivity of 96%, specificity is poor at 85%. It is estimated that 97% of patients with hypertension who have positive plasma metanephrines will not have a pheochromocytoma    - For any of the biochemical tests, sensitivity will be lower and specificity will be higher for hereditary compared with sporadic pheochromocytoma because hereditary tumors tend to be small and release catecholamines often too low to be detected  - In contrast, sporadic pheochromocytomas tend to be larger and present with typical signs and symptoms of catecholamine excess    - Urinary or plasma dopamine or plasma methoxytyramine may be useful in detecting the rare tumor with selective dopamine hypersecretion because plasma metanephrine fractions are not direct metabolites of dopamine and may be normal in the setting of a dopamine-secreting tumor    - If results are normal, no further evaluation is necessary except for patients being evaluated for spells in which case testing should be repeated during a spell  - Start 24-hour urine collection when they have a typical spell by recalling back to when last urination occurred before the spell and collect to that time the next day. If results still normal on repeat testing, then investigate other causes    - A positive test for catecholamine-secreting tumor includes:    - 24-hour urine fractionated metanephrines and  catecholamines:  - Normetanephrine greater than 900 mcg/24 hours or metanephrine greater than 400 mcg/24 hours  - Norepinephrine greater than 170 mcg/24 hours  - Epinephrine greater than 35 mcg/24 hours  - Dopamine greater than 700 mcg/24 hours    - Plasma fractionated metanephrines:  - Depends on the method used to obtain blood sample:  - For indwelling cannula, for 20 minutes following an overnight fast before the blood draw, the diagnostic cutoffs to exclude pheochromocytoma are metanephrine less than 0.3 nmol/L and/or normetanephrine less than 0.66 nmol/L  - For venipuncture in a seated, ambulant, nonfasting patient, the cutoffs to exclude pheochromocytoma are slightly higher: metanephrine less than 0.5 nmol/L and/or normetanephrine less than 0.9 nmol/L  - Pheochromocytoma will synthesize epinephrine, conversely paragangliomas will only secrete norepinephrine and dopamine, almost never epinephrine  - Mildly elevated plasma normetanephrine can be observed in patients with essential hypertension or MAVR    - If biochemical confirmation, the next step is imaging to locate the tumor on imaging  - If indeterminate test results (ie, above the upper limit of the reference interval for the laboratory but below the threshold for positive case detection), additional testing is based upon the clinical index of suspicion for pheo  - If high index of suspicion (family history, incidentally discovered adrenal mass with imaging characteristics consistent with pheochromocytoma), obtain (or repeat) plasma fractionated metanephrines  - If the initial sample was a seated plasma fractionated metanephrines, it is reasonable to obtain a supine sample  - If low index of suspicion (eg, resistant hypertension, self-limited episodes of nonexertional palpitations, diaphoresis, headache, tremor, or pallor), obtain (or repeat) 24-hour urinary fractionated catecholamines, metanephrines, and creatinine    Approaches for specific patient groups  "(table 2)    Low index of suspicion:  - Spells (defined as a sudden onset of a symptom or symptoms that are recurrent, self-limited, and stereotypic in nature) initial screening with 24-hour urinary fractionated metanephrines as they are not at high risk for pheochromocytoma. The 24-hour urinary fractionated metanephrines are similar in sensitivity to plasma fractionated metanephrines but higher in specificity. If initial results are normal, testing should be repeated during a spell  - Other possible causes of "spells" that may be confused with pheochromocytoma include hyperthyroidism, menopausal symptoms, idiopathic flushing disorder (eg, unexplained flushing spells), carbohydrate intolerance, hyperadrenergic spells, labile primary hypertension (formerly called "essential" hypertension), renovascular disease, hypoglycemia, postural orthostatic tachycardia syndrome (POTS), mast cell disease, and carcinoid syndrome     - Adrenal incidentalomas - If non-contrast CT is less than 10 HU, biochemical testing for pheochromocytoma is not needed. If greater than 10 HU, measure 24-hour urinary fractionated metanephrines and catecholamines. Approximately 3% of adrenal incidentalomas prove to be pheochromocytomas    - If biochemical testing of adrenal mass more than 2 cm is negative, a functioning adrenal pheochromocytoma is excluded. However, small adrenal pheochromocytomas (<2 cm in diameter) may not be biochemically detectable. Adrenal incidentalomas should be followed with both imaging and repeat biochemical testing    - Paroxysmal hypertension - Patients with paroxysmal hypertension or refractory hypertension should be evaluated with 24-hour urinary fractionated metanephrines. If normal, no further testing is needed, while if the results are significantly elevated, imaging with CT or MRI is indicated.     - Paroxysmal hypertension is a classic sign of pheochromocytoma, but patients with this rarely have pheo. Its presence " commonly initiates an aggressive evaluation to uncover a pheochromocytoma; after a pheochromocytoma has been excluded, the condition usually remains undiagnosed, thereby resulting in ineffective therapy. This disorder, named pseudopheochromocytoma or hyperadrenergic spells, may be due to stress or emotional distress that is only uncovered after careful psychological evaluation    - Increased sympathetic activity - Sympathetic activity is also increased in several conditions other than pheochromocytoma. As an example, abrupt discontinuation of a short-acting sympathetic antagonist drug (such as clonidine or propranolol) can lead to severe hypertension and coronary ischemia, probably due to upregulation of sympathetic receptors during the period of sympathetic blockade. When this obvious cause and effect relationship is evident, testing for pheochromocytoma is not needed.   - Increased sympathetic activity causing severe hypertension can also occur in Autonomic dysfunction (as in the Guillain-Barré syndrome or post-spinal cord injury), stress response after CABG or during a panic reaction, use of sympathomimetic drugs, such as cocaine, amphetamines, phencyclidine, epinephrine, phenylephrine, terbutaline, or the combination of a monoamine oxidase inhibitor (MAOI) and the ingestion of tyramine-containing foods.   - Tyramine is normally inactivated by monoamine oxidase in the intestinal tract. This inactivation does not occur in the presence of an MAOI, leading to the absorption of tyramine, which increases the release of norepinephrine from nerve endings and epinephrine from the adrenal gland. The ensuing hypertensive reaction is dose dependent and can be exacerbated if the patient is also taking a sympathomimetic drug  -The three major MAOIs are tranylcypromine, phenelzine, and isocarboxazid. The following foods contain relatively high concentrations of tyramine and should be avoided by patients being treated with an  "MAOI: fermented cheeses; imported beer; [place] and some other vasyl; champagne; soy sauce; avocados; bananas; overripe or spoiled food; and any fermented, smoked, or aged fish or meat (such as salami, pepperoni, and bologna)     High index of suspicion:  - Familial pheochromocytoma - (high-risk familial syndromes such as MEN2 and VHL syndrome, previously surgically resected pheochromocytomas or paragangliomas) obtain plasma fractionated metanephrines . A normal value excludes pheo but mildly elevated values of normetanephrine could be falsely positive, in which case do 24-hour urinary fractionated metanephrines, catecholamines, and imaging (algorithm 1).    Special situations  - Renal failure - Urinary catecholamines and metabolites may be invalid if the patient has advanced kidney disease. Serum chromogranin A levels have poor diagnostic specificity in these patients. In patients without pheochromocytoma who are receiving hemodialysis, plasma norepinephrine and dopamine are increased threefold and twofold above the upper limit of normal, respectively. However, standard reference ranges can be used for interpreting plasma epinephrine concentrations  - Therefore, when patients with renal failure have plasma norepinephrine more than threefold above upper normal limit or epinephrine greater than upper normal limit, pheo should be suspected    - Factitious pheochromocytoma - The patient usually has a medical background and the patient may "spike" the 24-hour urine container or the catecholamines may be administered systemically    Additional Evaluation after Biochemical Diagnosis    - Although any paraganglionic tissue may be involved, the most common extra-adrenal locations of catecholamine-secreting paragangliomas are the superior and inferior abdominal paraaortic areas (75% of extra-adrenal tumors); the urinary bladder (10%); the thorax (10%); and the skull base, neck, and pelvis (5%)    - CT or MRI of the abdomen " "and pelvis is performed first. Both detect almost all sporadic symptomatic tumors because most are 3 cm or larger in diameter (99% sensitive, but only 70% specific because of the higher prevalence of adrenal "incidentalomas"). In patients with MEN2, CT may miss 25% of tumors    - Imaging phenotype of pheo/paragangliomas: Increased attenuation on nonenhanced CT (most are >20 Hounsfield units [HU]), increased mass vascularity, delay in contrast medium washout (10 minutes after administration of contrast, an absolute contrast medium washout of less than 50%), high signal intensity on T2-weighted MRI, cystic and hemorrhagic changes, variable size and may be bilateral    - If abdominal and pelvic CT or MRI is negative in the presence of clinical and biochemical evidence of pheochromocytoma and the diagnosis is still considered likely, a total body nuclear imaging study may be indicated such as Ga-68 DOTATATE PET (most sensitive), FDG-PET, or iobenguane I-123 scintigraphy  - Total body nuclear imaging is also indicated in patients with large (eg, >8 cm) adrenal pheochromocytomas (increased risk of metastatic disease) or paraganglioma (increased risk of multiple tumors and malignancy)    Procedures to avoid  - Selective adrenal venous sampling (AVS) may result in inappropriate adrenalectomy  - When AVS for catecholamines was done in patients without pheo, epinephrine and norepinephrine was significantly higher in the right versus the left adrenal vein (up to 83-fold difference for epinephrine). Thus AVS should not be used in the investigation of adrenal pheo  - Image-guided needle biopsy - Image-guided needle biopsy of suspected pheo or paraganglioma should be avoided due to a high rate of biopsy-related complications      Genetic testing   - Genetic testing should be considered in all patients with pheo or paraganglioma. Although most catecholamine-secreting tumors are sporadic, approximately 40% are part of a familial " disorder. These are more likely bilateral adrenal pheos or paragangliomas. Genetic testing is usually done postop after pathologic diagnosis    Pheo in Pregnancy  - Pheo is a rare cause of hypertension during pregnancy, with features similar to the general population. However, the supine position during pregnancy may allow the gravid uterus to compress the tumor, causing paradoxical supine hypertension with normal blood pressure in the sitting or erect position. Furthermore, if hypertension and proteinuria occur, pheo may be difficult to distinguish from preeclampsia  - Unrecognized and untreated pheochromocytoma is associated with a 27-fold higher risk of maternal or fetal complications    Treatment  - Start medical therapy with alpha-blockade (either alpha-1 selective with doxazosin or nonselective with phenoxybenzamine) for least 7 days preoperatively to normalize blood pressure and expand the contracted intravascular space, followed, if necessary, by beta-blockade to control hypertension (including preventing a hypertensive crisis during surgery), tachycardia, and promote volume expansion    - A longer duration of preoperative alpha-blockade is indicated if recent MI, catecholamine cardiomyopathy, refractory HTN, or catecholamine-induced vasculitis    - Blood pressure should be monitored twice daily in the outpatient setting with the patient in the seated and standing positions. Target blood pressure is low-normal blood pressure for age (eg, less than 120/80 mmHg seated), with systolic blood pressure greater than 90 mmHg (standing); (essentially increase dose until orthostatic hypotension occurs)    - On day two of alpha-blockade, start a high sodium diet (>5000 mg daily) because of the catecholamine-induced volume contraction and the orthostasis associated with alpha-blockade  - This degree of volume expansion may be contraindicated in patients with congestive heart failure or renal insufficiency    - After  adequate alpha-adrenergic blockade has been achieved, beta-blockade is initiated, which typically occurs two to three days preoperatively. The beta-blocker should never be started first, because blockade of vasodilatory peripheral beta-adrenergic receptors with unopposed alpha-adrenergic receptor stimulation can lead to a further elevation in blood pressure     - Be especially  cautious if patient is asthmatic or has congestive heart failure. Chronic catecholamine excess can produce a cardiomyopathy that may become evident with the initiation of beta-adrenergic blockade, resulting in acute pulmonary edema. Therefore, when beta-blocker is administered, it should be used cautiously and at a low dose  - As an example, a patient may be given 10 mg of propranolol orally every six hours on the first day of beta-adrenergic blockade. Another option is to start with low-dose metoprolol (eg, 12.5 mg twice daily)  - On the second day, the beta-blockade (assuming the patient tolerates the drug) is converted to a single, long-acting dose  - The dose is then increased as necessary to control tachycardia (goal heart rate is 60 to 80 beats per minute). Typical maximum doses in this setting are 120 mg of propranolol or 200 mg of metoprolol    Adrenalectomy  - Localized adrenal pheochromocytoma -- Minimally invasive adrenalectomy can be safely performed for pheo in more than 90% of cases. Key to success is a high volume endocrine surgeon. Inexperience can lead to critical errors in management; for example, intraoperative tumor capsule rupture with seeding of the retroperitoneum and creating an incurable situation    - Familial pheochromocytoma -- Patients with familial pheochromocytomas (eg, MEN2, VHL) have a high incidence of bilateral disease. Cortical-sparing bilateral adrenalectomy (partial adrenalectomy) may be considered to prevent permanent glucocorticoid deficiency because some familial pheos have less metastatic potential  -  The management of patients with MEN2 and VHL differs:  - MEN2 has bilateral disease in 30% and contralateral disease develops in 50% with unilateral disease within 10 years. For these reasons, when bilateral adrenal pheos >2 cm in diameter are present, consideration of complete bilateral adrenalectomy is recommended for MEN2 patients  - If partial adrenalectomies are performed for bilateral pheo in MEN2, patient should be informed about the risk of recurrence (3% in the remnant gland occurring 6-13 years after partial adrenalectomy) and need for annual long-term follow-up. Patients with MEN2 have a high incidence of paroxysmal attacks and a higher prevalence of hypertension and other cardiovascular problems than do patients with VHL  - VHL has less diffuse medullary disease, and cortical-sparing bilateral adrenalectomy is an option when bilateral disease is present. However, partial adrenalectomy potentially leaves residual adrenal medullary tissue behind, thus increasing the risk of recurrent pheo   - Some variants of VHL (VHL type 2A) have high malignancy rates, and cortical-sparing adrenalectomy should be avoided. Patients with VHL should be referred for genetic counseling/screening for specific mutations  - If bilateral adrenalectomy is planned preoperatively, patient should receive glucocorticoid stress coverage while awaiting transfer to the operating room    Surgical Outcomes   - Complications of surgery for pheo are primarily due to severe preoperative hypertension, high secretion tumors, or repeat intervention for recurrence  - Complication rates are lower with laparoscopic as compared with open surgery    - Acute hypertensive crises -- In spite of preoperative medical preparation, hemodynamic instability may still occur during surgical resection of a pheochromocytoma. Higher preop plasma norepinephrine concentration, larger tumor size (>4 cm), and more pronounced postural blood pressure fall after  alpha-blockade (>10 mmHg) correlated with intraoperative hypertensive events    - Treatment options for hypertensive crises include IV sodium nitroprusside, phentolamine, or nicardipine    Other complications  - Cardiac arrhythmias should be managed with lidocaine or esmolol  - Postop hypotension can be avoided by adequate fluid replacement and hypoglycemia (which can occur in 10% of patients due to removal of catecholamine suppression of insulin secretion) by glucose infusion (monitor glucose for the first 1-2 days postop)  - After tumor removal, catecholamine secretion should fall to normal in 6-8 days    Prognosis   - Surgical removal of a pheo does not always lead to long-term cure. Recurrence is more likely in patients with familial pheochromocytoma or familial paraganglioma, right adrenal tumors, and extra-adrenal tumors  - Long-term monitoring is recommended in all patients, even those apparently cured, as recurrences may occur years later. Most patients should have annual lab screening    Metastatic pheo  - There are no curative treatments for metastatic pheochromocytoma, unless the sites of disease are surgically resectable. We suggest resection with intent to cure, which may improve symptoms and possibly survival  - Risk of malignancy is higher for paragangliomas than for pheos, especially if the patient has a mutation in succinate dehydrogenase subunit B (SHDB) [63]. Risk is also higher with larger tumors and some forms of variant VHL      - Radioactive iodine (I131) attached to the MIBG molecule produces iobenguane I-131, which functions as a semi-selective agent for malignant pheo or paraganglioma. This treatment is only an option for the approximately 60% of tumors that take up MIBG as determined by iobenguane I-123 (diagnostic) scintigraphy  - Less dopamine-secreting paragangliomas take up iobenguane I-123    - Pheos and extra-adrenal paragangliomas express somatostatin receptors similar to other  neuroendocrine tumors. Patients whose metastatic tumors express somatostatin receptors may benefit from therapy with radiolabeled somatostatin analogs, such as 177Lu-DOTATATE    - Patients with unresectable malignant pheo or paraganglioma may benefit from temozolomide with or without olaparib as a second-line therapy to cyclophosphamide plus vincristine plus dacarbazine    - One of the unique features of pheo/paraganglioma is a slow therapeutic response, particularly to radiation therapy (RT). They regress slowly (and usually partially) after RT, and successfully treated tumors demonstrate residual masses, the presence of which does not necessarily indicate treatment failure. Paragangliomas are vascular tumors, and the malignant cells constitute only a small part of the tumor mass; it is thought that the vascular elements constitute the bulk of the tumor undergoing fibrosis after treatment. Stabilization or reduction in tumor size, decreased enhancement, and reduced T2 signal intensity on magnetic resonance imaging (MRI) indicate local control     - Cromogranin A (CgA) is co-stored and co-released with catecholamines from chromaffin cells. Because of limited specificity, CgA is not useful for diagnosis. However, as with other neuroendocrine tumors, serum CgA correlates with disease burden, and serial measurement is mostly used to monitor response to treatment     Prognosis  - The prognosis is variable. Long-term survival is possible even in the presence of distant metastatic disease, but five-year survival rates are ?50 percent. Long-term surveillance should be conducted to assess for malignancy    Esoteric Points of Interest    - Mild elevations of metanephrines above upper limit (less than 2 times) are common and are generally attributed to enhanced sympathoadrenergic tone (anxiety/stress) or use of norepinephrine reuptake inhibitors (some SNRIs and cocaine)    - Unlike most other solid organ tumors, the  histopathology of pheochromocytoma does not reliably predict malignancy meaning that patients require lifelong follow-up with regular assessment of urine metanephrines.  If genetic cause for pheochromocytoma is found (20% of pheochromocytoma and up to 40% of paraganglioma) then MRI imaging every 2-3 years would be indicated in addition to biochemical follow-up. Ki-67 index greater than 3% is more suggestive of higher grade activity

## 2023-01-09 NOTE — PT/OT/SLP RE-EVAL
Physical Therapy Co-Reevaluation and Co-Treatment  Patient Name:  Dominique Mcgee   MRN:  [id number]  Admit Date: 12/23/2022  Admitting Diagnosis:  Shortness of breath   Length of Stay: 17 days  Recent Surgery: * No surgery found *      Hospital Course:    Pt presents for re-evaluation on this date. Pt transferred to ICU on 12/28 for respiratory weakness, intubated 12/29. Extubated 1/3.   Please refer to PT initial evaluation for PLOF and social history.  Recommendations:     Discharge Recommendations:  home health PT   Discharge Equipment Recommendations: other (see comments) (tbd pending medical plan)   Barriers to discharge:  pending medical plan    Plan:     During this hospitalization, patient to be seen 3 x/week to address the identified rehab impairments via gait training, therapeutic activities, neuromuscular re-education, therapeutic exercises and progress towards the established goals.  Plan of Care Expires:  02/08/23  Plan of Care Reviewed with: patient    Assessment:     Dominique Mcgee is a 72 y.o. female admitted with a medical diagnosis of Shortness of breath. Pt tolerated re-evaluation well today. Pt with potential for laminectomy 2/2 severe cervical stenosis. However multiple medical teams debating best course of action for airway protection at this time.  Upon PT assessment pt demo'ed good functional strength and able to sit EOB without assist. Pt however limited by persistent dizziness and worsening hypertension while EOB prompting return to supine. SPO2 remained in high 90s throughout mobility. Pt will continue to benefit from skilled PT services during this hospital admit to continue to improve transfer ability and efficiency as well as continue to progress pt's ambulation distance and cardiopulmonary endurance to maximize pt's functional independence and return to PLOF.     Problem List: weakness, impaired endurance, impaired functional mobility, impaired self care skills, gait instability,  impaired balance, impaired cognition, decreased upper extremity function, decreased lower extremity function, decreased safety awareness, pain, impaired coordination, impaired fine motor, impaired cardiopulmonary response to activity.  Rehab Prognosis: Good; patient would benefit from acute skilled PT services to address these deficits and reach maximum level of function.      Goals:   Multidisciplinary Problems       Physical Therapy Goals          Problem: Physical Therapy    Goal Priority Disciplines Outcome Goal Variances Interventions   Physical Therapy Goal     PT, PT/OT Ongoing, Progressing     Description: PT goals until 1/23/23    1. Pt supine to sit with mod independent-not met  2. Pt sit to stand with or without RW as needed for safety with minimal assistance- not met  3. Pt to perform gait 20ft with or without RW as needed for safety with minimal assistance.-not met  4. Pt will perform bed <> chair transfer with Minimal Assistance using RW or LRAD - not met  5. Pt to perform B LE exs in sitting or supine x 10 reps to strengthen B LE to improve functional mobility.-not met                        Subjective     RN notified prior to session. No family/friends present upon PT entrance into room. Patient agreeable to PT re-evaluation    Chief Complaint: c/o dizziness while sitting EOB  Patient/Family Comments/goals: get stronger  Pain/Comfort:  Pain Rating 1: 0/10  Pain Rating Post-Intervention 1: 0/10    Objective:     Additional staff present: OT; OT for co-evaluation due to suspected patient need for two skilled therapists to appropriately assess patient's functional deficits as well as ensure patient safety, accommodate for limited activity tolerance, and provide appropriate, skilled assistance to maximize functional potential during evaluation.    Patient found HOB elevated with: telemetry, pulse ox (continuous), PureWick, central line, oxygen, galvez catheter, bowel management system     General  "Precautions: Standard, Cardiac fall   Orthopedic Precautions:N/A   Braces: N/A   Respiratory Status: Comfort Flow 36% & 40L  Vitals: BP (!) 179/81   Pulse 99   Temp 98 °F (36.7 °C) (Axillary)   Resp (!) 35   Ht 5' 4" (1.626 m)   Wt 59.4 kg (130 lb 15.3 oz)   SpO2 100%   Breastfeeding No   BMI 22.48 kg/m²      Exam:  Cognition:   Alert and Cooperative  AxOx4  Command following: Follows multistep verbal commands  Fluency: clear/fluent  Hearing: Intact  Vision:  Intact  Skin Integrity: Visible skin intact  Posture: slouched posture, rounded shoulders, and forward head  Physical Exam:    Left UE Left LE Right UE Right LE   Edema absent absent absent absent   ROM AROM WFL AROM WFL AROM WFL AROM WFL   Strength within normal limits 4/5 within normal limits 4/5   Sensation intact to light touch intact to light touch intact to light touch intact to light touch   Coordination normal normal normal normal     Outcome Measures:  AM-PAC 6 CLICK MOBILITY  Turning over in bed (including adjusting bedclothes, sheets and blankets)?: 3  Sitting down on and standing up from a chair with arms (e.g., wheelchair, bedside commode, etc.): 2  Moving from lying on back to sitting on the side of the bed?: 3  Moving to and from a bed to a chair (including a wheelchair)?: 2  Need to walk in hospital room?: 1  Climbing 3-5 steps with a railing?: 1  Basic Mobility Total Score: 12     Functional Mobility:    Bed Mobility:  Supine to Sit: minimum assistance; from Rt side of bed  Scooting anteriorly to EOB to have both feet planted on floor: contact guard assistance  Sit to Supine: minimum assistance; to Rt side of bed    Sitting Balance at Edge of Bed:  Static Sitting Balance: Good- : able to accept minimal resistance  Dynamic Sitting Balance: Good- : able to sit unsupported and weight shift across midline minimally  Assistance Level Required: Stand-by Assistance  Time: ~3 minutes  Postural deviations noted: slouched posture and rounded " shoulders  Comments: Time EOB focused primarily on tolerance to upright positioning, cardiopulmonary response and endurance for activities, and strength of postural musculature to perform dynamic sitting to prepare for tasks in the home. Pt with c/o consistent dizziness while EOB which did not improve. Hypertension worsening while EOB increasing from 180/83 at rest to 205/93 after sitting EOB 3 minutes.      Transfers/Gait: Deferred due to pt's performance with above listed functional mobility     Education:  Time provided for education, counseling and discussion of health disposition in regards to patient's current status  All questions answered within PT scope of practice and to patient's satisfaction  PT role in POC to address current functional deficits  Pt educated on proper body mechanics, safety techniques, and energy conservation with PT facilitation and cueing throughout session  Whiteboard updated with therapist name and pt's current mobility status documented above    Patient left HOB elevated with all lines intact, call button in reach, and RN notified.    Time Tracking:     PT Received On: 01/09/23  PT Start Time: 0911     PT Stop Time: 0927  PT Total Time (min): 16 min     Billable Minutes: Re-eval 8 minutes and Therapeutic Exercise 8 minutes    Raeann Trujillo, PT, DPT  1/9/2023  Pager: [phone]

## 2023-01-09 NOTE — PLAN OF CARE
Discharge Recommendation: Rehab - pending medical plans    Re-evaluation completed today. PT goals appropriate.    Please continue Progressive Mobility Protocol as appropriate.    Raeann Trujillo PT, DPT  1/9/2023  Pager: [phone]      Problem: Physical Therapy  Goal: Physical Therapy Goal  Description: PT goals until 1/23/23    1. Pt supine to sit with mod independent-not met  2. Pt sit to stand with or without RW as needed for safety with minimal assistance- not met  3. Pt to perform gait 20ft with or without RW as needed for safety with minimal assistance.-not met  4. Pt will perform bed <> chair transfer with Minimal Assistance using RW or LRAD - not met  5. Pt to perform B LE exs in sitting or supine x 10 reps to strengthen B LE to improve functional mobility.-not met   Outcome: Ongoing, Progressing

## 2023-01-09 NOTE — PROGRESS NOTES
The pt is a 72 yof who presented to the ED on 12/23 with shortness of breath & weakness. Additional pertinent findings included: chronic weakness & impaired gait, dysphagia with difficulty taking solid food for several weeks, hyponatremia, hypertension, & back pain upon presentation. The pt was admitted to hospital medicine. Her condition worsened on 12/28 prompting transfer to the ICU & intubation on 12/29 due to decreased level of alertness. MRI cervical spine revealed spondylosis with myelopathy. The pt was extubated 12/31. Since extubation, she has exhibited respiratory muscles weakness & has been dependent on non-invasive ventilation.    Acute respiratory failure.   Persistent need for NIV post extubation 12/31. NIF & VC was unable to be measured by RT today because pt was unable to come off NIV to do the measurement. Cough strength is severely impaired with audible mucus in the lower airways that pt is unable to clear with cough. Pt exhibits hypophonia. Pt exhibits use of SCMs to augment respiratory effort. 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 Cervical myelopathy may be a major contributing factor. Myasthenia gravis panel collected, results pending, low suspicion per neurology. Pt is completing an empiric course of IVIG.  Alternating between NIV & NC.   Impaired mucus clearance is likely a major contributing factor. Administering cough assist.  Chest imaging notable for basilar atelectatic changes L>R.   Pt has completed a course of treatment for pneumonia.  Cervical spondylosis with myelopathy. Appreciate re-evaluation from neurosurgery.  Right hip fluid collection. Initial culture positive for e coli though cell count was not suggestive of septic arthritis. Received 12 days of abx therapy. No bacteria identified on repeat drainage. Abx therapy completed per ID recommendation.  HTN.   Not adequately controlled. Losartan restarted yesterday. BP remains above goal. BP  control complicated by limited ability to take PO. Start scheduled doses of IV labetalol.  Urine metanephrines mildly elevated in patient with hypertension. No abnormal adrenal findings on CT. Extra-adrenal pheochromocytoma is extremely rare & is therefore unlikely in this patient (though not ruled out). Endocrinology has evaluted the patient & recommended serum metanephrine test & repeat 24-hour urine as the next step in evaluating. These tests are in process (plasma metanephrine is in process from 12/27 prior to pressor administration).  Dysphagia. Currently NPO. Consulted SLP for repeat evaluation. Supporting with TPN.  Hypothyroidism. Administering levothyroxine.  Renal artery stenosis. There is 60-79% stenosis in the Right Renal Artery. Left renal artery was not visualized due to excessive bowel gas. Follow up for re-evaluation as an outpt; this is not a high priority problem at this time.  Hyponatremia. Improved with cessation of hctz, fluid administration, & nutritional support alone. This suggests that impaired oral intake may have been the primary cause. Do not restart hctz or chlorthalidone.     Critical Care Time: 70 minutes  Critical care was time spent personally by me on the following activities: evaluating this patient's organ dysfunction, development of treatment plan, discussing treatment plan with patient or surrogate and bedside caregivers, discussions with consultants, evaluation of patient's response to treatment, examination of patient, ordering and performing treatments and interventions, ordering and review of laboratory studies, ordering and review of radiographic studies, re-evaluation of patient's condition. This critical care time did not overlap with that of any other provider or involve time for any procedures.

## 2023-01-09 NOTE — CHAPLAIN
Palliative Care     Patient: Dominique Mcgee       MRN: [id number]  : 1950  Age: 72 y.o.  Hospital Length of Stay: 17 days  Code Status: Full Code   Attending Provider: Delfino Chen MD  Principal Problem: Shortness of breath    Present during Interview:  Visited pall med pt again today, hadn't see since late last week. Pt was alone, respiratory was in and out.    Non-clinical observations:  Pt was awake, alert, welcomed me in and she is no longer on bi-pap.    Feelings/[place]/Family  (Emotions/Fears/Experiences/Coping):      Pt showed joyful and thankful tears about her progression and her love for God, hearing her prayers; pt quoted scription; provided compassionate presence and listening ear as  held her hand. Pt shared how grateful she is to her mom who introduced her to [place] and The Lord. Pt emotional.    Pt welcomed me to pray with her again and was appreciative of the visit and the prayer. She asked me to write my name down in her notebook and I did so.     Future (Spiritual Care Plan of Action):  Palliative  will continue to follow. Lord, in your mercy.    In Peace,  Rev. Nehal Lopez MBA, MDiv   [phone]

## 2023-01-09 NOTE — ASSESSMENT & PLAN NOTE
CT scan showing possible hematoma vs abscess around right hip. Aspiration done with IR. Culture with e.coli pan-sensitive. Repeat CT showing fluid has reaccumulated.   -ID following  -Has been on zosyn, switch to cefepime per ID  -ortho consulted- recs for repeating drainage w/ IR  -f/u repeat cultures and fluid analysis with NGTD  -abx to end 1/9

## 2023-01-09 NOTE — CARE UPDATE
Aspiration of fluid collection results reviewed: Cultures remain negative. No drain in place anymore. No orthopedic surgical intervention. FU with PCP.

## 2023-01-09 NOTE — CONSULTS
[facility]  Endocrinology  Consult Note    Consult Requested by: Delfino Chen MD   Reason for admit: Shortness of breath    HISTORY OF PRESENT ILLNESS:  72 year old female with hypertension, hyperlipidemia, hypothyroidism, and balance difficulites who presents with hypertensive emergency and hyponatremia of 120. Urine studies indicated SIADH and volume restriction was instituted but hyponatremia persisted and she became increasingly lethargic and weak with hypotension requiring ICU transfer. She required multiple pressors and was briefly intubated for airway protection. Sodium improved with hypertonic saline. Once extubated and off pressors patient began to re-experience labile blood pressures similar to her initial presentation. Started on cardene drip. Difficulty weaning off BIPAP due to inadequate volumes and persistent shortness of breath.  - She is reportedly independent, lives alone, cooks her own meals, since she has been ill she has had decreased PO intake and drinking coffee, tea, and eating cereal. She has had progressive worsening hypertension in the last 3 weeks and was started on losartan-HCTZ and metoprolol. Prior to this she was only taking lisinopril per daughter     - CT abdomen/pelvis 01/02/2023 showing proctitis and hip abscess. The spleen, pancreas and adrenal glands are unremarkable. Went for IR aspiration of abscess. Culture showing e.coli, on zosyn  - MRI brain non-concerning. MRI spine with severe cervical stenosis and cord compression, also with mild lumbar stenosis.    - Endocrinology consulted for elevated metanephrines on urine collection and evaluation for potential pheochromocytoma versus paraganglioma    Regarding Hypertension:    Lab Results   Component Value Date    LABCORT 37.80 (H) 12/28/2022     Lab Results   Component Value Date    ACTH 7 01/02/2023     Lab Results   Component Value Date    ALDOSTERONE 10.8 12/28/2022    ALDOSTERONE 7.0 12/23/2022     ALDOSTERONE 7.4 12/23/2022    LABRENI 1.2 12/23/2022     Lab Results   Component Value Date    NORMETANEPHR 1907 (H) 01/01/2023    NORMETANEPHR 994 (H) 12/28/2022    TOTMETAURINE 2511 (H) 01/01/2023    TOTMETAURINE 1366 (H) 12/28/2022       Medications and/or Treatments Impacting Glycemic Control:  Immunotherapy:    Immunosuppressants       None          Steroids:   Hormones (From admission, onward)      Start     Stop Route Frequency Ordered    12/28/22 2339  vasopressin (PITRESSIN) 20 unit/mL injection        Note to Pharmacy: Created by cabinet override    12/29 1144   12/28/22 2339          Pressors:    Autonomic Drugs (From admission, onward)      Start     Stop Route Frequency Ordered    12/29/22 0129  rocuronium 10 mg/mL injection        Note to Pharmacy: Created by cabinet override    12/29 1344   12/29/22 0129            Medications Prior to Admission   Medication Sig Dispense Refill Last Dose    alendronate-vitamin D3 (FOSAMAX PLUS D) 70 mg- 2,800 unit per tablet Take 1 tablet by mouth every 7 days.   12/22/2022    hydroCHLOROthiazide (HYDRODIURIL) 25 MG tablet Take 25 mg by mouth once daily.   12/22/2022    levothyroxine (SYNTHROID) 50 MCG tablet Take 50 mcg by mouth once daily.   12/22/2022    losartan (COZAAR) 50 MG tablet Take 50 mg by mouth once daily.   12/22/2022    meloxicam (MOBIC) 15 MG tablet Take 15 mg by mouth once daily.   12/22/2022    metoprolol succinate (TOPROL-XL) 50 MG 24 hr tablet Take 50 mg by mouth once daily.   12/22/2022    simvastatin (ZOCOR) 20 MG tablet Take 20 mg by mouth every evening.   12/22/2022       Current Facility-Administered Medications   Medication Dose Route Frequency Provider Last Rate Last Admin    albuterol-ipratropium 2.5 mg-0.5 mg/3 mL nebulizer solution 3 mL  3 mL Nebulization Q6H Delfino Chen MD        capsaicin 0.025 % cream   Topical (Top) BID Hope Jean DO   Given at 01/08/23 2115    ceFEPIme (MAXIPIME) 1 g in dextrose 5 % in water (D5W) 5 % 50  mL IVPB (MB+)  1 g Intravenous Q8H Nicole Drew MD   Stopped at 01/09/23 0244    dextrose 10 % infusion   Intravenous Continuous PRN Beto Avelar MD        dextrose 10% bolus 125 mL 125 mL  12.5 g Intravenous PRN Lexis Purdy MD        dextrose 10% bolus 250 mL 250 mL  25 g Intravenous PRN Lexis Purdy MD        enoxaparin injection 40 mg  40 mg Subcutaneous Daily Hope Jean DO   40 mg at 01/08/23 1721    fat emulsion 20% infusion 250 mL  250 mL Intravenous Daily Beto Avelar MD 20.8 mL/hr at 01/09/23 0600 Rate Verify at 01/09/23 0600    glucagon (human recombinant) injection 1 mg  1 mg Intramuscular PRN Beto Avelar MD        glycerin adult suppository 1 suppository  1 suppository Rectal Once PRN Christal Cespedes MD        Immune Globulin G (IGG)-PRO-IGA 10 % injection (Privigen) 10 % injection 25 g  0.4 g/kg Intravenous Q24H Beto Avelar MD   Stopped at 01/08/23 2256    insulin aspart U-100 pen 1-10 Units  1-10 Units Subcutaneous Q4H PRN Beto Avelar MD        iohexol (OMNIPAQUE) oral solution 15 mL  15 mL Oral PRN Robin De La Torre MD        labetalol 20 mg/4 mL (5 mg/mL) IV syring  10 mg Intravenous Q6H PRN Beto Avelar MD        levothyroxine injection 25 mcg  25 mcg Intravenous Daily Beto Avelar MD   25 mcg at 01/08/23 0927    linezolid 600 mg/300 mL IVPB 600 mg  600 mg Intravenous Q12H Nicole Drew MD   Stopped at 01/09/23 0508    pantoprazole injection 40 mg  40 mg Intravenous Daily Marsha Arzate MD   40 mg at 01/08/23 0927    sodium chloride 0.9% flush 10 mL  10 mL Intravenous Q12H PRN Hope Jean DO        sodium chloride 3% nebulizer solution 4 mL  4 mL Nebulization Q6H Delfino Chen MD        TPN ADULT CENTRAL LINE CUSTOM   Intravenous Continuous Beto Avelar MD 40 mL/hr at 01/09/23 0600 Rate Verify at 01/09/23 0600       Interval HPI:   Overnight events:  No acute events reported  overnight    PMH, PSH, FH, SH updated and reviewed     ROS:  Unable to obtain due to: Continuous BiPAP    Labs Reviewed and Include:  BASELINE Creatinine:   [unfilled]  [unfilled]  [unfilled]  Recent Labs   Lab 01/09/23  0212      CALCIUM 8.1*   *   K 4.0   CO2 26      BUN 15   CREATININE 0.5     Lab Results   Component Value Date    HGBA1C 5.4 12/23/2022       Nutritional status:   Body mass index is 22.48 kg/m².  Lab Results   Component Value Date    ALBUMIN 3.4 (L) 12/27/2022    ALBUMIN 3.2 (L) 12/26/2022    ALBUMIN 3.1 (L) 12/25/2022     Lab Results   Component Value Date    PREALBUMIN 10 (L) 01/05/2023       Estimated Creatinine Clearance: 90.4 mL/min (based on SCr of 0.5 mg/dL).    POCT Glucose results:    Current Insulin Regimen:       PHYSICAL EXAMINATION:  Vitals:    01/09/23 0701   BP: (!) 167/76   Pulse: 105   Resp: (!) 22   Temp: 98.6 °F (37 °C)     Body mass index is 22.48 kg/m².    Physical Exam  Constitutional:       Appearance: She is ill-appearing.   Cardiovascular:      Pulses: Normal pulses.   Pulmonary:      Comments: Tachypneic  Abdominal:      General: Abdomen is flat.   Neurological:      General: No focal deficit present.      Mental Status: She is alert.   Psychiatric:         Mood and Affect: Mood normal.         Behavior: Behavior normal.     .     ASSESSMENT and PLAN:    Essential hypertension  - Profound hypertensive episodes with occasional hypotensive episodes requiring pressor support in the past; concern for pheochromocytoma/paraganglioma  - Urine metanephrines were drawn x2 and did return elevated though this may have been influenced by recent pressors  - Subsequent CT imaging of the adrenal glands did not show any adenoma or other structural abnormality  - Serum metanephrines drawn 01/08/2023 canceled by lab interface    - Will draw 24 hour urine metanephrines now for comparison to prior urine samples; patient off pressors several days. Even so this may not be  accurate given that patient is still under physiologic stress.  If not greater than 2 times upper limit of normal, will undertake outpatient evaluation.  - If urine metanephrines return greater than 2 times upper normal, patient may require DOTATATE scan to assess for potential paraganglioma as adrenal imaging has been negative  - Recommend for blood pressure control up-titrating losartan as tolerated and adding hydrochlorothiazide if necessary; may also consider nifedipine. Do not advise alpha blockade at this time until urine metanephrine result returns. Recommend stopping beta-blockade if concern for  pheochromocytoma/paraganglioma as this can precipitate alpha adrenergic crisis  - As urine metanephrines may take some time to return, if blood pressure remains controlled on above medications, can discharge from endocrine standpoint; no need to keep inpatient for potential pheochromocytoma/paraganglioma workup if blood pressure remains reasonably controlled    Hypothyroidism (acquired)  - Recent TFTs within normal limits but with TSH low-normal range in this 72-year-old patient  - On levothyroxine 50 mcg at home    - Agree with continuing home levothyroxine 25 mcg daily and rechecking TFTs in 6 weeks    Hyperlipemia  - Resume home simvastatin at discharge    Hyponatremia  - Sodium 135 this morning from 133 yesterday   - Continue to monitor      Enrike Power DO  [facility], [address]  [address] [place], [place], [address]    Office: [phone]  Fax: [phone]    Disclaimer: This note has been generated using voice-recognition software. There may be typographical errors that have been missed during proof-reading.    The above history labs imaging impression and plan were discussed with attending physician who is in agreement and also took part in this patient's care.  I personally reviewed all of the patients available medications, labs, imaging, vitals, allergies,  medical history.

## 2023-01-09 NOTE — SUBJECTIVE & OBJECTIVE
Interval History/Significant Events:   Feeling well this morning. Continuing to alternate bipap and HFNC.    Review of Systems   Unable to perform ROS: Acuity of condition   Respiratory:  Positive for shortness of breath.    Objective:     Vital Signs (Most Recent):  Temp: 98.6 °F (37 °C) (01/09/23 0701)  Pulse: 100 (01/09/23 0800)  Resp: (!) 23 (01/09/23 0800)  BP: (!) 149/67 (01/09/23 0800)  SpO2: 100 % (01/09/23 0800)   Vital Signs (24h Range):  Temp:  [98.3 °F (36.8 °C)-99.4 °F (37.4 °C)] 98.6 °F (37 °C)  Pulse:  [] 100  Resp:  [12-42] 23  SpO2:  [96 %-100 %] 100 %  BP: (107-211)/() 149/67   Weight: 59.4 kg (130 lb 15.3 oz)  Body mass index is 22.48 kg/m².      Intake/Output Summary (Last 24 hours) at 1/9/2023 0925  Last data filed at 1/9/2023 0900  Gross per 24 hour   Intake 1855.22 ml   Output 2000 ml   Net -144.78 ml         Physical Exam  Vitals and nursing note reviewed.   Constitutional:       General: She is not in acute distress.  HENT:      Head: Normocephalic and atraumatic.   Eyes:      Extraocular Movements: Extraocular movements intact.      Pupils: Pupils are equal, round, and reactive to light.   Cardiovascular:      Rate and Rhythm: Regular rhythm. Tachycardia present.      Pulses: Normal pulses.      Heart sounds: Normal heart sounds. No murmur heard.  Pulmonary:      Effort: Accessory muscle usage present.      Breath sounds: Decreased air movement present. No wheezing or rales.   Abdominal:      General: Abdomen is flat. There is no distension.      Palpations: Abdomen is soft.      Tenderness: There is no abdominal tenderness.   Musculoskeletal:         General: No swelling.      Right lower leg: No edema.      Left lower leg: No edema.   Skin:     General: Skin is warm and dry.      Findings: No bruising or rash.   Neurological:      General: No focal deficit present.      Mental Status: She is alert and oriented to person, place, and time.   Psychiatric:         Mood and Affect:  Mood normal.         Behavior: Behavior normal.       Vents:  Vent Mode: A/C (12/30/22 1528)  Ventilator Initiated: Yes (12/29/22 0144)  Set Rate: 12 BPM (12/30/22 1528)  Vt Set: 350 mL (12/30/22 0722)  Pressure Support: 14 cmH20 (12/30/22 1139)  PEEP/CPAP: 6 cmH20 (12/30/22 1528)  Oxygen Concentration (%): 30 (01/09/23 0800)  Peak Airway Pressure: 22 cmH20 (12/30/22 1528)  Plateau Pressure: 20 cmH20 (12/30/22 1528)  Total Ve: 9.73 L/m (12/30/22 1528)  Negative Inspiratory Force (cm H2O): 0 (12/30/22 1528)  F/VT Ratio<105 (RSBI): (!) 58.05 (12/30/22 1528)  Lines/Drains/Airways       Central Venous Catheter Line  Duration             Percutaneous Central Line Insertion/Assessment - Triple Lumen  12/29/22 0043 right internal jugular 11 days              Drain  Duration                  Urethral Catheter 01/04/23 0009 Non-latex 16 Fr. 5 days         Rectal Tube 01/05/23 2130 rectal tube w/ balloon (indicate number of mLs) 3 days                  Significant Labs:    CBC/Anemia Profile:  Recent Labs   Lab 01/08/23 0312 01/09/23 0212   WBC 9.45 8.75   HGB 7.6* 7.0*   HCT 24.7* 22.7*    251   MCV 85 84   RDW 14.0 14.1          Chemistries:  Recent Labs   Lab 01/08/23 0312 01/09/23 0212   * 135*   K 4.3 4.0    102   CO2 27 26   BUN 15 15   CREATININE 0.5 0.5   CALCIUM 8.0* 8.1*   MG 2.1 2.2   PHOS 2.8 3.0       All pertinent labs within the past 24 hours have been reviewed.    Significant Imaging:  I have reviewed all pertinent imaging results/findings within the past 24 hours.

## 2023-01-09 NOTE — ASSESSMENT & PLAN NOTE
- Profound hypertensive episodes with occasional hypotensive episodes requiring pressor support in the past; concern for pheochromocytoma/paraganglioma  - Urine metanephrines were drawn x2 and did return elevated though this may have been influenced by recent pressors  - Subsequent CT imaging of the adrenal glands did not show any adenoma or other structural abnormality  - Serum metanephrines drawn 01/08/2023 canceled by lab interface    - 24 hour urine metanephrines repeated for comparison to prior urine samples; patient off pressors several days. Even so this may not be accurate given that patient is still under physiologic stress.  If not greater than 2 times upper limit of normal, will undertake outpatient evaluation.  - If urine metanephrines return greater than 2 times upper normal, patient may require DOTATATE scan to assess for potential paraganglioma as adrenal imaging has been negative  - Recommend for blood pressure control up-titrating losartan as tolerated and adding hydrochlorothiazide if necessary; may also consider nifedipine. Do not advise alpha blockade at this time until urine metanephrine result returns. Recommend stopping beta-blockade if concern for  pheochromocytoma/paraganglioma as this can precipitate alpha adrenergic crisis  - As urine metanephrines may take some time to return, if blood pressure remains controlled on above medications, can discharge from endocrine standpoint; no need to keep inpatient for potential pheochromocytoma/paraganglioma workup if blood pressure remains reasonably controlled

## 2023-01-09 NOTE — PLAN OF CARE
Problem: Occupational Therapy  Goal: Occupational Therapy Goal  Description: Goals to be met by: 1/23/23    Patient will increase functional independence with ADLs by performing:    UE Dressing with MIN A   Grooming while seated with set-up  Toileting from BSC with CGA  for hygiene and clothing management.   BSC transfer to toilet with CGA    Pt to tolerated OOB to chair x 2-3 hours daily to increase endurance for functional activity.     Outcome: Ongoing, Progressing

## 2023-01-10 NOTE — PLAN OF CARE
CMICU DAILY GOALS       A: Awake    RASS: Goal -    Actual - RASS (Man[place] Agitation-Sedation Scale): 0-->alert and calm   Restraint necessity: Clinical Justification: Removing medical devices  B: Breathe   SBT: Not intubated   C: Coordinate A & B, analgesics/sedatives   Pain: managed    SAT: Not intubated  D: Delirium   CAM-ICU: Overall CAM-ICU: Negative  E: Early(intubated/ Progressive (non-intubated) Mobility   MOVE Screen: Fail   Activity: Activity Management: Arm raise - L1, Rolling - L1  FAS: Feeding/Nutrition   Diet order: Diet/Nutrition Received: full liquid,    T: Thrombus   DVT prophylaxis: VTE Required Core Measure: Pharmacological prophylaxis initiated/maintained  H: HOB Elevation   Head of Bed (HOB) Positioning: HOB at 30 degrees  U: Ulcer Prophylaxis   GI: no  G: Glucose control   managed Glycemic Management: blood glucose monitored  S: Skin   Bathing/Skin Care: dressed/undressed, incontinence care, linen changed  Device Skin Pressure Protection: absorbent pad utilized/changed, adhesive use limited, skin-to-skin areas padded, skin-to-device areas padded, pressure points protected  Pressure Reduction Devices: foam padding utilized, positioning supports utilized, specialty bed utilized  Pressure Reduction Techniques: frequent weight shift encouraged, pressure points protected, weight shift assistance provided  Skin Protection: adhesive use limited, incontinence pads utilized, tubing/devices free from skin contact, transparent dressing maintained, skin-to-skin areas padded, skin-to-device areas padded  B: Bowel Function   diarrhea   I: Indwelling Catheters   Rush necessity: [REMOVED]      Urethral Catheter 12/27/22 1535-Reason for Continuing Urinary Catheterization: Critically ill in ICU and requiring hourly monitoring of intake/output       Urethral Catheter 01/04/23 0009 Non-latex 16 Fr.-Reason for Continuing Urinary Catheterization: Critically ill in ICU and requiring hourly monitoring of  intake/output, Urinary retention   CVC necessity: Yes  D: De-escalation Antibiotics   No    Family/Goals of care/Code Status   Code Status: Full Code    24H Vital Sign Range  Temp:  [97.6 °F (36.4 °C)-98.6 °F (37 °C)]   Pulse:  []   Resp:  []   BP: (124-231)/()   SpO2:  [96 %-100 %]      Shift Events   Scheduled labetolol changed to q4h per MD to manage HTN. Continuing to collect 24 hr urine specimen. No acute events throughout shift.    VS and assessment per flow sheet, patient progressing towards goals as tolerated, plan of care reviewed with  Dominique Mcgee , all concerns addressed, will continue to monitor.    Ximena Sauceda

## 2023-01-10 NOTE — PROGRESS NOTES
[facility]  Critical Care Medicine  Progress Note    Patient Name: Dominique Mcgee  MRN: [id number]  Admission Date: 12/23/2022  Hospital Length of Stay: 18 days  Code Status: Full Code  Attending Provider: Delfino Chen MD  Primary Care Provider: Briana Leblanc MD   Principal Problem: Shortness of breath    Subjective:     HPI:  Ms. Mcgee is a 73 y/o F patient with HTN, HLD, hypothyroidism, recent URI 3 weeks prior to admission, balance difficulites who presented to the ED on 12/23 for 3 weeks of weakness, LIRA, decreased appetite, and constipation with intermittent lower abdominal cramping. Per daughter, patient is typically independent, lives alone, cooks her own meals, since she has been ill she has had decreased PO intake and drinking coffee, tea, and eating cereal. She has had progressive worsening HTN in the last 3 weeks and was started on losartan-HCTZ and metoprolol. Prior to this she was only taking lisinopril per daughter. Admitted for HTN emergency and was found to have Na 120. Received IV fluids and admitted to hospital medicine. For her hypernatremia, her thiazide was held and she was placed on fluid restriction. Urine studies from 12/25 showed Uosm of 802 and HANH of 109. Her BP has been labile since admission requiring both IV antihypertensives as well as fluid boluses. The patient's hyponatremia persisted despite fluid restriction and she became increasingly lethargic and weak so CCM was consulted for further management.      Upon evaluation, patient is somnolent. Opens eyes to voice, unable to engage in full conversation. Speaks mainly single words. Denies thirst. Reports SOB and generalized abdominal pain a/w constipation. No CP, N/V/D, dysuria, edema. Most recent vitals: /60, pulse 82, R 18, SpO2 94% on RA. Na trend 119->115->117.       Hospital/ICU Course:  Once transferred to ICU patient became hypotensive requiring multiple pressors. Patient intubated for airway  protection. Art line and central line placed. Sodium improved with hypertonic saline. CT abdomen/pelvis showing proctitis and hip abscess. Went for IR aspiration of abscess. Culture showing e.coli, on zosyn. MRI brain non-concerning. MRI spine with severe cervical stenosis and cord compression, also with mild lumbar stenosis. NSGY consulted with plans for outpatient follow-up. Repeat CXR with increased LLL consolidation and pleural effusion. Extubated to bipap. Once extubated and off pressors patient began to re-experience labile blood pressures similar to her initial presentation. Started on cardene drip. Difficulty weaning off BIPAP due to inadequate volumes and persistent shortness of breath. Extensive workup done regarding difficulties coming off BIPAP. Neurology consulted. ID was consulted for hip abscess- continued on zosyn. Repeat imaging concerning for pneumonia; started on linezolid. Ortho consulted for hip abscess that re-occurred on repeat imaging. Requested IR re-drain abscess and re-culture. Started on IVIG. Ongoing discussions with neurology and neurosurgery regarding cervical stenosis/cord injury as possible etiology of respiratory muscle weakness. Neurosurgery considering intervention but would prefer if patient received tracheostomy prior to laminectomy. Urine metanephrines came back elevated on two collections. Endocrinology consulted. Repeating urine metanephrines as first sets were collected during use of vasopressors.Also uptitrating blood pressure medications and using PO meds when able (off and on BiPAP).       Interval History/Significant Events:   Feeling well this morning. Continuing to alternate bipap and HFNC.    Review of Systems   Constitutional:  Negative for appetite change and fever.   HENT:  Negative for congestion and sinus pain.    Eyes:  Negative for discharge and redness.   Respiratory:  Positive for shortness of breath. Negative for cough.    Cardiovascular:  Negative for chest  pain and leg swelling.   Gastrointestinal:  Negative for diarrhea and nausea.   Endocrine: Negative for polydipsia and polyuria.   Genitourinary:  Negative for dysuria and hematuria.   Musculoskeletal:  Negative for arthralgias and myalgias.   Skin:  Negative for color change and rash.   Neurological:  Negative for weakness and numbness.   Psychiatric/Behavioral:  Negative for agitation and confusion.    Objective:     Vital Signs (Most Recent):  Temp: 97.4 °F (36.3 °C) (01/10/23 0700)  Pulse: 92 (01/10/23 0900)  Resp: (!) 23 (01/10/23 0900)  BP: (!) 165/70 (01/10/23 0900)  SpO2: 100 % (01/10/23 0900)   Vital Signs (24h Range):  Temp:  [97.4 °F (36.3 °C)-98.3 °F (36.8 °C)] 97.4 °F (36.3 °C)  Pulse:  [] 92  Resp:  [] 23  SpO2:  [99 %-100 %] 100 %  BP: (124-231)/() 165/70   Weight: 59.4 kg (130 lb 15.3 oz)  Body mass index is 22.48 kg/m².      Intake/Output Summary (Last 24 hours) at 1/10/2023 1008  Last data filed at 1/10/2023 0900  Gross per 24 hour   Intake 1278.52 ml   Output 1825 ml   Net -546.48 ml         Physical Exam  Vitals and nursing note reviewed.   Constitutional:       General: She is not in acute distress.  HENT:      Head: Normocephalic and atraumatic.   Eyes:      Extraocular Movements: Extraocular movements intact.      Pupils: Pupils are equal, round, and reactive to light.   Cardiovascular:      Rate and Rhythm: Regular rhythm. Tachycardia present.      Pulses: Normal pulses.      Heart sounds: Normal heart sounds. No murmur heard.  Pulmonary:      Effort: Accessory muscle usage present.      Breath sounds: Decreased air movement present. No wheezing or rales.      Comments: Shallow breathing  Abdominal:      General: Abdomen is flat. There is no distension.      Palpations: Abdomen is soft.      Tenderness: There is no abdominal tenderness.   Musculoskeletal:         General: No swelling.      Right lower leg: No edema.      Left lower leg: No edema.   Skin:     General: Skin is  warm and dry.      Findings: No bruising or rash.   Neurological:      General: No focal deficit present.      Mental Status: She is alert and oriented to person, place, and time.   Psychiatric:         Mood and Affect: Mood normal.         Behavior: Behavior normal.       Vents:  Vent Mode: A/C (12/30/22 1528)  Ventilator Initiated: Yes (12/29/22 0144)  Set Rate: 12 BPM (12/30/22 1528)  Vt Set: 350 mL (12/30/22 0722)  Pressure Support: 14 cmH20 (12/30/22 1139)  PEEP/CPAP: 6 cmH20 (12/30/22 1528)  Oxygen Concentration (%): 30 (01/10/23 0900)  Peak Airway Pressure: 22 cmH20 (12/30/22 1528)  Plateau Pressure: 20 cmH20 (12/30/22 1528)  Total Ve: 9.73 L/m (12/30/22 1528)  Negative Inspiratory Force (cm H2O):  (UNABLE TO PERFORM) (01/09/23 1030)  F/VT Ratio<105 (RSBI): (!) 58.05 (12/30/22 1528)  Lines/Drains/Airways       Central Venous Catheter Line  Duration             Percutaneous Central Line Insertion/Assessment - Triple Lumen  12/29/22 0043 right internal jugular 12 days              Drain  Duration                  Urethral Catheter 01/04/23 0009 Non-latex 16 Fr. 6 days         Rectal Tube 01/05/23 2130 rectal tube w/ balloon (indicate number of mLs) 4 days                  Significant Labs:    CBC/Anemia Profile:  Recent Labs   Lab 01/09/23 0212 01/10/23  0334   WBC 8.75 7.66   HGB 7.0* 7.3*   HCT 22.7* 23.4*    261   MCV 84 84   RDW 14.1 14.2          Chemistries:  Recent Labs   Lab 01/09/23  0212 01/10/23  0334   * 134*   K 4.0 3.9    105   CO2 26 25   BUN 15 17   CREATININE 0.5 0.5   CALCIUM 8.1* 8.1*   MG 2.2 2.3   PHOS 3.0 2.8       All pertinent labs within the past 24 hours have been reviewed.    Significant Imaging:  I have reviewed all pertinent imaging results/findings within the past 24 hours.      ABG  Recent Labs   Lab 01/05/23  1132   PH 7.389   PO2 41   PCO2 63.5*   HCO3 38.4*   BE 13     Assessment/Plan:     Neuro  Stenosis, cervical spine  Noted on MRI spine. Of note patient  has been experiencing episodes of vertigo and decreased balance which could be related.   -NSGY consulted, appreciate recs  -if need for reintubation should be done with fiberoptic or glidescope  -concern that this could be contributing to her current respiratory muscle weakness  -appreciate neurology and nsgy involvement  -per NSGY, if laminectomy to be done, would prefer patient to have tracheostomy prior to their intervention  -will continue to monitor for improvement and re-discuss with NSGY 1/10-1/11    Pulmonary  * Shortness of breath  Patient presented with approx 3 weeks of worsening shortness of breath, weakness, and falls. On transfer to ICU required intubation. Was extubated to BIPAP. Have been unable to wean off bipap due to persistent shortness of breath, tachypnea, and inadequate volumes. Trial off bipap results in tachypnea, tachycardia, and hypertension. Appears weak distally and with fine motor skills. Unclear etiology at this time. Does not appear pulmonary in nature as patient is saturating well, ABGs have been wnl. MRI C-spine showed severe cervical stenosis, NSGY was consulted, unlikely that the stenosis would be causing these issues. Concern that difficulty breathing could be related to diaphragm weakness. Critical illness myopathy seems unlikely as patient was not intubated for long duration.  -neurology consulted, myasthenia panel sent  -further labs ordered (ESR, CRP, DARLINE, myositis panel)  -continue bipap, alternate with HFNC as tolerated  -palliative care consulted for long term planning  -started IVIG on 1/5 for 5 day course  -discussing with neurology and nsgy about possible relation to cervical stenosis  -NSGY considering intervention, prefer if tracheostomy is done prior to any surgery; will continue to discuss and assess her daily changes in respiratory status    Respiratory distress  See shortness of breath    Acute hypoxemic respiratory failure  Patient with Hypoxic Respiratory  failure which is Acute.  she is not on home oxygen. Supplemental oxygen was provided and noted- Oxygen Concentration (%):  [30-40] 30  Resp Rate Total:  [21 br/min] 21 br/min.   Signs/symptoms of respiratory failure include- tachypnea, increased work of breathing and respiratory distress. Contributing diagnoses includes - Pleural effusion and atelectasis Labs and images were reviewed. Patient Has recent ABG, which has been reviewed. Will treat underlying causes and adjust management of respiratory failure as follows- unclear etiology of pleural effusions, patient notes recent URI 3 weeks ago that has left her short of breath since then, on broad spectrum abx  Repeat CXR with increased consolidation in LLL  Extubated to Bipap  1/3: CT Chest - Bibasilar atelectatic/consolidative change (left greater than right) with patchy right basilar opacities and small bilateral pleural effusions.  Correlation for underlying infectious process advised.  ID following, linezolid to treat for HAP to end 1/9  See shortness of breath    Cardiac/Vascular  Right renal artery stenosis  -no acute intervention while in ICU  -see HTN    Hyperlipemia  Continue home statin    Essential hypertension  Known history of uncontrolled HTN, w/ labile BP since admit.   -BP meds held in setting of septic shock  -will avoid IV meds if possible as has had hypotension following IV BP meds  -renal artery duplex showing R renal artery stenosis, L renal artery unable to be visualized  -Will monitor and add back meds as tolerated, IV metoprolol when unable to tolerate PO  -of note plasma metanephrines came back elevated in both 24 hour urine collections; discussed with radiology and they do not see any major abnormalities around adrenals; have consulted endocrine; likelihood of extra-adrenal pheo is low, will check repeat urine metanephrines per endocrine.  -while tolerating PO will do losartan; labetalol IV scheduled q4h on 1/9 due to NPO at some times  during the day precluding PO meds. Will try to uptitrate as able with PO meds.    Dyspnea on exertion  Began prior to admission. Has been requiring 2L via NC to maintain oxygenation  -O2 via NC PRN    Renal/  Metabolic alkalosis  Initially could have been related to diuretic use. Bicarb remains elevated after stopping HCTZ.    Hyponatremia  Patient started having weakness around 3 weeks ago after possible viral illness, also has had decreased appetite and more recently experiencing shortness of breath.  Sodium of 120 on admit, has been fluctuating since, now around 116-117. UOSM 802, HANH 109  Has become increasingly lethargic since admission, concern due to hyponatremia  Likely hypovolemic hyponatremia in setting of diuretic use, decreased intake and possible GI loses    -nephrology consulted, appreciate recs  -RESOLVED  -BMP q12    Endocrine  Hypothyroidism (acquired)  -continue levothyroxine    Moderate malnutrition  Nutrition consulted. Most recent weight and BMI monitored-   On TPN; replete lytes and monitor for re-feeding syndrome  Malnutrition (Moderate to Severe)  Energy Intake (Malnutrition): less than 75% for greater than 7 days  Measurements:  Wt Readings from Last 1 Encounters:   01/05/23 59.4 kg (130 lb 15.3 oz)   Body mass index is 22.48 kg/m².  Recommendations: Recommendation/Intervention: 1.  Goals: Meet % EEN, EPN by RD f/u date  Patient has been screened and assessed by RD. RD will follow patient.      GI  Dysphagia  SLP eval if able to tolerate off bipap long enough    Orthopedic  Abscess of bursa of right hip  CT scan showing possible hematoma vs abscess around right hip. Aspiration done with IR. Culture with e.coli pan-sensitive. Repeat CT showing fluid has reaccumulated.   -ID following  -Has been on zosyn, switch to cefepime per ID  -ortho consulted- recs for repeating drainage w/ IR  -f/u repeat cultures and fluid analysis with NGTD  -abx ended 1/9. Non-toxic after  discontinuation    Other  Hypothermia  Considering new onset will check BCx and start zosyn. Using bear hugger for warming.    Generalized weakness  See shortness of breath         Critical Care Daily Checklist:     A: Awake: RASS Goal/Actual Goal:    Actual: Manning Agitation Sedation Scale (RASS): Alert and calm   B: Spontaneous Breathing Trial Performed? Spon. Breathing Trial Initiated?: Not initiated (12/29/22 6629)   C: SAT & SBT Coordinated?  n/a                      D: Delirium: CAM-ICU Overall CAM-ICU: Negative   E: Early Mobility Performed? Yes   F: Feeding Goal: Goals: Meet % EEN, EPN by RD f/u date  Status: Nutrition Goal Status: goal met       Current Diet Order   Procedures    Diet full liquid       AS: Analgesia/Sedation none   T: Thromboembolic Prophylaxis yes   H: HOB > 300 Yes   U: Stress Ulcer Prophylaxis (if needed) no   G: Glucose Control yes   B: Bowel Function Stool Occurrence: 1   I: Indwelling Catheter (Lines & Rush) Necessity yes   D: De-escalation of Antimicrobials/Pharmacotherapies yes     Plan for the day/ETD PT/OT/SLP wean bipap as tolerated     Code Status:  Family/Goals of Care: Full Code          Critical secondary to Patient has a condition that poses threat to life and bodily function: Acute hypoxemic respiratory failure      Critical care was time spent personally by me on the following activities: development of treatment plan with patient or surrogate and bedside caregivers, discussions with consultants, evaluation of patient's response to treatment, examination of patient, ordering and performing treatments and interventions, ordering and review of laboratory studies, ordering and review of radiographic studies, pulse oximetry, re-evaluation of patient's condition. This critical care time did not overlap with that of any other provider or involve time for any procedures.     Connor M Gillies, MD  Critical Care Medicine  [facility] - Cardiac Medical ICU

## 2023-01-10 NOTE — NURSING
CMICU DAILY GOALS       A: Awake    RASS: Goal -    Actual - RASS (Manning Agitation-Sedation Scale): 0-->alert and calm   Restraint necessity: Clinical Justification: Removing medical devices  B: Breathe   SBT: Not intubated   C: Coordinate A & B, analgesics/sedatives   Pain: managed    SAT: Not intubated  D: Delirium   CAM-ICU: Overall CAM-ICU: Negative  E: Early(intubated/ Progressive (non-intubated) Mobility   MOVE Screen: Pass   Activity: Activity Management: Arm raise - L1, Rolling - L1  FAS: Feeding/Nutrition   Diet order: Diet/Nutrition Received: full liquid,    T: Thrombus   DVT prophylaxis: VTE Required Core Measure: Pharmacological prophylaxis initiated/maintained  H: HOB Elevation   Head of Bed (HOB) Positioning: HOB at 30 degrees  U: Ulcer Prophylaxis   GI: yes  G: Glucose control   managed Glycemic Management: blood glucose monitored  S: Skin   Bathing/Skin Care: dressed/undressed, incontinence care, linen changed  Device Skin Pressure Protection: absorbent pad utilized/changed  Pressure Reduction Devices: pressure-redistributing mattress utilized  Pressure Reduction Techniques: weight shift assistance provided  Skin Protection: incontinence pads utilized  B: Bowel Function   diarrhea   I: Indwelling Catheters   Rush necessity: [REMOVED]      Urethral Catheter 12/27/22 1535-Reason for Continuing Urinary Catheterization: Critically ill in ICU and requiring hourly monitoring of intake/output       Urethral Catheter 01/04/23 0009 Non-latex 16 Fr.-Reason for Continuing Urinary Catheterization: Critically ill in ICU and requiring hourly monitoring of intake/output   CVC necessity: Yes  D: De-escalation Antibiotics   Yes    Family/Goals of care/Code Status   Code Status: Full Code    24H Vital Sign Range  Temp:  [97.6 °F (36.4 °C)-99.4 °F (37.4 °C)]   Pulse:  []   Resp:  []   BP: (107-231)/()   SpO2:  [96 %-100 %]      Shift Events   No acute events throughout shift. Pt AxOx4 currently on  bipap. Able to tolerate the high flow nasal cannula for approximately 4 hours today. MD aware of hypertensive episodes. 24hr urine started at 1630.Safety precautions in place.     VS and assessment per flow sheet, patient progressing towards goals as tolerated, plan of care reviewed with patient, all concerns addressed, will continue to monitor.    Geetha Kirby

## 2023-01-10 NOTE — SUBJECTIVE & OBJECTIVE
Interval History/Significant Events:   Had shortness of breath this morning while on HFNC, switched back to BIPAP.    Review of Systems   Unable to perform ROS: Acuity of condition   Respiratory:  Positive for shortness of breath.    Objective:     Vital Signs (Most Recent):  Temp: 100.3 °F (37.9 °C) (01/11/23 1045)  Pulse: (!) 111 (01/11/23 1200)  Resp: (!) 31 (01/11/23 1200)  BP: (!) 169/77 (01/11/23 1236)  SpO2: 97 % (01/11/23 1200)   Vital Signs (24h Range):  Temp:  [97.8 °F (36.6 °C)-100.3 °F (37.9 °C)] 100.3 °F (37.9 °C)  Pulse:  [] 111  Resp:  [22-47] 31  SpO2:  [84 %-100 %] 97 %  BP: (124-201)/(56-90) 169/77   Weight: 59.4 kg (130 lb 15.3 oz)  Body mass index is 22.48 kg/m².      Intake/Output Summary (Last 24 hours) at 1/11/2023 1251  Last data filed at 1/11/2023 1000  Gross per 24 hour   Intake 1393.97 ml   Output 1175 ml   Net 218.97 ml         Physical Exam  Vitals and nursing note reviewed.   Constitutional:       General: She is not in acute distress.     Appearance: She is ill-appearing.   HENT:      Head: Normocephalic and atraumatic.   Eyes:      Extraocular Movements: Extraocular movements intact.      Pupils: Pupils are equal, round, and reactive to light.   Cardiovascular:      Rate and Rhythm: Regular rhythm. Tachycardia present.      Pulses: Normal pulses.      Heart sounds: Normal heart sounds. No murmur heard.  Pulmonary:      Effort: Accessory muscle usage present.      Breath sounds: Decreased air movement present. No wheezing or rales.   Abdominal:      General: Abdomen is flat. There is no distension.      Palpations: Abdomen is soft.      Tenderness: There is no abdominal tenderness.   Musculoskeletal:         General: No swelling.      Right lower leg: No edema.      Left lower leg: No edema.   Skin:     General: Skin is warm and dry.      Findings: No bruising or rash.   Neurological:      General: No focal deficit present.      Mental Status: She is alert and oriented to person,  place, and time.   Psychiatric:         Mood and Affect: Mood normal.         Behavior: Behavior normal.       Vents:  Vent Mode: A/C (12/30/22 1528)  Ventilator Initiated: Yes (12/29/22 0144)  Set Rate: 12 BPM (12/30/22 1528)  Vt Set: 350 mL (12/30/22 0722)  Pressure Support: 14 cmH20 (12/30/22 1139)  PEEP/CPAP: 6 cmH20 (12/30/22 1528)  Oxygen Concentration (%): 30 (01/11/23 1200)  Peak Airway Pressure: 22 cmH20 (12/30/22 1528)  Plateau Pressure: 20 cmH20 (12/30/22 1528)  Total Ve: 9.73 L/m (12/30/22 1528)  Negative Inspiratory Force (cm H2O):  (-15) (01/11/23 0732)  F/VT Ratio<105 (RSBI): (!) 58.05 (12/30/22 1528)  Lines/Drains/Airways       Central Venous Catheter Line  Duration             Percutaneous Central Line Insertion/Assessment - Triple Lumen  12/29/22 0043 right internal jugular 13 days              Drain  Duration                  Urethral Catheter 01/04/23 0009 Non-latex 16 Fr. 7 days         Rectal Tube 01/05/23 2130 rectal tube w/ balloon (indicate number of mLs) 5 days                  Significant Labs:    CBC/Anemia Profile:  Recent Labs   Lab 01/10/23  0334 01/11/23  0328   WBC 7.66 6.59   HGB 7.3* 6.8*   HCT 23.4* 21.8*    242   MCV 84 85   RDW 14.2 14.4          Chemistries:  Recent Labs   Lab 01/10/23  0334 01/11/23  0328   * 139  139   K 3.9 4.1  4.1    105  105   CO2 25 25  25   BUN 17 17  17   CREATININE 0.5 0.5  0.5   CALCIUM 8.1* 8.2*  8.2*   ALBUMIN  --  2.3*   PROT  --  7.3   BILITOT  --  0.2   ALKPHOS  --  67   ALT  --  26   AST  --  33   MG 2.3 2.3   PHOS 2.8 3.0       All pertinent labs within the past 24 hours have been reviewed.    Significant Imaging:  I have reviewed all pertinent imaging results/findings within the past 24 hours.

## 2023-01-10 NOTE — ASSESSMENT & PLAN NOTE
CT scan showing possible hematoma vs abscess around right hip. Aspiration done with IR. Culture with e.coli pan-sensitive. Repeat CT showing fluid has reaccumulated.   -ID following  -Has been on zosyn, switch to cefepime per ID  -ortho consulted- recs for repeating drainage w/ IR  -f/u repeat cultures and fluid analysis with NGTD  -abx ended 1/9. Non-toxic after discontinuation

## 2023-01-10 NOTE — ASSESSMENT & PLAN NOTE
Known history of uncontrolled HTN, w/ labile BP since admit.   -BP meds held in setting of septic shock  -will avoid IV meds if possible as has had hypotension following IV BP meds  -renal artery duplex showing R renal artery stenosis, L renal artery unable to be visualized  -Will monitor and add back meds as tolerated, IV metoprolol when unable to tolerate PO  -of note plasma metanephrines came back elevated in both 24 hour urine collections; discussed with radiology and they do not see any major abnormalities around adrenals; have consulted endocrine; likelihood of extra-adrenal pheo is low, will check repeat urine metanephrines per endocrine.  -while tolerating PO will do losartan; labetalol IV scheduled q4h on 1/9 due to NPO at some times during the day precluding PO meds. Will try to uptitrate as able with PO meds.

## 2023-01-10 NOTE — SUBJECTIVE & OBJECTIVE
"  BP (!) 171/74 (BP Location: Left arm, Patient Position: Lying)   Pulse 108   Temp 97.4 °F (36.3 °C) (Axillary)   Resp (!) 30   Ht 5' 4" (1.626 m)   Wt 59.4 kg (130 lb 15.3 oz)   SpO2 100%   Breastfeeding No   BMI 22.48 kg/m²     Labs Reviewed and Include    Recent Labs   Lab 01/10/23  0334   *   CALCIUM 8.1*   *   K 3.9   CO2 25      BUN 17   CREATININE 0.5     Lab Results   Component Value Date    WBC 7.66 01/10/2023    HGB 7.3 (L) 01/10/2023    HCT 23.4 (L) 01/10/2023    MCV 84 01/10/2023     01/10/2023     No results for input(s): TSH, FREET4 in the last 168 hours.  Lab Results   Component Value Date    HGBA1C 5.4 12/23/2022       Nutritional status:   Body mass index is 22.48 kg/m².  Lab Results   Component Value Date    ALBUMIN 3.4 (L) 12/27/2022    ALBUMIN 3.2 (L) 12/26/2022    ALBUMIN 3.1 (L) 12/25/2022     Lab Results   Component Value Date    PREALBUMIN 10 (L) 01/05/2023       Estimated Creatinine Clearance: 90.4 mL/min (based on SCr of 0.5 mg/dL).    Accu-Checks  Recent Labs     01/08/23  0932 01/08/23  1518 01/09/23  0017 01/09/23  0411 01/09/23  0725 01/09/23  1215 01/09/23  2028 01/10/23  0018 01/10/23  0334 01/10/23  0839   POCTGLUCOSE 120* 129* 105 119* 119* 153* 116* 92 134* 139*       Current Medications and/or Treatments Impacting Glycemic Control  Immunotherapy:    Immunosuppressants       None          Steroids:   Hormones (From admission, onward)      Start     Stop Route Frequency Ordered    12/28/22 2339  vasopressin (PITRESSIN) 20 unit/mL injection        Note to Pharmacy: Created by cabinet override    12/29 1144   12/28/22 2339          Pressors:    Autonomic Drugs (From admission, onward)      Start     Stop Route Frequency Ordered    12/29/22 0129  rocuronium 10 mg/mL injection        Note to Pharmacy: Created by cabinet override    12/29 1344   12/29/22 0129          Hyperglycemia/Diabetes Medications:   Antihyperglycemics (From admission, onward)      " Start     Stop Route Frequency Ordered    01/06/23 0832  insulin aspart U-100 pen 1-10 Units         -- SubQ Every 4 hours PRN 01/06/23 0767

## 2023-01-10 NOTE — ASSESSMENT & PLAN NOTE
Patient presented with approx 3 weeks of worsening shortness of breath, weakness, and falls. On transfer to ICU required intubation. Was extubated to BIPAP. Have been unable to wean off bipap due to persistent shortness of breath, tachypnea, and inadequate volumes. Trial off bipap results in tachypnea, tachycardia, and hypertension. Appears weak distally and with fine motor skills. Unclear etiology at this time. Does not appear pulmonary in nature as patient is saturating well, ABGs have been wnl. MRI C-spine showed severe cervical stenosis, NSGY was consulted, unlikely that the stenosis would be causing these issues. Concern that difficulty breathing could be related to diaphragm weakness. Critical illness myopathy seems unlikely as patient was not intubated for long duration.  -neurology consulted, myasthenia panel sent  -further labs ordered (ESR, CRP, DARLINE, myositis panel)  -continue bipap, alternate with HFNC as tolerated  -palliative care consulted for long term planning  -started IVIG on 1/5 for 5 day course  -discussing with neurology and nsgy about possible relation to cervical stenosis  -NSGY considering intervention, prefer if tracheostomy is done prior to any surgery; will continue to discuss and assess her daily changes in respiratory status

## 2023-01-10 NOTE — PT/OT/SLP PROGRESS
Speech Language Pathology Treatment    Patient Name:  Dominique Mcgee   MRN:  [id number]  Admitting Diagnosis: Shortness of breath    Recommendations:                 General Recommendations:  Dysphagia therapy  Diet recommendations:  NPO, Liquid Diet Level: Full liquids   Aspiration Precautions: 1 bite/sip at a time, No straws, Small bites/sips, and Standard aspiration precautions   General Precautions: Standard,    Communication strategies:  none    Subjective     Patient awake and alert. Cleared with RN prior to session.     Pain/Comfort:   No c/o pain    Respiratory Status: High flow, flow 30 L/min, concentration 30%    Objective:     Has the patient been evaluated by SLP for swallowing?   Yes  Keep patient NPO? No   Current Respiratory Status:        Patient seen for ongoing swallow assessment.  Recently removed from BIPAP.  Patient with full liquid breakfast tray noted at the bedside. Assisted patient with setup.  Patient consumed thin liquids via cup sips x9 along with bites of puree x3. Items on tray warmed for patient per SLP.  Patient left to continue meal. Not appropriate for diet advancement at this time. Would benefit from nutrition consult for supplement. Skilled education was provided to patient re: diet recs, standard aspiration precautions of which to follow, and ongoing ST plan of care.    Assessment:     Dominique Mcgee is a 72 y.o. female with an SLP diagnosis of Dysphagia.  She presents with high risk of airway compromise given high o2 needs. .    Goals:   Multidisciplinary Problems       SLP Goals          Problem: SLP    Goal Priority Disciplines Outcome   SLP Goal     SLP Ongoing, Progressing   Description: Speech Language Pathology Goals  Goals expected to be met by 1/10    1. Patient will tolerate a full liquid with no overt signs of airway compromise.                                Plan:     Patient to be seen:  4 x/week   Plan of Care expires:     Plan of Care reviewed with:  patient    SLP Follow-Up:  Yes       Discharge recommendations:  home health speech therapy   Barriers to Discharge:  None    Time Tracking:     SLP Treatment Date:   01/10/23  Speech Start Time:  1000  Speech Stop Time:  1010     Speech Total Time (min):  10 min    Billable Minutes: Treatment Swallowing Dysfunction 10    01/10/2023

## 2023-01-10 NOTE — SUBJECTIVE & OBJECTIVE
Interval History/Significant Events:   Feeling well this morning. Continuing to alternate bipap and HFNC.    Review of Systems   Constitutional:  Negative for appetite change and fever.   HENT:  Negative for congestion and sinus pain.    Eyes:  Negative for discharge and redness.   Respiratory:  Positive for shortness of breath. Negative for cough.    Cardiovascular:  Negative for chest pain and leg swelling.   Gastrointestinal:  Negative for diarrhea and nausea.   Endocrine: Negative for polydipsia and polyuria.   Genitourinary:  Negative for dysuria and hematuria.   Musculoskeletal:  Negative for arthralgias and myalgias.   Skin:  Negative for color change and rash.   Neurological:  Negative for weakness and numbness.   Psychiatric/Behavioral:  Negative for agitation and confusion.    Objective:     Vital Signs (Most Recent):  Temp: 97.4 °F (36.3 °C) (01/10/23 0700)  Pulse: 92 (01/10/23 0900)  Resp: (!) 23 (01/10/23 0900)  BP: (!) 165/70 (01/10/23 0900)  SpO2: 100 % (01/10/23 0900)   Vital Signs (24h Range):  Temp:  [97.4 °F (36.3 °C)-98.3 °F (36.8 °C)] 97.4 °F (36.3 °C)  Pulse:  [] 92  Resp:  [] 23  SpO2:  [99 %-100 %] 100 %  BP: (124-231)/() 165/70   Weight: 59.4 kg (130 lb 15.3 oz)  Body mass index is 22.48 kg/m².      Intake/Output Summary (Last 24 hours) at 1/10/2023 1008  Last data filed at 1/10/2023 0900  Gross per 24 hour   Intake 1278.52 ml   Output 1825 ml   Net -546.48 ml         Physical Exam  Vitals and nursing note reviewed.   Constitutional:       General: She is not in acute distress.  HENT:      Head: Normocephalic and atraumatic.   Eyes:      Extraocular Movements: Extraocular movements intact.      Pupils: Pupils are equal, round, and reactive to light.   Cardiovascular:      Rate and Rhythm: Regular rhythm. Tachycardia present.      Pulses: Normal pulses.      Heart sounds: Normal heart sounds. No murmur heard.  Pulmonary:      Effort: Accessory muscle usage present.       Breath sounds: Decreased air movement present. No wheezing or rales.      Comments: Shallow breathing  Abdominal:      General: Abdomen is flat. There is no distension.      Palpations: Abdomen is soft.      Tenderness: There is no abdominal tenderness.   Musculoskeletal:         General: No swelling.      Right lower leg: No edema.      Left lower leg: No edema.   Skin:     General: Skin is warm and dry.      Findings: No bruising or rash.   Neurological:      General: No focal deficit present.      Mental Status: She is alert and oriented to person, place, and time.   Psychiatric:         Mood and Affect: Mood normal.         Behavior: Behavior normal.       Vents:  Vent Mode: A/C (12/30/22 1528)  Ventilator Initiated: Yes (12/29/22 0144)  Set Rate: 12 BPM (12/30/22 1528)  Vt Set: 350 mL (12/30/22 0722)  Pressure Support: 14 cmH20 (12/30/22 1139)  PEEP/CPAP: 6 cmH20 (12/30/22 1528)  Oxygen Concentration (%): 30 (01/10/23 0900)  Peak Airway Pressure: 22 cmH20 (12/30/22 1528)  Plateau Pressure: 20 cmH20 (12/30/22 1528)  Total Ve: 9.73 L/m (12/30/22 1528)  Negative Inspiratory Force (cm H2O):  (UNABLE TO PERFORM) (01/09/23 1030)  F/VT Ratio<105 (RSBI): (!) 58.05 (12/30/22 1528)  Lines/Drains/Airways       Central Venous Catheter Line  Duration             Percutaneous Central Line Insertion/Assessment - Triple Lumen  12/29/22 0043 right internal jugular 12 days              Drain  Duration                  Urethral Catheter 01/04/23 0009 Non-latex 16 Fr. 6 days         Rectal Tube 01/05/23 2130 rectal tube w/ balloon (indicate number of mLs) 4 days                  Significant Labs:    CBC/Anemia Profile:  Recent Labs   Lab 01/09/23  0212 01/10/23  0334   WBC 8.75 7.66   HGB 7.0* 7.3*   HCT 22.7* 23.4*    261   MCV 84 84   RDW 14.1 14.2          Chemistries:  Recent Labs   Lab 01/09/23  0212 01/10/23  0334   * 134*   K 4.0 3.9    105   CO2 26 25   BUN 15 17   CREATININE 0.5 0.5   CALCIUM 8.1* 8.1*    MG 2.2 2.3   PHOS 3.0 2.8       All pertinent labs within the past 24 hours have been reviewed.    Significant Imaging:  I have reviewed all pertinent imaging results/findings within the past 24 hours.

## 2023-01-10 NOTE — ASSESSMENT & PLAN NOTE
Patient with Hypoxic Respiratory failure which is Acute.  she is not on home oxygen. Supplemental oxygen was provided and noted- Oxygen Concentration (%):  [30-40] 30  Resp Rate Total:  [21 br/min] 21 br/min.   Signs/symptoms of respiratory failure include- tachypnea, increased work of breathing and respiratory distress. Contributing diagnoses includes - Pleural effusion and atelectasis Labs and images were reviewed. Patient Has recent ABG, which has been reviewed. Will treat underlying causes and adjust management of respiratory failure as follows- unclear etiology of pleural effusions, patient notes recent URI 3 weeks ago that has left her short of breath since then, on broad spectrum abx  Repeat CXR with increased consolidation in LLL  Extubated to Bipap  1/3: CT Chest - Bibasilar atelectatic/consolidative change (left greater than right) with patchy right basilar opacities and small bilateral pleural effusions.  Correlation for underlying infectious process advised.  ID following, linezolid to treat for HAP to end 1/9  See shortness of breath

## 2023-01-11 PROBLEM — G95.9 CERVICAL MYELOPATHY: Status: ACTIVE | Noted: 2023-01-01

## 2023-01-11 PROBLEM — I10 HYPERTENSION: Status: ACTIVE | Noted: 2023-01-01

## 2023-01-11 NOTE — CHAPLAIN
Palliative Care     Patient: Dominique Mcgee       MRN: [id number]  : 1950  Age: 72 y.o.  Hospital Length of Stay: 19 days  Code Status: Full Code   Attending Provider: Delfino Chen MD  Principal Problem: Shortness of breath    Present during Interview:  Visited pt again today, no family present at the time, however the daughter was about to enter room after I just exited, so I spoke to her too.    Non-clinical observations:  Unlike my last visit, pt is back on bi-pap. Respiratory therapist said that pt is on and off bi-pap.    Facts (Spiritual Perception of Illness):   Per Respiratory therapist, although pt is on bi-pap she is using less O2 than earlier.    Feelings (Emotions/Fears/Experiences/Coping):      Hard for pt to talk on bi-pap. She wrote on her notepad that she ate something today and now her stomach hurts. She thanked me for the note I wrote on her notepad the other day. Pt still finds strength in her amrita.    Family/Friends (Support, Community, Culture):     Daughter was walking in as I walked out--- left work early to visit-- she said she was told to be here for 3p. Daughter said she's trying to hold it together not getting sick to care for herself, her kids, her job and seeing mamma. Provided listening her and encouragement.    Future (Spiritual Care Plan of Action):  Palliative  will continue to follow. Lord, in your mercy.    In Peace,  Rev. Nehal Lopez MBA, MDiv

## 2023-01-11 NOTE — PROGRESS NOTES
[facility]  Critical Care Medicine  Progress Note    Patient Name: Dominique Mcgee  MRN: [id number]  Admission Date: 12/23/2022  Hospital Length of Stay: 19 days  Code Status: Full Code  Attending Provider: Delfino Chen MD  Primary Care Provider: Briana Leblanc MD   Principal Problem: Shortness of breath    Subjective:     HPI:  Ms. Mcgee is a 71 y/o F patient with HTN, HLD, hypothyroidism, recent URI 3 weeks prior to admission, balance difficulites who presented to the ED on 12/23 for 3 weeks of weakness, LIRA, decreased appetite, and constipation with intermittent lower abdominal cramping. Per daughter, patient is typically independent, lives alone, cooks her own meals, since she has been ill she has had decreased PO intake and drinking coffee, tea, and eating cereal. She has had progressive worsening HTN in the last 3 weeks and was started on losartan-HCTZ and metoprolol. Prior to this she was only taking lisinopril per daughter. Admitted for HTN emergency and was found to have Na 120. Received IV fluids and admitted to hospital medicine. For her hypernatremia, her thiazide was held and she was placed on fluid restriction. Urine studies from 12/25 showed Uosm of 802 and HANH of 109. Her BP has been labile since admission requiring both IV antihypertensives as well as fluid boluses. The patient's hyponatremia persisted despite fluid restriction and she became increasingly lethargic and weak so CCM was consulted for further management.      Upon evaluation, patient is somnolent. Opens eyes to voice, unable to engage in full conversation. Speaks mainly single words. Denies thirst. Reports SOB and generalized abdominal pain a/w constipation. No CP, N/V/D, dysuria, edema. Most recent vitals: /60, pulse 82, R 18, SpO2 94% on RA. Na trend 119->115->117.       Hospital/ICU Course:  Once transferred to ICU patient became hypotensive requiring multiple pressors. Patient intubated for airway  protection. Art line and central line placed. Sodium improved with hypertonic saline. CT abdomen/pelvis showing proctitis and hip abscess. Went for IR aspiration of abscess. Culture showing e.coli, on zosyn. MRI brain non-concerning. MRI spine with severe cervical stenosis and cord compression, also with mild lumbar stenosis. NSGY consulted with plans for outpatient follow-up. Repeat CXR with increased LLL consolidation and pleural effusion. Extubated to bipap. Once extubated and off pressors patient began to re-experience labile blood pressures similar to her initial presentation. Started on cardene drip. Difficulty weaning off BIPAP due to inadequate volumes and persistent shortness of breath. Extensive workup done regarding difficulties coming off BIPAP. Neurology consulted. ID was consulted for hip abscess- continued on zosyn. Repeat imaging concerning for pneumonia; started on linezolid. Ortho consulted for hip abscess that re-occurred on repeat imaging. Requested IR re-drain abscess and re-culture. Started on IVIG. Ongoing discussions with neurology and neurosurgery regarding cervical stenosis/cord injury as possible etiology of respiratory muscle weakness. Neurosurgery considering intervention but would prefer if patient received tracheostomy prior to laminectomy. Urine metanephrines came back elevated on two collections. Endocrinology consulted. Repeating urine metanephrines as first sets were collected during use of vasopressors. Also uptitrating blood pressure medications and using PO meds when able (off and on BiPAP). Discussed next steps w/ patient and daughter as patient has persistent respiratory muscle weakness resulting in dependence on BIPAP that has not improved.      Interval History/Significant Events:   Had shortness of breath this morning while on HFNC, switched back to BIPAP.    Review of Systems   Unable to perform ROS: Acuity of condition   Respiratory:  Positive for shortness of breath.     Objective:     Vital Signs (Most Recent):  Temp: 100.3 °F (37.9 °C) (01/11/23 1045)  Pulse: (!) 111 (01/11/23 1200)  Resp: (!) 31 (01/11/23 1200)  BP: (!) 169/77 (01/11/23 1236)  SpO2: 97 % (01/11/23 1200)   Vital Signs (24h Range):  Temp:  [97.8 °F (36.6 °C)-100.3 °F (37.9 °C)] 100.3 °F (37.9 °C)  Pulse:  [] 111  Resp:  [22-47] 31  SpO2:  [84 %-100 %] 97 %  BP: (124-201)/(56-90) 169/77   Weight: 59.4 kg (130 lb 15.3 oz)  Body mass index is 22.48 kg/m².      Intake/Output Summary (Last 24 hours) at 1/11/2023 1251  Last data filed at 1/11/2023 1000  Gross per 24 hour   Intake 1393.97 ml   Output 1175 ml   Net 218.97 ml         Physical Exam  Vitals and nursing note reviewed.   Constitutional:       General: She is not in acute distress.     Appearance: She is ill-appearing.   HENT:      Head: Normocephalic and atraumatic.   Eyes:      Extraocular Movements: Extraocular movements intact.      Pupils: Pupils are equal, round, and reactive to light.   Cardiovascular:      Rate and Rhythm: Regular rhythm. Tachycardia present.      Pulses: Normal pulses.      Heart sounds: Normal heart sounds. No murmur heard.  Pulmonary:      Effort: Accessory muscle usage present.      Breath sounds: Decreased air movement present. No wheezing or rales.   Abdominal:      General: Abdomen is flat. There is no distension.      Palpations: Abdomen is soft.      Tenderness: There is no abdominal tenderness.   Musculoskeletal:         General: No swelling.      Right lower leg: No edema.      Left lower leg: No edema.   Skin:     General: Skin is warm and dry.      Findings: No bruising or rash.   Neurological:      General: No focal deficit present.      Mental Status: She is alert and oriented to person, place, and time.   Psychiatric:         Mood and Affect: Mood normal.         Behavior: Behavior normal.       Vents:  Vent Mode: A/C (12/30/22 7948)  Ventilator Initiated: Yes (12/29/22 6642)  Set Rate: 12 BPM (12/30/22 5653)  Vt  Set: 350 mL (12/30/22 0722)  Pressure Support: 14 cmH20 (12/30/22 1139)  PEEP/CPAP: 6 cmH20 (12/30/22 1528)  Oxygen Concentration (%): 30 (01/11/23 1200)  Peak Airway Pressure: 22 cmH20 (12/30/22 1528)  Plateau Pressure: 20 cmH20 (12/30/22 1528)  Total Ve: 9.73 L/m (12/30/22 1528)  Negative Inspiratory Force (cm H2O):  (-15) (01/11/23 0732)  F/VT Ratio<105 (RSBI): (!) 58.05 (12/30/22 1528)  Lines/Drains/Airways       Central Venous Catheter Line  Duration             Percutaneous Central Line Insertion/Assessment - Triple Lumen  12/29/22 0043 right internal jugular 13 days              Drain  Duration                  Urethral Catheter 01/04/23 0009 Non-latex 16 Fr. 7 days         Rectal Tube 01/05/23 2130 rectal tube w/ balloon (indicate number of mLs) 5 days                  Significant Labs:    CBC/Anemia Profile:  Recent Labs   Lab 01/10/23  0334 01/11/23  0328   WBC 7.66 6.59   HGB 7.3* 6.8*   HCT 23.4* 21.8*    242   MCV 84 85   RDW 14.2 14.4          Chemistries:  Recent Labs   Lab 01/10/23  0334 01/11/23  0328   * 139  139   K 3.9 4.1  4.1    105  105   CO2 25 25  25   BUN 17 17  17   CREATININE 0.5 0.5  0.5   CALCIUM 8.1* 8.2*  8.2*   ALBUMIN  --  2.3*   PROT  --  7.3   BILITOT  --  0.2   ALKPHOS  --  67   ALT  --  26   AST  --  33   MG 2.3 2.3   PHOS 2.8 3.0       All pertinent labs within the past 24 hours have been reviewed.    Significant Imaging:  I have reviewed all pertinent imaging results/findings within the past 24 hours.      ABG  Recent Labs   Lab 01/05/23  1132   PH 7.389   PO2 41   PCO2 63.5*   HCO3 38.4*   BE 13     Assessment/Plan:     Neuro  Stenosis, cervical spine  Noted on MRI spine. Of note patient has been experiencing episodes of vertigo and decreased balance which could be related.   -NSGY consulted, appreciate recs  -if need for reintubation should be done with fiberoptic or glidescope  -concern that this could be contributing to her current respiratory  muscle weakness  -appreciate neurology and nsgy involvement  -per NSGY, if laminectomy to be done, would prefer patient to have tracheostomy prior to their intervention  -discussing next steps with patient and family    Pulmonary  * Shortness of breath  Patient presented with approx 3 weeks of worsening shortness of breath, weakness, and falls. On transfer to ICU required intubation. Was extubated to BIPAP. Have been unable to wean off bipap due to persistent shortness of breath, tachypnea, and inadequate volumes. Trial off bipap results in tachypnea, tachycardia, and hypertension. Appears weak distally and with fine motor skills. Unclear etiology at this time. Does not appear pulmonary in nature as patient is saturating well, ABGs have been wnl. MRI C-spine showed severe cervical stenosis, NSGY was consulted, unlikely that the stenosis would be causing these issues. Concern that difficulty breathing could be related to diaphragm weakness. Critical illness myopathy seems unlikely as patient was not intubated for long duration.  -neurology consulted, myasthenia panel sent  -further labs ordered (ESR, CRP, DARLINE, myositis panel)  -continue bipap, alternate with HFNC as tolerated  -palliative care consulted for long term planning  -started IVIG on 1/5 for 5 day course  -discussing with neurology and nsgy about possible relation to cervical stenosis  -NSGY considering intervention, prefer if tracheostomy is done prior to any surgery; will continue to discuss and assess her daily changes in respiratory status    Respiratory distress  See shortness of breath    Acute hypoxemic respiratory failure  Patient with Hypoxic Respiratory failure which is Acute.  she is not on home oxygen. Supplemental oxygen was provided and noted- Oxygen Concentration (%):  [27-60] 30.   Signs/symptoms of respiratory failure include- tachypnea, increased work of breathing and respiratory distress. Contributing diagnoses includes - Pleural effusion  and atelectasis Labs and images were reviewed. Patient Has recent ABG, which has been reviewed. Will treat underlying causes and adjust management of respiratory failure as follows- unclear etiology of pleural effusions, patient notes recent URI 3 weeks ago that has left her short of breath since then, on broad spectrum abx  Repeat CXR with increased consolidation in LLL  Extubated to Bipap  1/3: CT Chest - Bibasilar atelectatic/consolidative change (left greater than right) with patchy right basilar opacities and small bilateral pleural effusions.  Correlation for underlying infectious process advised.  Completed abx course for PNA  See shortness of breath    Cardiac/Vascular  Right renal artery stenosis  -no acute intervention while in ICU  -see HTN    Hyperlipemia  Continue home statin    Essential hypertension  Known history of uncontrolled HTN, w/ labile BP since admit.   -BP meds held in setting of septic shock  -will avoid IV meds if possible as has had hypotension following IV BP meds  -renal artery duplex showing R renal artery stenosis, L renal artery unable to be visualized  -Will monitor and add back meds as tolerated, IV metoprolol when unable to tolerate PO  -of note plasma metanephrines came back elevated in both 24 hour urine collections; discussed with radiology and they do not see any major abnormalities around adrenals; have consulted endocrine; likelihood of extra-adrenal pheo is low, will check repeat urine metanephrines per endocrine.  -while tolerating PO will do losartan; labetalol IV scheduled q4h on 1/9 due to NPO at some times during the day precluding PO meds. Will try to uptitrate as able with PO meds.    Dyspnea on exertion  Began prior to admission. Has been requiring 2L via NC to maintain oxygenation  -O2 via NC PRN    Renal/  Metabolic alkalosis  Initially could have been related to diuretic use. Bicarb remains elevated after stopping HCTZ.    Hyponatremia  Patient started having  weakness around 3 weeks ago after possible viral illness, also has had decreased appetite and more recently experiencing shortness of breath.  Sodium of 120 on admit, has been fluctuating since, now around 116-117. UOSM 802, HANH 109  Has become increasingly lethargic since admission, concern due to hyponatremia  Likely hypovolemic hyponatremia in setting of diuretic use, decreased intake and possible GI loses    -nephrology consulted, appreciate recs  -RESOLVED  -BMP q12    Endocrine  Hypothyroidism (acquired)  -continue levothyroxine    Moderate malnutrition  Nutrition consulted. Most recent weight and BMI monitored-   On TPN; replete lytes and monitor for re-feeding syndrome  Malnutrition (Moderate to Severe)  Energy Intake (Malnutrition): less than 75% for greater than 7 days  Measurements:  Wt Readings from Last 1 Encounters:   01/05/23 59.4 kg (130 lb 15.3 oz)   Body mass index is 22.48 kg/m².  Recommendations: Recommendation/Intervention: 1.  Goals: Meet % EEN, EPN by RD f/u date  Patient has been screened and assessed by RD. RD will follow patient.      GI  Dysphagia  SLP eval, recommendations for full liquid diet      Orthopedic  Abscess of bursa of right hip  CT scan showing possible hematoma vs abscess around right hip. Aspiration done with IR. Culture with e.coli pan-sensitive. Repeat CT showing fluid has reaccumulated.   -ID following  -Has been on zosyn, switch to cefepime per ID  -ortho consulted- recs for repeating drainage w/ IR  -f/u repeat cultures and fluid analysis with NGTD  -abx ended 1/9    Other  Hypothermia  Considering new onset will check BCx and start zosyn. Using bear hugger for warming.    Generalized weakness  See shortness of breath       Critical Care Daily Checklist:    A: Awake: RASS Goal/Actual Goal: RASS Goal: 0-->alert and calm  Actual: Manning Agitation Sedation Scale (RASS): Alert and calm   B: Spontaneous Breathing Trial Performed? Spon. Breathing Trial Initiated?: Not  initiated (12/29/22 0729)   C: SAT & SBT Coordinated?  n/a                      D: Delirium: CAM-ICU Overall CAM-ICU: Negative   E: Early Mobility Performed? Yes   F: Feeding Goal: Goals: Meet % EEN, EPN by RD f/u date  Status: Nutrition Goal Status: goal met   Current Diet Order   Procedures    Diet full liquid      AS: Analgesia/Sedation n/a   T: Thromboembolic Prophylaxis yes   H: HOB > 300 Yes   U: Stress Ulcer Prophylaxis (if needed) no   G: Glucose Control yes   B: Bowel Function Stool Occurrence: 1   I: Indwelling Catheter (Lines & Rush) Necessity yes   D: De-escalation of Antimicrobials/Pharmacotherapies no    Plan for the day/ETD Family discussion    Code Status:  Family/Goals of Care: Full Code  ongoing       Critical secondary to Patient has a condition that poses threat to life and bodily function: Severe Respiratory Distress      Critical care was time spent personally by me on the following activities: development of treatment plan with patient or surrogate and bedside caregivers, discussions with consultants, evaluation of patient's response to treatment, examination of patient, ordering and performing treatments and interventions, ordering and review of laboratory studies, ordering and review of radiographic studies, pulse oximetry, re-evaluation of patient's condition. This critical care time did not overlap with that of any other provider or involve time for any procedures.     Beto Avelar MD  Critical Care Medicine  [facility] - Cardiac Medical ICU

## 2023-01-11 NOTE — PROGRESS NOTES
[facility] - [facility]  Palliative Medicine  Progress Note    Patient Name: Dominique Mcgee  MRN: [id number]  Admission Date: 12/23/2022  Hospital Length of Stay: 19 days  Code Status: Full Code   Attending Provider: Delfino Chen MD  Consulting Provider: Alexandra Ag MD  Primary Care Physician: Briana Leblacn MD  Principal Problem:Shortness of breath    Patient information was obtained from patient and primary team.      Assessment/Plan:     Palliative care encounter  72 year old female with onset of acute hypoxemic respiratory failure and profound weakness over the past few weeks. Patient is dependent on Bipap. Palliative consulted for assistance with GOC    Code status: Full. Patient would want time trial on vent    Surrogate decision maker: 2 daughters are legal NOK     GOC/ACP  1/11/ Followed up with patient. Sister at bedside. Patient feeling more and more optimistic now that she is able to tolerate comfort flow for periods of time. Discussed how comfort flow is allowing her to communicate more easily and allowing her to take in some PO. Also discussed that she still has a long way to go and we still are not sure what the underlying problem is that is driving her profound weakness. Patient understands that her breathing could get worse at anytime. She maintains that she would want a time trial on the vent. Would not want to be dependent on a vent in a NH indefinitely. Provided support.  -Goals are life prolonging. Would not want to be dependent on vent indefinitely        1/7 Followed up with patient regarding code status. Patient feeling a bit better and more hopeful. She voiced that she does not think it will come to it but would want a time trial on the vent if necessary. Would not want to be dependent on a vent indefinitely. Wants chest compressions and shocks in the event of a cardiac arrest. Provided support    1/6   Met with patient in am and then patient, Dr. Dotson, patient's two  "daughters (one in person and one non speaker), and patient's friend. Discussed where things stand now and potential outcomes. Discussed needing to have a plan for if patient decompensates. Patient has voiced several times that she would not want to be re-intubated, trached, or back on vent. Extensively discussed what this would look like to patient and family- it would buy more time but does not fix the underlying problem. Discussed what quality of life would look like if trached and vent dependent. Provided support   Additional 60 minutes spent discussing ACP           1/5  -Met with patient at bedside. Later spoke with local daughter on the phone. Introduced palliative medicine and our role in patient's care. Discussed complexity of her medical condition and that the etiology remains unclear. Despite best supportive care she is not improving and we worry about whether we are going to be able to fix the underlying issue. Explained that if she is not improving, we are going to need to make some difficult decisions. Explained that she cannot remain on the bipap mask indefinitely. The team is doing everything they can to try to get her off but if she remains dependent on significant respiratory support the next step to prolong life would likely be trach/vent. Patient shook her head no. She remembers everything about her recent intubation and would not want to be dependent on a vent long term. This would not be an acceptable quality of life. Provided support                 I will follow-up with patient. Please contact us if you have any additional questions.    Subjective:     Chief Complaint:   Chief Complaint   Patient presents with    Shortness of Breath     X 2 weeks. Reports cough. Denies cardiac hx.       HPI:   Per critical care H&P   "71 y/o F patient with HTN, HLD, hypothyroidism, recent URI 3 weeks prior to admission, balance difficulites who presented to the ED on 12/23 for 3 weeks of weakness, LIRA, " "decreased appetite, and constipation with intermittent lower abdominal cramping. Per daughter, patient is typically independent, lives alone, cooks her own meals, since she has been ill she has had decreased PO intake and drinking coffee, tea, and eating cereal. She has had progressive worsening HTN in the last 3 weeks and was started on losartan-HCTZ and metoprolol. Prior to this she was only taking lisinopril per daughter. Admitted for HTN emergency and was found to have Na 120. Received IV fluids and admitted to hospital medicine. For her hypernatremia, her thiazide was held and she was placed on fluid restriction. Urine studies from 12/25 showed Uosm of 802 and HANH of 109. Her BP has been labile since admission requiring both IV antihypertensives as well as fluid boluses. The patient's hyponatremia persisted despite fluid restriction and she became increasingly lethargic and weak so CCM was consulted for further management.      Upon evaluation, patient is somnolent. Opens eyes to voice, unable to engage in full conversation. Speaks mainly single words. Denies thirst. Reports SOB and generalized abdominal pain a/w constipation. No CP, N/V/D, dysuria, edema. Most recent vitals: /60, pulse 82, R 18, SpO2 94% on RA. Na trend 119->115->117. "    Patient is being treated for acute hypoxemic respiratory failure and profound weakness of unclear origin and sepsis. She is dependent on continuous Bipap s/p extubation. She has been started on TPN. Palliative consulted to assist with [facility]          Hospital Course:  No notes on file    Interval Hx: Patient s/p IVIG. She is able to tolerate comfort flow for periods of time but remains very weak. Says she feels stronger each day. Being worked up for potential pheo. Neurosurgery discussing potential laminectomy. Sister at bedside     Past Medical History:   Diagnosis Date    Hyperlipidemia     Hypertension     Hyponatremia 12/23/2022    Hypophosphatemia 12/30/2022    " Hypothyroidism     Proctitis 12/29/2022    Severe sepsis 12/29/2022    Shock 12/29/2022       Past Surgical History:   Procedure Laterality Date    HYSTERECTOMY         Review of patient's allergies indicates:   Allergen Reactions    Hydrochlorothiazide (bulk) Other (See Comments)     Hyponatremia    Hydralazine analogues      Precipitous drop in BP with IV formulation. Avoid if possible    Shellfish containing products Swelling       Medications:  Continuous Infusions:   dextrose 10 % in water (D10W)      TPN ADULT CENTRAL LINE CUSTOM 40 mL/hr at 01/11/23 1500    TPN ADULT CENTRAL LINE CUSTOM       Scheduled Meds:   albuterol-ipratropium  3 mL Nebulization Q6H WAKE    [START ON 1/12/2023] amLODIPine  10 mg Oral Daily    capsaicin   Topical (Top) BID    enoxaparin  40 mg Subcutaneous Daily    fat emulsion 20%  250 mL Intravenous Daily    labetalol  20 mg Intravenous Q4H    levothyroxine  50 mcg Oral Before breakfast    losartan  50 mg Oral Daily    sodium chloride 3%  4 mL Nebulization Q6H WAKE     PRN Meds:sodium chloride, acetaminophen, dextrose 10 % in water (D10W), dextrose 10%, dextrose 10%, glucagon (human recombinant), glycerin adult, insulin aspart U-100, iohexol, labetalol, sodium chloride 0.9%    Family History       Problem Relation (Age of Onset)    Breast cancer Sister (55), Sister (65)          Tobacco Use    Smoking status: Never    Smokeless tobacco: Not on file   Substance and Sexual Activity    Alcohol use: Not on file    Drug use: Never    Sexual activity: Not on file       Review of Systems   Constitutional:  Positive for fatigue.   HENT: Negative.     Eyes: Negative.    Respiratory:  Positive for shortness of breath.    Gastrointestinal: Negative.    Endocrine: Negative.    Musculoskeletal: Negative.    Skin: Negative.    Neurological:  Positive for weakness.   Psychiatric/Behavioral:  The patient is nervous/anxious.    Objective:     Vital Signs (Most Recent):  Temp:  99.6 °F (37.6 °C) (01/11/23 1130)  Pulse: (!) 112 (01/11/23 1515)  Resp: (!) 34 (01/11/23 1515)  BP: (!) 169/77 (01/11/23 1500)  SpO2: 100 % (01/11/23 1515)   Vital Signs (24h Range):  Temp:  [97.8 °F (36.6 °C)-100.3 °F (37.9 °C)] 99.6 °F (37.6 °C)  Pulse:  [] 112  Resp:  [22-47] 34  SpO2:  [84 %-100 %] 100 %  BP: (124-201)/() 169/77     Weight: 59.4 kg (130 lb 15.3 oz)  Body mass index is 22.48 kg/m².    Physical Exam  Vitals and nursing note reviewed.   Constitutional:       General: She is not in acute distress.  HENT:      Head: Normocephalic.      Right Ear: External ear normal.      Left Ear: External ear normal.      Nose: Nose normal.      Mouth/Throat:      Mouth: Mucous membranes are dry.   Eyes:      Pupils: Pupils are equal, round, and reactive to light.   Cardiovascular:      Rate and Rhythm: Tachycardia present.   Pulmonary:      Effort: Pulmonary effort is normal. No respiratory distress.      Comments: Comfort flow 30L at 30%  Abdominal:      General: There is no distension.   Musculoskeletal:      Cervical back: Normal range of motion.   Neurological:      Mental Status: She is alert and oriented to person, place, and time.      Motor: Weakness present.   Psychiatric:         Mood and Affect: Mood normal.       Review of Symptoms      Symptom Assessment (ESAS 0-10 Scale)  Pain:  0  Dyspnea:  3  Anxiety:  3  Nausea:  0  Depression:  0  Anorexia:  0  Fatigue:  0  Insomnia:  0  Restlessness:  0  Agitation:  0     CAM / Delirium:  Negative  Constipation:  Negative  Diarrhea:  Negative    Anxiety:  Is nervous/anxious    Bowel Management Plan (BMP):  Yes      Modified Shar Scale:  2    Performance Status:  30 (100 prior to admission)    Living Arrangements:  Lives alone    Psychosocial/Cultural:   See Palliative Psychosocial Note: No  Patient is . She has two daughters, one local and one out of state. She has sisters. Completely independent prior to hospitalization     Social Issues  Identified: New Diagnosis/Trauma  Bereavement Risk: No  Caregiver Needs Discussed. Caregiver Distress: No:     **Primary  to Follow**  Palliative Care  Consult: No    Spiritual:  F - Chinyere and Belief:  Yes  I - Importance:  Very  A - Address in Care:  Yes      Advance Care Planning   Advance Directives:   Living Will: No    LaPOST: No    Do Not Resuscitate Status: No    Medical Power of : No      Decision Making:  Patient answered questions  Goals of Care: What is most important right now is to focus on curative/life-prolongation (regardless of treatment burdens). Accordingly, we have decided that the best plan to meet the patient's goals includes continuing with treatment.       Significant Labs: All pertinent labs within the past 24 hours have been reviewed.  CBC:   Recent Labs   Lab 01/11/23 0328   WBC 6.59   HGB 6.8*   HCT 21.8*   MCV 85          BMP:  Recent Labs   Lab 01/11/23 0328     104     139   K 4.1  4.1     105   CO2 25  25   BUN 17  17   CREATININE 0.5  0.5   CALCIUM 8.2*  8.2*   MG 2.3       LFT:  Lab Results   Component Value Date    AST 33 01/11/2023    ALKPHOS 67 01/11/2023    BILITOT 0.2 01/11/2023     Albumin:   Albumin   Date Value Ref Range Status   01/11/2023 2.3 (L) 3.5 - 5.2 g/dL Final     Protein:   Total Protein   Date Value Ref Range Status   01/11/2023 7.3 6.0 - 8.4 g/dL Final     Lactic acid:   Lab Results   Component Value Date    LACTATE 0.6 12/29/2022    LACTATE 1.0 12/28/2022       Significant Imaging: I have reviewed all pertinent imaging results/findings within the past 24 hours.        Alexandra Ag MD  Palliative Medicine  [facility] - Cardiac Medical ICU        > 50% of 40  min visit spent in chart review, face to face discussion of goals of care,  symptom assessment, coordination of care and emotional support

## 2023-01-11 NOTE — SUBJECTIVE & OBJECTIVE
Interval Hx: Patient s/p IVIG. She is able to tolerate comfort flow for periods of time but remains very weak. Says she feels stronger each day. Being worked up for potential pheo. Neurosurgery discussing potential laminectomy. Sister at bedside     Past Medical History:   Diagnosis Date    Hyperlipidemia     Hypertension     Hyponatremia 12/23/2022    Hypophosphatemia 12/30/2022    Hypothyroidism     Proctitis 12/29/2022    Severe sepsis 12/29/2022    Shock 12/29/2022       Past Surgical History:   Procedure Laterality Date    HYSTERECTOMY         Review of patient's allergies indicates:   Allergen Reactions    Hydrochlorothiazide (bulk) Other (See Comments)     Hyponatremia    Hydralazine analogues      Precipitous drop in BP with IV formulation. Avoid if possible    Shellfish containing products Swelling       Medications:  Continuous Infusions:   dextrose 10 % in water (D10W)      TPN ADULT CENTRAL LINE CUSTOM 40 mL/hr at 01/11/23 1500    TPN ADULT CENTRAL LINE CUSTOM       Scheduled Meds:   albuterol-ipratropium  3 mL Nebulization Q6H WAKE    [START ON 1/12/2023] amLODIPine  10 mg Oral Daily    capsaicin   Topical (Top) BID    enoxaparin  40 mg Subcutaneous Daily    fat emulsion 20%  250 mL Intravenous Daily    labetalol  20 mg Intravenous Q4H    levothyroxine  50 mcg Oral Before breakfast    losartan  50 mg Oral Daily    sodium chloride 3%  4 mL Nebulization Q6H WAKE     PRN Meds:sodium chloride, acetaminophen, dextrose 10 % in water (D10W), dextrose 10%, dextrose 10%, glucagon (human recombinant), glycerin adult, insulin aspart U-100, iohexol, labetalol, sodium chloride 0.9%    Family History       Problem Relation (Age of Onset)    Breast cancer Sister (55), Sister (65)          Tobacco Use    Smoking status: Never    Smokeless tobacco: Not on file   Substance and Sexual Activity    Alcohol use: Not on file    Drug use: Never    Sexual activity: Not on file       Review of Systems   Constitutional:  Positive  for fatigue.   HENT: Negative.     Eyes: Negative.    Respiratory:  Positive for shortness of breath.    Gastrointestinal: Negative.    Endocrine: Negative.    Musculoskeletal: Negative.    Skin: Negative.    Neurological:  Positive for weakness.   Psychiatric/Behavioral:  The patient is nervous/anxious.    Objective:     Vital Signs (Most Recent):  Temp: 99.6 °F (37.6 °C) (01/11/23 1130)  Pulse: (!) 112 (01/11/23 1515)  Resp: (!) 34 (01/11/23 1515)  BP: (!) 169/77 (01/11/23 1500)  SpO2: 100 % (01/11/23 1515)   Vital Signs (24h Range):  Temp:  [97.8 °F (36.6 °C)-100.3 °F (37.9 °C)] 99.6 °F (37.6 °C)  Pulse:  [] 112  Resp:  [22-47] 34  SpO2:  [84 %-100 %] 100 %  BP: (124-201)/() 169/77     Weight: 59.4 kg (130 lb 15.3 oz)  Body mass index is 22.48 kg/m².    Physical Exam  Vitals and nursing note reviewed.   Constitutional:       General: She is not in acute distress.  HENT:      Head: Normocephalic.      Right Ear: External ear normal.      Left Ear: External ear normal.      Nose: Nose normal.      Mouth/Throat:      Mouth: Mucous membranes are dry.   Eyes:      Pupils: Pupils are equal, round, and reactive to light.   Cardiovascular:      Rate and Rhythm: Tachycardia present.   Pulmonary:      Effort: Pulmonary effort is normal. No respiratory distress.      Comments: Comfort flow 30L at 30%  Abdominal:      General: There is no distension.   Musculoskeletal:      Cervical back: Normal range of motion.   Neurological:      Mental Status: She is alert and oriented to person, place, and time.      Motor: Weakness present.   Psychiatric:         Mood and Affect: Mood normal.       Review of Symptoms      Symptom Assessment (ESAS 0-10 Scale)  Pain:  0  Dyspnea:  3  Anxiety:  3  Nausea:  0  Depression:  0  Anorexia:  0  Fatigue:  0  Insomnia:  0  Restlessness:  0  Agitation:  0     CAM / Delirium:  Negative  Constipation:  Negative  Diarrhea:  Negative    Anxiety:  Is nervous/anxious    Bowel Management Plan  (BMP):  Yes      Modified Shar Scale:  2    Performance Status:  30 (100 prior to admission)    Living Arrangements:  Lives alone    Psychosocial/Cultural:   See Palliative Psychosocial Note: No  Patient is . She has two daughters, one local and one out of state. She has sisters. Completely independent prior to hospitalization     Social Issues Identified: New Diagnosis/Trauma  Bereavement Risk: No  Caregiver Needs Discussed. Caregiver Distress: No:     **Primary  to Follow**  Palliative Care  Consult: No    Spiritual:  F - Chinyere and Belief:  Yes  I - Importance:  Very  A - Address in Care:  Yes      Advance Care Planning   Advance Directives:   Living Will: No    LaPOST: No    Do Not Resuscitate Status: No    Medical Power of : No      Decision Making:  Patient answered questions  Goals of Care: What is most important right now is to focus on curative/life-prolongation (regardless of treatment burdens). Accordingly, we have decided that the best plan to meet the patient's goals includes continuing with treatment.       Significant Labs: All pertinent labs within the past 24 hours have been reviewed.  CBC:   Recent Labs   Lab 01/11/23 0328   WBC 6.59   HGB 6.8*   HCT 21.8*   MCV 85          BMP:  Recent Labs   Lab 01/11/23 0328     104     139   K 4.1  4.1     105   CO2 25  25   BUN 17  17   CREATININE 0.5  0.5   CALCIUM 8.2*  8.2*   MG 2.3       LFT:  Lab Results   Component Value Date    AST 33 01/11/2023    ALKPHOS 67 01/11/2023    BILITOT 0.2 01/11/2023     Albumin:   Albumin   Date Value Ref Range Status   01/11/2023 2.3 (L) 3.5 - 5.2 g/dL Final     Protein:   Total Protein   Date Value Ref Range Status   01/11/2023 7.3 6.0 - 8.4 g/dL Final     Lactic acid:   Lab Results   Component Value Date    LACTATE 0.6 12/29/2022    LACTATE 1.0 12/28/2022       Significant Imaging: I have reviewed all pertinent imaging results/findings within the  past 24 hours.

## 2023-01-11 NOTE — ASSESSMENT & PLAN NOTE
72 year old female with onset of acute hypoxemic respiratory failure and profound weakness over the past few weeks. Patient is dependent on Bipap. Palliative consulted for assistance with GOC    Code status: Full. Patient would want time trial on vent    Surrogate decision maker: 2 daughters are legal NOK     GOC/ACP  1/11/ Followed up with patient. Sister at bedside. Patient feeling more and more optimistic now that she is able to tolerate comfort flow for periods of time. Discussed how comfort flow is allowing her to communicate more easily and allowing her to take in some PO. Also discussed that she still has a long way to go and we still are not sure what the underlying problem is that is driving her profound weakness. Patient understands that her breathing could get worse at anytime. She maintains that she would want a time trial on the vent. Would not want to be dependent on a vent in a NH indefinitely. Provided support.  -Goals are life prolonging. Would not want to be dependent on vent indefinitely        1/7 Followed up with patient regarding code status. Patient feeling a bit better and more hopeful. She voiced that she does not think it will come to it but would want a time trial on the vent if necessary. Would not want to be dependent on a vent indefinitely. Wants chest compressions and shocks in the event of a cardiac arrest. Provided support    1/6   Met with patient in am and then patient, Dr. Dotson, patient's two daughters (one in person and one non speaker), and patient's friend. Discussed where things stand now and potential outcomes. Discussed needing to have a plan for if patient decompensates. Patient has voiced several times that she would not want to be re-intubated, trached, or back on vent. Extensively discussed what this would look like to patient and family- it would buy more time but does not fix the underlying problem. Discussed what quality of life would look like if trached and  vent dependent. Provided support   Additional 60 minutes spent discussing ACP           1/5  -Met with patient at bedside. Later spoke with local daughter on the phone. Introduced palliative medicine and our role in patient's care. Discussed complexity of her medical condition and that the etiology remains unclear. Despite best supportive care she is not improving and we worry about whether we are going to be able to fix the underlying issue. Explained that if she is not improving, we are going to need to make some difficult decisions. Explained that she cannot remain on the bipap mask indefinitely. The team is doing everything they can to try to get her off but if she remains dependent on significant respiratory support the next step to prolong life would likely be trach/vent. Patient shook her head no. She remembers everything about her recent intubation and would not want to be dependent on a vent long term. This would not be an acceptable quality of life. Provided support

## 2023-01-11 NOTE — PT/OT/SLP PROGRESS
"Speech Language Pathology Treatment    Patient Name:  Dominique Mcgee   MRN:  [id number]  Admitting Diagnosis: Shortness of breath    Recommendations:                 General Recommendations:  Dysphagia therapy  Diet recommendations:  NPO, Liquid Diet Level: Full liquids   Aspiration Precautions: Only when vital signs stable, 1 bite/sip at a time, Avoid talking while eating, Eliminate distractions, Feed only when awake/alert, upright with all PO, No straws, Small bites/sips, and Strict aspiration precautions.  Continue to monitor for signs and symptoms of aspiration and discontinue oral feeding should you notice any of the following: watery eyes, reddened facial area, wet vocal quality, increased work of breathing, change in respiratory status, increased congestion, coughing, fever and/or change in level of alertness.  General Precautions: Standard, aspiration, fall, respiratory  Communication strategies:  none    Subjective     Pt reviewed with RN, RN cleared for tx, denied difficulty with meal trays  Pt presents calm  She explains, "not right now"     Pain/Comfort:  Pain Rating 1: 0/10    Respiratory Status: High flow, flow 30 L/min, concentration 30%    Objective:     Has the patient been evaluated by SLP for swallowing?   Yes  Keep patient NPO? No   Current Respiratory Status:  High flow, flow 30 L/min, concentration 30%      Pt found awake and upright in bed with oxygen in place. Family in room. She was alert and attentive. She demonstrated mildly breathy vocal quality and productive cough. She politely declined PO trials with SLP. She explained she had some sips of juice for breakfast, prefers a few bites/sips at a time. SLP reviewed SLP role, ongoing aspiration precautions, dietary modifications and SLP POC including plan for ongoing assessment of advanced textures as oxygenation requirements are titrated. Pt verbalized understanding. No additional questions. Pt remained upright in bed in position as found " upon SLP exit.     Assessment:     Dominique Mcgee is a 72 y.o. female with an SLP diagnosis of Dysphagia.  She presents with risk of aspiration due to tenuous respiratory status. ST to continue to follow.    Goals:   Multidisciplinary Problems       SLP Goals          Problem: SLP    Goal Priority Disciplines Outcome   SLP Goal     SLP Ongoing, Progressing   Description: Speech Language Pathology Goals  Goals expected to be met by 1/10    1. Patient will tolerate a full liquid with no overt signs of airway compromise.                                Plan:     Patient to be seen:  4 x/week   Plan of Care expires:     Plan of Care reviewed with:  patient, family   SLP Follow-Up:  Yes       Discharge recommendations:  home health speech therapy       Time Tracking:     SLP Treatment Date:   01/11/23  Speech Start Time:  0959  Speech Stop Time:  1012     Speech Total Time (min):  13 min    Billable Minutes: Self Care/Home Management Training 13    01/11/2023

## 2023-01-11 NOTE — PLAN OF CARE
CMICU DAILY GOALS       A: Awake    RASS: Goal - RASS Goal: 0-->alert and calm  Actual - RASS (Manning Agitation-Sedation Scale): 0-->alert and calm   Restraint necessity: Clinical Justification: Removing medical devices  B: Breathe   SBT: Not intubated   C: Coordinate A & B, analgesics/sedatives   Pain: managed    SAT: Not intubated  D: Delirium   CAM-ICU: Overall CAM-ICU: Negative  E: Early(intubated/ Progressive (non-intubated) Mobility   MOVE Screen: Fail   Activity: Activity Management: Rolling - L1, Arm raise - L1  FAS: Feeding/Nutrition   Diet order: Diet/Nutrition Received: full liquid,    T: Thrombus   DVT prophylaxis: VTE Required Core Measure: Pharmacological prophylaxis initiated/maintained  H: HOB Elevation   Head of Bed (HOB) Positioning: HOB at 30 degrees  U: Ulcer Prophylaxis   GI: no  G: Glucose control   managed Glycemic Management: blood glucose monitored  S: Skin   Bathing/Skin Care: bath, complete, dressed/undressed, linen changed, incontinence care  Device Skin Pressure Protection: absorbent pad utilized/changed, adhesive use limited, skin-to-skin areas padded, skin-to-device areas padded, pressure points protected, positioning supports utilized  Pressure Reduction Devices: foam padding utilized, positioning supports utilized, specialty bed utilized  Pressure Reduction Techniques: frequent weight shift encouraged, pressure points protected, weight shift assistance provided  Skin Protection: adhesive use limited, incontinence pads utilized, tubing/devices free from skin contact, transparent dressing maintained, skin-to-skin areas padded, skin-to-device areas padded  B: Bowel Function   no issues   I: Indwelling Catheters   Rush necessity: [REMOVED]      Urethral Catheter 12/27/22 1535-Reason for Continuing Urinary Catheterization: Critically ill in ICU and requiring hourly monitoring of intake/output       Urethral Catheter 01/04/23 0009 Non-latex 16 Fr.-Reason for Continuing Urinary  Catheterization: Critically ill in ICU and requiring hourly monitoring of intake/output, Urinary retention   CVC necessity: Yes  D: De-escalation Antibiotics   No    Family/Goals of care/Code Status   Code Status: Full Code    24H Vital Sign Range  Temp:  [97.4 °F (36.3 °C)-98.3 °F (36.8 °C)]   Pulse:  []   Resp:  [22-47]   BP: (124-201)/(56-90)   SpO2:  [84 %-100 %]      Shift Events   Hgb 6.8 this morning; orders placed for 1 unit of PRBC's; patient consented and type and screen pending results.    VS and assessment per flow sheet, patient progressing towards goals as tolerated, plan of care reviewed with  Dominique Mcgee , all concerns addressed, will continue to monitor.    Ximena Sauceda

## 2023-01-11 NOTE — ASSESSMENT & PLAN NOTE
Patient started having weakness around 3 weeks ago after possible viral illness, also has had decreased appetite and more recently experiencing shortness of breath.  Sodium of 120 on admit, has been fluctuating since, now around 116-117. UOSM 802, HANH 109  Has become increasingly lethargic since admission, concern due to hyponatremia  Likely hypovolemic hyponatremia in setting of diuretic use, decreased intake and possible GI loses    -nephrology consulted, appreciate recs  -RESOLVED

## 2023-01-11 NOTE — ASSESSMENT & PLAN NOTE
Patient with Hypoxic Respiratory failure which is Acute.  she is not on home oxygen. Supplemental oxygen was provided and noted- Oxygen Concentration (%):  [27-60] 30.   Signs/symptoms of respiratory failure include- tachypnea, increased work of breathing and respiratory distress. Contributing diagnoses includes - Pleural effusion and atelectasis Labs and images were reviewed. Patient Has recent ABG, which has been reviewed. Will treat underlying causes and adjust management of respiratory failure as follows- unclear etiology of pleural effusions, patient notes recent URI 3 weeks ago that has left her short of breath since then, on broad spectrum abx  Repeat CXR with increased consolidation in LLL  Extubated to Bipap  1/3: CT Chest - Bibasilar atelectatic/consolidative change (left greater than right) with patchy right basilar opacities and small bilateral pleural effusions.  Correlation for underlying infectious process advised.  Completed abx course for PNA  See shortness of breath

## 2023-01-11 NOTE — ASSESSMENT & PLAN NOTE
CT scan showing possible hematoma vs abscess around right hip. Aspiration done with IR. Culture with e.coli pan-sensitive. Repeat CT showing fluid has reaccumulated.   -ID following  -Has been on zosyn, switch to cefepime per ID  -ortho consulted- recs for repeating drainage w/ IR  -f/u repeat cultures and fluid analysis with NGTD  -abx ended 1/9

## 2023-01-11 NOTE — ASSESSMENT & PLAN NOTE
Noted on MRI spine. Of note patient has been experiencing episodes of vertigo and decreased balance which could be related.   -NSGY consulted, appreciate recs  -if need for reintubation should be done with fiberoptic or glidescope  -concern that this could be contributing to her current respiratory muscle weakness  -appreciate neurology and nsgy involvement  -per NSGY, if laminectomy to be done, would prefer patient to have tracheostomy prior to their intervention  -discussing next steps with patient and family

## 2023-01-11 NOTE — NURSING
CMICU DAILY GOALS       A: Awake    RASS: Goal - RASS Goal: 0-->alert and calm  Actual - RASS (Manning Agitation-Sedation Scale): 0-->alert and calm   Restraint necessity: Clinical Justification: Removing medical devices  B: Breathe   SBT: Not intubated  C: Coordinate A & B, analgesics/sedatives   Pain: managed    SAT: Not intubated  D: Delirium   CAM-ICU: Overall CAM-ICU: Negative  E: Early(intubated/ Progressive (non-intubated) Mobility   MOVE Screen: Fail   Activity: Activity Management: Rolling - L1  FAS: Feeding/Nutrition   Diet order: Diet/Nutrition Received: full liquid,    T: Thrombus   DVT prophylaxis: VTE Required Core Measure: Pharmacological prophylaxis initiated/maintained  H: HOB Elevation   Head of Bed (HOB) Positioning: HOB at 30-45 degrees  U: Ulcer Prophylaxis   GI: yes  G: Glucose control   managed Glycemic Management: blood glucose monitored  S: Skin   Bathing/Skin Care: dressed/undressed, incontinence care, linen changed  Device Skin Pressure Protection: absorbent pad utilized/changed, adhesive use limited, pressure points protected, skin-to-device areas padded, skin-to-skin areas padded  Pressure Reduction Devices: foam padding utilized, pressure-redistributing mattress utilized  Pressure Reduction Techniques: weight shift assistance provided  Skin Protection: adhesive use limited, skin-to-device areas padded, skin-to-skin areas padded, transparent dressing maintained, tubing/devices free from skin contact  B: Bowel Function   diarrhea   I: Indwelling Catheters   Rush necessity: [REMOVED]      Urethral Catheter 12/27/22 1535-Reason for Continuing Urinary Catheterization: Critically ill in ICU and requiring hourly monitoring of intake/output       Urethral Catheter 01/04/23 0009 Non-latex 16 Fr.-Reason for Continuing Urinary Catheterization: Critically ill in ICU and requiring hourly monitoring of intake/output   CVC necessity: Yes  D: De-escalation Antibiotics   No    Family/Goals of  care/Code Status   Code Status: Full Code    24H Vital Sign Range  Temp:  [97.4 °F (36.3 °C)-98.3 °F (36.8 °C)]   Pulse:  []   Resp:  [22-40]   BP: (124-201)/()   SpO2:  [84 %-100 %]      Shift Events   Patient remains awake and alert. 24 hour urine completed. Patient tolerated Arivo for entire shift. Family updated at bedside.    VS and assessment per flow sheet, patient progressing towards goals as tolerated, plan of care reviewed with family, all concerns addressed, will continue to monitor.    Alexandra Castro

## 2023-01-12 PROBLEM — R50.9 FEVER: Status: ACTIVE | Noted: 2023-01-01

## 2023-01-12 NOTE — ASSESSMENT & PLAN NOTE
Febrile to 102.9 overnight of 1/11. Continued to be febrile during the day 1/12.   -CXR, UA, BCx, Covid/flu/rsv ordered  -remove rectal seal   -remove galvez  -tylenol PRN for fevers  -central line placed 12/29. Currently being used for TPN. If BCx positive consider removal.  -consider starting broad spectrum abx

## 2023-01-12 NOTE — CHAPLAIN
Palliative Care     Patient: Dominique Mcgee       MRN: [id number]  : 1950  Age: 72 y.o.  Hospital Length of Stay: 20 days  Code Status: Full Code   Attending Provider: Delfino Chen MD  Principal Problem: Shortness of breath    Present during Interview:  Visited [facility] pt again per req of [facility] doc, Dr. Ag. Had Dr. Zhong accompany me and introduced her to pt. I stayed with pt and doc for 30 min and Dr. Zhong continued to visit after I left.    Non-clinical observations:  Pt awake, alert, engaging spoke through bi-pap and note writing.    Feelings (Emotions/Fears/Experiences/Coping):      Pt continues to feel grateful for her care, wants to heal and asked for prayer around healing and strength. Pt claims daughters are aware of her wishes, but we didn't explore deeper while I was present.     Chinyere (Beliefs/Meaning/Philosophy):  Strong Orthodox chinyere-- kept saying God is so good! I'm so grateful.    Future (Spiritual Care Plan of Action):  Palliative  will continue to follow and assist [facility] team in next steps and supporting emotionally and spiritually. Lord, in your mercy,.    In Peace,  Rev. Nehal Lopez MBA, MDiv

## 2023-01-12 NOTE — ASSESSMENT & PLAN NOTE
Known history of uncontrolled HTN, w/ labile BP since admit.   -BP meds held in setting of septic shock  -will avoid IV meds if possible as has had hypotension following IV BP meds  -renal artery duplex showing R renal artery stenosis, L renal artery unable to be visualized  -Will monitor and add back meds as tolerated, IV metoprolol when unable to tolerate PO  -of note plasma metanephrines came back elevated in both 24 hour urine collections; discussed with radiology and they do not see any major abnormalities around adrenals; have consulted endocrine; likelihood of extra-adrenal pheo is low, will check repeat urine metanephrines per endocrine.  -while tolerating PO will do losartan; labetalol IV scheduled q4h on 1/9 due to NPO at some times during the day precluding PO meds. Will try to uptitrate as able with PO meds.  -see HTN

## 2023-01-12 NOTE — PT/OT/SLP PROGRESS
Speech Language Pathology  Patient Not Seen      Dominique Mcgee  MRN: [id number]    Patient not seen today secondary to BIPAP, Nursing hold . Will follow-up next scheduled service date pending appropriateness.

## 2023-01-12 NOTE — PLAN OF CARE
Recommendations     Continue current Full liquid diet, per SLP. Encourage PO intake as tolerated.  Continue current TPN regimen + IV Lipids - meeting needs.  If/when able to place NGT, initiate TFs. Rec'd Impact Peptide @ 35 mL/hr = 1260 kcals, 79 g of protein, 647 mL fluid.  RD to monitor & follow-up.     Goals: Meet % EEN, EPN by RD f/u date  Nutrition Goal Status: goal met  Communication of RD Recs: reviewed with RN

## 2023-01-12 NOTE — PROGRESS NOTES
[facility]  Critical Care Medicine  Progress Note    Patient Name: Dominique Mcgee  MRN: [id number]  Admission Date: 12/23/2022  Hospital Length of Stay: 20 days  Code Status: Full Code  Attending Provider: Delfino Chen MD  Primary Care Provider: Briana Leblanc MD   Principal Problem: Shortness of breath    Subjective:     HPI:  Ms. Mcgee is a 73 y/o F patient with HTN, HLD, hypothyroidism, recent URI 3 weeks prior to admission, balance difficulites who presented to the ED on 12/23 for 3 weeks of weakness, LIRA, decreased appetite, and constipation with intermittent lower abdominal cramping. Per daughter, patient is typically independent, lives alone, cooks her own meals, since she has been ill she has had decreased PO intake and drinking coffee, tea, and eating cereal. She has had progressive worsening HTN in the last 3 weeks and was started on losartan-HCTZ and metoprolol. Prior to this she was only taking lisinopril per daughter. Admitted for HTN emergency and was found to have Na 120. Received IV fluids and admitted to hospital medicine. For her hypernatremia, her thiazide was held and she was placed on fluid restriction. Urine studies from 12/25 showed Uosm of 802 and HANH of 109. Her BP has been labile since admission requiring both IV antihypertensives as well as fluid boluses. The patient's hyponatremia persisted despite fluid restriction and she became increasingly lethargic and weak so CCM was consulted for further management.      Upon evaluation, patient is somnolent. Opens eyes to voice, unable to engage in full conversation. Speaks mainly single words. Denies thirst. Reports SOB and generalized abdominal pain a/w constipation. No CP, N/V/D, dysuria, edema. Most recent vitals: /60, pulse 82, R 18, SpO2 94% on RA. Na trend 119->115->117.       Hospital/ICU Course:  Once transferred to ICU patient became hypotensive requiring multiple pressors. Patient intubated for airway  protection. Art line and central line placed. Sodium improved with hypertonic saline. CT abdomen/pelvis showing proctitis and hip abscess. Went for IR aspiration of abscess. Culture showing e.coli, on zosyn. MRI brain non-concerning. MRI spine with severe cervical stenosis and cord compression, also with mild lumbar stenosis. NSGY consulted with plans for outpatient follow-up. Repeat CXR with increased LLL consolidation and pleural effusion. Extubated to bipap. Once extubated and off pressors patient began to re-experience labile blood pressures similar to her initial presentation. Started on cardene drip. Difficulty weaning off BIPAP due to inadequate volumes and persistent shortness of breath. Extensive workup done regarding difficulties coming off BIPAP. Neurology consulted. ID was consulted for hip abscess- continued on zosyn. Repeat imaging concerning for pneumonia; started on linezolid. Ortho consulted for hip abscess that re-occurred on repeat imaging. Requested IR re-drain abscess and re-culture. Started on IVIG. Ongoing discussions with neurology and neurosurgery regarding cervical stenosis/cord injury as possible etiology of respiratory muscle weakness. Neurosurgery considering intervention but would prefer if patient received tracheostomy prior to laminectomy. Urine metanephrines came back elevated on two collections. Endocrinology consulted. Repeating urine metanephrines as first sets were collected during use of vasopressors. Also uptitrating blood pressure medications and using PO meds when able (off and on BiPAP). Discussed next steps w/ patient and daughter as patient has persistent respiratory muscle weakness resulting in dependence on BIPAP that has not improved. Began having fevers overnight of 1/11.      Interval History/Significant Events:   Febrile overnight to 102.9. Repeat infectious workup ordered. Feels more tired today. Continues to have hypertension despite multiple agents.    Review of  Systems   Unable to perform ROS: Acuity of condition   Respiratory:  Positive for shortness of breath.    Objective:     Vital Signs (Most Recent):  Temp: 99.6 °F (37.6 °C) (01/12/23 0915)  Pulse: (!) 116 (01/12/23 1000)  Resp: (!) 28 (01/12/23 1000)  BP: (!) 180/77 (01/12/23 1000)  SpO2: 100 % (01/12/23 1000)   Vital Signs (24h Range):  Temp:  [98.3 °F (36.8 °C)-102.9 °F (39.4 °C)] 99.6 °F (37.6 °C)  Pulse:  [] 116  Resp:  [22-44] 28  SpO2:  [94 %-100 %] 100 %  BP: (100-204)/() 180/77   Weight: 59.4 kg (130 lb 15.3 oz)  Body mass index is 22.48 kg/m².      Intake/Output Summary (Last 24 hours) at 1/12/2023 1023  Last data filed at 1/12/2023 0900  Gross per 24 hour   Intake 1230.3 ml   Output 1080 ml   Net 150.3 ml         Physical Exam  Vitals and nursing note reviewed.   Constitutional:       General: She is not in acute distress.     Appearance: She is ill-appearing.   HENT:      Head: Normocephalic and atraumatic.   Eyes:      Extraocular Movements: Extraocular movements intact.      Pupils: Pupils are equal, round, and reactive to light.   Cardiovascular:      Rate and Rhythm: Regular rhythm. Tachycardia present.      Pulses: Normal pulses.      Heart sounds: Normal heart sounds. No murmur heard.  Pulmonary:      Effort: Accessory muscle usage present.      Breath sounds: Decreased air movement present. No wheezing or rales.   Abdominal:      General: Abdomen is flat. There is no distension.      Palpations: Abdomen is soft.      Tenderness: There is no abdominal tenderness.   Musculoskeletal:         General: No swelling.      Right lower leg: No edema.      Left lower leg: No edema.   Skin:     General: Skin is warm and dry.      Findings: No bruising or rash.   Neurological:      General: No focal deficit present.      Mental Status: She is alert and oriented to person, place, and time.   Psychiatric:         Mood and Affect: Mood normal.         Behavior: Behavior normal.       Vents:  Vent  Mode: A/C (12/30/22 1528)  Ventilator Initiated: Yes (12/29/22 0144)  Set Rate: 12 BPM (12/30/22 1528)  Vt Set: 350 mL (12/30/22 0722)  Pressure Support: 14 cmH20 (12/30/22 1139)  PEEP/CPAP: 6 cmH20 (12/30/22 1528)  Oxygen Concentration (%): 30 (01/12/23 0605)  Peak Airway Pressure: 22 cmH20 (12/30/22 1528)  Plateau Pressure: 20 cmH20 (12/30/22 1528)  Total Ve: 9.73 L/m (12/30/22 1528)  Negative Inspiratory Force (cm H2O): 31 (01/12/23 0710)  F/VT Ratio<105 (RSBI): (!) 58.05 (12/30/22 1528)  Lines/Drains/Airways       Central Venous Catheter Line  Duration             Percutaneous Central Line Insertion/Assessment - Triple Lumen  12/29/22 0043 right internal jugular 14 days              Drain  Duration                  Rectal Tube 01/05/23 2130 rectal tube w/ balloon (indicate number of mLs) 6 days                  Significant Labs:    CBC/Anemia Profile:  Recent Labs   Lab 01/11/23  0328 01/12/23  0353   WBC 6.59 9.69   HGB 6.8* 8.6*   HCT 21.8* 28.1*    164   MCV 85 87   RDW 14.4 15.1*          Chemistries:  Recent Labs   Lab 01/11/23  0328 01/12/23  0353     139 140   K 4.1  4.1 4.0     105 108   CO2 25  25 25   BUN 17  17 18   CREATININE 0.5  0.5 0.5   CALCIUM 8.2*  8.2* 8.3*   ALBUMIN 2.3*  --    PROT 7.3  --    BILITOT 0.2  --    ALKPHOS 67  --    ALT 26  --    AST 33  --    MG 2.3 2.3   PHOS 3.0 2.7       All pertinent labs within the past 24 hours have been reviewed.    Significant Imaging:  I have reviewed all pertinent imaging results/findings within the past 24 hours.      ABG  Recent Labs   Lab 01/05/23  1132   PH 7.389   PO2 41   PCO2 63.5*   HCO3 38.4*   BE 13     Assessment/Plan:     Neuro  Cervical myelopathy  See cervical spondylosis    Stenosis, cervical spine  Noted on MRI spine. Of note patient has been experiencing episodes of vertigo and decreased balance which could be related.   -NSGY consulted, appreciate recs  -if need for reintubation should be done with  fiberoptic or glidescope  -concern that this could be contributing to her current respiratory muscle weakness  -appreciate neurology and nsgy involvement  -per NSGY, if laminectomy to be done, would prefer patient to have tracheostomy prior to their intervention  -discussing next steps with patient and family, appreciate palliative input    Pulmonary  * Shortness of breath  Patient presented with approx 3 weeks of worsening shortness of breath, weakness, and falls. On transfer to ICU required intubation. Was extubated to BIPAP. Have been unable to wean off bipap due to persistent shortness of breath, tachypnea, and inadequate volumes. Trial off bipap results in tachypnea, tachycardia, and hypertension. Appears weak distally and with fine motor skills. Unclear etiology at this time. Does not appear pulmonary in nature as patient is saturating well, ABGs have been wnl. MRI C-spine showed severe cervical stenosis, NSGY was consulted, unlikely that the stenosis would be causing these issues. Concern that difficulty breathing could be related to diaphragm weakness. Critical illness myopathy seems unlikely as patient was not intubated for long duration.  -neurology consulted, myasthenia panel sent  -further labs ordered (ESR, CRP, DARLINE, myositis panel)  -continue bipap, alternate with HFNC as tolerated  -started IVIG on 1/5 for 5 day course  -discussing with neurology and nsgy about possible relation to cervical stenosis  -NSGY considering intervention, prefer if tracheostomy is done prior to any surgery; will continue to discuss and assess her daily changes in respiratory status  -appreciate palliative assistance  -discussed with patients daughter on 1/12 who stated that if her respiratory status were to suddenly worsen that she would not want to be re-intubated. Will discuss trach again once patient is feeling better.    Respiratory distress  See shortness of breath    Acute hypoxemic respiratory failure  Patient with  Hypoxic Respiratory failure which is Acute.  she is not on home oxygen. Supplemental oxygen was provided and noted- Oxygen Concentration (%):  [27-60] 30.   Signs/symptoms of respiratory failure include- tachypnea, increased work of breathing and respiratory distress. Contributing diagnoses includes - Pleural effusion and atelectasis Labs and images were reviewed. Patient Has recent ABG, which has been reviewed. Will treat underlying causes and adjust management of respiratory failure as follows- unclear etiology of pleural effusions, patient notes recent URI 3 weeks ago that has left her short of breath since then, on broad spectrum abx  Repeat CXR with increased consolidation in LLL  Extubated to Bipap  1/3: CT Chest - Bibasilar atelectatic/consolidative change (left greater than right) with patchy right basilar opacities and small bilateral pleural effusions.  Correlation for underlying infectious process advised.  Completed abx course for PNA  See shortness of breath    Cardiac/Vascular  Hypertension  Continues to be hypertensive despite scheduled labetalol, losartan, and amlodipine. Appears to worsen when on high flow instead of bipap so could be partly due to stress response.  -consider changing from labetalol to cardene drip  -repeat urine metanephrines pending  -avoid pure beta blockade    Right renal artery stenosis  -no acute intervention while in ICU  -see HTN    Hyperlipemia  Continue home statin    Essential hypertension  Known history of uncontrolled HTN, w/ labile BP since admit.   -BP meds held in setting of septic shock  -will avoid IV meds if possible as has had hypotension following IV BP meds  -renal artery duplex showing R renal artery stenosis, L renal artery unable to be visualized  -Will monitor and add back meds as tolerated, IV metoprolol when unable to tolerate PO  -of note plasma metanephrines came back elevated in both 24 hour urine collections; discussed with radiology and they do not  see any major abnormalities around adrenals; have consulted endocrine; likelihood of extra-adrenal pheo is low, will check repeat urine metanephrines per endocrine.  -while tolerating PO will do losartan; labetalol IV scheduled q4h on 1/9 due to NPO at some times during the day precluding PO meds. Will try to uptitrate as able with PO meds.  -see HTN    Dyspnea on exertion  Began prior to admission. Has been requiring 2L via NC to maintain oxygenation  -O2 via NC PRN    Renal/  Metabolic alkalosis  Initially could have been related to diuretic use. Bicarb remains elevated after stopping HCTZ.    Hyponatremia  Patient started having weakness around 3 weeks ago after possible viral illness, also has had decreased appetite and more recently experiencing shortness of breath.  Sodium of 120 on admit, has been fluctuating since, now around 116-117. UOSM 802, HANH 109  Has become increasingly lethargic since admission, concern due to hyponatremia  Likely hypovolemic hyponatremia in setting of diuretic use, decreased intake and possible GI loses    -nephrology consulted, appreciate recs  -RESOLVED  -BMP q12    Endocrine  Hypothyroidism (acquired)  -continue levothyroxine    Moderate malnutrition  Nutrition consulted. Most recent weight and BMI monitored-   On TPN; replete lytes and monitor for re-feeding syndrome  Malnutrition (Moderate to Severe)  Energy Intake (Malnutrition): less than 75% for greater than 7 days  Measurements:  Wt Readings from Last 1 Encounters:   01/05/23 59.4 kg (130 lb 15.3 oz)   Body mass index is 22.48 kg/m².  Recommendations: Recommendation/Intervention: 1.  Goals: Meet % EEN, EPN by RD f/u date  Patient has been screened and assessed by RD. RD will follow patient.      GI  Dysphagia  SLP eval, recommendations for full liquid diet      Orthopedic  Abscess of bursa of right hip  CT scan showing possible hematoma vs abscess around right hip. Aspiration done with IR. Culture with e.coli  pan-sensitive. Repeat CT showing fluid has reaccumulated.   -ID following  -Has been on zosyn, switch to cefepime per ID  -ortho consulted- recs for repeating drainage w/ IR  -f/u repeat cultures and fluid analysis with NGTD  -abx ended 1/9    Other  Fever  Febrile to 102.9 overnight of 1/11. Continued to be febrile during the day 1/12.   -CXR, UA, BCx, Covid/flu/rsv ordered  -remove rectal seal   -remove galvez  -tylenol PRN for fevers  -central line placed 12/29. Currently being used for TPN. If BCx positive consider removal.  -consider starting broad spectrum abx    Hypothermia  Considering new onset will check BCx and start zosyn. Using bear hugger for warming.    Generalized weakness  See shortness of breath       Critical Care Daily Checklist:    A: Awake: RASS Goal/Actual Goal: RASS Goal: 0-->alert and calm  Actual: Manning Agitation Sedation Scale (RASS): Alert and calm   B: Spontaneous Breathing Trial Performed? Spon. Breathing Trial Initiated?: Not initiated (12/29/22 0729)   C: SAT & SBT Coordinated?  n/a                      D: Delirium: CAM-ICU Overall CAM-ICU: Negative   E: Early Mobility Performed? Yes   F: Feeding Goal: Goals: Meet % EEN, EPN by RD f/u date  Status: Nutrition Goal Status: goal met   Current Diet Order   Procedures    Diet full liquid      AS: Analgesia/Sedation none   T: Thromboembolic Prophylaxis yes   H: HOB > 300 Yes   U: Stress Ulcer Prophylaxis (if needed) n/a   G: Glucose Control yes   B: Bowel Function Stool Occurrence: 1   I: Indwelling Catheter (Lines & Galvez) Necessity Galvez removed  Central line being used for TPN  Remove rectal seal   D: De-escalation of Antimicrobials/Pharmacotherapies none    Plan for the day/ETD Infectious workup    Code Status:  Family/Goals of Care: Full Code  ongoing       Critical secondary to Patient has a condition that poses threat to life and bodily function: Severe Respiratory Distress      Critical care was time spent personally by me  on the following activities: development of treatment plan with patient or surrogate and bedside caregivers, discussions with consultants, evaluation of patient's response to treatment, examination of patient, ordering and performing treatments and interventions, ordering and review of laboratory studies, ordering and review of radiographic studies, pulse oximetry, re-evaluation of patient's condition. This critical care time did not overlap with that of any other provider or involve time for any procedures.     Beto Avelar MD  Critical Care Medicine  [facility] - Cardiac Medical [facility]

## 2023-01-12 NOTE — ASSESSMENT & PLAN NOTE
Noted on MRI spine. Of note patient has been experiencing episodes of vertigo and decreased balance which could be related.   -NSGY consulted, appreciate recs  -if need for reintubation should be done with fiberoptic or glidescope  -concern that this could be contributing to her current respiratory muscle weakness  -appreciate neurology and nsgy involvement  -per NSGY, if laminectomy to be done, would prefer patient to have tracheostomy prior to their intervention  -discussing next steps with patient and family, appreciate palliative input

## 2023-01-12 NOTE — PROGRESS NOTES
Pharmacokinetic Initial Assessment: IV Vancomycin    Assessment/Plan:    Initiate intravenous vancomycin with loading dose of 1500 mg once followed by a maintenance dose of vancomycin 1250 mg IV every 12 hours  Desired empiric serum trough concentration is 15 to 20 mcg/mL  Draw vancomycin trough level 60 min prior to fifth dose on 1/14 at approximately 1400 (fifth dose so to keep consult off overnight)  Pharmacy will continue to follow and monitor vancomycin.      Please contact pharmacy at extension [phone] with any questions regarding this assessment.     Thank you for the consult,   Molly Cheung, PharmD       Patient brief summary:  Dominique Mcgee is a 72 y.o. female initiated on antimicrobial therapy with IV Vancomycin for treatment of suspected lower respiratory infection    Drug Allergies:   Review of patient's allergies indicates:   Allergen Reactions    Hydrochlorothiazide (bulk) Other (See Comments)     Hyponatremia    Hydralazine analogues      Precipitous drop in BP with IV formulation. Avoid if possible    Shellfish containing products Swelling       Actual Body Weight:   59.4 kg    Renal Function:   Estimated Creatinine Clearance: 90.4 mL/min (based on SCr of 0.5 mg/dL).,     Dialysis Method (if applicable):  N/A    CBC (last 72 hours):  Recent Labs   Lab Result Units 01/10/23  0334 01/11/23 0328 01/12/23  0353   WBC K/uL 7.66 6.59 9.69   Hemoglobin g/dL 7.3* 6.8* 8.6*   Hematocrit % 23.4* 21.8* 28.1*   Platelets K/uL 261 242 164   Gran % % 68.4 75.2* 86.6*   Lymph % % 13.6* 9.6* 4.4*   Mono % % 11.0 10.3 7.3   Eosinophil % % 5.9 3.6 0.3   Basophil % % 0.7 0.8 0.9   Differential Method  Automated Automated Automated       Metabolic Panel (last 72 hours):  Recent Labs   Lab Result Units 01/10/23  0334 01/11/23  0328 01/12/23  0353   Sodium mmol/L 134* 139  139 140   Potassium mmol/L 3.9 4.1  4.1 4.0   Chloride mmol/L 105 105  105 108   CO2 mmol/L 25 25  25 25   Glucose mg/dL 119* 104  104 123*    BUN mg/dL 17 17  17 18   Creatinine mg/dL 0.5 0.5  0.5 0.5   Albumin g/dL  --  2.3*  --    Total Bilirubin mg/dL  --  0.2  --    Alkaline Phosphatase U/L  --  67  --    AST U/L  --  33  --    ALT U/L  --  26  --    Magnesium mg/dL 2.3 2.3 2.3   Phosphorus mg/dL 2.8 3.0 2.7       Drug levels (last 3 results):  No results for input(s): VANCOMYCINRA, VANCORANDOM, VANCOMYCINPE, VANCOPEAK, VANCOMYCINTR, VANCOTROUGH in the last 72 hours.    Microbiologic Results:  Microbiology Results (last 7 days)       Procedure Component Value Units Date/Time    Fungus culture [[id number]] Collected: 01/05/23 1436    Order Status: Completed Specimen: Abscess from Hip, Right Updated: 01/12/23 1312     Fungus (Mycology) Culture Culture in progress    Blood culture [[id number]] Collected: 01/12/23 0852    Order Status: Sent Specimen: Blood from Peripheral, Antecubital, Left Updated: 01/12/23 0937    Blood culture [[id number]] Collected: 01/12/23 0851    Order Status: Sent Specimen: Blood from Peripheral, Antecubital, Right Updated: 01/12/23 0937    Culture, Anaerobic [[id number]] Collected: 01/05/23 1436    Order Status: Completed Specimen: Abscess from Hip, Right Updated: 01/12/23 0741     Anaerobic Culture No anaerobes isolated    Aerobic culture [[id number]] Collected: 01/05/23 1436    Order Status: Completed Specimen: Abscess from Hip, Right Updated: 01/09/23 1244     Aerobic Bacterial Culture No growth    AFB Culture & Smear [[id number]] Collected: 01/05/23 1436    Order Status: Completed Specimen: Abscess from Hip, Right Updated: 01/06/23 2127     AFB Culture & Smear Culture in progress     AFB CULTURE STAIN No acid fast bacilli seen.    Gram stain [[id number]] Collected: 01/05/23 1436    Order Status: Completed Specimen: Abscess from Hip, Right Updated: 01/05/23 1758     Gram Stain Result No WBC's      No organisms seen

## 2023-01-12 NOTE — ASSESSMENT & PLAN NOTE
Continues to be hypertensive despite scheduled labetalol, losartan, and amlodipine. Appears to worsen when on high flow instead of bipap so could be partly due to stress response.  -consider changing from labetalol to cardene drip  -repeat urine metanephrines pending  -avoid pure beta blockade

## 2023-01-12 NOTE — PROGRESS NOTES
[facility]  Adult Nutrition  Progress Note    SUMMARY       Recommendations    Continue current Full liquid diet, per SLP. Encourage PO intake as tolerated.  Continue current TPN regimen + IV Lipids - meeting needs.  If/when able to place NGT, initiate TFs. Rec'd Impact Peptide @ 35 mL/hr = 1260 kcals, 79 g of protein, 647 mL fluid.  RD to monitor & follow-up.    Goals: Meet % EEN, EPN by RD f/u date  Nutrition Goal Status: goal met  Communication of RD Recs: reviewed with RN    Assessment and Plan    Moderate malnutrition    Nutrition Problem:  Moderate Protein-Calorie Malnutrition  Malnutrition in the context of Acute Illness/Injury    Related to (etiology):  Inability to consume sufficient energy     Signs and Symptoms (as evidenced by):  Energy Intake: <75% of estimated energy requirement for > 1 week   Body Fat Depletion: mild and moderate depletion of orbitals   Muscle Mass Depletion: mild and moderate depletion of temples and clavicle region     Interventions(treatment strategy):  Collaboration of nutrition care w/ other providers  EN/PN    Nutrition Diagnosis Status:  Continues    Malnutrition Assessment    Energy Intake (Malnutrition): less than 75% for greater than 7 days   Orbital Region (Subcutaneous Fat Loss): mild depletion   Congregational Region (Muscle Loss): moderate depletion  Clavicle Bone Region (Muscle Loss): mild depletion     Reason for Assessment    Reason For Assessment: RD follow-up  Diagnosis: other (see comments) (Hyponatremia)  Relevant Medical History: HTN, HLD  Interdisciplinary Rounds: attended    General Information Comments: Pt on full liquid diet, per SLP - tolerating, however +poor PO intake. Pt also receiving TPN/Lipids. Palliative care following. Per H & P, pt w/ decreased appetite PTA x 3 weeks; unsure of UBW (most recent was 180# x 2018). NFPE complete 12/30 - mild-moderate wasting noted. RD feels pt meets criteria for moderate malnutrition. Please see PES  "statement for details.  Nutrition Discharge Planning: Pending clinical course    Nutrition/Diet History    Spiritual, Cultural Beliefs, Rastafarian Practices, Values that Affect Care: no  Food Allergies: shellfish  Factors Affecting Nutritional Intake: other (see comments) (SOB/BiPAP)    Anthropometrics    Temp: 99.6 °F (37.6 °C)  Height: 5' 4" (162.6 cm)  Height (inches): 64 in  Weight Method: Bed Scale  Weight: 59.4 kg (130 lb 15.3 oz)  Weight (lb): 130.95 lb  Ideal Body Weight (IBW), Female: 120 lb  % Ideal Body Weight, Female (lb): 109.13 %  BMI (Calculated): 22.5  BMI Grade: 18.5-24.9 - normal    Lab/Procedures/Meds    Pertinent Labs Reviewed: reviewed  Pertinent Labs Comments: VENICE 171  Pertinent Medications Reviewed: reviewed  Pertinent Medications Comments: -    Estimated/Assessed Needs    Weight Used For Calorie Calculations: 59.4 kg (130 lb 15.3 oz)    Energy Calorie Requirements (kcal): 1375 kcal/d  Energy Need Method: [place]-St Jeor (1.25 PAL)    Protein Requirements: 71-89 g/d (1.2-1.5 g/kg)  Weight Used For Protein Calculations: 59.4 kg (130 lb 15.3 oz)    Estimated Fluid Requirement Method: other (see comments) (Per MD or 1 mL/kcal)  RDA Method (mL): 1375    Nutrition Prescription Ordered    Current Diet Order: Full liquids  Current Nutrition Support Formula Ordered: Other (Comment) (160g D, 80g AA @ 40 mL/hr + IV Lipids)    Evaluation of Received Nutrient/Fluid Intake    Parenteral Calories (kcal): 1364  Parenteral Protein (gm): 80  Parenteral Fluid (mL): 960  Lipid Calories (kcals): 500 kcals  GIR (Glucose Infusion Rate) (mg/kg/min): 1.87 mg/kg/min    % Kcal Needs: 99%  % Protein Needs: 100%    I/O: -1.5L since 12/29    Energy Calories Required: meeting needs  Protein Required: meeting needs  Fluid Required: other (see comments) (Per MD)    Comments: LBM: 1/11    Tolerance: tolerating    Nutrition Risk    Level of Risk/Frequency of Follow-up:  (1x/week)     Monitor and Evaluation    Food and Nutrient " Intake: energy intake, food and beverage intake, enteral nutrition intake, parenteral nutrition intake  Food and Nutrient Adminstration: diet order, enteral and parenteral nutrition administration  Physical Activity and Function: nutrition-related ADLs and IADLs  Anthropometric Measurements: weight, weight change  Biochemical Data, Medical Tests and Procedures: glucose/endocrine profile, lipid profile, inflammatory profile, gastrointestinal profile, electrolyte and renal panel  Nutrition-Focused Physical Findings: overall appearance     Nutrition Follow-Up    RD Follow-up?: Yes

## 2023-01-12 NOTE — PT/OT/SLP PROGRESS
Occupational Therapy      Patient Name:  Dominique Mcgee   MRN:  [id number]    Patient not seen today secondary to  (RN hold 2' tenuous respiratory status). Will follow-up at a later date.    1/12/2023

## 2023-01-12 NOTE — SUBJECTIVE & OBJECTIVE
Interval History/Significant Events:   Febrile overnight to 102.9. Repeat infectious workup ordered. Feels more tired today. Continues to have hypertension despite multiple agents.    Review of Systems   Unable to perform ROS: Acuity of condition   Respiratory:  Positive for shortness of breath.    Objective:     Vital Signs (Most Recent):  Temp: 99.6 °F (37.6 °C) (01/12/23 0915)  Pulse: (!) 116 (01/12/23 1000)  Resp: (!) 28 (01/12/23 1000)  BP: (!) 180/77 (01/12/23 1000)  SpO2: 100 % (01/12/23 1000)   Vital Signs (24h Range):  Temp:  [98.3 °F (36.8 °C)-102.9 °F (39.4 °C)] 99.6 °F (37.6 °C)  Pulse:  [] 116  Resp:  [22-44] 28  SpO2:  [94 %-100 %] 100 %  BP: (100-204)/() 180/77   Weight: 59.4 kg (130 lb 15.3 oz)  Body mass index is 22.48 kg/m².      Intake/Output Summary (Last 24 hours) at 1/12/2023 1023  Last data filed at 1/12/2023 0900  Gross per 24 hour   Intake 1230.3 ml   Output 1080 ml   Net 150.3 ml         Physical Exam  Vitals and nursing note reviewed.   Constitutional:       General: She is not in acute distress.     Appearance: She is ill-appearing.   HENT:      Head: Normocephalic and atraumatic.   Eyes:      Extraocular Movements: Extraocular movements intact.      Pupils: Pupils are equal, round, and reactive to light.   Cardiovascular:      Rate and Rhythm: Regular rhythm. Tachycardia present.      Pulses: Normal pulses.      Heart sounds: Normal heart sounds. No murmur heard.  Pulmonary:      Effort: Accessory muscle usage present.      Breath sounds: Decreased air movement present. No wheezing or rales.   Abdominal:      General: Abdomen is flat. There is no distension.      Palpations: Abdomen is soft.      Tenderness: There is no abdominal tenderness.   Musculoskeletal:         General: No swelling.      Right lower leg: No edema.      Left lower leg: No edema.   Skin:     General: Skin is warm and dry.      Findings: No bruising or rash.   Neurological:      General: No focal deficit  present.      Mental Status: She is alert and oriented to person, place, and time.   Psychiatric:         Mood and Affect: Mood normal.         Behavior: Behavior normal.       Vents:  Vent Mode: A/C (12/30/22 1528)  Ventilator Initiated: Yes (12/29/22 0144)  Set Rate: 12 BPM (12/30/22 1528)  Vt Set: 350 mL (12/30/22 0722)  Pressure Support: 14 cmH20 (12/30/22 1139)  PEEP/CPAP: 6 cmH20 (12/30/22 1528)  Oxygen Concentration (%): 30 (01/12/23 0605)  Peak Airway Pressure: 22 cmH20 (12/30/22 1528)  Plateau Pressure: 20 cmH20 (12/30/22 1528)  Total Ve: 9.73 L/m (12/30/22 1528)  Negative Inspiratory Force (cm H2O): 31 (01/12/23 0710)  F/VT Ratio<105 (RSBI): (!) 58.05 (12/30/22 1528)  Lines/Drains/Airways       Central Venous Catheter Line  Duration             Percutaneous Central Line Insertion/Assessment - Triple Lumen  12/29/22 0043 right internal jugular 14 days              Drain  Duration                  Rectal Tube 01/05/23 2130 rectal tube w/ balloon (indicate number of mLs) 6 days                  Significant Labs:    CBC/Anemia Profile:  Recent Labs   Lab 01/11/23 0328 01/12/23  0353   WBC 6.59 9.69   HGB 6.8* 8.6*   HCT 21.8* 28.1*    164   MCV 85 87   RDW 14.4 15.1*          Chemistries:  Recent Labs   Lab 01/11/23 0328 01/12/23  0353     139 140   K 4.1  4.1 4.0     105 108   CO2 25  25 25   BUN 17  17 18   CREATININE 0.5  0.5 0.5   CALCIUM 8.2*  8.2* 8.3*   ALBUMIN 2.3*  --    PROT 7.3  --    BILITOT 0.2  --    ALKPHOS 67  --    ALT 26  --    AST 33  --    MG 2.3 2.3   PHOS 3.0 2.7       All pertinent labs within the past 24 hours have been reviewed.    Significant Imaging:  I have reviewed all pertinent imaging results/findings within the past 24 hours.

## 2023-01-12 NOTE — ASSESSMENT & PLAN NOTE
Patient presented with approx 3 weeks of worsening shortness of breath, weakness, and falls. On transfer to ICU required intubation. Was extubated to BIPAP. Have been unable to wean off bipap due to persistent shortness of breath, tachypnea, and inadequate volumes. Trial off bipap results in tachypnea, tachycardia, and hypertension. Appears weak distally and with fine motor skills. Unclear etiology at this time. Does not appear pulmonary in nature as patient is saturating well, ABGs have been wnl. MRI C-spine showed severe cervical stenosis, NSGY was consulted, unlikely that the stenosis would be causing these issues. Concern that difficulty breathing could be related to diaphragm weakness. Critical illness myopathy seems unlikely as patient was not intubated for long duration.  -neurology consulted, myasthenia panel sent  -further labs ordered (ESR, CRP, DARLINE, myositis panel)  -continue bipap, alternate with HFNC as tolerated  -started IVIG on 1/5 for 5 day course  -discussing with neurology and nsgy about possible relation to cervical stenosis  -NSGY considering intervention, prefer if tracheostomy is done prior to any surgery; will continue to discuss and assess her daily changes in respiratory status  -appreciate palliative assistance  -discussed with patients daughter on 1/12 who stated that if her respiratory status were to suddenly worsen that she would not want to be re-intubated. Will discuss trach again once patient is feeling better.

## 2023-01-12 NOTE — PLAN OF CARE
[facility] DAILY GOALS       A: Awake    RASS: Goal - RASS Goal: 0-->alert and calm  Actual - RASS ([place] Agitation-Sedation Scale): 0-->alert and calm   Restraint necessity: Clinical Justification: Removing medical devices  B: Breathe   SBT: Not intubated   C: Coordinate A & B, analgesics/sedatives   Pain: managed    SAT: Not intubated  D: Delirium   CAM-ICU: Overall CAM-ICU: Negative  E: Early(intubated/ Progressive (non-intubated) Mobility   MOVE Screen: Fail   Activity: Activity Management: Arm raise - L1, Rolling - L1  FAS: Feeding/Nutrition   Diet order: Diet/Nutrition Received: full liquid,    T: Thrombus   DVT prophylaxis: VTE Required Core Measure: Pharmacological prophylaxis initiated/maintained  H: HOB Elevation   Head of Bed (HOB) Positioning: HOB at 30-45 degrees  U: Ulcer Prophylaxis   GI: no  G: Glucose control   managed Glycemic Management: blood glucose monitored  S: Skin   Bathing/Skin Care: bath, complete, dressed/undressed, linen changed  Device Skin Pressure Protection: absorbent pad utilized/changed, skin-to-skin areas padded, skin-to-device areas padded, pressure points protected, positioning supports utilized  Pressure Reduction Devices: foam padding utilized, positioning supports utilized, specialty bed utilized  Pressure Reduction Techniques: frequent weight shift encouraged, positioned off wounds, pressure points protected, weight shift assistance provided  Skin Protection: adhesive use limited, incontinence pads utilized, tubing/devices free from skin contact, transparent dressing maintained, skin-to-skin areas padded, skin-to-device areas padded  B: Bowel Function   no issues   I: Indwelling Catheters   Rush necessity: [REMOVED]      Urethral Catheter 12/27/22 1535-Reason for Continuing Urinary Catheterization: Critically ill in ICU and requiring hourly monitoring of intake/output       Urethral Catheter 01/04/23 0009 Non-latex 16 Fr.-Reason for Continuing Urinary Catheterization:  Critically ill in ICU and requiring hourly monitoring of intake/output, Urinary retention   CVC necessity: Yes  D: De-escalation Antibiotics   Yes    Family/Goals of care/Code Status   Code Status: Full Code    24H Vital Sign Range  Temp:  [98 °F (36.7 °C)-102.9 °F (39.4 °C)]   Pulse:  []   Resp:  [22-41]   BP: (100-197)/()   SpO2:  [96 %-100 %]      Shift Events   No acute events throughout shift    VS and assessment per flow sheet, patient progressing towards goals as tolerated, plan of care reviewed with  Dominique Mcgee , all concerns addressed, will continue to monitor.    Ximena Sauceda

## 2023-01-12 NOTE — PROGRESS NOTES
"[facility] - Cardiac Medical ICU  Palliative Medicine  Progress Note    Patient Name: Dominique Mcgee  MRN: [id number]  Admission Date: 12/23/2022  Hospital Length of Stay: 20 days  Code Status: Full Code   Attending Provider: Delfino Chen MD  Consulting Provider: Libra Zhong MD  Primary Care Physician: Briana Leblanc MD  Principal Problem:Shortness of breath    Patient information was obtained from patient and primary team.      Assessment/Plan:     Palliative care encounter  72 year old female with onset of acute hypoxemic respiratory failure and profound weakness over the past few weeks. Patient is dependent on Bipap. Palliative consulted for assistance with GOC    Code status: Full. Patient would want time trial on vent    Surrogate decision maker: 2 daughters are legal NOK     GOC/ACP  1/12: Met Ms. Mcgee, introduced myself as colleague of Dr. Ag and visited her with palliative zena Calvert. She requested for prayer, asking for strength and healing. After prayer, she is moved to sing and sings to us a song of gratefulness. She writes, "I was praising God with my whole heart. One morning I [was] just praising, praising, praising." I told her that I had met with Dr. Chen and learned of the conversation they had yesterday. Validated how difficult it was to have this serious conversation. She explains to me that Dr. Chen found out why she was so sick and it was because of the cervical stenosis. She says that if she were going to get surgery, she would need a tracheostomy, but this would likely be long term and there is no guarantee that she would get better. She writes that "I don't want it...TRACH. I told my daughter Lydia. I weighed the pros and cons." I agreed with her, saying that if this intervention were going to ensure that she would get better, then there would be no question about proceeding with it. I said that unfortunately it will not make her better and getting a trach would only " prolong her current state. I shared that we should be like her, thoughtful and recognize that just because we can do things to the human body, doesn't mean that it's right to do so and that while we can't promise healing through our own hands, God provides healing in heaven. I gently discussed hospice with Ms. Mcgee and discussed the pros and cons of hospice. I encouraged her to think about it. She nods tearfully first, then cries. After several minutes of tears, I asked her if it was OK for me to revisit again and she said yes. Will continue to follow along to provide support and explore goals/next steps.    1/11/ Followed up with patient. Sister at bedside. Patient feeling more and more optimistic now that she is able to tolerate comfort flow for periods of time. Discussed how comfort flow is allowing her to communicate more easily and allowing her to take in some PO. Also discussed that she still has a long way to go and we still are not sure what the underlying problem is that is driving her profound weakness. Patient understands that her breathing could get worse at anytime. She maintains that she would want a time trial on the vent. Would not want to be dependent on a vent in a NH indefinitely. Provided support.  -Goals are life prolonging. Would not want to be dependent on vent indefinitely        1/7 Followed up with patient regarding code status. Patient feeling a bit better and more hopeful. She voiced that she does not think it will come to it but would want a time trial on the vent if necessary. Would not want to be dependent on a vent indefinitely. Wants chest compressions and shocks in the event of a cardiac arrest. Provided support    1/6   Met with patient in am and then patient, Dr. Dotson, patient's two daughters (one in person and one non speaker), and patient's friend. Discussed where things stand now and potential outcomes. Discussed needing to have a plan for if patient decompensates.  "Patient has voiced several times that she would not want to be re-intubated, trached, or back on vent. Extensively discussed what this would look like to patient and family- it would buy more time but does not fix the underlying problem. Discussed what quality of life would look like if trached and vent dependent. Provided support   Additional 60 minutes spent discussing ACP           1/5  -Met with patient at bedside. Later spoke with local daughter on the phone. Introduced palliative medicine and our role in patient's care. Discussed complexity of her medical condition and that the etiology remains unclear. Despite best supportive care she is not improving and we worry about whether we are going to be able to fix the underlying issue. Explained that if she is not improving, we are going to need to make some difficult decisions. Explained that she cannot remain on the bipap mask indefinitely. The team is doing everything they can to try to get her off but if she remains dependent on significant respiratory support the next step to prolong life would likely be trach/vent. Patient shook her head no. She remembers everything about her recent intubation and would not want to be dependent on a vent long term. This would not be an acceptable quality of life. Provided support                 I will follow-up with patient. Please contact us if you have any additional questions.    Subjective:     Chief Complaint:   Chief Complaint   Patient presents with    Shortness of Breath     X 2 weeks. Reports cough. Denies cardiac hx.       HPI:   Per critical care H&P   "71 y/o F patient with HTN, HLD, hypothyroidism, recent URI 3 weeks prior to admission, balance difficulites who presented to the ED on 12/23 for 3 weeks of weakness, LIRA, decreased appetite, and constipation with intermittent lower abdominal cramping. Per daughter, patient is typically independent, lives alone, cooks her own meals, since she has been ill she has " "had decreased PO intake and drinking coffee, tea, and eating cereal. She has had progressive worsening HTN in the last 3 weeks and was started on losartan-HCTZ and metoprolol. Prior to this she was only taking lisinopril per daughter. Admitted for HTN emergency and was found to have Na 120. Received IV fluids and admitted to hospital medicine. For her hypernatremia, her thiazide was held and she was placed on fluid restriction. Urine studies from 12/25 showed Uosm of 802 and HANH of 109. Her BP has been labile since admission requiring both IV antihypertensives as well as fluid boluses. The patient's hyponatremia persisted despite fluid restriction and she became increasingly lethargic and weak so CCM was consulted for further management.      Upon evaluation, patient is somnolent. Opens eyes to voice, unable to engage in full conversation. Speaks mainly single words. Denies thirst. Reports SOB and generalized abdominal pain a/w constipation. No CP, N/V/D, dysuria, edema. Most recent vitals: /60, pulse 82, R 18, SpO2 94% on RA. Na trend 119->115->117. "    Patient is being treated for acute hypoxemic respiratory failure and profound weakness of unclear origin and sepsis. She is dependent on continuous Bipap s/p extubation. She has been started on TPN. Palliative consulted to assist with [facility]          Hospital Course:  No notes on file    Interval History: Met Ms. Mcgee with palliative . Had supportive conversation, most of what she wrote to communicate. She sang a song of gratefulness to us, thanked her team repeatedly for caring for her.    Medications:  Continuous Infusions:   dextrose 10 % in water (D10W)      TPN ADULT CENTRAL LINE CUSTOM 40 mL/hr at 01/12/23 0900    TPN ADULT CENTRAL LINE CUSTOM       Scheduled Meds:   albuterol-ipratropium  3 mL Nebulization Q6H WAKE    amLODIPine  10 mg Oral Daily    capsaicin   Topical (Top) BID    ceFEPime (MAXIPIME) IVPB  2 g Intravenous Q8H    " enoxaparin  40 mg Subcutaneous Daily    fat emulsion 20%  250 mL Intravenous Daily    labetalol  20 mg Intravenous Q4H    levothyroxine  50 mcg Oral Before breakfast    [START ON 1/13/2023] losartan  100 mg Oral Daily    losartan  50 mg Oral Once    sodium chloride 3%  4 mL Nebulization Q6H WAKE    [START ON 1/13/2023] vancomycin (VANCOCIN) IVPB  1,250 mg Intravenous Q12H    vancomycin (VANCOCIN) IVPB  1,500 mg Intravenous Once     PRN Meds:sodium chloride, acetaminophen, dextrose 10 % in water (D10W), dextrose 10%, dextrose 10%, glucagon (human recombinant), glycerin adult, insulin aspart U-100, iohexol, labetalol, sodium chloride 0.9%, Pharmacy to dose Vancomycin consult **AND** vancomycin - pharmacy to dose    Objective:     Vital Signs (Most Recent):  Temp: 99.8 °F (37.7 °C) (01/12/23 1230)  Pulse: (!) 124 (01/12/23 1545)  Resp: (!) 32 (01/12/23 1545)  BP: (!) 172/70 (01/12/23 1530)  SpO2: 100 % (01/12/23 1545)   Vital Signs (24h Range):  Temp:  [98.3 °F (36.8 °C)-102.9 °F (39.4 °C)] 99.8 °F (37.7 °C)  Pulse:  [] 124  Resp:  [22-44] 32  SpO2:  [94 %-100 %] 100 %  BP: (100-204)/(51-98) 172/70     Weight: 59.4 kg (130 lb 15.3 oz)  Body mass index is 22.48 kg/m².    Physical Exam  Constitutional:       Appearance: She is ill-appearing.   HENT:      Head: Normocephalic and atraumatic.      Right Ear: External ear normal.      Left Ear: External ear normal.      Nose: Nose normal.      Mouth/Throat:      Mouth: Mucous membranes are dry.   Eyes:      Extraocular Movements: Extraocular movements intact.      Conjunctiva/sclera: Conjunctivae normal.   Cardiovascular:      Rate and Rhythm: Normal rate.   Pulmonary:      Breath sounds: Normal breath sounds.      Comments: On BiPAP  Abdominal:      General: Bowel sounds are normal.      Palpations: Abdomen is soft.   Musculoskeletal:         General: Swelling present.   Lymphadenopathy:      Cervical: No cervical adenopathy.   Skin:     General: Skin is warm  and dry.   Neurological:      Mental Status: She is alert and oriented to person, place, and time. Mental status is at baseline.   Psychiatric:         Mood and Affect: Mood normal.         Behavior: Behavior normal.      Comments: Tearful       Review of Symptoms      Symptom Assessment (ESAS 0-10 Scale)  Pain:  0  Dyspnea:  0  Anxiety:  0  Nausea:  0  Depression:  0  Anorexia:  0  Fatigue:  0  Insomnia:  0  Restlessness:  0  Agitation:  0         Psychosocial/Cultural:   See Palliative Psychosocial Note: No  Social Issues Identified: Coping deficit pt/family  Bereavement Risk: Yes: Identified: work/home, financial, intimacy, and caregiver concerns   Caregiver Needs Discussed. Caregiver Distress: Yes: Intensity of family caregiving  **Primary  to Follow**  Palliative Care  Consult: No      Advance Care Planning   Advance Directives:   Living Will: No    LaPOST: No    Do Not Resuscitate Status: No    Medical Power of : No      Decision Making:  Patient answered questions  Goals of Care: What is most important right now is to focus on curative/life-prolongation (regardless of treatment burdens). Accordingly, we have decided that the best plan to meet the patient's goals includes continuing with treatment.       Significant Labs: CBC:   Recent Labs   Lab 01/11/23 0328 01/12/23 0353   WBC 6.59 9.69   HGB 6.8* 8.6*   HCT 21.8* 28.1*    164     CMP:   Recent Labs   Lab 01/11/23 0328 01/12/23 0353     139 140   K 4.1  4.1 4.0     105 108   CO2 25  25 25     104 123*   BUN 17  17 18   CREATININE 0.5  0.5 0.5   CALCIUM 8.2*  8.2* 8.3*   PROT 7.3  --    ALBUMIN 2.3*  --    BILITOT 0.2  --    ALKPHOS 67  --    AST 33  --    ALT 26  --    ANIONGAP 9  9 7*     CBC:   Recent Labs   Lab 01/12/23 0353   WBC 9.69   HGB 8.6*   HCT 28.1*   MCV 87        BMP:  Recent Labs   Lab 01/12/23 0353   *      K 4.0      CO2 25   BUN 18    CREATININE 0.5   CALCIUM 8.3*   MG 2.3     LFT:  Lab Results   Component Value Date    AST 33 01/11/2023    ALKPHOS 67 01/11/2023    BILITOT 0.2 01/11/2023     Albumin:   Albumin   Date Value Ref Range Status   01/11/2023 2.3 (L) 3.5 - 5.2 g/dL Final     Protein:   Total Protein   Date Value Ref Range Status   01/11/2023 7.3 6.0 - 8.4 g/dL Final     Lactic acid:   Lab Results   Component Value Date    LACTATE 0.6 12/29/2022    LACTATE 1.0 12/28/2022       Significant Imaging: I have reviewed all pertinent imaging results/findings within the past 24 hours.    I spent 60 minutes in the care of this patient, >50% in chart review, face to face discussion of goals of care, symptom assessment, counseling, coordination of care and emotional support.      Libra Zhong MD  Palliative Medicine  [facility] - Cardiac Medical ICU

## 2023-01-12 NOTE — SUBJECTIVE & OBJECTIVE
Interval History: Met Ms. Mcgee with palliative . Had supportive conversation, most of what she wrote to communicate. She sang a song of gratefulness to us, thanked her team repeatedly for caring for her.    Medications:  Continuous Infusions:   dextrose 10 % in water (D10W)      TPN ADULT CENTRAL LINE CUSTOM 40 mL/hr at 01/12/23 0900    TPN ADULT CENTRAL LINE CUSTOM       Scheduled Meds:   albuterol-ipratropium  3 mL Nebulization Q6H WAKE    amLODIPine  10 mg Oral Daily    capsaicin   Topical (Top) BID    ceFEPime (MAXIPIME) IVPB  2 g Intravenous Q8H    enoxaparin  40 mg Subcutaneous Daily    fat emulsion 20%  250 mL Intravenous Daily    labetalol  20 mg Intravenous Q4H    levothyroxine  50 mcg Oral Before breakfast    [START ON 1/13/2023] losartan  100 mg Oral Daily    losartan  50 mg Oral Once    sodium chloride 3%  4 mL Nebulization Q6H WAKE    [START ON 1/13/2023] vancomycin (VANCOCIN) IVPB  1,250 mg Intravenous Q12H    vancomycin (VANCOCIN) IVPB  1,500 mg Intravenous Once     PRN Meds:sodium chloride, acetaminophen, dextrose 10 % in water (D10W), dextrose 10%, dextrose 10%, glucagon (human recombinant), glycerin adult, insulin aspart U-100, iohexol, labetalol, sodium chloride 0.9%, Pharmacy to dose Vancomycin consult **AND** vancomycin - pharmacy to dose    Objective:     Vital Signs (Most Recent):  Temp: 99.8 °F (37.7 °C) (01/12/23 1230)  Pulse: (!) 124 (01/12/23 1545)  Resp: (!) 32 (01/12/23 1545)  BP: (!) 172/70 (01/12/23 1530)  SpO2: 100 % (01/12/23 1545)   Vital Signs (24h Range):  Temp:  [98.3 °F (36.8 °C)-102.9 °F (39.4 °C)] 99.8 °F (37.7 °C)  Pulse:  [] 124  Resp:  [22-44] 32  SpO2:  [94 %-100 %] 100 %  BP: (100-204)/(51-98) 172/70     Weight: 59.4 kg (130 lb 15.3 oz)  Body mass index is 22.48 kg/m².    Physical Exam  Constitutional:       Appearance: She is ill-appearing.   HENT:      Head: Normocephalic and atraumatic.      Right Ear: External ear normal.      Left Ear: External ear  normal.      Nose: Nose normal.      Mouth/Throat:      Mouth: Mucous membranes are dry.   Eyes:      Extraocular Movements: Extraocular movements intact.      Conjunctiva/sclera: Conjunctivae normal.   Cardiovascular:      Rate and Rhythm: Normal rate.   Pulmonary:      Breath sounds: Normal breath sounds.      Comments: On BiPAP  Abdominal:      General: Bowel sounds are normal.      Palpations: Abdomen is soft.   Musculoskeletal:         General: Swelling present.   Lymphadenopathy:      Cervical: No cervical adenopathy.   Skin:     General: Skin is warm and dry.   Neurological:      Mental Status: She is alert and oriented to person, place, and time. Mental status is at baseline.   Psychiatric:         Mood and Affect: Mood normal.         Behavior: Behavior normal.      Comments: Tearful       Review of Symptoms      Symptom Assessment (ESAS 0-10 Scale)  Pain:  0  Dyspnea:  0  Anxiety:  0  Nausea:  0  Depression:  0  Anorexia:  0  Fatigue:  0  Insomnia:  0  Restlessness:  0  Agitation:  0         Psychosocial/Cultural:   See Palliative Psychosocial Note: No  Social Issues Identified: Coping deficit pt/family  Bereavement Risk: Yes: Identified: work/home, financial, intimacy, and caregiver concerns   Caregiver Needs Discussed. Caregiver Distress: Yes: Intensity of family caregiving  **Primary  to Follow**  Palliative Care  Consult: No      Advance Care Planning   Advance Directives:   Living Will: No    LaPOST: No    Do Not Resuscitate Status: No    Medical Power of : No      Decision Making:  Patient answered questions  Goals of Care: What is most important right now is to focus on curative/life-prolongation (regardless of treatment burdens). Accordingly, we have decided that the best plan to meet the patient's goals includes continuing with treatment.       Significant Labs: CBC:   Recent Labs   Lab 01/11/23  0328 01/12/23  0353   WBC 6.59 9.69   HGB 6.8* 8.6*   HCT 21.8*  28.1*    164     CMP:   Recent Labs   Lab 01/11/23  0328 01/12/23 0353     139 140   K 4.1  4.1 4.0     105 108   CO2 25  25 25     104 123*   BUN 17  17 18   CREATININE 0.5  0.5 0.5   CALCIUM 8.2*  8.2* 8.3*   PROT 7.3  --    ALBUMIN 2.3*  --    BILITOT 0.2  --    ALKPHOS 67  --    AST 33  --    ALT 26  --    ANIONGAP 9  9 7*     CBC:   Recent Labs   Lab 01/12/23 0353   WBC 9.69   HGB 8.6*   HCT 28.1*   MCV 87        BMP:  Recent Labs   Lab 01/12/23 0353   *      K 4.0      CO2 25   BUN 18   CREATININE 0.5   CALCIUM 8.3*   MG 2.3     LFT:  Lab Results   Component Value Date    AST 33 01/11/2023    ALKPHOS 67 01/11/2023    BILITOT 0.2 01/11/2023     Albumin:   Albumin   Date Value Ref Range Status   01/11/2023 2.3 (L) 3.5 - 5.2 g/dL Final     Protein:   Total Protein   Date Value Ref Range Status   01/11/2023 7.3 6.0 - 8.4 g/dL Final     Lactic acid:   Lab Results   Component Value Date    LACTATE 0.6 12/29/2022    LACTATE 1.0 12/28/2022       Significant Imaging: I have reviewed all pertinent imaging results/findings within the past 24 hours.

## 2023-01-12 NOTE — PT/OT/SLP PROGRESS
Physical Therapy  Consult    Patient Name:  Dominique Mcgee   MRN:  [id number]  Admitting Diagnosis:  Shortness of breath   Recent Surgery: * No surgery found *    Admit Date: 12/23/2022  Length of Stay: 20 days    Physical Therapy orders received and acknowledged. Patient not seen today due to Nurse/ KAELYN hold (RN hold due to worsening respiratory status).  PT to attempt when Dominique Mcgee is medically appropriate for progressive mobility.    Raeann Trujillo PT, DPT  1/12/2023  Pager: [phone]

## 2023-01-12 NOTE — ASSESSMENT & PLAN NOTE
"72 year old female with onset of acute hypoxemic respiratory failure and profound weakness over the past few weeks. Patient is dependent on Bipap. Palliative consulted for assistance with GOC    Code status: Full. Patient would want time trial on vent    Surrogate decision maker: 2 daughters are legal NOK     GOC/ACP  1/12: Met Ms. Mcgee, introduced myself as colleague of Dr. Ag and visited her with palliative  Nehal. She requested for prayer, asking for strength and healing. After prayer, she is moved to sing and sings to us a song of gratefulness. She writes, "I was praising God with my whole heart. One morning I [was] just praising, praising, praising." I told her that I had met with Dr. Chen and learned of the conversation they had yesterday. Validated how difficult it was to have this serious conversation. She explains to me that Dr. Chen found out why she was so sick and it was because of the cervical stenosis. She says that if she were going to get surgery, she would need a tracheostomy, but this would likely be long term and there is no guarantee that she would get better. She writes that "I don't want it...TRACH. I told my daughter Lydia. I weighed the pros and cons." I agreed with her, saying that if this intervention were going to ensure that she would get better, then there would be no question about proceeding with it. I said that unfortunately it will not make her better and getting a trach would only prolong her current state. I shared that we should be like her, thoughtful and recognize that just because we can do things to the human body, doesn't mean that it's right to do so and that while we can't promise healing through our own hands, God provides healing in heaven. I gently discussed hospice with Ms. Mcgee and discussed the pros and cons of hospice. I encouraged her to think about it. She nods tearfully first, then cries. After several minutes of tears, I asked her if it " was OK for me to revisit again and she said yes. Will continue to follow along to provide support and explore goals/next steps.    1/11/ Followed up with patient. Sister at bedside. Patient feeling more and more optimistic now that she is able to tolerate comfort flow for periods of time. Discussed how comfort flow is allowing her to communicate more easily and allowing her to take in some PO. Also discussed that she still has a long way to go and we still are not sure what the underlying problem is that is driving her profound weakness. Patient understands that her breathing could get worse at anytime. She maintains that she would want a time trial on the vent. Would not want to be dependent on a vent in a NH indefinitely. Provided support.  -Goals are life prolonging. Would not want to be dependent on vent indefinitely        1/7 Followed up with patient regarding code status. Patient feeling a bit better and more hopeful. She voiced that she does not think it will come to it but would want a time trial on the vent if necessary. Would not want to be dependent on a vent indefinitely. Wants chest compressions and shocks in the event of a cardiac arrest. Provided support    1/6   Met with patient in am and then patient, Dr. Dotson, patient's two daughters (one in person and one non speaker), and patient's friend. Discussed where things stand now and potential outcomes. Discussed needing to have a plan for if patient decompensates. Patient has voiced several times that she would not want to be re-intubated, trached, or back on vent. Extensively discussed what this would look like to patient and family- it would buy more time but does not fix the underlying problem. Discussed what quality of life would look like if trached and vent dependent. Provided support   Additional 60 minutes spent discussing ACP           1/5  -Met with patient at bedside. Later spoke with local daughter on the phone. Introduced palliative  medicine and our role in patient's care. Discussed complexity of her medical condition and that the etiology remains unclear. Despite best supportive care she is not improving and we worry about whether we are going to be able to fix the underlying issue. Explained that if she is not improving, we are going to need to make some difficult decisions. Explained that she cannot remain on the bipap mask indefinitely. The team is doing everything they can to try to get her off but if she remains dependent on significant respiratory support the next step to prolong life would likely be trach/vent. Patient shook her head no. She remembers everything about her recent intubation and would not want to be dependent on a vent long term. This would not be an acceptable quality of life. Provided support

## 2023-01-13 NOTE — PLAN OF CARE
[facility]  Discharge Reassessment    Primary Care Provider: Briana Leblanc MD    Expected Discharge Date: 1/17/2023    Reassessment (most recent)       Discharge Reassessment - 01/13/23 1641          Discharge Reassessment    Assessment Type Discharge Planning Reassessment     Did the patient's condition or plan change since previous assessment? Yes     Discharge Plan discussed with: Adult children     Name(s) and Number(s) Evelyn dgtr/cp# [phone]     Communicated TRICIA with patient/caregiver Date not available/Unable to determine     Discharge Plan A Home with family     Discharge Plan B Hospice/home;Inpatient Hospice     DME Needed Upon Discharge  other (see comments)   TBD    Discharge Barriers Identified None     Why the patient remains in the hospital Requires continued medical care        Post-Acute Status    Post-Acute Authorization Hospice     Hospice Status Pending medical clearance/testing     Discharge Delays None known at this time                   SW spoke with daughter (Evelyn) who conveyed that tx team has not discuss a discharge plan @ this time.  SW relayed that the Medical tx will discuss plan and prognosis.    No discharge plan in motion @ this time.    Misti Nicole LMSW  [facility]  [phone]

## 2023-01-13 NOTE — ASSESSMENT & PLAN NOTE
72 year old female with onset of acute hypoxemic respiratory failure and profound weakness over the past few weeks. Patient is dependent on Bipap. Palliative consulted for assistance with [facility]    Code status: DNAR/DNI    Surrogate decision maker: 2 daughters are legal NOK  - Evelyn Borges [phone] and Jennifer Mcgee [phone]    GOC/ACP  1/13: Met Ms. Mcgee and her sister Marielena initially in the morning, clarified code status and updated to DNAR/DNI, knowing that she would not want to be chronically dependent on a ventilator. Revisited in the afternoon with Reverend TRESSA Lopez and after prayer, discussed recommendations to optimize her dyspnea. I shared that I spoke with Dr. Chen and despite his greatest efforts to add aggressive medical therapies to clear infection and to optimize her health/body, she remains tachypneic and breathing is very labored. I shared that knowing that everything is being done to optimize her respiratory status, we can add low-dose opioids/morphine to help alleviate her dyspnea, and once her breathing is significantly more comfortable, we can remove the BiPAP machine and place her on oxygen via nasal cannula so that she can enjoy time with her family and talk/sing without the BiPAP machine. She nods repeatedly during this recommendation and agrees that this is what she would like to do. I recommended that we continue using medication to alleviate her shortness of breath, and by continuing to optimize it, we do not need to go back on BiPAP. She agreed with this as well. I asked if I could share this conversation with her daughter and she asked that I speak with Evelyn. I called Evelyn and reiterated our conversation. She is supportive of her mother's decision. She will be visiting on Tuesday and is open to meeting with me when I return from the holiday weekend.    1/12: Met Ms. Mcgee, introduced myself as colleague of Dr. Ag and visited her with palliative   "Nehal. She requested for prayer, asking for strength and healing. After prayer, she is moved to sing and sings to us a song of gratefulness. She writes, "I was praising God with my whole heart. One morning I [was] just praising, praising, praising." I told her that I had met with Dr. Chen and learned of the conversation they had yesterday. Validated how difficult it was to have this serious conversation. She explains to me that Dr. Chen found out why she was so sick and it was because of the cervical stenosis. She says that if she were going to get surgery, she would need a tracheostomy, but this would likely be long term and there is no guarantee that she would get better. She writes that "I don't want it...TRACH. I told my daughter Evelyn. I weighed the pros and cons." I agreed with her, saying that if this intervention were going to ensure that she would get better, then there would be no question about proceeding with it. I said that unfortunately it will not make her better and getting a trach would only prolong her current state. I shared that we should be like her, thoughtful and recognize that just because we can do things to the human body, doesn't mean that it's right to do so and that while we can't promise healing through our own hands, God provides healing in heaven. I gently discussed hospice with Ms. Mcgee and discussed the pros and cons of hospice. I encouraged her to think about it. She nods tearfully first, then cries. After several minutes of tears, I asked her if it was OK for me to revisit again and she said yes. Will continue to follow along to provide support and explore goals/next steps.    1/11/ Followed up with patient. Sister at bedside. Patient feeling more and more optimistic now that she is able to tolerate comfort flow for periods of time. Discussed how comfort flow is allowing her to communicate more easily and allowing her to take in some PO. Also discussed that she still " has a long way to go and we still are not sure what the underlying problem is that is driving her profound weakness. Patient understands that her breathing could get worse at anytime. She maintains that she would want a time trial on the vent. Would not want to be dependent on a vent in a NH indefinitely. Provided support. Goals are life prolonging. Would not want to be dependent on vent indefinitely      1/7 Followed up with patient regarding code status. Patient feeling a bit better and more hopeful. She voiced that she does not think it will come to it but would want a time trial on the vent if necessary. Would not want to be dependent on a vent indefinitely. Wants chest compressions and shocks in the event of a cardiac arrest. Provided support    1/6   Met with patient in am and then patient, Dr. Dotson, patient's two daughters (one in person and one non speaker), and patient's friend. Discussed where things stand now and potential outcomes. Discussed needing to have a plan for if patient decompensates. Patient has voiced several times that she would not want to be re-intubated, trached, or back on vent. Extensively discussed what this would look like to patient and family- it would buy more time but does not fix the underlying problem. Discussed what quality of life would look like if trached and vent dependent. Provided support      1/5  -Met with patient at bedside. Later spoke with local daughter on the phone. Introduced palliative medicine and our role in patient's care. Discussed complexity of her medical condition and that the etiology remains unclear. Despite best supportive care she is not improving and we worry about whether we are going to be able to fix the underlying issue. Explained that if she is not improving, we are going to need to make some difficult decisions. Explained that she cannot remain on the bipap mask indefinitely. The team is doing everything they can to try to get her off but  if she remains dependent on significant respiratory support the next step to prolong life would likely be trach/vent. Patient shook her head no. She remembers everything about her recent intubation and would not want to be dependent on a vent long term. This would not be an acceptable quality of life. Provided support

## 2023-01-13 NOTE — PT/OT/SLP DISCHARGE
Occupational Therapy Discharge Summary    Dominique Mcgee  MRN: [id number]   Principal Problem: Shortness of breath      Patient Discharged from acute Occupational Therapy on 1/13/23.      Assessment:      Patient is no longer making progress.    Objective:     GOALS:   Multidisciplinary Problems       Occupational Therapy Goals          Problem: Occupational Therapy    Goal Priority Disciplines Outcome Interventions   Occupational Therapy Goal     OT, PT/OT Ongoing, Progressing    Description: Goals to be met by: 1/23/23    Patient will increase functional independence with ADLs by performing:    UE Dressing with MIN A   Grooming while seated with set-up  Toileting from BSC with CGA  for hygiene and clothing management.   BSC transfer to toilet with CGA    Pt to tolerated OOB to chair x 2-3 hours daily to increase endurance for functional activity.                          Reasons for Discontinuation of Therapy Services  Patient is unable to continue work toward goals because of medical or psychosocial complications.      Plan:     Patient Discharged to:  Remains in ICU not medically appropriate for skilled OT services    1/13/2023

## 2023-01-13 NOTE — ACP (ADVANCE CARE PLANNING)
Advance Care Planning   Date: 01/13/2023  [facility]  Participants: Dominqiue Mcgee, sister MarielenaLibra (palliative provider)    Code Status  In light of the patients advanced and life limiting illness, I engaged the Ms. Mcgee, who was accompanied by her sister Marielena, in a conversation about life-sustaining treatment. After establishing that she would not want to have a trach nor be dependent on a ventilator for the rest of her life, she agreed that interventions like CPR and intubation would not help her get better and would in fact prolong or commit her to a quality of life she would not find acceptable. The patient wishes to have a natural, peaceful death. I shared with her in order to protect her from harmful interventions, I will place a DNR order in her medical record, which she agreed.    She asked that I return later before continuing our conversation.    I spent a total of 16 minutes engaging the patient in this advance care planning discussion.

## 2023-01-13 NOTE — SUBJECTIVE & OBJECTIVE
Interval History/Significant Events:   Continues to have fevers. Anti-fungal added overnight. Not feeling well this morning. Code status changed to DNR/DNI. Patient would not like to proceed with a tracheostomy.    Review of Systems   Unable to perform ROS: Acuity of condition   Constitutional:  Positive for fatigue and fever.   Respiratory:  Positive for shortness of breath.    Objective:     Vital Signs (Most Recent):  Temp: (!) 102.5 °F (39.2 °C) (01/13/23 0705)  Pulse: (!) 131 (01/13/23 1000)  Resp: (!) 38 (01/13/23 1000)  BP: (!) 160/70 (01/13/23 1000)  SpO2: 99 % (01/13/23 1000)   Vital Signs (24h Range):  Temp:  [99.6 °F (37.6 °C)-103.2 °F (39.6 °C)] 102.5 °F (39.2 °C)  Pulse:  [102-144] 131  Resp:  [24-41] 38  SpO2:  [98 %-100 %] 99 %  BP: (101-194)/() 160/70   Weight: 59.4 kg (130 lb 15.3 oz)  Body mass index is 22.48 kg/m².      Intake/Output Summary (Last 24 hours) at 1/13/2023 1017  Last data filed at 1/13/2023 1009  Gross per 24 hour   Intake 1583.89 ml   Output 150 ml   Net 1433.89 ml         Physical Exam  Vitals and nursing note reviewed.   Constitutional:       General: She is in acute distress.      Appearance: She is ill-appearing.   HENT:      Head: Normocephalic and atraumatic.   Eyes:      Extraocular Movements: Extraocular movements intact.      Pupils: Pupils are equal, round, and reactive to light.   Cardiovascular:      Rate and Rhythm: Regular rhythm. Tachycardia present.      Pulses: Normal pulses.      Heart sounds: Normal heart sounds. No murmur heard.  Pulmonary:      Effort: Accessory muscle usage present.      Breath sounds: Decreased air movement present. No wheezing or rales.   Abdominal:      General: Abdomen is flat. There is no distension.      Palpations: Abdomen is soft.      Tenderness: There is no abdominal tenderness.   Musculoskeletal:         General: No swelling.      Right lower leg: No edema.      Left lower leg: No edema.   Skin:     General: Skin is warm and  dry.      Findings: No bruising or rash.   Neurological:      General: No focal deficit present.      Mental Status: She is alert and oriented to person, place, and time.   Psychiatric:         Mood and Affect: Mood normal.         Behavior: Behavior normal.       Vents:  Vent Mode: A/C (12/30/22 1528)  Ventilator Initiated: Yes (12/29/22 0144)  Set Rate: 12 BPM (12/30/22 1528)  Vt Set: 350 mL (12/30/22 0722)  Pressure Support: 14 cmH20 (12/30/22 1139)  PEEP/CPAP: 6 cmH20 (12/30/22 1528)  Oxygen Concentration (%): 30 (01/13/23 1000)  Peak Airway Pressure: 22 cmH20 (12/30/22 1528)  Plateau Pressure: 20 cmH20 (12/30/22 1528)  Total Ve: 9.73 L/m (12/30/22 1528)  Negative Inspiratory Force (cm H2O): 31 (01/12/23 0710)  F/VT Ratio<105 (RSBI): (!) 58.05 (12/30/22 1528)  Lines/Drains/Airways       Central Venous Catheter Line  Duration             Percutaneous Central Line Insertion/Assessment - Triple Lumen  12/29/22 0043 right internal jugular 15 days                  Significant Labs:    CBC/Anemia Profile:  Recent Labs   Lab 01/12/23  0353 01/13/23  0323   WBC 9.69 8.95   HGB 8.6* 8.7*   HCT 28.1* 27.0*    99*   MCV 87 85   RDW 15.1* 15.7*          Chemistries:  Recent Labs   Lab 01/12/23  0353 01/13/23  0323    143   K 4.0 3.6    110   CO2 25 25   BUN 18 20   CREATININE 0.5 0.5   CALCIUM 8.3* 8.3*   MG 2.3 2.3   PHOS 2.7 2.3*       All pertinent labs within the past 24 hours have been reviewed.    Significant Imaging:  I have reviewed all pertinent imaging results/findings within the past 24 hours.

## 2023-01-13 NOTE — ASSESSMENT & PLAN NOTE
Febrile to 102.9 overnight of 1/11. Continued to be febrile during the day 1/12.   -CXR, UA, BCx, Covid/flu/rsv ordered  -remove rectal seal   -remove galvez  -tylenol PRN for fevers  -central line placed 12/29. Currently being used for TPN. If BCx positive consider removal.  -have started vanc, cefepime, and micafungin  -ID consulted, appreciate recs

## 2023-01-13 NOTE — PT/OT/SLP PROGRESS
Speech Language Pathology  Missed Visit      Dominique Mcgee  MRN: [id number]    Patient not seen today secondary to continuous BIPAP. Will follow-up next service date.

## 2023-01-13 NOTE — PLAN OF CARE
CMICU DAILY GOALS       A: Awake    RASS: Goal - RASS Goal: 0-->alert and calm  Actual - RASS (Manning Agitation-Sedation Scale): 0-->alert and calm   Restraint necessity: Clinical Justification: Removing medical devices  B: Breathe   SBT: Not intubated   C: Coordinate A & B, analgesics/sedatives   Pain: managed    SAT: Not intubated  D: Delirium   CAM-ICU: Overall CAM-ICU: Negative  E: Early(intubated/ Progressive (non-intubated) Mobility   MOVE Screen: Fail   Activity: Activity Management: Arm raise - L1  FAS: Feeding/Nutrition   Diet order: Diet/Nutrition Received: full liquid,    T: Thrombus   DVT prophylaxis: VTE Required Core Measure: Pharmacological prophylaxis initiated/maintained  H: HOB Elevation   Head of Bed (HOB) Positioning: HOB at 30 degrees  U: Ulcer Prophylaxis   GI: no  G: Glucose control   managed Glycemic Management: blood glucose monitored  S: Skin   Bathing/Skin Care: bath, complete, linen changed  Device Skin Pressure Protection: absorbent pad utilized/changed, adhesive use limited, pressure points protected, positioning supports utilized, skin-to-device areas padded, skin-to-skin areas padded  Pressure Reduction Devices: specialty bed utilized, pressure-redistributing mattress utilized, positioning supports utilized, heel offloading device utilized, foam padding utilized  Pressure Reduction Techniques: frequent weight shift encouraged, weight shift assistance provided, pressure points protected  Skin Protection: adhesive use limited  B: Bowel Function   no issues   I: Indwelling Catheters   Rush necessity: [REMOVED]      Urethral Catheter 12/27/22 1535-Reason for Continuing Urinary Catheterization: Critically ill in ICU and requiring hourly monitoring of intake/output  [REMOVED]      Urethral Catheter 01/04/23 0009 Non-latex 16 Fr.-Reason for Continuing Urinary Catheterization: Critically ill in ICU and requiring hourly monitoring of intake/output, Urinary retention   CVC necessity: Yes  D:  De-escalation Antibiotics   Yes    Family/Goals of care/Code Status   Code Status: DNR    24H Vital Sign Range  Temp:  [99.6 °F (37.6 °C)-103.2 °F (39.6 °C)]   Pulse:  [108-144]   Resp:  [25-38]   BP: (117-194)/(56-84)   SpO2:  [98 %-100 %]      Shift Events   No acute events throughout shift, Possible discuss of next steps with pallative about hospice    VS and assessment per flow sheet, patient progressing towards goals as tolerated, plan of care reviewed with family, concerns addressed, will continue to monitor.    Lisa Hwang

## 2023-01-13 NOTE — PROGRESS NOTES
[facility]  Palliative Medicine  Progress Note    Patient Name: Dominique Mcgee  MRN: [id number]  Admission Date: 12/23/2022  Hospital Length of Stay: 21 days  Code Status: DNR   Attending Provider: Delfino Chen MD  Consulting Provider: Libra Zhong MD  Primary Care Physician: Briana Leblanc MD  Principal Problem:Shortness of breath    Patient information was obtained from patient, relative(s) and primary team.      Assessment/Plan:     Palliative care encounter  72 year old female with onset of acute hypoxemic respiratory failure and profound weakness over the past few weeks. Patient is dependent on Bipap. Palliative consulted for assistance with [facility]    Code status: DNAR/DNI    Surrogate decision maker: 2 daughters are legal JAMESON  - Evelyn Borges [phone] and Jennifer Mcgee [phone]    C/ACP  1/13: Met Ms. Mcgee and her sister Marielena initially in the morning, clarified code status and updated to DNAR/DNI, knowing that she would not want to be chronically dependent on a ventilator. Revisited in the afternoon with Reverend TRESSA Lopez and after prayer, discussed recommendations to optimize her dyspnea. I shared that I spoke with Dr. Chen and despite his greatest efforts to add aggressive medical therapies to clear infection and to optimize her health/body, she remains tachypneic and breathing is very labored. I shared that knowing that everything is being done to optimize her respiratory status, we can add low-dose opioids/morphine to help alleviate her dyspnea, and once her breathing is significantly more comfortable, we can remove the BiPAP machine and place her on oxygen via nasal cannula so that she can enjoy time with her family and talk/sing without the BiPAP machine. She nods repeatedly during this recommendation and agrees that this is what she would like to do. I recommended that we continue using medication to alleviate her shortness of breath, and by continuing to  "optimize it, we do not need to go back on BiPAP. She agreed with this as well. I asked if I could share this conversation with her daughter and she asked that I speak with Evelyn. I called Evelyn and reiterated our conversation. She is supportive of her mother's decision. She will be visiting on Tuesday and is open to meeting with me when I return from the holiday weekend.    1/12: Met Ms. Mcgee, introduced myself as colleague of Dr. Ag and visited her with palliative  Nehal. She requested for prayer, asking for strength and healing. After prayer, she is moved to sing and sings to us a song of gratefulness. She writes, "I was praising God with my whole heart. One morning I [was] just praising, praising, praising." I told her that I had met with Dr. Chen and learned of the conversation they had yesterday. Validated how difficult it was to have this serious conversation. She explains to me that Dr. Chen found out why she was so sick and it was because of the cervical stenosis. She says that if she were going to get surgery, she would need a tracheostomy, but this would likely be long term and there is no guarantee that she would get better. She writes that "I don't want it...TRACH. I told my daughter Evelyn. I weighed the pros and cons." I agreed with her, saying that if this intervention were going to ensure that she would get better, then there would be no question about proceeding with it. I said that unfortunately it will not make her better and getting a trach would only prolong her current state. I shared that we should be like her, thoughtful and recognize that just because we can do things to the human body, doesn't mean that it's right to do so and that while we can't promise healing through our own hands, God provides healing in heaven. I gently discussed hospice with Ms. Mcgee and discussed the pros and cons of hospice. I encouraged her to think about it. She nods tearfully " first, then cries. After several minutes of tears, I asked her if it was OK for me to revisit again and she said yes. Will continue to follow along to provide support and explore goals/next steps.    1/11/ Followed up with patient. Sister at bedside. Patient feeling more and more optimistic now that she is able to tolerate comfort flow for periods of time. Discussed how comfort flow is allowing her to communicate more easily and allowing her to take in some PO. Also discussed that she still has a long way to go and we still are not sure what the underlying problem is that is driving her profound weakness. Patient understands that her breathing could get worse at anytime. She maintains that she would want a time trial on the vent. Would not want to be dependent on a vent in a NH indefinitely. Provided support. Goals are life prolonging. Would not want to be dependent on vent indefinitely      1/7 Followed up with patient regarding code status. Patient feeling a bit better and more hopeful. She voiced that she does not think it will come to it but would want a time trial on the vent if necessary. Would not want to be dependent on a vent indefinitely. Wants chest compressions and shocks in the event of a cardiac arrest. Provided support    1/6   Met with patient in am and then patient, Dr. Dotson, patient's two daughters (one in person and one non speaker), and patient's friend. Discussed where things stand now and potential outcomes. Discussed needing to have a plan for if patient decompensates. Patient has voiced several times that she would not want to be re-intubated, trached, or back on vent. Extensively discussed what this would look like to patient and family- it would buy more time but does not fix the underlying problem. Discussed what quality of life would look like if trached and vent dependent. Provided support      1/5  -Met with patient at bedside. Later spoke with local daughter on the phone.  "Introduced palliative medicine and our role in patient's care. Discussed complexity of her medical condition and that the etiology remains unclear. Despite best supportive care she is not improving and we worry about whether we are going to be able to fix the underlying issue. Explained that if she is not improving, we are going to need to make some difficult decisions. Explained that she cannot remain on the bipap mask indefinitely. The team is doing everything they can to try to get her off but if she remains dependent on significant respiratory support the next step to prolong life would likely be trach/vent. Patient shook her head no. She remembers everything about her recent intubation and would not want to be dependent on a vent long term. This would not be an acceptable quality of life. Provided support         Will not be present over the holiday weekend and will return on Tuesday, 1/17/2023 to follow-up with Ms. Mcgee and her family.         Subjective:     Chief Complaint:   Chief Complaint   Patient presents with    Shortness of Breath     X 2 weeks. Reports cough. Denies cardiac hx.       HPI:   Per critical care H&P   "71 y/o F patient with HTN, HLD, hypothyroidism, recent URI 3 weeks prior to admission, balance difficulites who presented to the ED on 12/23 for 3 weeks of weakness, LIRA, decreased appetite, and constipation with intermittent lower abdominal cramping. Per daughter, patient is typically independent, lives alone, cooks her own meals, since she has been ill she has had decreased PO intake and drinking coffee, tea, and eating cereal. She has had progressive worsening HTN in the last 3 weeks and was started on losartan-HCTZ and metoprolol. Prior to this she was only taking lisinopril per daughter. Admitted for HTN emergency and was found to have Na 120. Received IV fluids and admitted to hospital medicine. For her hypernatremia, her thiazide was held and she was placed on fluid restriction. " "Urine studies from 12/25 showed Uosm of 802 and HANH of 109. Her BP has been labile since admission requiring both IV antihypertensives as well as fluid boluses. The patient's hyponatremia persisted despite fluid restriction and she became increasingly lethargic and weak so [facility] was consulted for further management.      Upon evaluation, patient is somnolent. Opens eyes to voice, unable to engage in full conversation. Speaks mainly single words. Denies thirst. Reports SOB and generalized abdominal pain a/w constipation. No CP, N/V/D, dysuria, edema. Most recent vitals: /60, pulse 82, R 18, SpO2 94% on RA. Na trend 119->115->117. "    Patient is being treated for acute hypoxemic respiratory failure and profound weakness of unclear origin and sepsis. She is dependent on continuous Bipap s/p extubation. She has been started on TPN. Palliative consulted to assist with [facility]          Hospital Course:  No notes on file    Interval History: Met with Ms. Mcgee this morning, who was accompanied by her sister Marielena at bedside. She is breathing harder and feeling more tired. Discussed code status and updated accordingly.    Medications:  Continuous Infusions:   dextrose 10 % in water (D10W)      TPN ADULT CENTRAL LINE CUSTOM 40 mL/hr at 01/13/23 0241    TPN ADULT CENTRAL LINE CUSTOM       Scheduled Meds:   albuterol-ipratropium  3 mL Nebulization Q6H WAKE    amLODIPine  10 mg Oral Daily    capsaicin   Topical (Top) BID    ceFEPime (MAXIPIME) IVPB  1 g Intravenous Q8H    enoxaparin  40 mg Subcutaneous Daily    fat emulsion 20%  250 mL Intravenous Daily    labetalol  20 mg Intravenous Q4H    levothyroxine  50 mcg Oral Before breakfast    losartan  100 mg Oral Daily    micafungin (MYCAMINE) IVPB  100 mg Intravenous Q24H    vancomycin (VANCOCIN) IVPB  1,250 mg Intravenous Q24H     PRN Meds:sodium chloride, acetaminophen, dextrose 10 % in water (D10W), dextrose 10%, dextrose 10%, glucagon (human recombinant), " glycerin adult, insulin aspart U-100, iohexol, labetalol, sodium chloride 0.9%, Pharmacy to dose Vancomycin consult **AND** vancomycin - pharmacy to dose    Objective:     Vital Signs (Most Recent):  Temp: (!) 102.4 °F (39.1 °C) (01/13/23 1000)  Pulse: (!) 131 (01/13/23 1000)  Resp: (!) 38 (01/13/23 1000)  BP: (!) 160/70 (01/13/23 1000)  SpO2: 99 % (01/13/23 1000)   Vital Signs (24h Range):  Temp:  [99.6 °F (37.6 °C)-103.2 °F (39.6 °C)] 102.4 °F (39.1 °C)  Pulse:  [102-144] 131  Resp:  [24-41] 38  SpO2:  [98 %-100 %] 99 %  BP: (101-194)/() 160/70     Weight: 59.4 kg (130 lb 15.3 oz)  Body mass index is 22.48 kg/m².    Physical Exam  Constitutional:       Appearance: She is ill-appearing.   HENT:      Head: Normocephalic and atraumatic.      Right Ear: External ear normal.      Left Ear: External ear normal.      Nose: Nose normal.      Mouth/Throat:      Mouth: Mucous membranes are dry.   Eyes:      Extraocular Movements: Extraocular movements intact.      Conjunctiva/sclera: Conjunctivae normal.   Cardiovascular:      Rate and Rhythm: Tachycardia present.   Pulmonary:      Comments: Tachypneic, on BiPAP  Abdominal:      General: Bowel sounds are normal. There is no distension.      Palpations: Abdomen is soft.   Musculoskeletal:         General: Swelling present.   Lymphadenopathy:      Cervical: No cervical adenopathy.   Skin:     General: Skin is dry.      Findings: Bruising present.   Neurological:      Mental Status: She is alert and oriented to person, place, and time. Mental status is at baseline.   Psychiatric:         Behavior: Behavior normal.       Review of Symptoms      Symptom Assessment (ESAS 0-10 Scale)  Pain:  0  Dyspnea:  0  Anxiety:  0  Nausea:  0  Depression:  0  Anorexia:  0  Fatigue:  0  Insomnia:  0  Restlessness:  0  Agitation:  0         Psychosocial/Cultural:   See Palliative Psychosocial Note: No  Social Issues Identified: Coping deficit pt/family  Bereavement Risk: Yes: Identified:  work/home, financial, intimacy, and caregiver concerns   Caregiver Needs Discussed. Caregiver Distress: Yes: Intensity of family caregiving  **Primary  to Follow**  Palliative Care  Consult: No      Advance Care Planning   Advance Directives:   Goals of Care: What is most important right now is to focus on curative/life-prolongation (regardless of treatment burdens). Accordingly, we have decided that the best plan to meet the patient's goals includes continuing with treatment.       Significant Labs: CBC:   Recent Labs   Lab 01/12/23 0353 01/13/23 0323   WBC 9.69 8.95   HGB 8.6* 8.7*   HCT 28.1* 27.0*    99*     CMP:   Recent Labs   Lab 01/12/23 0353 01/13/23 0323    143   K 4.0 3.6    110   CO2 25 25   * 131*   BUN 18 20   CREATININE 0.5 0.5   CALCIUM 8.3* 8.3*   ANIONGAP 7* 8     CBC:   Recent Labs   Lab 01/13/23 0323   WBC 8.95   HGB 8.7*   HCT 27.0*   MCV 85   PLT 99*     BMP:  Recent Labs   Lab 01/13/23 0323   *      K 3.6      CO2 25   BUN 20   CREATININE 0.5   CALCIUM 8.3*   MG 2.3     LFT:  Lab Results   Component Value Date    AST 33 01/11/2023    ALKPHOS 67 01/11/2023    BILITOT 0.2 01/11/2023     Albumin:   Albumin   Date Value Ref Range Status   01/11/2023 2.3 (L) 3.5 - 5.2 g/dL Final     Protein:   Total Protein   Date Value Ref Range Status   01/11/2023 7.3 6.0 - 8.4 g/dL Final     Lactic acid:   Lab Results   Component Value Date    LACTATE 0.6 12/29/2022    LACTATE 1.0 12/28/2022       Significant Imaging: I have reviewed all pertinent imaging results/findings within the past 24 hours.    I spent 60 minutes in the care of this patient, >50% in chart review, face to face discussion of goals of care, symptom assessment, counseling, coordination of care and emotional support.      Libra Zhong MD  Palliative Medicine  [facility]

## 2023-01-13 NOTE — CHAPLAIN
Palliative Care     Patient: Dominique Mcgee       MRN: [id number]  : 1950  Age: 72 y.o.  Hospital Length of Stay: 21 days  Code Status: DNR   Attending Provider: Delfino Chen MD  Principal Problem: Shortness of breath    Present during Interview:  Visited pt again today with Melanie leon, Dr. Zhong.     Non-clinical observations/Facts:  Pt's breathing more labored, working with pall med doc and critical care team on keeping pt comfortable, while still treating.    Feelings/Chinyere  (Emotions/Fears/Experiences/Coping):      Pt seemed a little down today and of course on the bi-pap is laborious in and of itself; she communicated by writing mostly, thanking us and all the doctors. Pt welcomed prayer again as all three of us held hands.     Family/Friends (Support, Community, Culture):     Dr. Zhong received permission to talk to pt's local daughter with next steps in the plan that pt agreed to.    Future (Spiritual Care Plan of Action):  We informed pt that both Dr. Zhong and I would be back on Tuesday, but as always she will remain in my prayers. Lord, in your mercy,    In Peace,  Rev. Nehal Lopez MBA, MDiv   [phone]

## 2023-01-13 NOTE — SUBJECTIVE & OBJECTIVE
Interval History: Met with Ms. Mcgee this morning, who was accompanied by her sister Marielena at bedside. She is breathing harder and feeling more tired. Discussed code status and updated accordingly.    Medications:  Continuous Infusions:   dextrose 10 % in water (D10W)      TPN ADULT CENTRAL LINE CUSTOM 40 mL/hr at 01/13/23 0241    TPN ADULT CENTRAL LINE CUSTOM       Scheduled Meds:   albuterol-ipratropium  3 mL Nebulization Q6H WAKE    amLODIPine  10 mg Oral Daily    capsaicin   Topical (Top) BID    ceFEPime (MAXIPIME) IVPB  1 g Intravenous Q8H    enoxaparin  40 mg Subcutaneous Daily    fat emulsion 20%  250 mL Intravenous Daily    labetalol  20 mg Intravenous Q4H    levothyroxine  50 mcg Oral Before breakfast    losartan  100 mg Oral Daily    micafungin (MYCAMINE) IVPB  100 mg Intravenous Q24H    vancomycin (VANCOCIN) IVPB  1,250 mg Intravenous Q24H     PRN Meds:sodium chloride, acetaminophen, dextrose 10 % in water (D10W), dextrose 10%, dextrose 10%, glucagon (human recombinant), glycerin adult, insulin aspart U-100, iohexol, labetalol, sodium chloride 0.9%, Pharmacy to dose Vancomycin consult **AND** vancomycin - pharmacy to dose    Objective:     Vital Signs (Most Recent):  Temp: (!) 102.4 °F (39.1 °C) (01/13/23 1000)  Pulse: (!) 131 (01/13/23 1000)  Resp: (!) 38 (01/13/23 1000)  BP: (!) 160/70 (01/13/23 1000)  SpO2: 99 % (01/13/23 1000)   Vital Signs (24h Range):  Temp:  [99.6 °F (37.6 °C)-103.2 °F (39.6 °C)] 102.4 °F (39.1 °C)  Pulse:  [102-144] 131  Resp:  [24-41] 38  SpO2:  [98 %-100 %] 99 %  BP: (101-194)/() 160/70     Weight: 59.4 kg (130 lb 15.3 oz)  Body mass index is 22.48 kg/m².    Physical Exam  Constitutional:       Appearance: She is ill-appearing.   HENT:      Head: Normocephalic and atraumatic.      Right Ear: External ear normal.      Left Ear: External ear normal.      Nose: Nose normal.      Mouth/Throat:      Mouth: Mucous membranes are dry.   Eyes:      Extraocular Movements:  Extraocular movements intact.      Conjunctiva/sclera: Conjunctivae normal.   Cardiovascular:      Rate and Rhythm: Tachycardia present.   Pulmonary:      Comments: Tachypneic, on BiPAP  Abdominal:      General: Bowel sounds are normal. There is no distension.      Palpations: Abdomen is soft.   Musculoskeletal:         General: Swelling present.   Lymphadenopathy:      Cervical: No cervical adenopathy.   Skin:     General: Skin is dry.      Findings: Bruising present.   Neurological:      Mental Status: She is alert and oriented to person, place, and time. Mental status is at baseline.   Psychiatric:         Behavior: Behavior normal.       Review of Symptoms      Symptom Assessment (ESAS 0-10 Scale)  Pain:  0  Dyspnea:  0  Anxiety:  0  Nausea:  0  Depression:  0  Anorexia:  0  Fatigue:  0  Insomnia:  0  Restlessness:  0  Agitation:  0         Psychosocial/Cultural:   See Palliative Psychosocial Note: No  Social Issues Identified: Coping deficit pt/family  Bereavement Risk: Yes: Identified: work/home, financial, intimacy, and caregiver concerns   Caregiver Needs Discussed. Caregiver Distress: Yes: Intensity of family caregiving  **Primary  to Follow**  Palliative Care  Consult: No      Advance Care Planning   Advance Directives:   Goals of Care: What is most important right now is to focus on curative/life-prolongation (regardless of treatment burdens). Accordingly, we have decided that the best plan to meet the patient's goals includes continuing with treatment.       Significant Labs: CBC:   Recent Labs   Lab 01/12/23  0353 01/13/23  0323   WBC 9.69 8.95   HGB 8.6* 8.7*   HCT 28.1* 27.0*    99*     CMP:   Recent Labs   Lab 01/12/23  0353 01/13/23  0323    143   K 4.0 3.6    110   CO2 25 25   * 131*   BUN 18 20   CREATININE 0.5 0.5   CALCIUM 8.3* 8.3*   ANIONGAP 7* 8     CBC:   Recent Labs   Lab 01/13/23  0323   WBC 8.95   HGB 8.7*   HCT 27.0*   MCV 85   PLT 99*      BMP:  Recent Labs   Lab 01/13/23  0323   *      K 3.6      CO2 25   BUN 20   CREATININE 0.5   CALCIUM 8.3*   MG 2.3     LFT:  Lab Results   Component Value Date    AST 33 01/11/2023    ALKPHOS 67 01/11/2023    BILITOT 0.2 01/11/2023     Albumin:   Albumin   Date Value Ref Range Status   01/11/2023 2.3 (L) 3.5 - 5.2 g/dL Final     Protein:   Total Protein   Date Value Ref Range Status   01/11/2023 7.3 6.0 - 8.4 g/dL Final     Lactic acid:   Lab Results   Component Value Date    LACTATE 0.6 12/29/2022    LACTATE 1.0 12/28/2022       Significant Imaging: I have reviewed all pertinent imaging results/findings within the past 24 hours.

## 2023-01-13 NOTE — PT/OT/SLP DISCHARGE
Physical Therapy Discharge Summary    Name: Dominique Mcgee  MRN: [id number]   Principal Problem: Shortness of breath     Patient Discharged from acute Physical Therapy on 1/13/2023.  Please refer to prior PT noted date on 1/9/2023 for functional status.     Assessment:     Patient is no longer making progress. Pt continues with labile respiratory status dependent on BiPAP. Not appropriate for skilled therapy services at this time. Please re-consult if pt's respiratory status improves.    Objective:     GOALS:   Multidisciplinary Problems       Physical Therapy Goals          Problem: Physical Therapy    Goal Priority Disciplines Outcome Goal Variances Interventions   Physical Therapy Goal     PT, PT/OT Ongoing, Progressing     Description: PT goals until 1/23/23    1. Pt supine to sit with mod independent-not met  2. Pt sit to stand with or without RW as needed for safety with minimal assistance- not met  3. Pt to perform gait 20ft with or without RW as needed for safety with minimal assistance.-not met  4. Pt will perform bed <> chair transfer with Minimal Assistance using RW or LRAD - not met  5. Pt to perform B LE exs in sitting or supine x 10 reps to strengthen B LE to improve functional mobility.-not met                        Reasons for Discontinuation of Therapy Services  Patient is unable to continue work toward goals because of medical or psychosocial complications.      Plan:     Patient Discharged to: Remains in MICU.      1/13/2023

## 2023-01-13 NOTE — PROGRESS NOTES
[facility]  Critical Care Medicine  Progress Note    Patient Name: Dominique Mcgee  MRN: [id number]  Admission Date: 12/23/2022  Hospital Length of Stay: 21 days  Code Status: DNR  Attending Provider: Delfino Chen MD  Primary Care Provider: Briana Leblanc MD   Principal Problem: Shortness of breath    Subjective:     HPI:  Ms. Mcgee is a 71 y/o F patient with HTN, HLD, hypothyroidism, recent URI 3 weeks prior to admission, balance difficulites who presented to the ED on 12/23 for 3 weeks of weakness, LIRA, decreased appetite, and constipation with intermittent lower abdominal cramping. Per daughter, patient is typically independent, lives alone, cooks her own meals, since she has been ill she has had decreased PO intake and drinking coffee, tea, and eating cereal. She has had progressive worsening HTN in the last 3 weeks and was started on losartan-HCTZ and metoprolol. Prior to this she was only taking lisinopril per daughter. Admitted for HTN emergency and was found to have Na 120. Received IV fluids and admitted to hospital medicine. For her hypernatremia, her thiazide was held and she was placed on fluid restriction. Urine studies from 12/25 showed Uosm of 802 and HANH of 109. Her BP has been labile since admission requiring both IV antihypertensives as well as fluid boluses. The patient's hyponatremia persisted despite fluid restriction and she became increasingly lethargic and weak so CCM was consulted for further management.      Upon evaluation, patient is somnolent. Opens eyes to voice, unable to engage in full conversation. Speaks mainly single words. Denies thirst. Reports SOB and generalized abdominal pain a/w constipation. No CP, N/V/D, dysuria, edema. Most recent vitals: /60, pulse 82, R 18, SpO2 94% on RA. Na trend 119->115->117.       Hospital/ICU Course:  Once transferred to ICU patient became hypotensive requiring multiple pressors. Patient intubated for airway  protection. Art line and central line placed. Sodium improved with hypertonic saline. CT abdomen/pelvis showing proctitis and hip abscess. Went for IR aspiration of abscess. Culture showing e.coli, on zosyn. MRI brain non-concerning. MRI spine with severe cervical stenosis and cord compression, also with mild lumbar stenosis. NSGY consulted with plans for outpatient follow-up. Repeat CXR with increased LLL consolidation and pleural effusion. Extubated to bipap. Once extubated and off pressors patient began to re-experience labile blood pressures similar to her initial presentation. Started on cardene drip. Difficulty weaning off BIPAP due to inadequate volumes and persistent shortness of breath. Extensive workup done regarding difficulties coming off BIPAP. Neurology consulted. ID was consulted for hip abscess- continued on zosyn. Repeat imaging concerning for pneumonia; started on linezolid. Ortho consulted for hip abscess that re-occurred on repeat imaging. Requested IR re-drain abscess and re-culture. Started on IVIG. Ongoing discussions with neurology and neurosurgery regarding cervical stenosis/cord injury as possible etiology of respiratory muscle weakness. Neurosurgery considering intervention but would prefer if patient received tracheostomy prior to laminectomy. Urine metanephrines came back elevated on two collections. Endocrinology consulted. Repeating urine metanephrines as first sets were collected during use of vasopressors. Also uptitrating blood pressure medications and using PO meds when able (off and on BiPAP). Discussed next steps w/ patient and daughter as patient has persistent respiratory muscle weakness resulting in dependence on BIPAP that has not improved. Began having fevers overnight of 1/11. Started on vanc, cefepime, and micafungin. Palliative care team discussed with patient who expressed her wishes not to have a tracheostomy and decided to have code status of DNR/DNI.       Interval  History/Significant Events:   Continues to have fevers. Anti-fungal added overnight. Not feeling well this morning. Code status changed to DNR/DNI. Patient would not like to proceed with a tracheostomy.    Review of Systems   Unable to perform ROS: Acuity of condition   Constitutional:  Positive for fatigue and fever.   Respiratory:  Positive for shortness of breath.    Objective:     Vital Signs (Most Recent):  Temp: (!) 102.5 °F (39.2 °C) (01/13/23 0705)  Pulse: (!) 131 (01/13/23 1000)  Resp: (!) 38 (01/13/23 1000)  BP: (!) 160/70 (01/13/23 1000)  SpO2: 99 % (01/13/23 1000)   Vital Signs (24h Range):  Temp:  [99.6 °F (37.6 °C)-103.2 °F (39.6 °C)] 102.5 °F (39.2 °C)  Pulse:  [102-144] 131  Resp:  [24-41] 38  SpO2:  [98 %-100 %] 99 %  BP: (101-194)/() 160/70   Weight: 59.4 kg (130 lb 15.3 oz)  Body mass index is 22.48 kg/m².      Intake/Output Summary (Last 24 hours) at 1/13/2023 1017  Last data filed at 1/13/2023 1009  Gross per 24 hour   Intake 1583.89 ml   Output 150 ml   Net 1433.89 ml         Physical Exam  Vitals and nursing note reviewed.   Constitutional:       General: She is in acute distress.      Appearance: She is ill-appearing.   HENT:      Head: Normocephalic and atraumatic.   Eyes:      Extraocular Movements: Extraocular movements intact.      Pupils: Pupils are equal, round, and reactive to light.   Cardiovascular:      Rate and Rhythm: Regular rhythm. Tachycardia present.      Pulses: Normal pulses.      Heart sounds: Normal heart sounds. No murmur heard.  Pulmonary:      Effort: Accessory muscle usage present.      Breath sounds: Decreased air movement present. No wheezing or rales.   Abdominal:      General: Abdomen is flat. There is no distension.      Palpations: Abdomen is soft.      Tenderness: There is no abdominal tenderness.   Musculoskeletal:         General: No swelling.      Right lower leg: No edema.      Left lower leg: No edema.   Skin:     General: Skin is warm and dry.       Findings: No bruising or rash.   Neurological:      General: No focal deficit present.      Mental Status: She is alert and oriented to person, place, and time.   Psychiatric:         Mood and Affect: Mood normal.         Behavior: Behavior normal.       Vents:  Vent Mode: A/C (12/30/22 1528)  Ventilator Initiated: Yes (12/29/22 0144)  Set Rate: 12 BPM (12/30/22 1528)  Vt Set: 350 mL (12/30/22 0722)  Pressure Support: 14 cmH20 (12/30/22 1139)  PEEP/CPAP: 6 cmH20 (12/30/22 1528)  Oxygen Concentration (%): 30 (01/13/23 1000)  Peak Airway Pressure: 22 cmH20 (12/30/22 1528)  Plateau Pressure: 20 cmH20 (12/30/22 1528)  Total Ve: 9.73 L/m (12/30/22 1528)  Negative Inspiratory Force (cm H2O): 31 (01/12/23 0710)  F/VT Ratio<105 (RSBI): (!) 58.05 (12/30/22 1528)  Lines/Drains/Airways       Central Venous Catheter Line  Duration             Percutaneous Central Line Insertion/Assessment - Triple Lumen  12/29/22 0043 right internal jugular 15 days                  Significant Labs:    CBC/Anemia Profile:  Recent Labs   Lab 01/12/23  0353 01/13/23  0323   WBC 9.69 8.95   HGB 8.6* 8.7*   HCT 28.1* 27.0*    99*   MCV 87 85   RDW 15.1* 15.7*          Chemistries:  Recent Labs   Lab 01/12/23  0353 01/13/23  0323    143   K 4.0 3.6    110   CO2 25 25   BUN 18 20   CREATININE 0.5 0.5   CALCIUM 8.3* 8.3*   MG 2.3 2.3   PHOS 2.7 2.3*       All pertinent labs within the past 24 hours have been reviewed.    Significant Imaging:  I have reviewed all pertinent imaging results/findings within the past 24 hours.      ABG  No results for input(s): PH, PO2, PCO2, HCO3, BE in the last 168 hours.  Assessment/Plan:     Neuro  Cervical myelopathy  See cervical spondylosis    Stenosis, cervical spine  Noted on MRI spine. Of note patient has been experiencing episodes of vertigo and decreased balance which could be related.   -NSGY consulted, appreciate recs  -if need for reintubation should be done with fiberoptic or  glidescope  -concern that this could be contributing to her current respiratory muscle weakness  -appreciate neurology and nsgy involvement  -per NSGY, if laminectomy to be done, would prefer patient to have tracheostomy prior to their intervention  -discussing next steps with patient and family, appreciate palliative input    Pulmonary  * Shortness of breath  Patient presented with approx 3 weeks of worsening shortness of breath, weakness, and falls. On transfer to ICU required intubation. Was extubated to BIPAP. Have been unable to wean off bipap due to persistent shortness of breath, tachypnea, and inadequate volumes. Trial off bipap results in tachypnea, tachycardia, and hypertension. Appears weak distally and with fine motor skills. Unclear etiology at this time. Does not appear pulmonary in nature as patient is saturating well, ABGs have been wnl. MRI C-spine showed severe cervical stenosis, NSGY was consulted, unlikely that the stenosis would be causing these issues. Concern that difficulty breathing could be related to diaphragm weakness. Critical illness myopathy seems unlikely as patient was not intubated for long duration.  -neurology consulted, myasthenia panel sent  -further labs ordered (ESR, CRP, DARLINE, myositis panel)  -continue bipap, alternate with HFNC as tolerated  -started IVIG on 1/5 for 5 day course  -discussing with neurology and nsgy about possible relation to cervical stenosis  -NSGY considering intervention, prefer if tracheostomy is done prior to any surgery; will continue to discuss and assess her daily changes in respiratory status  -appreciate palliative assistance  -discussed with patients daughter on 1/12 who stated that if her respiratory status were to suddenly worsen that she would not want to be re-intubated.  -patient has expressed that she does not want to proceed with a tracheostomy and would like her code status to be DNR/DNI    Respiratory distress  See shortness of  breath    Acute hypoxemic respiratory failure  Patient with Hypoxic Respiratory failure which is Acute.  she is not on home oxygen. Supplemental oxygen was provided and noted- Oxygen Concentration (%):  [27-60] 30.   Signs/symptoms of respiratory failure include- tachypnea, increased work of breathing and respiratory distress. Contributing diagnoses includes - Pleural effusion and atelectasis Labs and images were reviewed. Patient Has recent ABG, which has been reviewed. Will treat underlying causes and adjust management of respiratory failure as follows- unclear etiology of pleural effusions, patient notes recent URI 3 weeks ago that has left her short of breath since then, on broad spectrum abx  Repeat CXR with increased consolidation in LLL  Extubated to Bipap  1/3: CT Chest - Bibasilar atelectatic/consolidative change (left greater than right) with patchy right basilar opacities and small bilateral pleural effusions.  Correlation for underlying infectious process advised.  Completed abx course for PNA  See shortness of breath    Cardiac/Vascular  Hypertension  Continues to be hypertensive despite scheduled labetalol, losartan, and amlodipine. Appears to worsen when on high flow instead of bipap so could be partly due to stress response.  -consider changing from labetalol to cardene drip  -repeat urine metanephrines pending  -avoid pure beta blockade    Right renal artery stenosis  -no acute intervention while in ICU  -see HTN    Hyperlipemia  Continue home statin    Essential hypertension  Known history of uncontrolled HTN, w/ labile BP since admit.   -BP meds held in setting of septic shock  -will avoid IV meds if possible as has had hypotension following IV BP meds  -renal artery duplex showing R renal artery stenosis, L renal artery unable to be visualized  -Will monitor and add back meds as tolerated, IV metoprolol when unable to tolerate PO  -of note plasma metanephrines came back elevated in both 24 hour  urine collections; discussed with radiology and they do not see any major abnormalities around adrenals; have consulted endocrine; likelihood of extra-adrenal pheo is low, will check repeat urine metanephrines per endocrine.  -while tolerating PO will do losartan; labetalol IV scheduled q4h on 1/9 due to NPO at some times during the day precluding PO meds. Will try to uptitrate as able with PO meds.  -see HTN    Dyspnea on exertion  Began prior to admission. Has been requiring 2L via NC to maintain oxygenation  -O2 via NC PRN    Renal/  Metabolic alkalosis  Initially could have been related to diuretic use. Bicarb remains elevated after stopping HCTZ.    Hyponatremia  Patient started having weakness around 3 weeks ago after possible viral illness, also has had decreased appetite and more recently experiencing shortness of breath.  Sodium of 120 on admit, has been fluctuating since, now around 116-117. UOSM 802, HANH 109  Has become increasingly lethargic since admission, concern due to hyponatremia  Likely hypovolemic hyponatremia in setting of diuretic use, decreased intake and possible GI loses    -nephrology consulted, appreciate recs  -RESOLVED  -BMP q12    Endocrine  Hypothyroidism (acquired)  -continue levothyroxine    Moderate malnutrition  Nutrition consulted. Most recent weight and BMI monitored-   On TPN; replete lytes and monitor for re-feeding syndrome  Malnutrition (Moderate to Severe)  Energy Intake (Malnutrition): less than 75% for greater than 7 days  Measurements:  Wt Readings from Last 1 Encounters:   01/05/23 59.4 kg (130 lb 15.3 oz)   Body mass index is 22.48 kg/m².  Recommendations: Recommendation/Intervention: 1.  Goals: Meet % EEN, EPN by RD f/u date  Patient has been screened and assessed by RD. RD will follow patient.      GI  Dysphagia  SLP eval, recommendations for full liquid diet  Liquids when able to tolerate off BIPAP    Orthopedic  Abscess of bursa of right hip  CT scan showing  possible hematoma vs abscess around right hip. Aspiration done with IR. Culture with e.coli pan-sensitive. Repeat CT showing fluid has reaccumulated.   -ID following  -Has been on zosyn, switch to cefepime per ID  -ortho consulted- recs for repeating drainage w/ IR  -f/u repeat cultures and fluid analysis with NGTD  -abx ended 1/9    Other  Fever  Febrile to 102.9 overnight of 1/11. Continued to be febrile during the day 1/12.   -CXR, UA, BCx, Covid/flu/rsv ordered  -remove rectal seal   -remove galvez  -tylenol PRN for fevers  -central line placed 12/29. Currently being used for TPN. If BCx positive consider removal.  -have started vanc, cefepime, and micafungin  -ID consulted, appreciate recs    Hypothermia  Considering new onset will check BCx and start zosyn. Using bear hugger for warming.    Generalized weakness  See shortness of breath       Critical Care Daily Checklist:    A: Awake: RASS Goal/Actual Goal: RASS Goal: 0-->alert and calm  Actual: Manning Agitation Sedation Scale (RASS): Drowsy   B: Spontaneous Breathing Trial Performed? Spon. Breathing Trial Initiated?: Not initiated (12/29/22 0729)   C: SAT & SBT Coordinated?  N/a                   D: Delirium: CAM-ICU Overall CAM-ICU: Negative   E: Early Mobility Performed? No   F: Feeding Goal: Goals: Meet % EEN, EPN by RD f/u date  Status: Nutrition Goal Status: goal met   Current Diet Order   Procedures    Diet full liquid      AS: Analgesia/Sedation none   T: Thromboembolic Prophylaxis yes   H: HOB > 300 Yes   U: Stress Ulcer Prophylaxis (if needed) n/a   G: Glucose Control yes   B: Bowel Function Stool Occurrence: 1   I: Indwelling Catheter (Lines & Galvez) Necessity yes   D: De-escalation of Antimicrobials/Pharmacotherapies no    Plan for the day/ETD Continue anti-microbials    Code Status:  Family/Goals of Care: DNR  ongoing       Critical secondary to Patient has a condition that poses threat to life and bodily function: Severe Respiratory  Distress      Critical care was time spent personally by me on the following activities: development of treatment plan with patient or surrogate and bedside caregivers, discussions with consultants, evaluation of patient's response to treatment, examination of patient, ordering and performing treatments and interventions, ordering and review of laboratory studies, ordering and review of radiographic studies, pulse oximetry, re-evaluation of patient's condition. This critical care time did not overlap with that of any other provider or involve time for any procedures.     Beto Avelar MD  Critical Care Medicine  [facility] - Cardiac Medical [facility]

## 2023-01-13 NOTE — ASSESSMENT & PLAN NOTE
Patient presented with approx 3 weeks of worsening shortness of breath, weakness, and falls. On transfer to ICU required intubation. Was extubated to BIPAP. Have been unable to wean off bipap due to persistent shortness of breath, tachypnea, and inadequate volumes. Trial off bipap results in tachypnea, tachycardia, and hypertension. Appears weak distally and with fine motor skills. Unclear etiology at this time. Does not appear pulmonary in nature as patient is saturating well, ABGs have been wnl. MRI C-spine showed severe cervical stenosis, NSGY was consulted, unlikely that the stenosis would be causing these issues. Concern that difficulty breathing could be related to diaphragm weakness. Critical illness myopathy seems unlikely as patient was not intubated for long duration.  -neurology consulted, myasthenia panel sent  -further labs ordered (ESR, CRP, DARLINE, myositis panel)  -continue bipap, alternate with HFNC as tolerated  -started IVIG on 1/5 for 5 day course  -discussing with neurology and nsgy about possible relation to cervical stenosis  -NSGY considering intervention, prefer if tracheostomy is done prior to any surgery; will continue to discuss and assess her daily changes in respiratory status  -appreciate palliative assistance  -discussed with patients daughter on 1/12 who stated that if her respiratory status were to suddenly worsen that she would not want to be re-intubated.  -patient has expressed that she does not want to proceed with a tracheostomy and would like her code status to be DNR/DNI

## 2023-01-14 NOTE — ASSESSMENT & PLAN NOTE
Was on TPN, now stopped since transitioning towards comfort measures. OK to have D5 fluids per patient request.

## 2023-01-14 NOTE — PLAN OF CARE
HISTORY OF PRESENT ILLNESS:  72 year old female with hypertension, hyperlipidemia, hypothyroidism, and balance difficulites who presents with hypertensive emergency and hyponatremia of 120. Urine studies indicated SIADH and volume restriction was instituted but hyponatremia persisted and she became increasingly lethargic and weak with hypotension requiring ICU transfer. She required multiple pressors and was briefly intubated for airway protection. Sodium improved with hypertonic saline. Once extubated and off pressors patient began to re-experience labile blood pressures similar to her initial presentation. Started on cardene drip. Difficulty weaning off BIPAP due to inadequate volumes and persistent shortness of breath.  - She is reportedly independent, lives alone, cooks her own meals, since she has been ill she has had decreased PO intake and drinking coffee, tea, and eating cereal. She has had progressive worsening hypertension in the last 3 weeks and was started on losartan-HCTZ and metoprolol. Prior to this she was only taking lisinopril per daughter     - CT abdomen/pelvis 01/02/2023 showing proctitis and hip abscess. The spleen, pancreas and adrenal glands are unremarkable. Went for IR aspiration of abscess. Culture showing e.coli, on zosyn  - MRI brain non-concerning. MRI spine with severe cervical stenosis and cord compression, also with mild lumbar stenosis.     - Endocrinology consulted for elevated metanephrines on urine collection and evaluation for potential pheochromocytoma versus paraganglioma     Regarding Hypertension:    Lab Results   Component Value Date    NORMETANEPHR 1419 (H) 01/10/2023    NORMETANEPHR 1907 (H) 01/01/2023    TOTMETAURINE 1928 (H) 01/10/2023    TOTMETAURINE 2511 (H) 01/01/2023          ASSESSMENT and PLAN:     Essential hypertension  - Profound hypertensive episodes with occasional hypotensive episodes requiring pressor support in the past; concern for  pheochromocytoma/paraganglioma  - Urine metanephrines return elevated again 1/10 but not more than 2x ULN  - CT imaging of the adrenal glands did not show any adenoma or other structural abnormality     - Noted patient recent change to palliative care, do not recommend further endocrine workup  - Recommend continuing blood pressure control for quality of life    - Due to no demonstrated need for continued endocrine workup, endocrinology will sign off at this time. Please page or secure chat with questions    Hypothyroidism (acquired)  - Recent TFTs within normal limits but with TSH low-normal range in this 72-year-old patient  - On levothyroxine 50 mcg at home     - Agree with continuing home levothyroxine 25 mcg daily          Enrike Power DO  [facility], [address]th [address]  [address] [place], [address]     Office: [phone]  Fax: [phone]     Disclaimer: This note has been generated using voice-recognition software. There may be typographical errors that have been missed during proof-reading.     The above history labs imaging impression and plan were discussed with attending physician who is in agreement and also took part in this patient's care.  I personally reviewed all of the patients available medications, labs, imaging, vitals, allergies, medical history.

## 2023-01-14 NOTE — PROGRESS NOTES
[facility]  Critical Care Medicine  Progress Note    Patient Name: Dominique Mcgee  MRN: [id number]  Admission Date: 12/23/2022  Hospital Length of Stay: 22 days  Code Status: DNR  Attending Provider: Delfino Chen MD  Primary Care Provider: Briana Leblanc MD   Principal Problem: Shortness of breath    Subjective:     HPI:  Ms. Mcgee is a 73 y/o F patient with HTN, HLD, hypothyroidism, recent URI 3 weeks prior to admission, balance difficulites who presented to the ED on 12/23 for 3 weeks of weakness, LIRA, decreased appetite, and constipation with intermittent lower abdominal cramping. Per daughter, patient is typically independent, lives alone, cooks her own meals, since she has been ill she has had decreased PO intake and drinking coffee, tea, and eating cereal. She has had progressive worsening HTN in the last 3 weeks and was started on losartan-HCTZ and metoprolol. Prior to this she was only taking lisinopril per daughter. Admitted for HTN emergency and was found to have Na 120. Received IV fluids and admitted to hospital medicine. For her hypernatremia, her thiazide was held and she was placed on fluid restriction. Urine studies from 12/25 showed Uosm of 802 and HANH of 109. Her BP has been labile since admission requiring both IV antihypertensives as well as fluid boluses. The patient's hyponatremia persisted despite fluid restriction and she became increasingly lethargic and weak so CCM was consulted for further management.      Upon evaluation, patient is somnolent. Opens eyes to voice, unable to engage in full conversation. Speaks mainly single words. Denies thirst. Reports SOB and generalized abdominal pain a/w constipation. No CP, N/V/D, dysuria, edema. Most recent vitals: /60, pulse 82, R 18, SpO2 94% on RA. Na trend 119->115->117.       Hospital/ICU Course:  Once transferred to ICU patient became hypotensive requiring multiple pressors. Patient intubated for airway  protection. Art line and central line placed. Sodium improved with hypertonic saline. CT abdomen/pelvis showing proctitis and hip abscess. Went for IR aspiration of abscess. Culture showing e.coli, on zosyn. MRI brain non-concerning. MRI spine with severe cervical stenosis and cord compression, also with mild lumbar stenosis. NSGY consulted with plans for outpatient follow-up. Repeat CXR with increased LLL consolidation and pleural effusion. Extubated to bipap. Once extubated and off pressors patient began to re-experience labile blood pressures similar to her initial presentation. Started on cardene drip. Difficulty weaning off BIPAP due to inadequate volumes and persistent shortness of breath. Extensive workup done regarding difficulties coming off BIPAP. Neurology consulted. ID was consulted for hip abscess- continued on zosyn. Repeat imaging concerning for pneumonia; started on linezolid. Ortho consulted for hip abscess that re-occurred on repeat imaging. Requested IR re-drain abscess and re-culture. Started on IVIG. Ongoing discussions with neurology and neurosurgery regarding cervical stenosis/cord injury as possible etiology of respiratory muscle weakness. Neurosurgery considering intervention but would prefer if patient received tracheostomy prior to laminectomy. Urine metanephrines came back elevated on two collections. Endocrinology consulted. Repeating urine metanephrines as first sets were collected during use of vasopressors. Also uptitrating blood pressure medications and using PO meds when able (off and on BiPAP). Discussed next steps w/ patient and daughter as patient has persistent respiratory muscle weakness resulting in dependence on BIPAP that has not improved. Began having fevers overnight of 1/11. Started on vanc, cefepime, and micafungin. Palliative care team discussed with patient who expressed her wishes not to have a tracheostomy and decided to have code status of DNR/DNI.       Interval  History/Significant Events:   NAEO. Feels tired. Morphine was added for pain and air hunger.    Review of Systems   Unable to perform ROS: Acuity of condition   Constitutional:  Positive for fatigue and fever.   Respiratory:  Positive for shortness of breath.    Objective:     Vital Signs (Most Recent):  Temp: 99.5 °F (37.5 °C) (01/14/23 0300)  Pulse: (!) 118 (01/14/23 0815)  Resp: (!) 27 (01/14/23 0815)  BP: (!) 117/59 (01/14/23 0815)  SpO2: 99 % (01/14/23 0815)   Vital Signs (24h Range):  Temp:  [99.5 °F (37.5 °C)-102.4 °F (39.1 °C)] 99.5 °F (37.5 °C)  Pulse:  [107-131] 118  Resp:  [25-38] 27  SpO2:  [97 %-100 %] 99 %  BP: (101-187)/(53-84) 117/59   Weight: 59.4 kg (130 lb 15.3 oz)  Body mass index is 22.48 kg/m².      Intake/Output Summary (Last 24 hours) at 1/14/2023 0910  Last data filed at 1/14/2023 0705  Gross per 24 hour   Intake 790.73 ml   Output 800 ml   Net -9.27 ml         Physical Exam  Vitals and nursing note reviewed.   Constitutional:       General: She is in acute distress.      Appearance: She is ill-appearing.   HENT:      Head: Normocephalic and atraumatic.   Eyes:      Extraocular Movements: Extraocular movements intact.      Pupils: Pupils are equal, round, and reactive to light.   Cardiovascular:      Rate and Rhythm: Regular rhythm. Tachycardia present.      Pulses: Normal pulses.      Heart sounds: Normal heart sounds. No murmur heard.  Pulmonary:      Effort: Accessory muscle usage present.      Breath sounds: Decreased air movement present. No wheezing or rales.   Abdominal:      General: Abdomen is flat. There is no distension.      Palpations: Abdomen is soft.      Tenderness: There is no abdominal tenderness.   Musculoskeletal:         General: No swelling.      Right lower leg: No edema.      Left lower leg: No edema.   Skin:     General: Skin is warm and dry.      Findings: No bruising or rash.   Neurological:      General: No focal deficit present.      Mental Status: She is alert  and oriented to person, place, and time.   Psychiatric:         Mood and Affect: Mood normal.         Behavior: Behavior normal.       Vents:  Vent Mode: A/C (12/30/22 1528)  Ventilator Initiated: Yes (12/29/22 0144)  Set Rate: 12 BPM (12/30/22 1528)  Vt Set: 350 mL (12/30/22 0722)  Pressure Support: 14 cmH20 (12/30/22 1139)  PEEP/CPAP: 6 cmH20 (12/30/22 1528)  Oxygen Concentration (%): 30 (01/14/23 0803)  Peak Airway Pressure: 22 cmH20 (12/30/22 1528)  Plateau Pressure: 20 cmH20 (12/30/22 1528)  Total Ve: 9.73 L/m (12/30/22 1528)  Negative Inspiratory Force (cm H2O): 31 (01/12/23 0710)  F/VT Ratio<105 (RSBI): (!) 58.05 (12/30/22 1528)  Lines/Drains/Airways       Central Venous Catheter Line  Duration             Percutaneous Central Line Insertion/Assessment - Triple Lumen  12/29/22 0043 right internal jugular 16 days              Drain  Duration             Female External Urinary Catheter 01/13/23 0800 1 day                  Significant Labs:    CBC/Anemia Profile:  Recent Labs   Lab 01/13/23  0323   WBC 8.95   HGB 8.7*   HCT 27.0*   PLT 99*   MCV 85   RDW 15.7*          Chemistries:  Recent Labs   Lab 01/13/23  0323 01/14/23  0502    145   K 3.6 3.3*    113*   CO2 25 26   BUN 20 31*   CREATININE 0.5 0.8   CALCIUM 8.3* 8.2*   MG 2.3 2.5   PHOS 2.3* 2.7       All pertinent labs within the past 24 hours have been reviewed.    Significant Imaging:  I have reviewed all pertinent imaging results/findings within the past 24 hours.      ABG  No results for input(s): PH, PO2, PCO2, HCO3, BE in the last 168 hours.  Assessment/Plan:     Neuro  Cervical myelopathy  See cervical spondylosis    Stenosis, cervical spine  Noted on MRI spine. Of note patient has been experiencing episodes of vertigo and decreased balance which could be related.   -NSGY consulted, appreciate recs  -if need for reintubation should be done with fiberoptic or glidescope  -concern that this could be contributing to her current respiratory  muscle weakness  -appreciate neurology and nsgy involvement  -per NSGY, if laminectomy to be done, would prefer patient to have tracheostomy prior to their intervention  -discussing next steps with patient and family, appreciate palliative input    Pulmonary  * Shortness of breath  Patient presented with approx 3 weeks of worsening shortness of breath, weakness, and falls. On transfer to ICU required intubation. Was extubated to BIPAP. Have been unable to wean off bipap due to persistent shortness of breath, tachypnea, and inadequate volumes. Trial off bipap results in tachypnea, tachycardia, and hypertension. Appears weak distally and with fine motor skills. Unclear etiology at this time. Does not appear pulmonary in nature as patient is saturating well, ABGs have been wnl. MRI C-spine showed severe cervical stenosis, NSGY was consulted, unlikely that the stenosis would be causing these issues. Concern that difficulty breathing could be related to diaphragm weakness. Critical illness myopathy seems unlikely as patient was not intubated for long duration.  -neurology consulted, myasthenia panel sent  -further labs ordered (ESR, CRP, DARLINE, myositis panel)  -continue bipap, alternate with HFNC as tolerated  -started IVIG on 1/5 for 5 day course  -discussing with neurology and nsgy about possible relation to cervical stenosis  -NSGY considering intervention, prefer if tracheostomy is done prior to any surgery; will continue to discuss and assess her daily changes in respiratory status  -appreciate palliative assistance  -patient has expressed that she does not want to proceed with a tracheostomy and would like her code status to be DNR/DNI  -morphine added for air hunger and severe pain    Respiratory distress  See shortness of breath    Acute hypoxemic respiratory failure  Patient with Hypoxic Respiratory failure which is Acute.  she is not on home oxygen. Supplemental oxygen was provided and noted- Oxygen  Concentration (%):  [27-60] 30.   Signs/symptoms of respiratory failure include- tachypnea, increased work of breathing and respiratory distress. Contributing diagnoses includes - Pleural effusion and atelectasis Labs and images were reviewed. Patient Has recent ABG, which has been reviewed. Will treat underlying causes and adjust management of respiratory failure as follows- unclear etiology of pleural effusions, patient notes recent URI 3 weeks ago that has left her short of breath since then, on broad spectrum abx  Repeat CXR with increased consolidation in LLL  Extubated to Bipap  1/3: CT Chest - Bibasilar atelectatic/consolidative change (left greater than right) with patchy right basilar opacities and small bilateral pleural effusions.  Correlation for underlying infectious process advised.  Completed abx course for PNA  See shortness of breath    Cardiac/Vascular  Hypertension  Continues to be hypertensive despite scheduled labetalol, losartan, and amlodipine. Appears to worsen when on high flow instead of bipap so could be partly due to stress response.  -consider changing from labetalol to cardene drip  -repeat urine metanephrines pending  -avoid pure beta blockade    Right renal artery stenosis  -no acute intervention while in ICU  -see HTN    Hyperlipemia  Continue home statin    Essential hypertension  Known history of uncontrolled HTN, w/ labile BP since admit.   -BP meds held in setting of septic shock  -will avoid IV meds if possible as has had hypotension following IV BP meds  -renal artery duplex showing R renal artery stenosis, L renal artery unable to be visualized  -Will monitor and add back meds as tolerated, IV metoprolol when unable to tolerate PO  -of note plasma metanephrines came back elevated in both 24 hour urine collections; discussed with radiology and they do not see any major abnormalities around adrenals; have consulted endocrine; likelihood of extra-adrenal pheo is low, will check  repeat urine metanephrines per endocrine.  -while tolerating PO will do losartan; labetalol IV scheduled q4h on 1/9 due to NPO at some times during the day precluding PO meds. Will try to uptitrate as able with PO meds.  -see HTN    Dyspnea on exertion  Began prior to admission. Has been requiring 2L via NC to maintain oxygenation  -O2 via NC PRN    Renal/  Metabolic alkalosis  Initially could have been related to diuretic use. Bicarb remains elevated after stopping HCTZ.    Hyponatremia  Patient started having weakness around 3 weeks ago after possible viral illness, also has had decreased appetite and more recently experiencing shortness of breath.  Sodium of 120 on admit, has been fluctuating since, now around 116-117. UOSM 802, HANH 109  Has become increasingly lethargic since admission, concern due to hyponatremia  Likely hypovolemic hyponatremia in setting of diuretic use, decreased intake and possible GI loses    -nephrology consulted, appreciate recs  -RESOLVED  -BMP q12    Endocrine  Hypothyroidism (acquired)  -continue levothyroxine    Moderate malnutrition  Nutrition consulted. Most recent weight and BMI monitored-   Will hold TPN now due to shifting goals of care  Malnutrition (Moderate to Severe)  Energy Intake (Malnutrition): less than 75% for greater than 7 days  Measurements:  Wt Readings from Last 1 Encounters:   01/12/23 59.4 kg (130 lb 15.3 oz)   Body mass index is 22.48 kg/m².  Recommendations: Recommendation/Intervention: 1  Goals: Meet % EEN, EPN by RD f/u date  Patient has been screened and assessed by RD. RD will follow patient.      GI  Dysphagia  SLP eval, recommendations for full liquid diet  Liquids when able to tolerate off BIPAP    Orthopedic  Abscess of bursa of right hip  CT scan showing possible hematoma vs abscess around right hip. Aspiration done with IR. Culture with e.coli pan-sensitive. Repeat CT showing fluid has reaccumulated.   -ID following  -Has been on zosyn, switch  to cefepime per ID  -ortho consulted- recs for repeating drainage w/ IR  -f/u repeat cultures and fluid analysis with NGTD  -abx ended 1/9    Other  Fever  Febrile to 102.9 overnight of 1/11. Continued to be febrile during the day 1/12.   -CXR, UA, BCx, Covid/flu/rsv ordered  -remove rectal seal   -remove galvez  -tylenol PRN for fevers  -central line placed 12/29. Currently being used for TPN. If BCx positive consider removal.  -have started vanc, cefepime, and fluconazole  -repeat cultures NGTD    Hypothermia  Considering new onset will check BCx and start zosyn. Using bear hugger for warming.    Generalized weakness  See shortness of breath       Critical Care Daily Checklist:    A: Awake: RASS Goal/Actual Goal: RASS Goal: 0-->alert and calm  Actual: Manning Agitation Sedation Scale (RASS): Alert and calm   B: Spontaneous Breathing Trial Performed? Spon. Breathing Trial Initiated?: Not initiated (12/29/22 0729)   C: SAT & SBT Coordinated?  n/a                      D: Delirium: CAM-ICU Overall CAM-ICU: Negative   E: Early Mobility Performed? No   F: Feeding Goal: Goals: Meet % EEN, EPN by RD f/u date  Status: Nutrition Goal Status: goal met   Current Diet Order   Procedures    Diet full liquid      AS: Analgesia/Sedation Morphine PRN   T: Thromboembolic Prophylaxis yes   H: HOB > 300 Yes   U: Stress Ulcer Prophylaxis (if needed) no   G: Glucose Control yes   B: Bowel Function Stool Occurrence: 1   I: Indwelling Catheter (Lines & Galvez) Necessity Central line for TPN   D: De-escalation of Antimicrobials/Pharmacotherapies no    Plan for the day/ETD Continue abx, breaks from BIPAP as tim    Code Status:  Family/Goals of Care: DNR         Critical secondary to Patient has a condition that poses threat to life and bodily function: Severe Respiratory Distress      Critical care was time spent personally by me on the following activities: development of treatment plan with patient or surrogate and bedside  caregivers, discussions with consultants, evaluation of patient's response to treatment, examination of patient, ordering and performing treatments and interventions, ordering and review of laboratory studies, ordering and review of radiographic studies, pulse oximetry, re-evaluation of patient's condition. This critical care time did not overlap with that of any other provider or involve time for any procedures.     Beto Avelar MD  Critical Care Medicine  [facility] - Cardiac Medical [facility]

## 2023-01-14 NOTE — ASSESSMENT & PLAN NOTE
Patient presented with approx 3 weeks of worsening shortness of breath, weakness, and falls. On transfer to ICU required intubation. Was extubated to BIPAP. Have been unable to wean off bipap due to persistent shortness of breath, tachypnea, and inadequate volumes. Trial off bipap results in tachypnea, tachycardia, and hypertension. Appears weak distally and with fine motor skills. Unclear etiology at this time. Does not appear pulmonary in nature as patient is saturating well, ABGs have been wnl. MRI C-spine showed severe cervical stenosis, NSGY was consulted, unlikely that the stenosis would be causing these issues. Concern that difficulty breathing could be related to diaphragm weakness. Critical illness myopathy seems unlikely as patient was not intubated for long duration.  -neurology consulted, myasthenia panel sent  -further labs ordered (ESR, CRP, DARLINE, myositis panel)  -continue bipap, alternate with HFNC as tolerated  -started IVIG on 1/5 for 5 day course  -discussing with neurology and nsgy about possible relation to cervical stenosis  -NSGY considering intervention, prefer if tracheostomy is done prior to any surgery; will continue to discuss and assess her daily changes in respiratory status  -appreciate palliative assistance  -patient has expressed that she does not want to proceed with a tracheostomy and would like her code status to be DNR/DNI  -morphine added for air hunger and severe pain

## 2023-01-14 NOTE — PLAN OF CARE
CMICU DAILY GOALS       A: Awake    RASS: Goal - RASS Goal: 0-->alert and calm  Actual - RASS (Manning Agitation-Sedation Scale): 0-->alert and calm   Restraint necessity: Clinical Justification: Removing medical devices  B: Breathe   SBT: Not intubated   C: Coordinate A & B, analgesics/sedatives   Pain: managed    SAT: Not intubated  D: Delirium   CAM-ICU: Overall CAM-ICU: Negative  E: Early(intubated/ Progressive (non-intubated) Mobility   MOVE Screen: Fail   Activity: Activity Management: Arm raise - L1  FAS: Feeding/Nutrition   Diet order: Diet/Nutrition Received: NPO, full liquid,    T: Thrombus   DVT prophylaxis: VTE Required Core Measure: Pharmacological prophylaxis initiated/maintained  H: HOB Elevation   Head of Bed (HOB) Positioning: HOB at 30-45 degrees  U: Ulcer Prophylaxis   GI: no  G: Glucose control   managed Glycemic Management: blood glucose monitored  S: Skin   Bathing/Skin Care: bath, complete, linen changed  Device Skin Pressure Protection: absorbent pad utilized/changed, pressure points protected, skin-to-device areas padded, skin-to-skin areas padded  Pressure Reduction Devices: specialty bed utilized  Pressure Reduction Techniques: heels elevated off bed, pressure points protected, weight shift assistance provided  Skin Protection: incontinence pads utilized, skin-to-device areas padded, skin-to-skin areas padded, transparent dressing maintained, tubing/devices free from skin contact  B: Bowel Function   no issues   I: Indwelling Catheters   Rush necessity: [REMOVED]      Urethral Catheter 12/27/22 1535-Reason for Continuing Urinary Catheterization: Critically ill in ICU and requiring hourly monitoring of intake/output  [REMOVED]      Urethral Catheter 01/04/23 0009 Non-latex 16 Fr.-Reason for Continuing Urinary Catheterization: Critically ill in ICU and requiring hourly monitoring of intake/output, Urinary retention   CVC necessity: Yes  D: De-escalation Antibiotics   No    Family/Goals of  care/Code Status   Code Status: DNR    24H Vital Sign Range  Temp:  [99.5 °F (37.5 °C)-100.4 °F (38 °C)]   Pulse:  [107-135]   Resp:  [25-35]   BP: (101-172)/(53-74)   SpO2:  [97 %-100 %]      Shift Events   No acute events throughout shift    VS and assessment per flow sheet, patient progressing towards goals as tolerated, plan of care reviewed with family, concerns addressed, will continue to monitor.    Lisa Hwang

## 2023-01-14 NOTE — SUBJECTIVE & OBJECTIVE
Interval History/Significant Events:   NAEO. Feels tired. Morphine was added for pain and air hunger.    Review of Systems   Unable to perform ROS: Acuity of condition   Constitutional:  Positive for fatigue and fever.   Respiratory:  Positive for shortness of breath.    Objective:     Vital Signs (Most Recent):  Temp: 99.5 °F (37.5 °C) (01/14/23 0300)  Pulse: (!) 118 (01/14/23 0815)  Resp: (!) 27 (01/14/23 0815)  BP: (!) 117/59 (01/14/23 0815)  SpO2: 99 % (01/14/23 0815)   Vital Signs (24h Range):  Temp:  [99.5 °F (37.5 °C)-102.4 °F (39.1 °C)] 99.5 °F (37.5 °C)  Pulse:  [107-131] 118  Resp:  [25-38] 27  SpO2:  [97 %-100 %] 99 %  BP: (101-187)/(53-84) 117/59   Weight: 59.4 kg (130 lb 15.3 oz)  Body mass index is 22.48 kg/m².      Intake/Output Summary (Last 24 hours) at 1/14/2023 0910  Last data filed at 1/14/2023 0705  Gross per 24 hour   Intake 790.73 ml   Output 800 ml   Net -9.27 ml         Physical Exam  Vitals and nursing note reviewed.   Constitutional:       General: She is in acute distress.      Appearance: She is ill-appearing.   HENT:      Head: Normocephalic and atraumatic.   Eyes:      Extraocular Movements: Extraocular movements intact.      Pupils: Pupils are equal, round, and reactive to light.   Cardiovascular:      Rate and Rhythm: Regular rhythm. Tachycardia present.      Pulses: Normal pulses.      Heart sounds: Normal heart sounds. No murmur heard.  Pulmonary:      Effort: Accessory muscle usage present.      Breath sounds: Decreased air movement present. No wheezing or rales.   Abdominal:      General: Abdomen is flat. There is no distension.      Palpations: Abdomen is soft.      Tenderness: There is no abdominal tenderness.   Musculoskeletal:         General: No swelling.      Right lower leg: No edema.      Left lower leg: No edema.   Skin:     General: Skin is warm and dry.      Findings: No bruising or rash.   Neurological:      General: No focal deficit present.      Mental Status: She  is alert and oriented to person, place, and time.   Psychiatric:         Mood and Affect: Mood normal.         Behavior: Behavior normal.       Vents:  Vent Mode: A/C (12/30/22 1528)  Ventilator Initiated: Yes (12/29/22 0144)  Set Rate: 12 BPM (12/30/22 1528)  Vt Set: 350 mL (12/30/22 0722)  Pressure Support: 14 cmH20 (12/30/22 1139)  PEEP/CPAP: 6 cmH20 (12/30/22 1528)  Oxygen Concentration (%): 30 (01/14/23 0803)  Peak Airway Pressure: 22 cmH20 (12/30/22 1528)  Plateau Pressure: 20 cmH20 (12/30/22 1528)  Total Ve: 9.73 L/m (12/30/22 1528)  Negative Inspiratory Force (cm H2O): 31 (01/12/23 0710)  F/VT Ratio<105 (RSBI): (!) 58.05 (12/30/22 1528)  Lines/Drains/Airways       Central Venous Catheter Line  Duration             Percutaneous Central Line Insertion/Assessment - Triple Lumen  12/29/22 0043 right internal jugular 16 days              Drain  Duration             Female External Urinary Catheter 01/13/23 0800 1 day                  Significant Labs:    CBC/Anemia Profile:  Recent Labs   Lab 01/13/23  0323   WBC 8.95   HGB 8.7*   HCT 27.0*   PLT 99*   MCV 85   RDW 15.7*          Chemistries:  Recent Labs   Lab 01/13/23  0323 01/14/23  0502    145   K 3.6 3.3*    113*   CO2 25 26   BUN 20 31*   CREATININE 0.5 0.8   CALCIUM 8.3* 8.2*   MG 2.3 2.5   PHOS 2.3* 2.7       All pertinent labs within the past 24 hours have been reviewed.    Significant Imaging:  I have reviewed all pertinent imaging results/findings within the past 24 hours.

## 2023-01-15 NOTE — SUBJECTIVE & OBJECTIVE
Interval History/Significant Events:   Feels OK today. Resting in bed. No complaints of pain or shortness of breath. No further fevers.    Review of Systems   Unable to perform ROS: Acuity of condition   Constitutional:  Positive for fatigue and fever.   Respiratory:  Positive for shortness of breath.    Objective:     Vital Signs (Most Recent):  Temp: 97.8 °F (36.6 °C) (01/15/23 0300)  Pulse: (!) 134 (01/15/23 1105)  Resp: (!) 29 (01/15/23 1105)  BP: 138/68 (01/15/23 1105)  SpO2: 96 % (01/15/23 1105)   Vital Signs (24h Range):  Temp:  [97.8 °F (36.6 °C)-100.4 °F (38 °C)] 97.8 °F (36.6 °C)  Pulse:  [100-140] 134  Resp:  [22-35] 29  SpO2:  [96 %-100 %] 96 %  BP: ()/(48-70) 138/68   Weight: 59.4 kg (130 lb 15.3 oz)  Body mass index is 22.48 kg/m².      Intake/Output Summary (Last 24 hours) at 1/15/2023 1147  Last data filed at 1/15/2023 0500  Gross per 24 hour   Intake --   Output 400 ml   Net -400 ml         Physical Exam  Vitals and nursing note reviewed.   Constitutional:       General: She is in acute distress.      Appearance: She is ill-appearing.   HENT:      Head: Normocephalic and atraumatic.   Eyes:      Extraocular Movements: Extraocular movements intact.      Pupils: Pupils are equal, round, and reactive to light.   Cardiovascular:      Rate and Rhythm: Regular rhythm. Tachycardia present.      Pulses: Normal pulses.      Heart sounds: Normal heart sounds. No murmur heard.  Pulmonary:      Effort: Accessory muscle usage present.      Breath sounds: Decreased air movement present. No wheezing or rales.   Abdominal:      General: Abdomen is flat. There is no distension.      Palpations: Abdomen is soft.      Tenderness: There is no abdominal tenderness.   Musculoskeletal:         General: No swelling.      Right lower leg: No edema.      Left lower leg: No edema.   Skin:     General: Skin is warm and dry.      Findings: No bruising or rash.   Neurological:      General: No focal deficit present.       Mental Status: She is alert and oriented to person, place, and time.   Psychiatric:         Mood and Affect: Mood normal.         Behavior: Behavior normal.       Vents:  Vent Mode: A/C (12/30/22 1528)  Ventilator Initiated: Yes (12/29/22 0144)  Set Rate: 12 BPM (12/30/22 1528)  Vt Set: 350 mL (12/30/22 0722)  Pressure Support: 14 cmH20 (12/30/22 1139)  PEEP/CPAP: 6 cmH20 (12/30/22 1528)  Oxygen Concentration (%): 30 (01/15/23 1105)  Peak Airway Pressure: 22 cmH20 (12/30/22 1528)  Plateau Pressure: 20 cmH20 (12/30/22 1528)  Total Ve: 9.73 L/m (12/30/22 1528)  Negative Inspiratory Force (cm H2O): 31 (01/12/23 0710)  F/VT Ratio<105 (RSBI): (!) 58.05 (12/30/22 1528)  Lines/Drains/Airways       Central Venous Catheter Line  Duration             Percutaneous Central Line Insertion/Assessment - Triple Lumen  12/29/22 0043 right internal jugular 17 days              Drain  Duration             Female External Urinary Catheter 01/13/23 0800 2 days                  Significant Labs:    CBC/Anemia Profile:  Recent Labs   Lab 01/15/23  0539   WBC 6.64   HGB 7.3*   HCT 25.1*   PLT 49*   MCV 89   RDW 17.3*          Chemistries:  Recent Labs   Lab 01/14/23  0502 01/15/23  0539    145   K 3.3* 4.1   * 114*   CO2 26 22*   BUN 31* 48*   CREATININE 0.8 1.4   CALCIUM 8.2* 7.9*   MG 2.5  --    PHOS 2.7  --        All pertinent labs within the past 24 hours have been reviewed.    Significant Imaging:  I have reviewed all pertinent imaging results/findings within the past 24 hours.

## 2023-01-15 NOTE — ASSESSMENT & PLAN NOTE
Known history of uncontrolled HTN, w/ labile BP since admit.   -renal artery duplex showing R renal artery stenosis, L renal artery unable to be visualized  -repeat metanephrines not consistent with pheo per endocrine  -labetalol IV PRN for SBP >180

## 2023-01-15 NOTE — PROGRESS NOTES
[facility]  Critical Care Medicine  Progress Note    Patient Name: Dominique Mcgee  MRN: [id number]  Admission Date: 12/23/2022  Hospital Length of Stay: 23 days  Code Status: DNR  Attending Provider: Delfino Chen MD  Primary Care Provider: Briana Leblanc MD   Principal Problem: Acute hypoxemic respiratory failure    Subjective:     HPI:  Ms. Mcgee is a 73 y/o F patient with HTN, HLD, hypothyroidism, recent URI 3 weeks prior to admission, balance difficulites who presented to the ED on 12/23 for 3 weeks of weakness, LIRA, decreased appetite, and constipation with intermittent lower abdominal cramping. Per daughter, patient is typically independent, lives alone, cooks her own meals, since she has been ill she has had decreased PO intake and drinking coffee, tea, and eating cereal. She has had progressive worsening HTN in the last 3 weeks and was started on losartan-HCTZ and metoprolol. Prior to this she was only taking lisinopril per daughter. Admitted for HTN emergency and was found to have Na 120. Received IV fluids and admitted to hospital medicine. For her hypernatremia, her thiazide was held and she was placed on fluid restriction. Urine studies from 12/25 showed Uosm of 802 and HANH of 109. Her BP has been labile since admission requiring both IV antihypertensives as well as fluid boluses. The patient's hyponatremia persisted despite fluid restriction and she became increasingly lethargic and weak so CCM was consulted for further management.      Upon evaluation, patient is somnolent. Opens eyes to voice, unable to engage in full conversation. Speaks mainly single words. Denies thirst. Reports SOB and generalized abdominal pain a/w constipation. No CP, N/V/D, dysuria, edema. Most recent vitals: /60, pulse 82, R 18, SpO2 94% on RA. Na trend 119->115->117.       Hospital/ICU Course:  Once transferred to ICU patient became hypotensive requiring multiple pressors. Patient intubated  for airway protection. Art line and central line placed. Sodium improved with hypertonic saline. CT abdomen/pelvis showing proctitis and hip abscess. Went for IR aspiration of abscess. Culture showing e.coli, on zosyn. MRI brain non-concerning. MRI spine with severe cervical stenosis and cord compression, also with mild lumbar stenosis. NSGY consulted with plans for outpatient follow-up. Repeat CXR with increased LLL consolidation and pleural effusion. Extubated to bipap. Once extubated and off pressors patient began to re-experience labile blood pressures similar to her initial presentation. Started on cardene drip. Difficulty weaning off BIPAP due to inadequate volumes and persistent shortness of breath. Extensive workup done regarding difficulties coming off BIPAP. Neurology consulted. ID was consulted for hip abscess- continued on zosyn. Repeat imaging concerning for pneumonia; started on linezolid. Ortho consulted for hip abscess that re-occurred on repeat imaging. Requested IR re-drain abscess and re-culture. Started on IVIG. Ongoing discussions with neurology and neurosurgery regarding cervical stenosis/cord injury as possible etiology of respiratory muscle weakness. Neurosurgery considering intervention but would prefer if patient received tracheostomy prior to laminectomy. Urine metanephrines came back elevated on two collections. Endocrinology consulted. Repeating urine metanephrines as first sets were collected during use of vasopressors. Also uptitrating blood pressure medications and using PO meds when able (off and on BiPAP). Discussed next steps w/ patient and daughter as patient has persistent respiratory muscle weakness resulting in dependence on BIPAP that has not improved. Began having fevers overnight of 1/11. Started on vanc, cefepime, and fluconazole. Palliative care team discussed with patient who expressed her wishes not to have a tracheostomy and decided to have code status of DNR/DNI. Bcx  grew yeast; vanc/cefepime discontinued.       Interval History/Significant Events:   Feels OK today. Resting in bed. No complaints of pain or shortness of breath. No further fevers.    Review of Systems   Unable to perform ROS: Acuity of condition   Constitutional:  Positive for fatigue and fever.   Respiratory:  Positive for shortness of breath.    Objective:     Vital Signs (Most Recent):  Temp: 97.8 °F (36.6 °C) (01/15/23 0300)  Pulse: (!) 134 (01/15/23 1105)  Resp: (!) 29 (01/15/23 1105)  BP: 138/68 (01/15/23 1105)  SpO2: 96 % (01/15/23 1105)   Vital Signs (24h Range):  Temp:  [97.8 °F (36.6 °C)-100.4 °F (38 °C)] 97.8 °F (36.6 °C)  Pulse:  [100-140] 134  Resp:  [22-35] 29  SpO2:  [96 %-100 %] 96 %  BP: ()/(48-70) 138/68   Weight: 59.4 kg (130 lb 15.3 oz)  Body mass index is 22.48 kg/m².      Intake/Output Summary (Last 24 hours) at 1/15/2023 1147  Last data filed at 1/15/2023 0500  Gross per 24 hour   Intake --   Output 400 ml   Net -400 ml         Physical Exam  Vitals and nursing note reviewed.   Constitutional:       General: She is in acute distress.      Appearance: She is ill-appearing.   HENT:      Head: Normocephalic and atraumatic.   Eyes:      Extraocular Movements: Extraocular movements intact.      Pupils: Pupils are equal, round, and reactive to light.   Cardiovascular:      Rate and Rhythm: Regular rhythm. Tachycardia present.      Pulses: Normal pulses.      Heart sounds: Normal heart sounds. No murmur heard.  Pulmonary:      Effort: Accessory muscle usage present.      Breath sounds: Decreased air movement present. No wheezing or rales.   Abdominal:      General: Abdomen is flat. There is no distension.      Palpations: Abdomen is soft.      Tenderness: There is no abdominal tenderness.   Musculoskeletal:         General: No swelling.      Right lower leg: No edema.      Left lower leg: No edema.   Skin:     General: Skin is warm and dry.      Findings: No bruising or rash.   Neurological:       General: No focal deficit present.      Mental Status: She is alert and oriented to person, place, and time.   Psychiatric:         Mood and Affect: Mood normal.         Behavior: Behavior normal.       Vents:  Vent Mode: A/C (12/30/22 1528)  Ventilator Initiated: Yes (12/29/22 0144)  Set Rate: 12 BPM (12/30/22 1528)  Vt Set: 350 mL (12/30/22 0722)  Pressure Support: 14 cmH20 (12/30/22 1139)  PEEP/CPAP: 6 cmH20 (12/30/22 1528)  Oxygen Concentration (%): 30 (01/15/23 1105)  Peak Airway Pressure: 22 cmH20 (12/30/22 1528)  Plateau Pressure: 20 cmH20 (12/30/22 1528)  Total Ve: 9.73 L/m (12/30/22 1528)  Negative Inspiratory Force (cm H2O): 31 (01/12/23 0710)  F/VT Ratio<105 (RSBI): (!) 58.05 (12/30/22 1528)  Lines/Drains/Airways       Central Venous Catheter Line  Duration             Percutaneous Central Line Insertion/Assessment - Triple Lumen  12/29/22 0043 right internal jugular 17 days              Drain  Duration             Female External Urinary Catheter 01/13/23 0800 2 days                  Significant Labs:    CBC/Anemia Profile:  Recent Labs   Lab 01/15/23  0539   WBC 6.64   HGB 7.3*   HCT 25.1*   PLT 49*   MCV 89   RDW 17.3*          Chemistries:  Recent Labs   Lab 01/14/23  0502 01/15/23  0539    145   K 3.3* 4.1   * 114*   CO2 26 22*   BUN 31* 48*   CREATININE 0.8 1.4   CALCIUM 8.2* 7.9*   MG 2.5  --    PHOS 2.7  --        All pertinent labs within the past 24 hours have been reviewed.    Significant Imaging:  I have reviewed all pertinent imaging results/findings within the past 24 hours.      ABG  No results for input(s): PH, PO2, PCO2, HCO3, BE in the last 168 hours.  Assessment/Plan:     Neuro  Cervical myelopathy  See cervical stenosis    Stenosis, cervical spine  Noted on MRI spine. Of note patient has been experiencing episodes of vertigo and decreased balance which could be related.   -NSGY consulted, appreciate recs  -if need for reintubation should be done with fiberoptic or  glidescope  -concern that this could be contributing to her current respiratory muscle weakness  -appreciate neurology and nsgy involvement  -per NSGY, if laminectomy to be done, would prefer patient to have tracheostomy prior to their intervention  -no plans to proceed with trach/laminectomy per patient wishes    Pulmonary  * Acute hypoxemic respiratory failure  Patient with Hypoxic Respiratory failure which is Acute.   Signs/symptoms of respiratory failure include- tachypnea, increased work of breathing and respiratory distress. Contributing diagnoses includes - respiratory muscle weakness related to cervical stenosis/myelopathy.   Completed abx course for PNA  BIPAP and HFNC as tolerated    Respiratory distress  See shortness of breath    Shortness of breath  Patient presented with approx 3 weeks of worsening shortness of breath, weakness, and falls. On transfer to ICU required intubation. Was extubated to BIPAP. Have been unable to wean off bipap due to persistent shortness of breath, tachypnea, and inadequate volumes. Trial off bipap results in tachypnea, tachycardia, and hypertension. Appears weak distally and with fine motor skills. Unclear etiology at this time. Does not appear pulmonary in nature as patient is saturating well, ABGs have been wnl. MRI C-spine showed severe cervical stenosis, NSGY was consulted, unlikely that the stenosis would be causing these issues. Concern that difficulty breathing could be related to diaphragm weakness. Critical illness myopathy seems unlikely as patient was not intubated for long duration.  -neurology consulted, myasthenia panel sent, results negative  -further labs ordered (ESR, CRP, DARLINE, myositis panel), results negative  -continue bipap, alternate with HFNC as tolerated  -started IVIG on 1/5 for 5 day course; no improvement after IVIG  -discussing with neurology and nsgy about possible relation to cervical stenosis- most likely cause at this point; patient does not  want to undergo tracheostomy and laminectomy  -appreciate palliative assistance  -patient has expressed that she does not want to proceed with a tracheostomy and would like her code status to be DNR/DNI  -morphine added for air hunger and severe pain    Cardiac/Vascular  Hypertension  Continues to be hypertensive despite scheduled labetalol, losartan, and amlodipine. Appears to worsen when on high flow instead of bipap so could be partly due to stress response.  -consider changing from labetalol to cardene drip  -repeat urine metanephrines pending  -avoid pure beta blockade    Right renal artery stenosis  -no acute intervention while in ICU  -see HTN    Hyperlipemia  Continue home statin    Essential hypertension  Known history of uncontrolled HTN, w/ labile BP since admit.   -renal artery duplex showing R renal artery stenosis, L renal artery unable to be visualized  -repeat metanephrines not consistent with pheo per endocrine  -labetalol IV PRN for SBP >180    Dyspnea on exertion  Began prior to admission. Has been requiring 2L via NC to maintain oxygenation  -O2 via NC PRN    Renal/  Metabolic alkalosis  Initially could have been related to diuretic use. Bicarb remains elevated after stopping HCTZ.    Hyponatremia  Patient started having weakness around 3 weeks ago after possible viral illness, also has had decreased appetite and more recently experiencing shortness of breath.  Sodium of 120 on admit, has been fluctuating since, now around 116-117. UOSM 802, HANH 109  Has become increasingly lethargic since admission, concern due to hyponatremia  Likely hypovolemic hyponatremia in setting of diuretic use, decreased intake and possible GI loses    -nephrology consulted, appreciate recs  -RESOLVED  -BMP q12    Endocrine  Hypothyroidism (acquired)  -continue levothyroxine    Moderate malnutrition  Was on TPN, now stopped since transitioning towards comfort measures. OK to have D5 fluids per patient  request.      GI  Dysphagia  Pleasure feeds as tolerated    Orthopedic  Abscess of bursa of right hip  CT scan showing possible hematoma vs abscess around right hip. Aspiration done with IR. Culture with e.coli pan-sensitive. Repeat CT showing fluid has reaccumulated.   -ID following  -Has been on zosyn, switch to cefepime per ID  -ortho consulted- recs for repeating drainage w/ IR  -f/u repeat cultures with NGTD  -abx ended 1/9    Other  Fever  Febrile to 102.9 overnight of 1/11. Continued to be febrile during the day 1/12.   -CXR, UA, BCx, Covid/flu/rsv ordered  -remove rectal seal   -remove galvez  -tylenol PRN for fevers  -central line placed 12/29. Currently being used for TPN. If BCx positive consider removal.  -have started vanc, cefepime, and fluconazole  -repeat cultures w/ yeast  -d/c vanc/cefepime; continue fluconazole    Hypothermia  Considering new onset will check BCx and start zosyn. Using bear hugger for warming.    Generalized weakness  See shortness of breath       Critical Care Daily Checklist:    A: Awake: RASS Goal/Actual Goal: RASS Goal: 0-->alert and calm  Actual: Manning Agitation Sedation Scale (RASS): Alert and calm   B: Spontaneous Breathing Trial Performed? Spon. Breathing Trial Initiated?: Not initiated (12/29/22 0729)   C: SAT & SBT Coordinated?  n/a                      D: Delirium: CAM-ICU Overall CAM-ICU: Negative   E: Early Mobility Performed? No   F: Feeding Goal: Goals: Meet % EEN, EPN by RD f/u date  Status: Nutrition Goal Status: goal met   Current Diet Order   Procedures    Diet full liquid      AS: Analgesia/Sedation Morphine PRN   T: Thromboembolic Prophylaxis yes   H: HOB > 300 Yes   U: Stress Ulcer Prophylaxis (if needed) no   G: Glucose Control yes   B: Bowel Function Stool Occurrence: 1   I: Indwelling Catheter (Lines & Galvez) Necessity yes   D: De-escalation of Antimicrobials/Pharmacotherapies yes    Plan for the day/ETD comfort    Code Status:  Family/Goals of  Care: DNR         Critical secondary to Patient has a condition that poses threat to life and bodily function: Severe Respiratory Distress      Critical care was time spent personally by me on the following activities: development of treatment plan with patient or surrogate and bedside caregivers, discussions with consultants, evaluation of patient's response to treatment, examination of patient, ordering and performing treatments and interventions, ordering and review of laboratory studies, ordering and review of radiographic studies, pulse oximetry, re-evaluation of patient's condition. This critical care time did not overlap with that of any other provider or involve time for any procedures.     Beto Avelar MD  Critical Care Medicine  [facility] - Cardiac Medical [facility] [place]

## 2023-01-15 NOTE — ASSESSMENT & PLAN NOTE
Noted on MRI spine. Of note patient has been experiencing episodes of vertigo and decreased balance which could be related.   -NSGY consulted, appreciate recs  -if need for reintubation should be done with fiberoptic or glidescope  -concern that this could be contributing to her current respiratory muscle weakness  -appreciate neurology and nsgy involvement  -per NSGY, if laminectomy to be done, would prefer patient to have tracheostomy prior to their intervention  -no plans to proceed with trach/laminectomy per patient wishes

## 2023-01-15 NOTE — ASSESSMENT & PLAN NOTE
Patient with Hypoxic Respiratory failure which is Acute.   Signs/symptoms of respiratory failure include- tachypnea, increased work of breathing and respiratory distress. Contributing diagnoses includes - respiratory muscle weakness related to cervical stenosis/myelopathy.   Completed abx course for PNA  BIPAP and HFNC as tolerated

## 2023-01-15 NOTE — ASSESSMENT & PLAN NOTE
Febrile to 102.9 overnight of 1/11. Continued to be febrile during the day 1/12.   -CXR, UA, BCx, Covid/flu/rsv ordered  -remove rectal seal   -remove galvez  -tylenol PRN for fevers  -central line placed 12/29. Currently being used for TPN. If BCx positive consider removal.  -have started vanc, cefepime, and fluconazole  -repeat cultures w/ yeast  -d/c vanc/cefepime; continue fluconazole

## 2023-01-15 NOTE — PROGRESS NOTES
Pharmacist Renal Dose Adjustment Note    Dominique Mcgee is a 72 y.o. female being treated with the medications cefepime and fluconazole.    Patient Data:    Vital Signs (Most Recent):  Temp: 97.8 °F (36.6 °C) (01/15/23 0300)  Pulse: (!) 130 (01/15/23 0900)  Resp: (!) 29 (01/15/23 0900)  BP: 137/65 (01/15/23 0900)  SpO2: 98 % (01/15/23 0900)   Vital Signs (72h Range):  Temp:  [97.8 °F (36.6 °C)-103.2 °F (39.6 °C)]   Pulse:  [100-144]   Resp:  [22-41]   BP: ()/()   SpO2:  [97 %-100 %]      Recent Labs   Lab 01/13/23  0323 01/14/23  0502 01/15/23  0539   CREATININE 0.5 0.8 1.4     Serum creatinine: 1.4 mg/dL 01/15/23 0539  Estimated creatinine clearance: 32.3 mL/min - new GERMANIA    Medication:   Cefepime 1 gram every 8 hours will be changed to cefepime 1 gram every 12 hours.  Fluconazole 400 mg daily will be changed to fluconazole 200 mg daily.    Pharmacist's Name: Ghislaine Baker PharmD  Pharmacist's Extension: [phone]

## 2023-01-15 NOTE — ASSESSMENT & PLAN NOTE
Patient presented with approx 3 weeks of worsening shortness of breath, weakness, and falls. On transfer to ICU required intubation. Was extubated to BIPAP. Have been unable to wean off bipap due to persistent shortness of breath, tachypnea, and inadequate volumes. Trial off bipap results in tachypnea, tachycardia, and hypertension. Appears weak distally and with fine motor skills. Unclear etiology at this time. Does not appear pulmonary in nature as patient is saturating well, ABGs have been wnl. MRI C-spine showed severe cervical stenosis, NSGY was consulted, unlikely that the stenosis would be causing these issues. Concern that difficulty breathing could be related to diaphragm weakness. Critical illness myopathy seems unlikely as patient was not intubated for long duration.  -neurology consulted, myasthenia panel sent, results negative  -further labs ordered (ESR, CRP, DARLINE, myositis panel), results negative  -continue bipap, alternate with HFNC as tolerated  -started IVIG on 1/5 for 5 day course; no improvement after IVIG  -discussing with neurology and nsgy about possible relation to cervical stenosis- most likely cause at this point; patient does not want to undergo tracheostomy and laminectomy  -appreciate palliative assistance  -patient has expressed that she does not want to proceed with a tracheostomy and would like her code status to be DNR/DNI  -morphine added for air hunger and severe pain   Size Of Lesion: 5mm Detail Level: Detailed Size Of Lesion: 3mm

## 2023-01-15 NOTE — ASSESSMENT & PLAN NOTE
CT scan showing possible hematoma vs abscess around right hip. Aspiration done with IR. Culture with e.coli pan-sensitive. Repeat CT showing fluid has reaccumulated.   -ID following  -Has been on zosyn, switch to cefepime per ID  -ortho consulted- recs for repeating drainage w/ IR  -f/u repeat cultures with NGTD  -abx ended 1/9

## 2023-01-15 NOTE — PROGRESS NOTES
Pharmacokinetic Assessment Follow Up: IV Vancomycin    Vancomycin serum concentration assessment(s):    The trough level was drawn correctly and can be used to guide therapy at this time. The measurement is below the desired definitive target range of 15 to 20 mcg/mL.  - The trough level was drawn ~23 hours after previous dose and resulted at 10.0.    Vancomycin Regimen Plan:    Ms. Mcgee's Scr jumped from 0.5 to 0.8. UOP has fluctuated some, but appears it has also decreased some. BUN increased from 20 to 31.  - Will re-dose once now with vancomycin 1250 mg.  - Discontinue the scheduled vancomycin regimen and re-dose when the random level is less than 20 mcg/mL, next level to be drawn at 2000 on 1/15.  - Will pulse dose for now in the setting of likely change in renal function.    Drug levels (last 3 results):  Recent Labs   Lab Result Units 01/14/23  1718   Vancomycin-Trough ug/mL 10.0       Pharmacy will continue to follow and monitor vancomycin.    Please contact pharmacy at extension [phone] for questions regarding this assessment.    Thank you for the consult,   Kady Sanchez       Patient brief summary:  Dominique Mcgee is a 72 y.o. female initiated on antimicrobial therapy with IV Vancomycin for treatment of lower respiratory infection    The patient's current regimen is pulse dosing in the setting of likely change in renal function    Actual Body Weight:   59.4 kg    Renal Function:   Estimated Creatinine Clearance: 56.5 mL/min (based on SCr of 0.8 mg/dL).     Dialysis Method (if applicable):  N/A

## 2023-01-15 NOTE — PROGRESS NOTES
Therapy with vancomycin complete and/or consult discontinued by provider.  Pharmacy will sign off, please re-consult as needed.    Neftali Wilkinson, JorgeD

## 2023-01-16 NOTE — PROGRESS NOTES
[facility] - Cardiac Medical ICU  Critical Care Medicine  Progress Note    Patient Name: Dominique Mcgee  MRN: [id number]  Admission Date: 12/23/2022  Hospital Length of Stay: 24 days  Code Status: DNR  Attending Provider: Delfino Chen MD  Primary Care Provider: Briana Leblanc MD   Principal Problem: Acute hypoxemic respiratory failure    Subjective:     HPI:  Ms. Mcgee is a 73 y/o F patient with HTN, HLD, hypothyroidism, recent URI 3 weeks prior to admission, balance difficulites who presented to the ED on 12/23 for 3 weeks of weakness, LIRA, decreased appetite, and constipation with intermittent lower abdominal cramping. Per daughter, patient is typically independent, lives alone, cooks her own meals, since she has been ill she has had decreased PO intake and drinking coffee, tea, and eating cereal. She has had progressive worsening HTN in the last 3 weeks and was started on losartan-HCTZ and metoprolol. Prior to this she was only taking lisinopril per daughter. Admitted for HTN emergency and was found to have Na 120. Received IV fluids and admitted to hospital medicine. For her hypernatremia, her thiazide was held and she was placed on fluid restriction. Urine studies from 12/25 showed Uosm of 802 and HANH of 109. Her BP has been labile since admission requiring both IV antihypertensives as well as fluid boluses. The patient's hyponatremia persisted despite fluid restriction and she became increasingly lethargic and weak so CCM was consulted for further management.      Upon evaluation, patient is somnolent. Opens eyes to voice, unable to engage in full conversation. Speaks mainly single words. Denies thirst. Reports SOB and generalized abdominal pain a/w constipation. No CP, N/V/D, dysuria, edema. Most recent vitals: /60, pulse 82, R 18, SpO2 94% on RA. Na trend 119->115->117.       Hospital/ICU Course:  Once transferred to ICU patient became hypotensive requiring multiple pressors. Patient intubated  for airway protection. Art line and central line placed. Sodium improved with hypertonic saline. CT abdomen/pelvis showing proctitis and hip abscess. Went for IR aspiration of abscess. Culture showing e.coli, on zosyn. MRI brain non-concerning. MRI spine with severe cervical stenosis and cord compression, also with mild lumbar stenosis. NSGY consulted with plans for outpatient follow-up. Repeat CXR with increased LLL consolidation and pleural effusion. Extubated to bipap. Once extubated and off pressors patient began to re-experience labile blood pressures similar to her initial presentation. Started on cardene drip. Difficulty weaning off BIPAP due to inadequate volumes and persistent shortness of breath. Extensive workup done regarding difficulties coming off BIPAP. Neurology consulted. ID was consulted for hip abscess- continued on zosyn. Repeat imaging concerning for pneumonia; started on linezolid. Ortho consulted for hip abscess that re-occurred on repeat imaging. Requested IR re-drain abscess and re-culture. Started on IVIG. Ongoing discussions with neurology and neurosurgery regarding cervical stenosis/cord injury as possible etiology of respiratory muscle weakness. Neurosurgery considering intervention but would prefer if patient received tracheostomy prior to laminectomy. Urine metanephrines came back elevated on two collections. Endocrinology consulted. Repeating urine metanephrines as first sets were collected during use of vasopressors. Also uptitrating blood pressure medications and using PO meds when able (off and on BiPAP). Discussed next steps w/ patient and daughter as patient has persistent respiratory muscle weakness resulting in dependence on BIPAP that has not improved. Began having fevers overnight of 1/11. Started on vanc, cefepime, and fluconazole. Palliative care team discussed with patient who expressed her wishes not to have a tracheostomy and decided to have code status of DNR/DNI. Bcx  grew yeast; vanc/cefepime discontinued.       Interval History/Significant Events: NAEON. VSS. BUN/Cr increasing 58/2.2. Oxygen requirements remaining stable.     Review of Systems   Constitutional:  Positive for fatigue.   HENT: Negative.     Eyes: Negative.    Respiratory:  Positive for shortness of breath.    Gastrointestinal: Negative.    Endocrine: Negative.    Genitourinary: Negative.    Musculoskeletal: Negative.    Skin: Negative.    Neurological:  Positive for weakness.   Hematological: Negative.    Psychiatric/Behavioral:  The patient is nervous/anxious.    Objective:     Vital Signs (Most Recent):  Temp: 98.5 °F (36.9 °C) (01/16/23 0700)  Pulse: (!) 117 (01/16/23 1105)  Resp: (!) 32 (01/16/23 1105)  BP: (!) 165/87 (01/16/23 0900)  SpO2: 100 % (01/16/23 1105)   Vital Signs (24h Range):  Temp:  [96.5 °F (35.8 °C)-100.2 °F (37.9 °C)] 98.5 °F (36.9 °C)  Pulse:  [117-138] 117  Resp:  [25-41] 32  SpO2:  [93 %-100 %] 100 %  BP: (100-165)/(51-87) 165/87   Weight: 59.4 kg (130 lb 15.3 oz)  Body mass index is 22.48 kg/m².      Intake/Output Summary (Last 24 hours) at 1/16/2023 1148  Last data filed at 1/16/2023 1100  Gross per 24 hour   Intake 3014.62 ml   Output 300 ml   Net 2714.62 ml       Physical Exam  Vitals and nursing note reviewed.   Constitutional:       General: She is in acute distress.      Appearance: She is ill-appearing. She is not toxic-appearing or diaphoretic.   HENT:      Head: Normocephalic and atraumatic.      Right Ear: External ear normal.      Left Ear: External ear normal.   Eyes:      General:         Right eye: Discharge present.      Extraocular Movements: Extraocular movements intact.      Pupils: Pupils are equal, round, and reactive to light.   Cardiovascular:      Rate and Rhythm: Regular rhythm. Tachycardia present.      Pulses: Normal pulses.      Heart sounds: No murmur heard.    No friction rub.   Pulmonary:      Effort: Accessory muscle usage and respiratory distress present.       Breath sounds: Decreased air movement present. No stridor. Wheezing present. No rales.      Comments: On bipap  Abdominal:      General: Abdomen is flat. There is no distension.      Palpations: Abdomen is soft.      Tenderness: There is no abdominal tenderness. There is no guarding.   Musculoskeletal:         General: No swelling.      Cervical back: Normal range of motion.      Right lower leg: No edema.      Left lower leg: No edema.   Skin:     General: Skin is warm and dry.      Findings: No bruising or rash.   Neurological:      General: No focal deficit present.      Mental Status: She is alert and oriented to person, place, and time.   Psychiatric:         Mood and Affect: Mood normal.         Behavior: Behavior normal.         Thought Content: Thought content normal.         Judgment: Judgment normal.       Vents:  Vent Mode: A/C (12/30/22 1528)  Ventilator Initiated: Yes (12/29/22 0144)  Set Rate: 12 BPM (12/30/22 1528)  Vt Set: 350 mL (12/30/22 0722)  Pressure Support: 14 cmH20 (12/30/22 1139)  PEEP/CPAP: 6 cmH20 (12/30/22 1528)  Oxygen Concentration (%): 30 (01/16/23 0900)  Peak Airway Pressure: 22 cmH20 (12/30/22 1528)  Plateau Pressure: 20 cmH20 (12/30/22 1528)  Total Ve: 9.73 L/m (12/30/22 1528)  Negative Inspiratory Force (cm H2O): 31 (01/12/23 0710)  F/VT Ratio<105 (RSBI): (!) 58.05 (12/30/22 1528)  Lines/Drains/Airways       Central Venous Catheter Line  Duration             Percutaneous Central Line Insertion/Assessment - Triple Lumen  12/29/22 0043 right internal jugular 18 days              Drain  Duration             Female External Urinary Catheter 01/13/23 0800 3 days                  Significant Labs:    CBC/Anemia Profile:  Recent Labs   Lab 01/15/23  0539 01/16/23  0514   WBC 6.64 11.07   HGB 7.3* 7.1*   HCT 25.1* 23.6*   PLT 49* 84*   MCV 89 88   RDW 17.3* 17.8*        Chemistries:  Recent Labs   Lab 01/15/23  0539 01/16/23  0514    140   K 4.1 4.3   * 111*   CO2 22* 21*    BUN 48* 58*   CREATININE 1.4 2.2*   CALCIUM 7.9* 7.9*   ALBUMIN  --  1.8*   PROT  --  6.4   BILITOT  --  0.3   ALKPHOS  --  107   ALT  --  61*   AST  --  57*         CMP:     Recent Labs   Lab 01/16/23  0514   WBC 11.07   RBC 2.69*   HGB 7.1*   HCT 23.6*   PLT 84*   MCV 88   MCH 26.4*   MCHC 30.1*         Significant Imaging:  I have reviewed all pertinent imaging results/findings within the past 24 hours.      ABG  No results for input(s): PH, PO2, PCO2, HCO3, BE in the last 168 hours.  Assessment/Plan:     Neuro  Cervical myelopathy  See cervical stenosis    Stenosis, cervical spine  Noted on MRI spine. Of note patient has been experiencing episodes of vertigo and decreased balance which could be related.   -NSGY consulted, appreciate recs  -if need for reintubation should be done with fiberoptic or glidescope  -concern that this could be contributing to her current respiratory muscle weakness  -appreciate neurology and nsgy involvement  -per NSGY, if laminectomy to be done, would prefer patient to have tracheostomy prior to their intervention  -no plans to proceed with trach/laminectomy per patient wishes    Pulmonary  * Acute hypoxemic respiratory failure  Patient with Hypoxic Respiratory failure which is Acute.   Signs/symptoms of respiratory failure include- tachypnea, increased work of breathing and respiratory distress. Contributing diagnoses includes - respiratory muscle weakness related to cervical stenosis/myelopathy.   Completed abx course for PNA  BIPAP and HFNC as tolerated    Respiratory distress  See shortness of breath    Shortness of breath  Patient presented with approx 3 weeks of worsening shortness of breath, weakness, and falls. On transfer to ICU required intubation. Was extubated to BIPAP. Have been unable to wean off bipap due to persistent shortness of breath, tachypnea, and inadequate volumes. Trial off bipap results in tachypnea, tachycardia, and hypertension. Appears weak distally and  with fine motor skills. Unclear etiology at this time. Does not appear pulmonary in nature as patient is saturating well, ABGs have been wnl. MRI C-spine showed severe cervical stenosis, NSGY was consulted, unlikely that the stenosis would be causing these issues. Concern that difficulty breathing could be related to diaphragm weakness. Critical illness myopathy seems unlikely as patient was not intubated for long duration.  -neurology consulted, myasthenia panel sent, results negative  -further labs ordered (ESR, CRP, DARLINE, myositis panel), results negative  -continue bipap, alternate with HFNC as tolerated  -started IVIG on 1/5 for 5 day course; no improvement after IVIG  -discussing with neurology and nsgy about possible relation to cervical stenosis- most likely cause at this point; patient does not want to undergo tracheostomy and laminectomy  -appreciate palliative assistance  -patient has expressed that she does not want to proceed with a tracheostomy and would like her code status to be DNR/DNI  -morphine added for air hunger and severe pain    Cardiac/Vascular  Hypertension  Continues to be hypertensive despite scheduled labetalol, losartan, and amlodipine. Appears to worsen when on high flow instead of bipap so could be partly due to stress response.  -consider changing from labetalol to cardene drip  -avoid pure beta blockade    Right renal artery stenosis  -no acute intervention while in ICU  -see HTN    Hyperlipemia  Continue home statin    Essential hypertension  Known history of uncontrolled HTN, w/ labile BP since admit.   -renal artery duplex showing R renal artery stenosis, L renal artery unable to be visualized  -repeat metanephrines not consistent with pheo per endocrine  -labetalol IV PRN for SBP >180    Dyspnea on exertion  Began prior to admission. Has been requiring 2L via NC to maintain oxygenation  -O2 via NC PRN    Renal/  Metabolic alkalosis  Initially could have been related to diuretic  use. Bicarb remains elevated after stopping HCTZ.    Hyponatremia  Patient started having weakness around 3 weeks ago after possible viral illness, also has had decreased appetite and more recently experiencing shortness of breath.  Sodium of 120 on admit, has been fluctuating since, now around 116-117. UOSM 802, HANH 109  Has become increasingly lethargic since admission, concern due to hyponatremia  Likely hypovolemic hyponatremia in setting of diuretic use, decreased intake and possible GI loses    -nephrology consulted, appreciate recs  -RESOLVED  -BMP q12    Endocrine  Hypothyroidism (acquired)  -continue levothyroxine    Moderate malnutrition  Was on TPN, now stopped since transitioning towards comfort measures. OK to have D5 fluids per patient request.      GI  Dysphagia  Pleasure feeds as tolerated    Orthopedic  Abscess of bursa of right hip  CT scan showing possible hematoma vs abscess around right hip. Aspiration done with IR. Culture with e.coli pan-sensitive. Repeat CT showing fluid has reaccumulated.   -ID following  -Has been on zosyn, switch to cefepime per ID  -ortho consulted- recs for repeating drainage w/ IR  -f/u repeat cultures with NGTD  -abx ended 1/9    Palliative Care  Palliative care encounter  Ongoing discussions with patient and family    Other  Fever  Febrile to 102.9 overnight of 1/11. Continued to be febrile during the day 1/12.   -CXR, UA, BCx, Covid/flu/rsv ordered  -remove rectal seal   -remove galvez  -tylenol PRN for fevers  -central line placed 12/29. Currently being used for TPN. If BCx positive consider removal.  -repeat cultures w/ yeast  -d/c vanc/cefepime; continue fluconazole    Hypothermia  Considering new onset will check BCx and start zosyn. Using bear hugger for warming.    Generalized weakness  See shortness of breath       Critical Care Daily Checklist:    A: Awake: RASS Goal/Actual Goal: RASS Goal: 0-->alert and calm  Actual: Manning Agitation Sedation Scale (RASS):  Drowsy   B: Spontaneous Breathing Trial Performed? Spon. Breathing Trial Initiated?: Not initiated (12/29/22 0729)   C: SAT & SBT Coordinated?  na                      D: Delirium: CAM-ICU Overall CAM-ICU: Negative   E: Early Mobility Performed? Yes   F: Feeding Goal: Goals: Meet % EEN, EPN by RD f/u date  Status: Nutrition Goal Status: goal met   Current Diet Order   Procedures    Diet full liquid      AS: Analgesia/Sedation na   T: Thromboembolic Prophylaxis thrombocytopenia   H: HOB > 300 Yes   U: Stress Ulcer Prophylaxis (if needed) na   G: Glucose Control ye   B: Bowel Function Stool Occurrence: 1   I: Indwelling Catheter (Lines & Rush) Necessity yes   D: De-escalation of Antimicrobials/Pharmacotherapies yes    Plan for the day/ETD Goals of care discussion    Code Status:  Family/Goals of Care: DNR  ongoing       Critical secondary to Patient has a condition that poses threat to life and bodily function: Severe Respiratory Distress      Critical care was time spent personally by me on the following activities: development of treatment plan with patient or surrogate and bedside caregivers, discussions with consultants, evaluation of patient's response to treatment, examination of patient, ordering and performing treatments and interventions, ordering and review of laboratory studies, ordering and review of radiographic studies, pulse oximetry, re-evaluation of patient's condition. This critical care time did not overlap with that of any other provider or involve time for any procedures.     Kady Cote MD  Critical Care Medicine  [facility] - Cardiac Medical ICU

## 2023-01-16 NOTE — ASSESSMENT & PLAN NOTE
Patient presented with approx 3 weeks of worsening shortness of breath, weakness, and falls. On transfer to ICU required intubation. Was extubated to BIPAP. Have been unable to wean off bipap due to persistent shortness of breath, tachypnea, and inadequate volumes. Trial off bipap results in tachypnea, tachycardia, and hypertension. Appears weak distally and with fine motor skills. Unclear etiology at this time. Does not appear pulmonary in nature as patient is saturating well, ABGs have been wnl. MRI C-spine showed severe cervical stenosis, NSGY was consulted, unlikely that the stenosis would be causing these issues. Concern that difficulty breathing could be related to diaphragm weakness. Critical illness myopathy seems unlikely as patient was not intubated for long duration.  -neurology consulted, myasthenia panel sent, results negative  -further labs ordered (ESR, CRP, DARLINE, myositis panel), results negative  -continue bipap, alternate with HFNC as tolerated  -started IVIG on 1/5 for 5 day course; no improvement after IVIG  -discussing with neurology and nsgy about possible relation to cervical stenosis- most likely cause at this point; patient does not want to undergo tracheostomy and laminectomy  -appreciate palliative assistance  -patient has expressed that she does not want to proceed with a tracheostomy and would like her code status to be DNR/DNI  -morphine added for air hunger and severe pain

## 2023-01-16 NOTE — ASSESSMENT & PLAN NOTE
Febrile to 102.9 overnight of 1/11. Continued to be febrile during the day 1/12.   -CXR, UA, BCx, Covid/flu/rsv ordered  -remove rectal seal   -remove galvez  -tylenol PRN for fevers  -central line placed 12/29. Currently being used for TPN. If BCx positive consider removal.  -repeat cultures w/ yeast  -d/c vanc/cefepime; continue fluconazole

## 2023-01-16 NOTE — ASSESSMENT & PLAN NOTE
Continues to be hypertensive despite scheduled labetalol, losartan, and amlodipine. Appears to worsen when on high flow instead of bipap so could be partly due to stress response.  -consider changing from labetalol to cardene drip  -avoid pure beta blockade

## 2023-01-16 NOTE — SUBJECTIVE & OBJECTIVE
Interval History/Significant Events: NAEON. VSS. BUN/Cr increasing 58/2.2. Oxygen requirements remaining stable.     Review of Systems   Constitutional:  Positive for fatigue.   HENT: Negative.     Eyes: Negative.    Respiratory:  Positive for shortness of breath.    Gastrointestinal: Negative.    Endocrine: Negative.    Genitourinary: Negative.    Musculoskeletal: Negative.    Skin: Negative.    Neurological:  Positive for weakness.   Hematological: Negative.    Psychiatric/Behavioral:  The patient is nervous/anxious.    Objective:     Vital Signs (Most Recent):  Temp: 98.5 °F (36.9 °C) (01/16/23 0700)  Pulse: (!) 117 (01/16/23 1105)  Resp: (!) 32 (01/16/23 1105)  BP: (!) 165/87 (01/16/23 0900)  SpO2: 100 % (01/16/23 1105)   Vital Signs (24h Range):  Temp:  [96.5 °F (35.8 °C)-100.2 °F (37.9 °C)] 98.5 °F (36.9 °C)  Pulse:  [117-138] 117  Resp:  [25-41] 32  SpO2:  [93 %-100 %] 100 %  BP: (100-165)/(51-87) 165/87   Weight: 59.4 kg (130 lb 15.3 oz)  Body mass index is 22.48 kg/m².      Intake/Output Summary (Last 24 hours) at 1/16/2023 1148  Last data filed at 1/16/2023 1100  Gross per 24 hour   Intake 3014.62 ml   Output 300 ml   Net 2714.62 ml       Physical Exam  Vitals and nursing note reviewed.   Constitutional:       General: She is in acute distress.      Appearance: She is ill-appearing. She is not toxic-appearing or diaphoretic.   HENT:      Head: Normocephalic and atraumatic.      Right Ear: External ear normal.      Left Ear: External ear normal.   Eyes:      General:         Right eye: Discharge present.      Extraocular Movements: Extraocular movements intact.      Pupils: Pupils are equal, round, and reactive to light.   Cardiovascular:      Rate and Rhythm: Regular rhythm. Tachycardia present.      Pulses: Normal pulses.      Heart sounds: No murmur heard.    No friction rub.   Pulmonary:      Effort: Accessory muscle usage and respiratory distress present.      Breath sounds: Decreased air movement  present. No stridor. Wheezing present. No rales.      Comments: On bipap  Abdominal:      General: Abdomen is flat. There is no distension.      Palpations: Abdomen is soft.      Tenderness: There is no abdominal tenderness. There is no guarding.   Musculoskeletal:         General: No swelling.      Cervical back: Normal range of motion.      Right lower leg: No edema.      Left lower leg: No edema.   Skin:     General: Skin is warm and dry.      Findings: No bruising or rash.   Neurological:      General: No focal deficit present.      Mental Status: She is alert and oriented to person, place, and time.   Psychiatric:         Mood and Affect: Mood normal.         Behavior: Behavior normal.         Thought Content: Thought content normal.         Judgment: Judgment normal.       Vents:  Vent Mode: A/C (12/30/22 1528)  Ventilator Initiated: Yes (12/29/22 0144)  Set Rate: 12 BPM (12/30/22 1528)  Vt Set: 350 mL (12/30/22 0722)  Pressure Support: 14 cmH20 (12/30/22 1139)  PEEP/CPAP: 6 cmH20 (12/30/22 1528)  Oxygen Concentration (%): 30 (01/16/23 0900)  Peak Airway Pressure: 22 cmH20 (12/30/22 1528)  Plateau Pressure: 20 cmH20 (12/30/22 1528)  Total Ve: 9.73 L/m (12/30/22 1528)  Negative Inspiratory Force (cm H2O): 31 (01/12/23 0710)  F/VT Ratio<105 (RSBI): (!) 58.05 (12/30/22 1528)  Lines/Drains/Airways       Central Venous Catheter Line  Duration             Percutaneous Central Line Insertion/Assessment - Triple Lumen  12/29/22 0043 right internal jugular 18 days              Drain  Duration             Female External Urinary Catheter 01/13/23 0800 3 days                  Significant Labs:    CBC/Anemia Profile:  Recent Labs   Lab 01/15/23  0539 01/16/23  0514   WBC 6.64 11.07   HGB 7.3* 7.1*   HCT 25.1* 23.6*   PLT 49* 84*   MCV 89 88   RDW 17.3* 17.8*        Chemistries:  Recent Labs   Lab 01/15/23  0539 01/16/23  0514    140   K 4.1 4.3   * 111*   CO2 22* 21*   BUN 48* 58*   CREATININE 1.4 2.2*    CALCIUM 7.9* 7.9*   ALBUMIN  --  1.8*   PROT  --  6.4   BILITOT  --  0.3   ALKPHOS  --  107   ALT  --  61*   AST  --  57*         CMP:     Recent Labs   Lab 01/16/23  0514   WBC 11.07   RBC 2.69*   HGB 7.1*   HCT 23.6*   PLT 84*   MCV 88   MCH 26.4*   MCHC 30.1*         Significant Imaging:  I have reviewed all pertinent imaging results/findings within the past 24 hours.

## 2023-01-17 NOTE — ASSESSMENT & PLAN NOTE
Patient presented with approx 3 weeks of worsening shortness of breath, weakness, and falls. On transfer to ICU required intubation. Was extubated to BIPAP. Have been unable to wean off bipap due to persistent shortness of breath, tachypnea, and inadequate volumes. Trial off bipap results in tachypnea, tachycardia, and hypertension. Appears weak distally and with fine motor skills. Unclear etiology at this time. Does not appear pulmonary in nature as patient is saturating well, ABGs have been wnl. MRI C-spine showed severe cervical stenosis, NSGY was consulted, unlikely that the stenosis would be causing these issues. Concern that difficulty breathing could be related to diaphragm weakness. Critical illness myopathy seems unlikely as patient was not intubated for long duration.  -neurology consulted, myasthenia panel sent, results negative  -further labs ordered (ESR, CRP, DARLINE, myositis panel), results negative  -continue bipap, alternate with HFNC as tolerated  -started IVIG on 1/5 for 5 day course; no improvement after IVIG    -discussing with neurology and nsgy about possible relation to cervical stenosis- most likely cause at this point; patient does not want to undergo tracheostomy and laminectomy  -appreciate palliative assistance  -patient has expressed that she does not want to proceed with a tracheostomy and would like her code status to be DNR/DNI  -morphine added for air hunger and severe pain  -versed prn for anxiety

## 2023-01-17 NOTE — PROGRESS NOTES
[facility] - Card[facility] Medical [facility]  Critical Care Medicine  Progress Note    Patient Name: Dominique Mcgee  MRN: [id number]  Admission Date: 12/23/2022  Hospital Length of Stay: 25 days  Code Status: DNR  Attending Provider: Meredith Nina MD  Primary Care Provider: Briana Leblanc MD   Principal Problem: Acute hypoxemic respiratory failure    Subjective:     HPI:  Ms. Mcgee is a 71 y/o F patient with HTN, HLD, hypothyroidism, recent URI 3 weeks prior to admission, balance difficulites who presented to the ED on 12/23 for 3 weeks of weakness, LIRA, decreased appetite, and constipation with intermittent lower abdominal cramping. Per daughter, patient is typically independent, lives alone, cooks her own meals, since she has been ill she has had decreased PO intake and drinking coffee, tea, and eating cereal. She has had progressive worsening HTN in the last 3 weeks and was started on losartan-HCTZ and metoprolol. Prior to this she was only taking lisinopril per daughter. Admitted for HTN emergency and was found to have Na 120. Received IV fluids and admitted to hospital medicine. For her hypernatremia, her thiazide was held and she was placed on fluid restriction. Urine studies from 12/25 showed Uosm of 802 and HANH of 109. Her BP has been labile since admission requiring both IV antihypertensives as well as fluid boluses. The patient's hyponatremia persisted despite fluid restriction and she became increasingly lethargic and weak so CCM was consulted for further management.      Upon evaluation, patient is somnolent. Opens eyes to voice, unable to engage in full conversation. Speaks mainly single words. Denies thirst. Reports SOB and generalized abdominal pain a/w constipation. No CP, N/V/D, dysuria, edema. Most recent vitals: /60, pulse 82, R 18, SpO2 94% on RA. Na trend 119->115->117.       Hospital/ICU Course:  Once transferred to ICU patient became hypotensive requiring multiple pressors. Patient intubated for  airway protection. Art line and central line placed. Sodium improved with hypertonic saline. CT abdomen/pelvis showing proctitis and hip abscess. Went for IR aspiration of abscess. Culture showing e.coli, on zosyn. MRI brain non-concerning. MRI spine with severe cervical stenosis and cord compression, also with mild lumbar stenosis. NSGY consulted with plans for outpatient follow-up. Repeat CXR with increased LLL consolidation and pleural effusion. Extubated to bipap. Once extubated and off pressors patient began to re-experience labile blood pressures similar to her initial presentation. Started on cardene drip. Difficulty weaning off BIPAP due to inadequate volumes and persistent shortness of breath. Extensive workup done regarding difficulties coming off BIPAP. Neurology consulted. ID was consulted for hip abscess- continued on zosyn. Repeat imaging concerning for pneumonia; started on linezolid. Ortho consulted for hip abscess that re-occurred on repeat imaging. Requested IR re-drain abscess and re-culture. Started on IVIG. Ongoing discussions with neurology and neurosurgery regarding cervical stenosis/cord injury as possible etiology of respiratory muscle weakness. Neurosurgery considering intervention but would prefer if patient received tracheostomy prior to laminectomy. Urine metanephrines came back elevated on two collections. Endocrinology consulted. Repeating urine metanephrines as first sets were collected during use of vasopressors. Also uptitrating blood pressure medications and using PO meds when able (off and on BiPAP). Discussed next steps w/ patient and daughter as patient has persistent respiratory muscle weakness resulting in dependence on BIPAP that has not improved. Began having fevers overnight of 1/11. Started on vanc, cefepime, and fluconazole. Palliative care team discussed with patient who expressed her wishes not to have a tracheostomy and decided to have code status of DNR/DNI. Bcx grew  yeast; vanc/cefepime discontinued. Family discussion 1/16 to express patient's wishes and course of action moving forward.       Interval History/Significant Events: NAEON. VSS. BiPAP prn    Review of Systems   Constitutional:  Positive for fatigue.   HENT: Negative.     Eyes: Negative.    Respiratory:  Positive for shortness of breath.    Gastrointestinal: Negative.    Endocrine: Negative.    Genitourinary: Negative.    Musculoskeletal: Negative.    Skin: Negative.    Neurological:  Positive for weakness.   Hematological: Negative.    Psychiatric/Behavioral:  The patient is nervous/anxious.    Objective:     Vital Signs (Most Recent):  Temp: 97.6 °F (36.4 °C) (01/17/23 0300)  Pulse: 107 (01/17/23 0732)  Resp: (!) 27 (01/17/23 0732)  BP: (!) 94/52 (01/17/23 0600)  SpO2: 100 % (01/17/23 0732)   Vital Signs (24h Range):  Temp:  [97.6 °F (36.4 °C)-99 °F (37.2 °C)] 97.6 °F (36.4 °C)  Pulse:  [107-128] 107  Resp:  [22-47] 27  SpO2:  [90 %-100 %] 100 %  BP: ()/(52-87) 94/52   Weight: 59.4 kg (130 lb 15.3 oz)  Body mass index is 22.48 kg/m².      Intake/Output Summary (Last 24 hours) at 1/17/2023 0740  Last data filed at 1/17/2023 0500  Gross per 24 hour   Intake 3736.95 ml   Output 650 ml   Net 3086.95 ml       Physical Exam  Vitals and nursing note reviewed.   Constitutional:       General: She is in acute distress.      Appearance: She is ill-appearing. She is not toxic-appearing or diaphoretic.   HENT:      Head: Normocephalic and atraumatic.      Right Ear: External ear normal.      Left Ear: External ear normal.   Eyes:      General:         Right eye: Discharge present.      Extraocular Movements: Extraocular movements intact.      Pupils: Pupils are equal, round, and reactive to light.   Cardiovascular:      Rate and Rhythm: Regular rhythm. Tachycardia present.      Pulses: Normal pulses.      Heart sounds: No murmur heard.    No friction rub.   Pulmonary:      Effort: Accessory muscle usage and respiratory  distress present.      Breath sounds: Decreased air movement present. No stridor. Wheezing present. No rales.      Comments: On bipap  Abdominal:      General: Abdomen is flat. There is no distension.      Palpations: Abdomen is soft.      Tenderness: There is no abdominal tenderness. There is no guarding.   Musculoskeletal:         General: No swelling.      Cervical back: Normal range of motion.      Right lower leg: No edema.      Left lower leg: No edema.   Skin:     General: Skin is warm and dry.      Findings: No bruising or rash.   Neurological:      General: No focal deficit present.      Mental Status: She is alert and oriented to person, place, and time.   Psychiatric:         Mood and Affect: Mood normal.         Behavior: Behavior normal.         Thought Content: Thought content normal.         Judgment: Judgment normal.       Vents:  Vent Mode: A/C (12/30/22 1528)  Ventilator Initiated: Yes (12/29/22 0144)  Set Rate: 12 BPM (12/30/22 1528)  Vt Set: 350 mL (12/30/22 0722)  Pressure Support: 14 cmH20 (12/30/22 1139)  PEEP/CPAP: 6 cmH20 (12/30/22 1528)  Oxygen Concentration (%): 40 (01/17/23 0732)  Peak Airway Pressure: 22 cmH20 (12/30/22 1528)  Plateau Pressure: 20 cmH20 (12/30/22 1528)  Total Ve: 9.73 L/m (12/30/22 1528)  Negative Inspiratory Force (cm H2O): 31 (01/12/23 0710)  F/VT Ratio<105 (RSBI): (!) 58.05 (12/30/22 1528)  Lines/Drains/Airways       Central Venous Catheter Line  Duration             Percutaneous Central Line Insertion/Assessment - Triple Lumen  12/29/22 0043 right internal jugular 19 days              Drain  Duration             Female External Urinary Catheter 01/13/23 0800 3 days                  Significant Labs:    CBC/Anemia Profile:  Recent Labs   Lab 01/16/23 0514   WBC 11.07   HGB 7.1*   HCT 23.6*   PLT 84*   MCV 88   RDW 17.8*        Chemistries:  Recent Labs   Lab 01/16/23 0514      K 4.3   *   CO2 21*   BUN 58*   CREATININE 2.2*   CALCIUM 7.9*   ALBUMIN 1.8*    PROT 6.4   BILITOT 0.3   ALKPHOS 107   ALT 61*   AST 57*         Significant Imaging:  I have reviewed all pertinent imaging results/findings within the past 24 hours.      ABG  No results for input(s): PH, PO2, PCO2, HCO3, BE in the last 168 hours.  Assessment/Plan:     Neuro  Cervical myelopathy  See cervical stenosis    Stenosis, cervical spine  Noted on MRI spine. Of note patient has been experiencing episodes of vertigo and decreased balance which could be related.   -NSGY consulted, appreciate recs  -if need for reintubation should be done with fiberoptic or glidescope  -concern that this could be contributing to her current respiratory muscle weakness  -appreciate neurology and nsgy involvement  -per NSGY, if laminectomy to be done, would prefer patient to have tracheostomy prior to their intervention  -no plans to proceed with trach/laminectomy per patient wishes    Pulmonary  * Acute hypoxemic respiratory failure  Patient with Hypoxic Respiratory failure which is Acute.   Signs/symptoms of respiratory failure include- tachypnea, increased work of breathing and respiratory distress. Contributing diagnoses includes - respiratory muscle weakness related to cervical stenosis/myelopathy.   Completed abx course for PNA  BIPAP and HFNC as tolerated    Respiratory distress  See shortness of breath    Shortness of breath  Patient presented with approx 3 weeks of worsening shortness of breath, weakness, and falls. On transfer to ICU required intubation. Was extubated to BIPAP. Have been unable to wean off bipap due to persistent shortness of breath, tachypnea, and inadequate volumes. Trial off bipap results in tachypnea, tachycardia, and hypertension. Appears weak distally and with fine motor skills. Unclear etiology at this time. Does not appear pulmonary in nature as patient is saturating well, ABGs have been wnl. MRI C-spine showed severe cervical stenosis, NSGY was consulted, unlikely that the stenosis would be  causing these issues. Concern that difficulty breathing could be related to diaphragm weakness. Critical illness myopathy seems unlikely as patient was not intubated for long duration.  -neurology consulted, myasthenia panel sent, results negative  -further labs ordered (ESR, CRP, DARLINE, myositis panel), results negative  -continue bipap, alternate with HFNC as tolerated  -started IVIG on 1/5 for 5 day course; no improvement after IVIG    -discussing with neurology and nsgy about possible relation to cervical stenosis- most likely cause at this point; patient does not want to undergo tracheostomy and laminectomy  -appreciate palliative assistance  -patient has expressed that she does not want to proceed with a tracheostomy and would like her code status to be DNR/DNI  -morphine added for air hunger and severe pain  -versed prn for anxiety    Cardiac/Vascular  Hypertension  Continues to be hypertensive despite scheduled labetalol, losartan, and amlodipine. Appears to worsen when on high flow instead of bipap so could be partly due to stress response.  -consider changing from labetalol to cardene drip  -avoid pure beta blockade    Right renal artery stenosis  -no acute intervention while in ICU  -see HTN    Hyperlipemia  Continue home statin    Essential hypertension  Known history of uncontrolled HTN, w/ labile BP since admit.   -renal artery duplex showing R renal artery stenosis, L renal artery unable to be visualized  -repeat metanephrines not consistent with pheo per endocrine  -labetalol IV PRN for SBP >180    Dyspnea on exertion  Began prior to admission. Has been requiring 2L via NC to maintain oxygenation  -O2 via NC PRN    Renal/  Metabolic alkalosis  Initially could have been related to diuretic use. Bicarb remains elevated after stopping HCTZ.    Hyponatremia  Patient started having weakness around 3 weeks ago after possible viral illness, also has had decreased appetite and more recently experiencing  shortness of breath.  Sodium of 120 on admit, has been fluctuating since, now around 116-117. UOSM 802, HANH 109  Has become increasingly lethargic since admission, concern due to hyponatremia  Likely hypovolemic hyponatremia in setting of diuretic use, decreased intake and possible GI loses    -nephrology consulted, appreciate recs  -RESOLVED      Endocrine  Hypothyroidism (acquired)  -continue levothyroxine    Moderate malnutrition  Was on TPN, now stopped since transitioning towards comfort measures. OK to have D5 fluids per patient request.      GI  Dysphagia  Pleasure feeds as tolerated    Orthopedic  Abscess of bursa of right hip  CT scan showing possible hematoma vs abscess around right hip. Aspiration done with IR. Culture with e.coli pan-sensitive. Repeat CT showing fluid has reaccumulated.   -ID following  -Has been on zosyn, switch to cefepime per ID  -ortho consulted- recs for repeating drainage w/ IR  -f/u repeat cultures with NGTD  -abx ended 1/9    Palliative Care  Palliative care encounter  Ongoing discussions with patient and family    Other  Fever  Febrile to 102.9 overnight of 1/11. Continued to be febrile during the day 1/12.   -CXR, UA, BCx, Covid/flu/rsv ordered  -remove rectal seal   -remove galvez  -tylenol PRN for fevers  -central line placed 12/29. Currently being used for TPN. If BCx positive consider removal.  -repeat cultures w/ yeast  -d/c vanc/cefepime; continue fluconazole    Hypothermia  Considering new onset will check BCx and start zosyn. Using bear hugger for warming.    Generalized weakness  See shortness of breath       Critical Care Daily Checklist:    A: Awake: RASS Goal/Actual Goal: RASS Goal: 0-->alert and calm  Actual: Manning Agitation Sedation Scale (RASS): Drowsy   B: Spontaneous Breathing Trial Performed? Spon. Breathing Trial Initiated?: Not initiated (12/29/22 0729)   C: SAT & SBT Coordinated?  na                      D: Delirium: CAM-ICU Overall CAM-ICU: Negative   E:  Early Mobility Performed? Yes   F: Feeding Goal: Goals: Meet % EEN, EPN by RD f/u date  Status: Nutrition Goal Status: goal met   Current Diet Order   Procedures    Diet full liquid      AS: Analgesia/Sedation Morphine, versed   T: Thromboembolic Prophylaxis scd   H: HOB > 300 Yes   U: Stress Ulcer Prophylaxis (if needed) na   G: Glucose Control yes   B: Bowel Function Stool Occurrence: 1   I: Indwelling Catheter (Lines & Rush) Necessity na   D: De-escalation of Antimicrobials/Pharmacotherapies fluconazole    Plan for the day/ETD comfort    Code Status:  Family/Goals of Care: DNR  comfort       Critical secondary to Patient has a condition that poses threat to life and bodily function: Severe Respiratory Distress      Critical care was time spent personally by me on the following activities: development of treatment plan with patient or surrogate and bedside caregivers, discussions with consultants, evaluation of patient's response to treatment, examination of patient, ordering and performing treatments and interventions, ordering and review of laboratory studies, ordering and review of radiographic studies, pulse oximetry, re-evaluation of patient's condition. This critical care time did not overlap with that of any other provider or involve time for any procedures.     Kady Cote MD  Critical Care Medicine  [facility] - Cardiac Medical ICU

## 2023-01-17 NOTE — SUBJECTIVE & OBJECTIVE
Interval History: Met Ms. Mcgee with her step daughter at bedside, appropriately tearful and expressed gratefulness of team's compassionate care. Ms. Mcgee attempted trial of HFNC, but within minutes motioned with her head and hands to be put back on BiPAP. Discussed morphine as needed for dyspnea relief, assuring her that dose is not high enough to cause sedation or respiratory arrest, only to help alleviate sensation of dyspnea. She nods her head when I asked if she would like to try a dose.    Medications:  Continuous Infusions:  Scheduled Meds:   albuterol-ipratropium  3 mL Nebulization Q6H WAKE    capsaicin   Topical (Top) BID    fluconazole (DIFLUCAN) IV (PEDS and ADULTS)  200 mg Intravenous Q24H     PRN Meds:sodium chloride, acetaminophen, dextrose 10%, dextrose 10%, glycerin adult, iohexol, midazolam, morphine, sodium chloride 0.9%    Objective:     Vital Signs (Most Recent):  Temp: 97.9 °F (36.6 °C) (01/17/23 1100)  Pulse: (!) 132 (01/17/23 1121)  Resp: (!) 31 (01/17/23 1121)  BP: 120/60 (01/17/23 1100)  SpO2: (!) 94 % (01/17/23 1121)   Vital Signs (24h Range):  Temp:  [97.6 °F (36.4 °C)-99 °F (37.2 °C)] 97.9 °F (36.6 °C)  Pulse:  [107-148] 132  Resp:  [22-47] 31  SpO2:  [90 %-100 %] 94 %  BP: ()/(50-63) 120/60     Weight: 59.4 kg (130 lb 15.3 oz)  Body mass index is 22.48 kg/m².    Physical Exam  Constitutional:       Appearance: She is ill-appearing.   HENT:      Head: Normocephalic and atraumatic.      Right Ear: External ear normal.      Left Ear: External ear normal.      Nose: Nose normal.      Mouth/Throat:      Mouth: Mucous membranes are dry.   Eyes:      Extraocular Movements: Extraocular movements intact.      Conjunctiva/sclera: Conjunctivae normal.   Cardiovascular:      Rate and Rhythm: Tachycardia present.   Pulmonary:      Effort: Respiratory distress present.      Breath sounds: Wheezing present.   Abdominal:      General: Bowel sounds are normal. There is no distension.       Palpations: Abdomen is soft.   Musculoskeletal:         General: Swelling present.   Lymphadenopathy:      Cervical: No cervical adenopathy.   Skin:     General: Skin is warm and dry.   Neurological:      Mental Status: She is alert and oriented to person, place, and time. Mental status is at baseline.   Psychiatric:         Mood and Affect: Mood normal.         Behavior: Behavior normal.       Review of Symptoms      Symptom Assessment (ESAS 0-10 Scale)  Pain:  0  Dyspnea:  0  Anxiety:  0  Nausea:  0  Depression:  0  Anorexia:  0  Fatigue:  0  Insomnia:  0  Restlessness:  0  Agitation:  0         Psychosocial/Cultural:   See Palliative Psychosocial Note: No  Social Issues Identified: Coping deficit pt/family and New Diagnosis/Trauma  Bereavement Risk: Yes: Identified: work/home, financial, intimacy, and caregiver concerns   Caregiver Needs Discussed. Caregiver Distress: Yes: Intensity of family caregiving  **Primary  to Follow**  Palliative Care  Consult: No    Spiritual:  F - [place] and Belief:  Rastafarian  I - Importance:  Very important  C - Community:  Involved  A - Address in Care:   following      Advance Care Planning   Advance Directives:   Living Will: No    [place]POST: No    Do Not Resuscitate Status: Yes    Medical Power of : No      Decision Making:  Patient answered questions  Goals of Care: The patient endorses that what is most important right now is to focus on comfort and QOL     Accordingly, we have decided that the best plan to meet the patient's goals includes continuing with palliative care         Significant Labs: CBC:   Recent Labs   Lab 01/16/23  0514   WBC 11.07   HGB 7.1*   HCT 23.6*   PLT 84*     CMP:   Recent Labs   Lab 01/16/23 0514      K 4.3   *   CO2 21*   *   BUN 58*   CREATININE 2.2*   CALCIUM 7.9*   PROT 6.4   ALBUMIN 1.8*   BILITOT 0.3   ALKPHOS 107   AST 57*   ALT 61*   ANIONGAP 8     CBC:   Recent Labs   Lab  01/16/23  0514   WBC 11.07   HGB 7.1*   HCT 23.6*   MCV 88   PLT 84*     BMP:  No results for input(s): GLU, NA, K, CL, CO2, BUN, CREATININE, CALCIUM, MG in the last 24 hours.  LFT:  Lab Results   Component Value Date    AST 57 (H) 01/16/2023    ALKPHOS 107 01/16/2023    BILITOT 0.3 01/16/2023     Albumin:   Albumin   Date Value Ref Range Status   01/16/2023 1.8 (L) 3.5 - 5.2 g/dL Final     Protein:   Total Protein   Date Value Ref Range Status   01/16/2023 6.4 6.0 - 8.4 g/dL Final     Lactic acid:   Lab Results   Component Value Date    LACTATE 0.6 12/29/2022    LACTATE 1.0 12/28/2022       Significant Imaging: I have reviewed all pertinent imaging results/findings within the past 24 hours.

## 2023-01-17 NOTE — SUBJECTIVE & OBJECTIVE
Interval History/Significant Events: NAEON. VSS. BiPAP prn    Review of Systems   Constitutional:  Positive for fatigue.   HENT: Negative.     Eyes: Negative.    Respiratory:  Positive for shortness of breath.    Gastrointestinal: Negative.    Endocrine: Negative.    Genitourinary: Negative.    Musculoskeletal: Negative.    Skin: Negative.    Neurological:  Positive for weakness.   Hematological: Negative.    Psychiatric/Behavioral:  The patient is nervous/anxious.    Objective:     Vital Signs (Most Recent):  Temp: 97.6 °F (36.4 °C) (01/17/23 0300)  Pulse: 107 (01/17/23 0732)  Resp: (!) 27 (01/17/23 0732)  BP: (!) 94/52 (01/17/23 0600)  SpO2: 100 % (01/17/23 0732)   Vital Signs (24h Range):  Temp:  [97.6 °F (36.4 °C)-99 °F (37.2 °C)] 97.6 °F (36.4 °C)  Pulse:  [107-128] 107  Resp:  [22-47] 27  SpO2:  [90 %-100 %] 100 %  BP: ()/(52-87) 94/52   Weight: 59.4 kg (130 lb 15.3 oz)  Body mass index is 22.48 kg/m².      Intake/Output Summary (Last 24 hours) at 1/17/2023 0740  Last data filed at 1/17/2023 0500  Gross per 24 hour   Intake 3736.95 ml   Output 650 ml   Net 3086.95 ml       Physical Exam  Vitals and nursing note reviewed.   Constitutional:       General: She is in acute distress.      Appearance: She is ill-appearing. She is not toxic-appearing or diaphoretic.   HENT:      Head: Normocephalic and atraumatic.      Right Ear: External ear normal.      Left Ear: External ear normal.   Eyes:      General:         Right eye: Discharge present.      Extraocular Movements: Extraocular movements intact.      Pupils: Pupils are equal, round, and reactive to light.   Cardiovascular:      Rate and Rhythm: Regular rhythm. Tachycardia present.      Pulses: Normal pulses.      Heart sounds: No murmur heard.    No friction rub.   Pulmonary:      Effort: Accessory muscle usage and respiratory distress present.      Breath sounds: Decreased air movement present. No stridor. Wheezing present. No rales.      Comments: On  bipap  Abdominal:      General: Abdomen is flat. There is no distension.      Palpations: Abdomen is soft.      Tenderness: There is no abdominal tenderness. There is no guarding.   Musculoskeletal:         General: No swelling.      Cervical back: Normal range of motion.      Right lower leg: No edema.      Left lower leg: No edema.   Skin:     General: Skin is warm and dry.      Findings: No bruising or rash.   Neurological:      General: No focal deficit present.      Mental Status: She is alert and oriented to person, place, and time.   Psychiatric:         Mood and Affect: Mood normal.         Behavior: Behavior normal.         Thought Content: Thought content normal.         Judgment: Judgment normal.       Vents:  Vent Mode: A/C (12/30/22 1528)  Ventilator Initiated: Yes (12/29/22 0144)  Set Rate: 12 BPM (12/30/22 1528)  Vt Set: 350 mL (12/30/22 0722)  Pressure Support: 14 cmH20 (12/30/22 1139)  PEEP/CPAP: 6 cmH20 (12/30/22 1528)  Oxygen Concentration (%): 40 (01/17/23 0732)  Peak Airway Pressure: 22 cmH20 (12/30/22 1528)  Plateau Pressure: 20 cmH20 (12/30/22 1528)  Total Ve: 9.73 L/m (12/30/22 1528)  Negative Inspiratory Force (cm H2O): 31 (01/12/23 0710)  F/VT Ratio<105 (RSBI): (!) 58.05 (12/30/22 1528)  Lines/Drains/Airways       Central Venous Catheter Line  Duration             Percutaneous Central Line Insertion/Assessment - Triple Lumen  12/29/22 0043 right internal jugular 19 days              Drain  Duration             Female External Urinary Catheter 01/13/23 0800 3 days                  Significant Labs:    CBC/Anemia Profile:  Recent Labs   Lab 01/16/23 0514   WBC 11.07   HGB 7.1*   HCT 23.6*   PLT 84*   MCV 88   RDW 17.8*        Chemistries:  Recent Labs   Lab 01/16/23 0514      K 4.3   *   CO2 21*   BUN 58*   CREATININE 2.2*   CALCIUM 7.9*   ALBUMIN 1.8*   PROT 6.4   BILITOT 0.3   ALKPHOS 107   ALT 61*   AST 57*         Significant Imaging:  I have reviewed all pertinent imaging  results/findings within the past 24 hours.

## 2023-01-17 NOTE — CHAPLAIN
"Palliative Care     Patient: Dominique Mcgee       MRN: [id number]  : 1950  Age: 72 y.o.  Hospital Length of Stay: 25 days  Code Status: DNR   Attending Provider: Meredith Nina MD  Principal Problem: Acute hypoxemic respiratory failure    Present during Interview:  Visited pt again this week as promised from last week; step-daughter was bedside.    Non-clinical observations:  Pt was awake, on bi-pap, but much less engaging that last week; she seemed significantly weaker, didn't attempted to talk, motion or write. Pt "spoke" with her eyes only.    Feelings (Emotions/Fears/Experiences/Coping):      Unable to access completely; pt did smile when I told her I'd promise to visit today and did and she smiled when I sang a little song to her; other than that, no words were spoken.    Family/Friends (Support, Community, Culture):     Step-daughter bedside, first time I met her and I think she said this is the first time she's been here. However, she seemed to know the status of the pt's condition and the pt's wishes (DNI/DNR).    Future (Spiritual Care Plan of Action):  Palliative  will continue to follow closely. Lord, in your mercy.    In Peace,  Rev. Nehal Lopez MBA, MDiv   [phone]  "

## 2023-01-17 NOTE — CARE UPDATE
Called pt's daughter Jennifer Mcgee to provide clinical update. Informed that the pt is uncomfortable with the BiPAP mask. She states that pt's sister is coming from [place] on Friday. She agreed that her mom is not going to request the dilaudid PRN and so she is amenable to a dilaudid drip. Discussed with palliative care. We will start her on dilaudid gtt titration to a RASS goal of 0, or RR > 25. We'll keep her on BiPAP mask for now while waiting for her sister as she is likely to decompensate prior to Friday without it. She was given opportunity for ask questions or raise any concerns. All questions were answered. Pt agreed with the plan.       Shivani Nash D.O  Internal Medicine PGY-3  [facility]

## 2023-01-17 NOTE — PROGRESS NOTES
[facility]  Palliative Medicine  Progress Note    Patient Name: Dominique Mcgee  MRN: [id number]  Admission Date: 12/23/2022  Hospital Length of Stay: 25 days  Code Status: DNR   Attending Provider: Meredith Nina MD  Consulting Provider: Libra Zhnog MD  Primary Care Physician: Briana Leblanc MD  Principal Problem:Acute hypoxemic respiratory failure    Patient information was obtained from patient, relative(s) and primary team.      Assessment/Plan:     Palliative care encounter  72 year old female with onset of acute hypoxemic respiratory failure and profound weakness over the past few weeks, thought to be contributed by cervical stenosis. Patient is dependent on Bipap and has clearly, consistently expressed wishes against tracheostomy or chronic dependence on artificial respiratory support. Transitioning to comfort focused strategy as all attempts for reversing respiratory failure have been attempted and unsuccessful. Palliative consulted for assistance with [facility]    Code status: DNAR/DNI    Surrogate decision maker: 2 daughters are legal NOK  - Evelyn Borges [phone] and Jennifer Mcgee [phone]    Dyspnea, Terminal  - Continue education regarding opioid use for dyspnea - dosed to not cause palliative sedation nor pain, but only to help alleviate dyspnea sensation without causing respiratory arrest  - Consider changing morphine 2 mg IV to hydromorphone 0.2 mg IV q1h PRN severe pain, labored breathing due to worsening renal function (creatinine 0.5 -> 2.2)  - With time, would anticipate that encephalopathy will prolong and no longer respond to BiPAP regardless of opioid use, which at this point would be an appropriate time to transition to continuous hydromorphone drip  - Consider low-dose midazolam 1 mg IV q1h PRN dyspnea refractory to opioids  - If able to transition to nasal cannula, please provide fan at bedside  - Maintain volume status to euvolemia or hypovolemia as to avoid  "fluid-overloaded state that will worsen dyspnea    GOC/ACP  1/13: Met Ms. Mcgee and her sister Marielena initially in the morning, clarified code status and updated to DNAR/DNI, knowing that she would not want to be chronically dependent on a ventilator. Revisited in the afternoon with Reverend TRESSA Lopez and after prayer, discussed recommendations to optimize her dyspnea. I shared that I spoke with Dr. Chen and despite his greatest efforts to add aggressive medical therapies to clear infection and to optimize her health/body, she remains tachypneic and breathing is very labored. I shared that knowing that everything is being done to optimize her respiratory status, we can add low-dose opioids/morphine to help alleviate her dyspnea, and once her breathing is significantly more comfortable, we can remove the BiPAP machine and place her on oxygen via nasal cannula so that she can enjoy time with her family and talk/sing without the BiPAP machine. She nods repeatedly during this recommendation and agrees that this is what she would like to do. I recommended that we continue using medication to alleviate her shortness of breath, and by continuing to optimize it, we do not need to go back on BiPAP. She agreed with this as well. I asked if I could share this conversation with her daughter and she asked that I speak with Evelyn. I called Evelyn and reiterated our conversation. She is supportive of her mother's decision. She will be visiting on Tuesday and is open to meeting with me when I return from the holiday weekend.    1/12: Met Ms. Mcgee, introduced myself as colleague of Dr. Ag and visited her with palliative  Nehal. She requested for prayer, asking for strength and healing. After prayer, she is moved to sing and sings to us a song of gratefulness. She writes, "I was praising God with my whole heart. One morning I [was] just praising, praising, praising." I told her that I had met with Dr. Chen and " "learned of the conversation they had yesterday. Validated how difficult it was to have this serious conversation. She explains to me that Dr. Chen found out why she was so sick and it was because of the cervical stenosis. She says that if she were going to get surgery, she would need a tracheostomy, but this would likely be long term and there is no guarantee that she would get better. She writes that "I don't want it...TRACH. I told my daughter Evelyn. I weighed the pros and cons." I agreed with her, saying that if this intervention were going to ensure that she would get better, then there would be no question about proceeding with it. I said that unfortunately it will not make her better and getting a trach would only prolong her current state. I shared that we should be like her, thoughtful and recognize that just because we can do things to the human body, doesn't mean that it's right to do so and that while we can't promise healing through our own hands, God provides healing in heaven. I gently discussed hospice with Ms. Mcgee and discussed the pros and cons of hospice. I encouraged her to think about it. She nods tearfully first, then cries. After several minutes of tears, I asked her if it was OK for me to revisit again and she said yes. Will continue to follow along to provide support and explore goals/next steps.    1/11/ Followed up with patient. Sister at bedside. Patient feeling more and more optimistic now that she is able to tolerate comfort flow for periods of time. Discussed how comfort flow is allowing her to communicate more easily and allowing her to take in some PO. Also discussed that she still has a long way to go and we still are not sure what the underlying problem is that is driving her profound weakness. Patient understands that her breathing could get worse at anytime. She maintains that she would want a time trial on the vent. Would not want to be dependent on a vent in a NH " indefinitely. Provided support. Goals are life prolonging. Would not want to be dependent on vent indefinitely      1/7 Followed up with patient regarding code status. Patient feeling a bit better and more hopeful. She voiced that she does not think it will come to it but would want a time trial on the vent if necessary. Would not want to be dependent on a vent indefinitely. Wants chest compressions and shocks in the event of a cardiac arrest. Provided support    1/6   Met with patient in am and then patient, Dr. Dotson, patient's two daughters (one in person and one non speaker), and patient's friend. Discussed where things stand now and potential outcomes. Discussed needing to have a plan for if patient decompensates. Patient has voiced several times that she would not want to be re-intubated, trached, or back on vent. Extensively discussed what this would look like to patient and family- it would buy more time but does not fix the underlying problem. Discussed what quality of life would look like if trached and vent dependent. Provided support      1/5  -Met with patient at bedside. Later spoke with local daughter on the phone. Introduced palliative medicine and our role in patient's care. Discussed complexity of her medical condition and that the etiology remains unclear. Despite best supportive care she is not improving and we worry about whether we are going to be able to fix the underlying issue. Explained that if she is not improving, we are going to need to make some difficult decisions. Explained that she cannot remain on the bipap mask indefinitely. The team is doing everything they can to try to get her off but if she remains dependent on significant respiratory support the next step to prolong life would likely be trach/vent. Patient shook her head no. She remembers everything about her recent intubation and would not want to be dependent on a vent long term. This would not be an acceptable quality  "of life. Provided support           I will follow-up with patient. Please contact us if you have any additional questions.    Subjective:     Chief Complaint:   Chief Complaint   Patient presents with    Shortness of Breath     X 2 weeks. Reports cough. Denies cardiac hx.       HPI:   Per critical care H&P   "71 y/o F patient with HTN, HLD, hypothyroidism, recent URI 3 weeks prior to admission, balance difficulites who presented to the ED on 12/23 for 3 weeks of weakness, LIRA, decreased appetite, and constipation with intermittent lower abdominal cramping. Per daughter, patient is typically independent, lives alone, cooks her own meals, since she has been ill she has had decreased PO intake and drinking coffee, tea, and eating cereal. She has had progressive worsening HTN in the last 3 weeks and was started on losartan-HCTZ and metoprolol. Prior to this she was only taking lisinopril per daughter. Admitted for HTN emergency and was found to have Na 120. Received IV fluids and admitted to hospital medicine. For her hypernatremia, her thiazide was held and she was placed on fluid restriction. Urine studies from 12/25 showed Uosm of 802 and HANH of 109. Her BP has been labile since admission requiring both IV antihypertensives as well as fluid boluses. The patient's hyponatremia persisted despite fluid restriction and she became increasingly lethargic and weak so CCM was consulted for further management.      Upon evaluation, patient is somnolent. Opens eyes to voice, unable to engage in full conversation. Speaks mainly single words. Denies thirst. Reports SOB and generalized abdominal pain a/w constipation. No CP, N/V/D, dysuria, edema. Most recent vitals: /60, pulse 82, R 18, SpO2 94% on RA. Na trend 119->115->117. "    Patient is being treated for acute hypoxemic respiratory failure and profound weakness of unclear origin and sepsis. She is dependent on continuous Bipap s/p extubation. She has been started on " TPN. Palliative consulted to assist with [facility]          Hospital Course:  No notes on file    Interval History: Met Ms. Mcgee with her step daughter at bedside, appropriately tearful and expressed gratefulness of team's compassionate care. Ms. Mcgee attempted trial of HFNC, but within minutes motioned with her head and hands to be put back on BiPAP. Discussed morphine as needed for dyspnea relief, assuring her that dose is not high enough to cause sedation or respiratory arrest, only to help alleviate sensation of dyspnea. She nods her head when I asked if she would like to try a dose.    Medications:  Continuous Infusions:  Scheduled Meds:   albuterol-ipratropium  3 mL Nebulization Q6H WAKE    capsaicin   Topical (Top) BID    fluconazole (DIFLUCAN) IV (PEDS and ADULTS)  200 mg Intravenous Q24H     PRN Meds:sodium chloride, acetaminophen, dextrose 10%, dextrose 10%, glycerin adult, iohexol, midazolam, morphine, sodium chloride 0.9%    Objective:     Vital Signs (Most Recent):  Temp: 97.9 °F (36.6 °C) (01/17/23 1100)  Pulse: (!) 132 (01/17/23 1121)  Resp: (!) 31 (01/17/23 1121)  BP: 120/60 (01/17/23 1100)  SpO2: (!) 94 % (01/17/23 1121)   Vital Signs (24h Range):  Temp:  [97.6 °F (36.4 °C)-99 °F (37.2 °C)] 97.9 °F (36.6 °C)  Pulse:  [107-148] 132  Resp:  [22-47] 31  SpO2:  [90 %-100 %] 94 %  BP: ()/(50-63) 120/60     Weight: 59.4 kg (130 lb 15.3 oz)  Body mass index is 22.48 kg/m².    Physical Exam  Constitutional:       Appearance: She is ill-appearing.   HENT:      Head: Normocephalic and atraumatic.      Right Ear: External ear normal.      Left Ear: External ear normal.      Nose: Nose normal.      Mouth/Throat:      Mouth: Mucous membranes are dry.   Eyes:      Extraocular Movements: Extraocular movements intact.      Conjunctiva/sclera: Conjunctivae normal.   Cardiovascular:      Rate and Rhythm: Tachycardia present.   Pulmonary:      Effort: Respiratory distress present.      Breath sounds: Wheezing  present.   Abdominal:      General: Bowel sounds are normal. There is no distension.      Palpations: Abdomen is soft.   Musculoskeletal:         General: Swelling present.   Lymphadenopathy:      Cervical: No cervical adenopathy.   Skin:     General: Skin is warm and dry.   Neurological:      Mental Status: She is alert and oriented to person, place, and time. Mental status is at baseline.   Psychiatric:         Mood and Affect: Mood normal.         Behavior: Behavior normal.       Review of Symptoms      Symptom Assessment (ESAS 0-10 Scale)  Pain:  0  Dyspnea:  0  Anxiety:  0  Nausea:  0  Depression:  0  Anorexia:  0  Fatigue:  0  Insomnia:  0  Restlessness:  0  Agitation:  0         Psychosocial/Cultural:   See Palliative Psychosocial Note: No  Social Issues Identified: Coping deficit pt/family and New Diagnosis/Trauma  Bereavement Risk: Yes: Identified: work/home, financial, intimacy, and caregiver concerns   Caregiver Needs Discussed. Caregiver Distress: Yes: Intensity of family caregiving  **Primary  to Follow**  Palliative Care  Consult: No    Spiritual:  F - [place] and Belief:  Moravian  I - Importance:  Very important  C - Community:  Involved  A - Address in Care:   following      Advance Care Planning   Advance Directives:   Living Will: No    [place]POST: No    Do Not Resuscitate Status: Yes    Medical Power of : No      Decision Making:  Patient answered questions  Goals of Care: The patient endorses that what is most important right now is to focus on comfort and QOL     Accordingly, we have decided that the best plan to meet the patient's goals includes continuing with palliative care         Significant Labs: CBC:   Recent Labs   Lab 01/16/23 0514   WBC 11.07   HGB 7.1*   HCT 23.6*   PLT 84*     CMP:   Recent Labs   Lab 01/16/23 0514      K 4.3   *   CO2 21*   *   BUN 58*   CREATININE 2.2*   CALCIUM 7.9*   PROT 6.4   ALBUMIN 1.8*   BILITOT 0.3    ALKPHOS 107   AST 57*   ALT 61*   ANIONGAP 8     CBC:   Recent Labs   Lab 01/16/23  0514   WBC 11.07   HGB 7.1*   HCT 23.6*   MCV 88   PLT 84*     BMP:  No results for input(s): GLU, NA, K, CL, CO2, BUN, CREATININE, CALCIUM, MG in the last 24 hours.  LFT:  Lab Results   Component Value Date    AST 57 (H) 01/16/2023    ALKPHOS 107 01/16/2023    BILITOT 0.3 01/16/2023     Albumin:   Albumin   Date Value Ref Range Status   01/16/2023 1.8 (L) 3.5 - 5.2 g/dL Final     Protein:   Total Protein   Date Value Ref Range Status   01/16/2023 6.4 6.0 - 8.4 g/dL Final     Lactic acid:   Lab Results   Component Value Date    LACTATE 0.6 12/29/2022    LACTATE 1.0 12/28/2022       Significant Imaging: I have reviewed all pertinent imaging results/findings within the past 24 hours.    I spent a total of 50 minutes on the day of the visit.This includes face to face time in discussion of goals of care, symptom assessment, coordination of care and emotional support.  This also includes non-face to face time preparing to see the patient (eg, review of tests/imaging), obtaining and/or reviewing separately obtained history, documenting clinical information in the electronic or other health record, independently interpreting results and communicating results to the patient/family/caregiver, or care coordinator.      Libra Zhong MD  Palliative Medicine  [facility] Cardiac Medical [facility]

## 2023-01-17 NOTE — ASSESSMENT & PLAN NOTE
72 year old female with onset of acute hypoxemic respiratory failure and profound weakness over the past few weeks, thought to be contributed by cervical stenosis. Patient is dependent on Bipap and has clearly, consistently expressed wishes against tracheostomy or chronic dependence on artificial respiratory support. Transitioning to comfort focused strategy as all attempts for reversing respiratory failure have been attempted and unsuccessful. Palliative consulted for assistance with GOC    Code status: DNAR/DNI    Surrogate decision maker: 2 daughters are legal NOK  - Evelyn Borges [phone] and Jennifer Mcgee [phone]    Dyspnea, Terminal  - Continue education regarding opioid use for dyspnea - dosed to not cause palliative sedation nor pain, but only to help alleviate dyspnea sensation without causing respiratory arrest  - Consider changing morphine 2 mg IV to hydromorphone 0.2 mg IV q1h PRN severe pain, labored breathing due to worsening renal function (creatinine 0.5 -> 2.2)  - With time, would anticipate that encephalopathy will prolong and no longer respond to BiPAP regardless of opioid use, which at this point would be an appropriate time to transition to continuous hydromorphone drip  - Consider low-dose midazolam 1 mg IV q1h PRN dyspnea refractory to opioids  - If able to transition to nasal cannula, please provide fan at bedside  - Maintain volume status to euvolemia or hypovolemia as to avoid fluid-overloaded state that will worsen dyspnea    GOC/ACP  1/13: Met Ms. Mcgee and her sister Marielena initially in the morning, clarified code status and updated to DNAR/DNI, knowing that she would not want to be chronically dependent on a ventilator. Revisited in the afternoon with Reverend TRESSA Lopez and after prayer, discussed recommendations to optimize her dyspnea. I shared that I spoke with Dr. Chen and despite his greatest efforts to add aggressive medical therapies to clear infection and to optimize  "her health/body, she remains tachypneic and breathing is very labored. I shared that knowing that everything is being done to optimize her respiratory status, we can add low-dose opioids/morphine to help alleviate her dyspnea, and once her breathing is significantly more comfortable, we can remove the BiPAP machine and place her on oxygen via nasal cannula so that she can enjoy time with her family and talk/sing without the BiPAP machine. She nods repeatedly during this recommendation and agrees that this is what she would like to do. I recommended that we continue using medication to alleviate her shortness of breath, and by continuing to optimize it, we do not need to go back on BiPAP. She agreed with this as well. I asked if I could share this conversation with her daughter and she asked that I speak with Evelyn. I called Evelyn and reiterated our conversation. She is supportive of her mother's decision. She will be visiting on Tuesday and is open to meeting with me when I return from the holiday weekend.    1/12: Met Ms. Peckmateo, introduced myself as colleague of Dr. Ag and visited her with palliative  Nehal. She requested for prayer, asking for strength and healing. After prayer, she is moved to sing and sings to us a song of gratefulness. She writes, "I was praising God with my whole heart. One morning I [was] just praising, praising, praising." I told her that I had met with Dr. Chen and learned of the conversation they had yesterday. Validated how difficult it was to have this serious conversation. She explains to me that Dr. Chen found out why she was so sick and it was because of the cervical stenosis. She says that if she were going to get surgery, she would need a tracheostomy, but this would likely be long term and there is no guarantee that she would get better. She writes that "I don't want it...TRACH. I told my daughter Evelyn. I weighed the pros and cons." I agreed with her, " saying that if this intervention were going to ensure that she would get better, then there would be no question about proceeding with it. I said that unfortunately it will not make her better and getting a trach would only prolong her current state. I shared that we should be like her, thoughtful and recognize that just because we can do things to the human body, doesn't mean that it's right to do so and that while we can't promise healing through our own hands, God provides healing in heaven. I gently discussed hospice with Ms. Mcgee and discussed the pros and cons of hospice. I encouraged her to think about it. She nods tearfully first, then cries. After several minutes of tears, I asked her if it was OK for me to revisit again and she said yes. Will continue to follow along to provide support and explore goals/next steps.    1/11/ Followed up with patient. Sister at bedside. Patient feeling more and more optimistic now that she is able to tolerate comfort flow for periods of time. Discussed how comfort flow is allowing her to communicate more easily and allowing her to take in some PO. Also discussed that she still has a long way to go and we still are not sure what the underlying problem is that is driving her profound weakness. Patient understands that her breathing could get worse at anytime. She maintains that she would want a time trial on the vent. Would not want to be dependent on a vent in a NH indefinitely. Provided support. Goals are life prolonging. Would not want to be dependent on vent indefinitely      1/7 Followed up with patient regarding code status. Patient feeling a bit better and more hopeful. She voiced that she does not think it will come to it but would want a time trial on the vent if necessary. Would not want to be dependent on a vent indefinitely. Wants chest compressions and shocks in the event of a cardiac arrest. Provided support    1/6   Met with patient in am and then patient,  Dr. Dotson, patient's two daughters (one in person and one non speaker), and patient's friend. Discussed where things stand now and potential outcomes. Discussed needing to have a plan for if patient decompensates. Patient has voiced several times that she would not want to be re-intubated, trached, or back on vent. Extensively discussed what this would look like to patient and family- it would buy more time but does not fix the underlying problem. Discussed what quality of life would look like if trached and vent dependent. Provided support      1/5  -Met with patient at bedside. Later spoke with local daughter on the phone. Introduced palliative medicine and our role in patient's care. Discussed complexity of her medical condition and that the etiology remains unclear. Despite best supportive care she is not improving and we worry about whether we are going to be able to fix the underlying issue. Explained that if she is not improving, we are going to need to make some difficult decisions. Explained that she cannot remain on the bipap mask indefinitely. The team is doing everything they can to try to get her off but if she remains dependent on significant respiratory support the next step to prolong life would likely be trach/vent. Patient shook her head no. She remembers everything about her recent intubation and would not want to be dependent on a vent long term. This would not be an acceptable quality of life. Provided support

## 2023-01-18 NOTE — NURSING
Dtrs: Evelyn and Maryuri arrived and are visiting with other family members.  Evelyn stated her  is on her way from [place], [place].  Reviewed time frame before post mortem care needed to be done (9am per Director, CYNTHIA Kaiser).  Family voiced understanding but stated they were told they had 4 hours to have visitors visit. (9:30am vs 10:30am)

## 2023-01-18 NOTE — CHAPLAIN
Palliative Care     Patient: Dominique Mcgee       MRN: [id number]  : 1950  Age: 72 y.o.  Hospital Length of Stay: 26 days  Code Status: DNR   Attending Provider: Meredith Nina MD  Principal Problem: Acute hypoxemic respiratory failure    Present during Interview:  Received call that pt  early this morning and family requesting palliative . Two daughters and 5 other people beside. Spent 45 minutes with family.    Non-clinical observations/Facts:  Per family and Epic notes pt  fairly quickly and peacefully.    Feelings/Chinyere  (Emotions/Fears/Experiences/Coping):      Family understandably saddened especially the youngest daughter/POA/local. Provided compassionate presence and listening ear to their laments; commended daughter for her care and honoring her mother's wishes albeit difficult. Said prayer commending the pt's spirit; all Christians; family member said prayer as well; very appreciative of the presence. Remained silent bedside as family shared fond memories of pt.     Facts  Family has chosen [facility] in [place], let the bedside nurse know. Explained next steps and process with the family and gave grief packet (to the daughter who lives in [place], the youngest dgtr still too distraught). Had nurse order bereavement bowl and I fetched water and tissues.    Future (Spiritual Care Plan of Action):  Spiritual Care chaplains to complete decedent care ppwk; Pt and family etched on my heart. Lord, in your mercy.    In Peace,  Rev. Nehal Lopez MBA, MDiv   [phone]

## 2023-01-18 NOTE — DISCHARGE SUMMARY
[facility] - [facility] Medical [facility]  Critical Care Medicine  Discharge Summary      Patient Name: Dominique Mcgee  MRN: [id number]  Admission Date: 12/23/2022  Hospital Length of Stay: 26 days  Discharge Date and Time:  01/18/2023 6:45 AM  Attending Physician: Meredith Nina MD   Discharging Provider: Danae Garcia MD  Primary Care Provider: Briana Leblanc MD  Reason for Admission: Acute hypoxemic respiratory failure.     HPI:   Ms. Mcgee is a 73 y/o F patient with HTN, HLD, hypothyroidism, recent URI 3 weeks prior to admission, balance difficulites who presented to the ED on 12/23 for 3 weeks of weakness, LIRA, decreased appetite, and constipation with intermittent lower abdominal cramping. Per daughter, patient is typically independent, lives alone, cooks her own meals, since she has been ill she has had decreased PO intake and drinking coffee, tea, and eating cereal. She has had progressive worsening HTN in the last 3 weeks and was started on losartan-HCTZ and metoprolol. Prior to this she was only taking lisinopril per daughter. Admitted for HTN emergency and was found to have Na 120. Received IV fluids and admitted to hospital medicine. For her hypernatremia, her thiazide was held and she was placed on fluid restriction. Urine studies from 12/25 showed Uosm of 802 and HANH of 109. Her BP has been labile since admission requiring both IV antihypertensives as well as fluid boluses. The patient's hyponatremia persisted despite fluid restriction and she became increasingly lethargic and weak so CCM was consulted for further management.      Upon evaluation, patient is somnolent. Opens eyes to voice, unable to engage in full conversation. Speaks mainly single words. Denies thirst. Reports SOB and generalized abdominal pain a/w constipation. No CP, N/V/D, dysuria, edema. Most recent vitals: /60, pulse 82, R 18, SpO2 94% on RA. Na trend 119->115->117.       * No surgery found *    Indwelling Lines/Drains at Time of  Discharge:   Lines/Drains/Airways     Central Venous Catheter Line  Duration           Percutaneous Central Line Insertion/Assessment - Triple Lumen  12/29/22 0043 right internal jugular 20 days          Drain  Duration           Female External Urinary Catheter 01/13/23 0800 4 days              Hospital Course:   Once transferred to ICU patient became hypotensive requiring multiple pressors. Patient intubated for airway protection. Art line and central line placed. Sodium improved with hypertonic saline. CT abdomen/pelvis showing proctitis and hip abscess. Went for IR aspiration of abscess. Culture showing e.coli, on zosyn. MRI brain non-concerning. MRI spine with severe cervical stenosis and cord compression, also with mild lumbar stenosis. NSGY consulted with plans for outpatient follow-up. Repeat CXR with increased LLL consolidation and pleural effusion. Extubated to bipap. Once extubated and off pressors patient began to re-experience labile blood pressures similar to her initial presentation. Started on cardene drip. Difficulty weaning off BIPAP due to inadequate volumes and persistent shortness of breath. Extensive workup done regarding difficulties coming off BIPAP. Neurology consulted. ID was consulted for hip abscess- continued on zosyn. Repeat imaging concerning for pneumonia; started on linezolid. Ortho consulted for hip abscess that re-occurred on repeat imaging. Requested IR re-drain abscess and re-culture. Started on IVIG. Ongoing discussions with neurology and neurosurgery regarding cervical stenosis/cord injury as possible etiology of respiratory muscle weakness. Neurosurgery considering intervention but would prefer if patient received tracheostomy prior to laminectomy. Urine metanephrines came back elevated on two collections. Endocrinology consulted. Repeating urine metanephrines as first sets were collected during use of vasopressors. Also uptitrating blood pressure medications and using PO meds  when able (off and on BiPAP). Discussed next steps w/ patient and daughter as patient has persistent respiratory muscle weakness resulting in dependence on BIPAP that has not improved. Began having fevers overnight of 1/11. Started on vanc, cefepime, and fluconazole. Palliative care team discussed with patient who expressed her wishes not to have a tracheostomy and decided to have code status of DNR/DNI. Bcx grew yeast; vanc/cefepime discontinued. Family discussion 1/16 to express patient's wishes and course of action moving forward. On 1/17, pt was noted to increasingly uncomfortable with the BiPAP mask. After discussing with palliative care, was started on dilauded drip. Pt became increasingly hypoxemic early morning on 1/18 with spO2 50. Had a seizure like activity received versed. Also became bradycardic to the 50. The decision was made to remove bipap given pt was deemed to be in dying process at that time and she is DNR/DNI. Family was notified and they were amenable. Pt passed away on 1/18 at 6:31 am.       Consults (From admission, onward)        Status Ordering Provider     Inpatient consult to Endocrinology  Once        Provider:  (Not yet assigned)    Completed ADRIANA TSANG     Inpatient consult to Interventional Radiology  Once        Provider:  (Not yet assigned)    Completed JORDON KOTHARI     Inpatient consult to Palliative Care  Once        Provider:  (Not yet assigned)    Completed ADRIANA TSANG     Inpatient consult to Orthopedic Surgery  Once        Provider:  (Not yet assigned)    Completed ADRIANA TSANG     Inpatient consult to Infectious Diseases  Once        Provider:  (Not yet assigned)    Completed ADRIANA TSANG     Inpatient consult to Neurology  Once        Provider:  (Not yet assigned)    Completed ADRIANA TSANG     Inpatient consult to Neurosurgery  Once        Provider:  (Not yet assigned)    Completed USHA CANSECO     Inpatient consult to  Interventional Radiology  Once        Provider:  (Not yet assigned)    Completed OTTO WOODWARD     Inpatient consult to Critical Care Medicine  Once        Provider:  (Not yet assigned)    Completed ALAINA YOO     Inpatient consult to Nephrology  Once        Provider:  (Not yet assigned)    Completed WALTER WEEKS     Inpatient consult to Critical Care Medicine  Once        Provider:  (Not yet assigned)    Completed WALTER WEEKS        Significant Labs:  All pertinent labs within the past 24 hours have been reviewed.    Significant Imaging:  I have reviewed all pertinent imaging results/findings within the past 24 hours.    Pending Diagnostic Studies:     None        Final Active Diagnoses:    Diagnosis Date Noted POA    PRINCIPAL PROBLEM:  Acute hypoxemic respiratory failure [J96.01] 12/29/2022 Yes    Fever [R50.9] 01/12/2023 No    Cervical myelopathy [G95.9] 01/11/2023 Yes    Hypertension [I10] 01/11/2023 Yes    Hypothyroidism (acquired) [E03.9] 01/09/2023 Yes    Respiratory distress [R06.03] 01/06/2023 No    Dysphagia [R13.10] 01/06/2023 Yes    Palliative care encounter [Z51.5] 01/05/2023 Not Applicable    Shortness of breath [R06.02] 01/01/2023 Yes    Stenosis, cervical spine [M48.02] 12/31/2022 Yes    Moderate malnutrition [E44.0] 12/30/2022 Yes    Right renal artery stenosis [I70.1] 12/30/2022 Yes    Abscess of bursa of right hip [M71.051] 12/29/2022 Yes    Hyponatremia [E87.1] 12/23/2022 Yes    Essential hypertension [I10] 12/23/2022 Yes    Generalized weakness [R53.1] 12/23/2022 Yes    Hyperlipemia [E78.5] 12/23/2022 Yes      Problems Resolved During this Admission:    Diagnosis Date Noted Date Resolved POA    Hypophosphatemia [E83.39] 12/30/2022 01/02/2023 No    Proctitis [K62.89] 12/29/2022 01/05/2023 Yes    Severe sepsis [A41.9, R65.20] 12/29/2022 01/02/2023 No    Shock [R57.9] 12/29/2022 12/30/2022 No    Generalized abdominal pain [R10.84] 12/28/2022 01/06/2023 Yes     Constipation [K59.00] 2022 Yes    Leukocytosis [D72.829] 2022 No    Hypertensive urgency [I16.0] 2022 Yes     No new Assessment & Plan notes have been filed under this hospital service since the last note was generated.  Service: Critical Care Medicine    Discharged Condition:     Disposition:      Follow-up Information     Briana Leblanc MD.    Specialty: Internal Medicine  Contact information:  [address] [place] [id number]  [phone]                       Patient Instructions:      Ambulatory referral/consult to [facility]   Standing Status: Future   Referral Priority: Routine Referral Type: Consultation   Referral Reason: Specialty Services Required   Number of Visits Requested: 1     Medications:  None     Danae Garcia MD  Critical Care Medicine  [facility] - Cardiac Medical ICU

## 2023-01-18 NOTE — NURSING
Rec'd report from pm RN, Hue.  Pt was pronounced 0631 by Dr Garcia.  Awaiting family to arrive.  PM NP told family they had 4 hours to visit before post mortem care needed to be done.  1 family member already has arrived and is spending time with the , Miss Fox.

## 2023-01-18 NOTE — NURSING
Family in room visiting.  All have arrived except pt's significant other who is still on his way.  Pt's daughters have all of patient's belongings together and stated they have it all including pt's dentures, etc.

## 2023-01-18 NOTE — SIGNIFICANT EVENT
Death Note     Called to bedside by patient's nurse. Nursing supervisor notified. Family has been notified.  has been called.     Patient is not responding to verbal or tactile stimuli.No heart or breath sounds on auscultation. No respirations. No palpable pulses.      Time of death: 6:31 am on 1/18/23  Cause of Death: Acute hypoxemic respiratory failure due to cervical myelopathy.        -------------------------------------------------------------------------------------------------------------------  Electronically signed by:     MD [email or web address] [email or web address]  [phone]  Department of Internal Medicine  [facility]

## 2023-01-18 NOTE — NURSING
Post mortem care was completed per policy after family left. [facility] picked up  with morgue as the final destination.  ([facility] and [facility]  were called per policy as charted.  Not a 's case and not a donor.)

## 2023-01-18 NOTE — PLAN OF CARE
[facility] - Card[facility] Medical [facility]  Discharge Final Note    Primary Care Provider: Briana Leblanc MD    Expected Discharge Date: 2023    Time of Death: 6:31am  Cause of Death: Acute Hypoxemic Respiratory Failure due to cervical myelopathy    Final Discharge Note (most recent)       Final Note - 23 1120          Final Note    Assessment Type Final Discharge Note     Anticipated Discharge Disposition                      Important Message from Medicare  Important Message from Medicare regarding Discharge Appeal Rights: Given to patient/caregiver, Explained to patient/caregiver, Signed/date by patient/caregiver     Date IMM was signed: 23  Time IMM was signed: 1317    Contact Info       Briana Leblanc MD   Specialty: Internal Medicine   Relationship: PCP - General    [address] [facility] [address]   Phone: [phone]       Next Steps: Follow up          Misti Nicole LMSW  [facility]  X [phone]

## 2023-01-18 NOTE — PLAN OF CARE
[facility] - Card[facility] Medical [facility]  Discharge Reassessment    Primary Care Provider: Briana Leblanc MD    Expected Discharge Date: 1/20/2023    Reassessment (most recent)       Discharge Reassessment - 01/18/23 0914          Discharge Reassessment    Assessment Type Discharge Planning Reassessment     Did the patient's condition or plan change since previous assessment? No     Communicated TRICIA with patient/caregiver Date not available/Unable to determine     Discharge Plan A Inpatient Hospice     Discharge Plan B Hospice/home     DME Needed Upon Discharge  other (see comments)   TBD    Discharge Barriers Identified None     Why the patient remains in the hospital Requires continued medical care        Post-Acute Status    Post-Acute Authorization Hospice     Post-Acute Placement Status Pending medical clearance/testing     Hospice Status Pending medical clearance/testing     Discharge Delays None known at this time                   Patient not stable for discharge @ this time.  SW will continue to follow patient's progress to discharge from MICU.  SW remains available for any family concerns or needs.    Misti Nicole LMSW  [facility]  X [phone]

## 2023-01-24 LAB
BACTERIA BLD CULT: ABNORMAL

## 2023-01-24 NOTE — PHYSICIAN QUERY
"PT Name: Dominique Mcgee  MR #: [id number]    DOCUMENTATION CLARIFICATION     CDS/: MAY Son, RN, CCDS              Contact information: [email or web address]  This form is a permanent document in the medical record.     Query Date: January 24, 2023    By submitting this query, we are merely seeking further clarification of documentation. Please utilize your independent clinical judgment when addressing the question(s) below.    The Medical Record contains the following:   Indicators   Supporting Clinical Findings Location in Medical Record   X AMS, Confusion,  LOC, etc.  persistent hyponatremia with encepphalopathy  Endocrine: Dr. Maldonado 12/28   X Acute/Chronic Illness Acute hypoxemic respiratory failure, HTN, hypothyroidism, moderate malnutrition, abscess of right hip bursa, hyponatremia, hypophosphatemia, severe sepsis, leukocytosis DCS: Dr. Nina 1/18    Radiology Findings     X Electrolyte Imbalance On admission, she had hypertensive urgency and was found to have Na 120.   Received IV fluids and admitted to hospital medicine. 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 Maldonado 12/28    Medication      Treatment              Other       The noted clinical guidelines are only system guidelines and do not replace the providers clinical judgment.    The National [place] of Neurologic Disorders and Stroke (NINDS) of the NIH describes encephalopathy as any diffuse disease of the brain that alters brain function or structure.    Provider, please specify the type of encephalopathy associated with above clinical findings.    [   x] Metabolic Encephalopathy - Due to electrolyte imbalance, metabolic derangements, or infectious processes, includes Septic Encephalopathy, Uremic Encephalopathy   [   ] Encephalopathy, unspecified      [   ] Other Encephalopathy (please specify): ____________________   [   ] Other neurological condition- Includes " Post-ictal altered mental status (please specify condition): __________     Please document in your progress notes daily for the duration of treatment until resolved, and include in your discharge summary.    References:  NATHANIEL Coleman RN, CCDS. (2018, June 9). Notes from the Instructor: Encephalopathy tips. Retrieved October 22, 2020, from [email or web address]    ICD-9-CM Coding Clinic First Quarter 2013, Effective with discharges: October 21, 2013 [facility] [employer] § Seizure with encephalopathy due to postictal state (2013).    ICD-10-CM/PCS [employer] Integrated Codebook (Version V.20.8.10.0) [Computer software]. (2020). Retrieved October 21, 2020.    National [place] of Neurological Disorders and Stroke. (2019, March 27). Retrieved October 22, 2020, from https://www.ninds.nih.gov/Disorders/All-Disorders/Uzrgkqyvsprtbj-Acyllohoimh-Wonz    Form No. 70105

## 2023-02-01 LAB — FUNGUS SPEC CULT: NORMAL

## 2023-02-23 LAB
ACID FAST MOD KINY STN SPEC: NORMAL
MYCOBACTERIUM SPEC QL CULT: NORMAL

## 2024-01-18 NOTE — ASSESSMENT & PLAN NOTE
Febrile to 102.9 overnight of 1/11. Continued to be febrile during the day 1/12.   -CXR, UA, BCx, Covid/flu/rsv ordered  -remove rectal seal   -remove galvez  -tylenol PRN for fevers  -central line placed 12/29. Currently being used for TPN. If BCx positive consider removal.  -have started vanc, cefepime, and fluconazole  -repeat cultures NGTD   [facility]One